# Patient Record
Sex: MALE | Race: WHITE | Employment: UNEMPLOYED | URBAN - NONMETROPOLITAN AREA
[De-identification: names, ages, dates, MRNs, and addresses within clinical notes are randomized per-mention and may not be internally consistent; named-entity substitution may affect disease eponyms.]

---

## 2017-02-16 ENCOUNTER — HOSPITAL ENCOUNTER (OUTPATIENT)
Dept: GENERAL RADIOLOGY | Age: 37
Discharge: HOME | End: 2017-02-16

## 2017-02-16 DIAGNOSIS — J06.9 ACUTE UPPER RESPIRATORY INFECTIONS OF UNSPECIFIED SITE: ICD-10-CM

## 2017-02-16 PROCEDURE — 71020 XR CHEST STANDARD TWO VW: CPT

## 2019-02-06 ENCOUNTER — HOSPITAL ENCOUNTER (INPATIENT)
Age: 39
LOS: 1 days | Discharge: HOME | DRG: 897 | End: 2019-02-07
Attending: EMERGENCY MEDICINE | Admitting: HOSPITALIST

## 2019-02-06 ENCOUNTER — APPOINTMENT (OUTPATIENT)
Dept: CT IMAGING | Age: 39
DRG: 897 | End: 2019-02-06

## 2019-02-06 DIAGNOSIS — F15.10 METHAMPHETAMINE ABUSE (HCC): ICD-10-CM

## 2019-02-06 DIAGNOSIS — F10.931 ALCOHOL WITHDRAWAL SYNDROME, WITH DELIRIUM (HCC): ICD-10-CM

## 2019-02-06 DIAGNOSIS — F29 PSYCHOSIS, UNSPECIFIED PSYCHOSIS TYPE (HCC): Primary | ICD-10-CM

## 2019-02-06 DIAGNOSIS — Z72.0 TOBACCO ABUSE: ICD-10-CM

## 2019-02-06 DIAGNOSIS — M62.82 NON-TRAUMATIC RHABDOMYOLYSIS: ICD-10-CM

## 2019-02-06 PROBLEM — F19.959 DRUG-INDUCED PSYCHOTIC DISORDER WITH COMPLICATION (HCC): Status: ACTIVE | Noted: 2019-02-06

## 2019-02-06 PROBLEM — R74.01 TRANSAMINASEMIA: Status: ACTIVE | Noted: 2019-02-06

## 2019-02-06 PROBLEM — F19.10 POLYSUBSTANCE ABUSE (HCC): Status: ACTIVE | Noted: 2019-02-06

## 2019-02-06 LAB
ACETAMINOPHEN LEVEL: <15 UG/ML
ALBUMIN SERPL-MCNC: 4.7 G/DL (ref 3.5–5.2)
ALP BLD-CCNC: 95 U/L (ref 40–130)
ALT SERPL-CCNC: 53 U/L (ref 5–41)
AMPHETAMINE SCREEN, URINE: POSITIVE
ANION GAP SERPL CALCULATED.3IONS-SCNC: 15 MMOL/L (ref 7–19)
AST SERPL-CCNC: 78 U/L (ref 5–40)
BARBITURATE SCREEN URINE: NEGATIVE
BASOPHILS ABSOLUTE: 0.1 K/UL (ref 0–0.2)
BASOPHILS RELATIVE PERCENT: 0.7 % (ref 0–1)
BENZODIAZEPINE SCREEN, URINE: NEGATIVE
BILIRUB SERPL-MCNC: 0.6 MG/DL (ref 0.2–1.2)
BILIRUBIN URINE: NEGATIVE
BLOOD, URINE: NEGATIVE
BUN BLDV-MCNC: 9 MG/DL (ref 6–20)
CALCIUM SERPL-MCNC: 9.4 MG/DL (ref 8.6–10)
CANNABINOID SCREEN URINE: NEGATIVE
CHLORIDE BLD-SCNC: 100 MMOL/L (ref 98–111)
CLARITY: CLEAR
CO2: 26 MMOL/L (ref 22–29)
COCAINE METABOLITE SCREEN URINE: NEGATIVE
COLOR: YELLOW
CREAT SERPL-MCNC: 0.8 MG/DL (ref 0.5–1.2)
EOSINOPHILS ABSOLUTE: 0.1 K/UL (ref 0–0.6)
EOSINOPHILS RELATIVE PERCENT: 0.7 % (ref 0–5)
ETHANOL: <10 MG/DL (ref 0–0.08)
GFR NON-AFRICAN AMERICAN: >60
GLUCOSE BLD-MCNC: 109 MG/DL (ref 74–109)
GLUCOSE URINE: NEGATIVE MG/DL
HAV IGM SER IA-ACNC: NORMAL
HCT VFR BLD CALC: 41.9 % (ref 42–52)
HEMOGLOBIN: 14.1 G/DL (ref 14–18)
HEPATITIS B CORE IGM ANTIBODY: NORMAL
HEPATITIS B SURFACE ANTIGEN INTERPRETATION: NORMAL
HEPATITIS C ANTIBODY INTERPRETATION: NORMAL
INR BLD: 0.94 (ref 0.88–1.18)
KETONES, URINE: NEGATIVE MG/DL
LEUKOCYTE ESTERASE, URINE: NEGATIVE
LIPASE: 30 U/L (ref 13–60)
LYMPHOCYTES ABSOLUTE: 1.1 K/UL (ref 1.1–4.5)
LYMPHOCYTES RELATIVE PERCENT: 11.2 % (ref 20–40)
Lab: ABNORMAL
MAGNESIUM: 2 MG/DL (ref 1.6–2.6)
MCH RBC QN AUTO: 32.7 PG (ref 27–31)
MCHC RBC AUTO-ENTMCNC: 33.7 G/DL (ref 33–37)
MCV RBC AUTO: 97.2 FL (ref 80–94)
MONOCYTES ABSOLUTE: 1 K/UL (ref 0–0.9)
MONOCYTES RELATIVE PERCENT: 10.1 % (ref 0–10)
NEUTROPHILS ABSOLUTE: 7.4 K/UL (ref 1.5–7.5)
NEUTROPHILS RELATIVE PERCENT: 76.8 % (ref 50–65)
NITRITE, URINE: NEGATIVE
OPIATE SCREEN URINE: NEGATIVE
PDW BLD-RTO: 12.5 % (ref 11.5–14.5)
PH UA: 6.5
PLATELET # BLD: 144 K/UL (ref 130–400)
PMV BLD AUTO: 10.3 FL (ref 9.4–12.4)
POTASSIUM SERPL-SCNC: 3.8 MMOL/L (ref 3.5–5)
PROTEIN UA: NEGATIVE MG/DL
PROTHROMBIN TIME: 12 SEC (ref 12–14.6)
RBC # BLD: 4.31 M/UL (ref 4.7–6.1)
SALICYLATE, SERUM: <3 MG/DL (ref 3–10)
SODIUM BLD-SCNC: 141 MMOL/L (ref 136–145)
SPECIFIC GRAVITY UA: 1.01
TOTAL CK: 357 U/L (ref 39–308)
TOTAL CK: 670 U/L (ref 39–308)
TOTAL PROTEIN: 8.2 G/DL (ref 6.6–8.7)
URINE REFLEX TO CULTURE: NORMAL
UROBILINOGEN, URINE: 0.2 E.U./DL
WBC # BLD: 9.6 K/UL (ref 4.8–10.8)

## 2019-02-06 PROCEDURE — 83690 ASSAY OF LIPASE: CPT

## 2019-02-06 PROCEDURE — 96375 TX/PRO/DX INJ NEW DRUG ADDON: CPT

## 2019-02-06 PROCEDURE — 82550 ASSAY OF CK (CPK): CPT

## 2019-02-06 PROCEDURE — 2500000003 HC RX 250 WO HCPCS: Performed by: FAMILY MEDICINE

## 2019-02-06 PROCEDURE — 96376 TX/PRO/DX INJ SAME DRUG ADON: CPT

## 2019-02-06 PROCEDURE — 80074 ACUTE HEPATITIS PANEL: CPT

## 2019-02-06 PROCEDURE — 6360000002 HC RX W HCPCS: Performed by: FAMILY MEDICINE

## 2019-02-06 PROCEDURE — 2100000000 HC CCU R&B

## 2019-02-06 PROCEDURE — 83735 ASSAY OF MAGNESIUM: CPT

## 2019-02-06 PROCEDURE — 96366 THER/PROPH/DIAG IV INF ADDON: CPT

## 2019-02-06 PROCEDURE — 81003 URINALYSIS AUTO W/O SCOPE: CPT

## 2019-02-06 PROCEDURE — 80307 DRUG TEST PRSMV CHEM ANLYZR: CPT

## 2019-02-06 PROCEDURE — 36415 COLL VENOUS BLD VENIPUNCTURE: CPT

## 2019-02-06 PROCEDURE — 85025 COMPLETE CBC W/AUTO DIFF WBC: CPT

## 2019-02-06 PROCEDURE — 96365 THER/PROPH/DIAG IV INF INIT: CPT

## 2019-02-06 PROCEDURE — G0480 DRUG TEST DEF 1-7 CLASSES: HCPCS

## 2019-02-06 PROCEDURE — 99223 1ST HOSP IP/OBS HIGH 75: CPT | Performed by: FAMILY MEDICINE

## 2019-02-06 PROCEDURE — 6370000000 HC RX 637 (ALT 250 FOR IP): Performed by: FAMILY MEDICINE

## 2019-02-06 PROCEDURE — 2500000003 HC RX 250 WO HCPCS: Performed by: EMERGENCY MEDICINE

## 2019-02-06 PROCEDURE — 93005 ELECTROCARDIOGRAM TRACING: CPT

## 2019-02-06 PROCEDURE — 6370000000 HC RX 637 (ALT 250 FOR IP): Performed by: EMERGENCY MEDICINE

## 2019-02-06 PROCEDURE — 6360000002 HC RX W HCPCS: Performed by: EMERGENCY MEDICINE

## 2019-02-06 PROCEDURE — 2580000003 HC RX 258: Performed by: FAMILY MEDICINE

## 2019-02-06 PROCEDURE — 99291 CRITICAL CARE FIRST HOUR: CPT | Performed by: EMERGENCY MEDICINE

## 2019-02-06 PROCEDURE — 85610 PROTHROMBIN TIME: CPT

## 2019-02-06 PROCEDURE — 99285 EMERGENCY DEPT VISIT HI MDM: CPT

## 2019-02-06 PROCEDURE — 2580000003 HC RX 258: Performed by: EMERGENCY MEDICINE

## 2019-02-06 PROCEDURE — 70450 CT HEAD/BRAIN W/O DYE: CPT

## 2019-02-06 PROCEDURE — 80053 COMPREHEN METABOLIC PANEL: CPT

## 2019-02-06 RX ORDER — ONDANSETRON 2 MG/ML
4 INJECTION INTRAMUSCULAR; INTRAVENOUS EVERY 6 HOURS PRN
Status: DISCONTINUED | OUTPATIENT
Start: 2019-02-06 | End: 2019-02-07 | Stop reason: HOSPADM

## 2019-02-06 RX ORDER — LORAZEPAM 1 MG/1
4 TABLET ORAL
Status: DISCONTINUED | OUTPATIENT
Start: 2019-02-06 | End: 2019-02-07 | Stop reason: HOSPADM

## 2019-02-06 RX ORDER — LORAZEPAM 2 MG/ML
3 INJECTION INTRAMUSCULAR
Status: DISCONTINUED | OUTPATIENT
Start: 2019-02-06 | End: 2019-02-07 | Stop reason: HOSPADM

## 2019-02-06 RX ORDER — OLANZAPINE 10 MG/1
5 TABLET, ORALLY DISINTEGRATING ORAL ONCE
Status: COMPLETED | OUTPATIENT
Start: 2019-02-06 | End: 2019-02-06

## 2019-02-06 RX ORDER — LORAZEPAM 1 MG/1
1 TABLET ORAL
Status: DISCONTINUED | OUTPATIENT
Start: 2019-02-06 | End: 2019-02-07 | Stop reason: HOSPADM

## 2019-02-06 RX ORDER — LORAZEPAM 2 MG/ML
2 INJECTION INTRAMUSCULAR ONCE
Status: COMPLETED | OUTPATIENT
Start: 2019-02-06 | End: 2019-02-06

## 2019-02-06 RX ORDER — ACETAMINOPHEN 325 MG/1
650 TABLET ORAL EVERY 6 HOURS PRN
Status: DISCONTINUED | OUTPATIENT
Start: 2019-02-06 | End: 2019-02-07 | Stop reason: HOSPADM

## 2019-02-06 RX ORDER — LORAZEPAM 1 MG/1
3 TABLET ORAL
Status: DISCONTINUED | OUTPATIENT
Start: 2019-02-06 | End: 2019-02-07 | Stop reason: HOSPADM

## 2019-02-06 RX ORDER — LORAZEPAM 2 MG/ML
2 INJECTION INTRAMUSCULAR
Status: DISCONTINUED | OUTPATIENT
Start: 2019-02-06 | End: 2019-02-07 | Stop reason: HOSPADM

## 2019-02-06 RX ORDER — 0.9 % SODIUM CHLORIDE 0.9 %
1000 INTRAVENOUS SOLUTION INTRAVENOUS ONCE
Status: COMPLETED | OUTPATIENT
Start: 2019-02-06 | End: 2019-02-06

## 2019-02-06 RX ORDER — SODIUM CHLORIDE 0.9 % (FLUSH) 0.9 %
10 SYRINGE (ML) INJECTION EVERY 12 HOURS SCHEDULED
Status: DISCONTINUED | OUTPATIENT
Start: 2019-02-06 | End: 2019-02-07 | Stop reason: HOSPADM

## 2019-02-06 RX ORDER — LORAZEPAM 2 MG/ML
1 INJECTION INTRAMUSCULAR
Status: DISCONTINUED | OUTPATIENT
Start: 2019-02-06 | End: 2019-02-07 | Stop reason: HOSPADM

## 2019-02-06 RX ORDER — SODIUM CHLORIDE 0.9 % (FLUSH) 0.9 %
10 SYRINGE (ML) INJECTION PRN
Status: DISCONTINUED | OUTPATIENT
Start: 2019-02-06 | End: 2019-02-07 | Stop reason: HOSPADM

## 2019-02-06 RX ORDER — SODIUM CHLORIDE 9 MG/ML
INJECTION, SOLUTION INTRAVENOUS CONTINUOUS
Status: DISCONTINUED | OUTPATIENT
Start: 2019-02-06 | End: 2019-02-07 | Stop reason: HOSPADM

## 2019-02-06 RX ORDER — NICOTINE 21 MG/24HR
1 PATCH, TRANSDERMAL 24 HOURS TRANSDERMAL DAILY
Status: DISCONTINUED | OUTPATIENT
Start: 2019-02-06 | End: 2019-02-07 | Stop reason: HOSPADM

## 2019-02-06 RX ORDER — LORAZEPAM 1 MG/1
2 TABLET ORAL
Status: DISCONTINUED | OUTPATIENT
Start: 2019-02-06 | End: 2019-02-07 | Stop reason: HOSPADM

## 2019-02-06 RX ORDER — LORAZEPAM 2 MG/ML
4 INJECTION INTRAMUSCULAR
Status: DISCONTINUED | OUTPATIENT
Start: 2019-02-06 | End: 2019-02-07 | Stop reason: HOSPADM

## 2019-02-06 RX ADMIN — FOLIC ACID: 5 INJECTION, SOLUTION INTRAMUSCULAR; INTRAVENOUS; SUBCUTANEOUS at 14:11

## 2019-02-06 RX ADMIN — Medication 10 ML: at 21:29

## 2019-02-06 RX ADMIN — FAMOTIDINE 20 MG: 10 INJECTION, SOLUTION INTRAVENOUS at 21:29

## 2019-02-06 RX ADMIN — FOLIC ACID: 5 INJECTION, SOLUTION INTRAMUSCULAR; INTRAVENOUS; SUBCUTANEOUS at 10:04

## 2019-02-06 RX ADMIN — LORAZEPAM 2 MG: 2 INJECTION INTRAMUSCULAR; INTRAVENOUS at 08:56

## 2019-02-06 RX ADMIN — LORAZEPAM 2 MG: 2 INJECTION INTRAMUSCULAR; INTRAVENOUS at 18:31

## 2019-02-06 RX ADMIN — OLANZAPINE 5 MG: 10 TABLET, ORALLY DISINTEGRATING ORAL at 08:56

## 2019-02-06 RX ADMIN — LORAZEPAM 2 MG: 2 INJECTION INTRAMUSCULAR; INTRAVENOUS at 14:17

## 2019-02-06 RX ADMIN — LORAZEPAM 2 MG: 2 INJECTION INTRAMUSCULAR; INTRAVENOUS at 08:35

## 2019-02-06 RX ADMIN — SODIUM CHLORIDE 1000 ML: 9 INJECTION, SOLUTION INTRAVENOUS at 08:40

## 2019-02-06 RX ADMIN — SODIUM CHLORIDE: 9 INJECTION, SOLUTION INTRAVENOUS at 12:56

## 2019-02-06 RX ADMIN — LORAZEPAM 2 MG: 2 INJECTION INTRAMUSCULAR; INTRAVENOUS at 10:04

## 2019-02-06 RX ADMIN — FAMOTIDINE 20 MG: 10 INJECTION, SOLUTION INTRAVENOUS at 12:59

## 2019-02-06 RX ADMIN — ENOXAPARIN SODIUM 40 MG: 40 INJECTION SUBCUTANEOUS at 13:00

## 2019-02-06 ASSESSMENT — PAIN SCALES - GENERAL: PAINLEVEL_OUTOF10: 0

## 2019-02-07 VITALS
HEIGHT: 69 IN | WEIGHT: 164.2 LBS | OXYGEN SATURATION: 93 % | RESPIRATION RATE: 15 BRPM | SYSTOLIC BLOOD PRESSURE: 130 MMHG | TEMPERATURE: 99.3 F | DIASTOLIC BLOOD PRESSURE: 90 MMHG | HEART RATE: 93 BPM | BODY MASS INDEX: 24.32 KG/M2

## 2019-02-07 PROBLEM — M62.82 NON-TRAUMATIC RHABDOMYOLYSIS: Status: RESOLVED | Noted: 2019-02-06 | Resolved: 2019-02-07

## 2019-02-07 PROBLEM — F10.931 ALCOHOL WITHDRAWAL SYNDROME, WITH DELIRIUM (HCC): Status: RESOLVED | Noted: 2019-02-06 | Resolved: 2019-02-07

## 2019-02-07 PROBLEM — F19.959 DRUG-INDUCED PSYCHOTIC DISORDER WITH COMPLICATION (HCC): Status: RESOLVED | Noted: 2019-02-06 | Resolved: 2019-02-07

## 2019-02-07 LAB
TOTAL CK: 180 U/L (ref 39–308)
TOTAL CK: 227 U/L (ref 39–308)
TOTAL CK: 270 U/L (ref 39–308)

## 2019-02-07 PROCEDURE — 36415 COLL VENOUS BLD VENIPUNCTURE: CPT

## 2019-02-07 PROCEDURE — 2580000003 HC RX 258: Performed by: FAMILY MEDICINE

## 2019-02-07 PROCEDURE — 6360000002 HC RX W HCPCS: Performed by: FAMILY MEDICINE

## 2019-02-07 PROCEDURE — 99239 HOSP IP/OBS DSCHRG MGMT >30: CPT | Performed by: HOSPITALIST

## 2019-02-07 PROCEDURE — 2500000003 HC RX 250 WO HCPCS: Performed by: FAMILY MEDICINE

## 2019-02-07 PROCEDURE — 99253 IP/OBS CNSLTJ NEW/EST LOW 45: CPT | Performed by: PSYCHIATRY & NEUROLOGY

## 2019-02-07 PROCEDURE — 6370000000 HC RX 637 (ALT 250 FOR IP): Performed by: FAMILY MEDICINE

## 2019-02-07 PROCEDURE — 82550 ASSAY OF CK (CPK): CPT

## 2019-02-07 RX ORDER — NALTREXONE HYDROCHLORIDE 50 MG/1
50 TABLET, FILM COATED ORAL
Status: DISCONTINUED | OUTPATIENT
Start: 2019-02-08 | End: 2019-02-07 | Stop reason: HOSPADM

## 2019-02-07 RX ADMIN — Medication 10 ML: at 09:02

## 2019-02-07 RX ADMIN — FAMOTIDINE 20 MG: 10 INJECTION, SOLUTION INTRAVENOUS at 09:01

## 2019-02-07 RX ADMIN — FOLIC ACID: 5 INJECTION, SOLUTION INTRAMUSCULAR; INTRAVENOUS; SUBCUTANEOUS at 09:01

## 2019-02-07 ASSESSMENT — PAIN SCALES - GENERAL
PAINLEVEL_OUTOF10: 0

## 2019-02-08 LAB
EKG P AXIS: 73 DEGREES
EKG P-R INTERVAL: 150 MS
EKG Q-T INTERVAL: 322 MS
EKG QRS DURATION: 92 MS
EKG QTC CALCULATION (BAZETT): 427 MS
EKG T AXIS: 51 DEGREES

## 2019-04-21 ENCOUNTER — HOSPITAL ENCOUNTER (OUTPATIENT)
Age: 39
Setting detail: OBSERVATION
Discharge: HOME | End: 2019-04-22
Attending: EMERGENCY MEDICINE | Admitting: FAMILY MEDICINE

## 2019-04-21 DIAGNOSIS — F10.920 ACUTE ALCOHOLIC INTOXICATION WITHOUT COMPLICATION (HCC): ICD-10-CM

## 2019-04-21 DIAGNOSIS — R45.851 SUICIDAL IDEATION: Primary | ICD-10-CM

## 2019-04-21 LAB
ACETAMINOPHEN LEVEL: <15 UG/ML
ALBUMIN SERPL-MCNC: 4.6 G/DL (ref 3.5–5.2)
ALP BLD-CCNC: 104 U/L (ref 40–130)
ALT SERPL-CCNC: 156 U/L (ref 5–41)
AMPHETAMINE SCREEN, URINE: NEGATIVE
ANION GAP SERPL CALCULATED.3IONS-SCNC: 17 MMOL/L (ref 7–19)
AST SERPL-CCNC: 167 U/L (ref 5–40)
BARBITURATE SCREEN URINE: NEGATIVE
BASOPHILS ABSOLUTE: 0.1 K/UL (ref 0–0.2)
BASOPHILS RELATIVE PERCENT: 1.3 % (ref 0–1)
BENZODIAZEPINE SCREEN, URINE: NEGATIVE
BILIRUB SERPL-MCNC: 0.3 MG/DL (ref 0.2–1.2)
BUN BLDV-MCNC: 14 MG/DL (ref 6–20)
CALCIUM SERPL-MCNC: 9.3 MG/DL (ref 8.6–10)
CANNABINOID SCREEN URINE: NEGATIVE
CHLORIDE BLD-SCNC: 102 MMOL/L (ref 98–111)
CO2: 24 MMOL/L (ref 22–29)
COCAINE METABOLITE SCREEN URINE: NEGATIVE
CREAT SERPL-MCNC: 0.6 MG/DL (ref 0.5–1.2)
EOSINOPHILS ABSOLUTE: 0 K/UL (ref 0–0.6)
EOSINOPHILS RELATIVE PERCENT: 0.3 % (ref 0–5)
ETHANOL: 320 MG/DL (ref 0–0.08)
GFR NON-AFRICAN AMERICAN: >60
GLUCOSE BLD-MCNC: 127 MG/DL (ref 74–109)
HCT VFR BLD CALC: 46 % (ref 42–52)
HEMOGLOBIN: 15.8 G/DL (ref 14–18)
LYMPHOCYTES ABSOLUTE: 1.5 K/UL (ref 1.1–4.5)
LYMPHOCYTES RELATIVE PERCENT: 24.2 % (ref 20–40)
Lab: NORMAL
MCH RBC QN AUTO: 33.9 PG (ref 27–31)
MCHC RBC AUTO-ENTMCNC: 34.3 G/DL (ref 33–37)
MCV RBC AUTO: 98.7 FL (ref 80–94)
MONOCYTES ABSOLUTE: 0.4 K/UL (ref 0–0.9)
MONOCYTES RELATIVE PERCENT: 6.6 % (ref 0–10)
NEUTROPHILS ABSOLUTE: 4.3 K/UL (ref 1.5–7.5)
NEUTROPHILS RELATIVE PERCENT: 67.3 % (ref 50–65)
OPIATE SCREEN URINE: NEGATIVE
PDW BLD-RTO: 13.2 % (ref 11.5–14.5)
PLATELET # BLD: 163 K/UL (ref 130–400)
PMV BLD AUTO: 9.8 FL (ref 9.4–12.4)
POTASSIUM SERPL-SCNC: 3.7 MMOL/L (ref 3.5–5)
RBC # BLD: 4.66 M/UL (ref 4.7–6.1)
SALICYLATE, SERUM: <3 MG/DL (ref 3–10)
SODIUM BLD-SCNC: 143 MMOL/L (ref 136–145)
TOTAL PROTEIN: 8.1 G/DL (ref 6.6–8.7)
WBC # BLD: 6.3 K/UL (ref 4.8–10.8)

## 2019-04-21 PROCEDURE — 99285 EMERGENCY DEPT VISIT HI MDM: CPT

## 2019-04-21 PROCEDURE — 6360000002 HC RX W HCPCS: Performed by: EMERGENCY MEDICINE

## 2019-04-21 PROCEDURE — 99285 EMERGENCY DEPT VISIT HI MDM: CPT | Performed by: EMERGENCY MEDICINE

## 2019-04-21 PROCEDURE — 85025 COMPLETE CBC W/AUTO DIFF WBC: CPT

## 2019-04-21 PROCEDURE — G0480 DRUG TEST DEF 1-7 CLASSES: HCPCS

## 2019-04-21 PROCEDURE — G0378 HOSPITAL OBSERVATION PER HR: HCPCS

## 2019-04-21 PROCEDURE — 36415 COLL VENOUS BLD VENIPUNCTURE: CPT

## 2019-04-21 PROCEDURE — 80053 COMPREHEN METABOLIC PANEL: CPT

## 2019-04-21 PROCEDURE — 2580000003 HC RX 258: Performed by: EMERGENCY MEDICINE

## 2019-04-21 PROCEDURE — 83735 ASSAY OF MAGNESIUM: CPT

## 2019-04-21 PROCEDURE — 2500000003 HC RX 250 WO HCPCS: Performed by: EMERGENCY MEDICINE

## 2019-04-21 PROCEDURE — 6360000002 HC RX W HCPCS: Performed by: INTERNAL MEDICINE

## 2019-04-21 PROCEDURE — 99219 PR INITIAL OBSERVATION CARE/DAY 50 MINUTES: CPT | Performed by: INTERNAL MEDICINE

## 2019-04-21 PROCEDURE — 96374 THER/PROPH/DIAG INJ IV PUSH: CPT

## 2019-04-21 PROCEDURE — 80307 DRUG TEST PRSMV CHEM ANLYZR: CPT

## 2019-04-21 RX ORDER — THIAMINE MONONITRATE (VIT B1) 100 MG
100 TABLET ORAL DAILY
Status: DISCONTINUED | OUTPATIENT
Start: 2019-04-22 | End: 2019-04-22 | Stop reason: HOSPADM

## 2019-04-21 RX ORDER — LORAZEPAM 1 MG/1
1 TABLET ORAL
Status: DISCONTINUED | OUTPATIENT
Start: 2019-04-21 | End: 2019-04-22 | Stop reason: HOSPADM

## 2019-04-21 RX ORDER — LORAZEPAM 2 MG/ML
3 INJECTION INTRAMUSCULAR
Status: DISCONTINUED | OUTPATIENT
Start: 2019-04-21 | End: 2019-04-22 | Stop reason: HOSPADM

## 2019-04-21 RX ORDER — SODIUM CHLORIDE 9 MG/ML
INJECTION, SOLUTION INTRAVENOUS CONTINUOUS
Status: DISCONTINUED | OUTPATIENT
Start: 2019-04-22 | End: 2019-04-22 | Stop reason: HOSPADM

## 2019-04-21 RX ORDER — ONDANSETRON 2 MG/ML
4 INJECTION INTRAMUSCULAR; INTRAVENOUS EVERY 6 HOURS PRN
Status: DISCONTINUED | OUTPATIENT
Start: 2019-04-21 | End: 2019-04-22 | Stop reason: HOSPADM

## 2019-04-21 RX ORDER — LORAZEPAM 1 MG/1
2 TABLET ORAL
Status: DISCONTINUED | OUTPATIENT
Start: 2019-04-21 | End: 2019-04-22 | Stop reason: HOSPADM

## 2019-04-21 RX ORDER — LORAZEPAM 2 MG/ML
1 INJECTION INTRAMUSCULAR
Status: DISCONTINUED | OUTPATIENT
Start: 2019-04-21 | End: 2019-04-22 | Stop reason: HOSPADM

## 2019-04-21 RX ORDER — LORAZEPAM 2 MG/ML
INJECTION INTRAMUSCULAR
Status: DISPENSED
Start: 2019-04-21 | End: 2019-04-22

## 2019-04-21 RX ORDER — LORAZEPAM 1 MG/1
3 TABLET ORAL
Status: DISCONTINUED | OUTPATIENT
Start: 2019-04-21 | End: 2019-04-22 | Stop reason: HOSPADM

## 2019-04-21 RX ORDER — LORAZEPAM 1 MG/1
4 TABLET ORAL
Status: DISCONTINUED | OUTPATIENT
Start: 2019-04-21 | End: 2019-04-22 | Stop reason: HOSPADM

## 2019-04-21 RX ORDER — FOLIC ACID 1 MG/1
1 TABLET ORAL DAILY
Status: DISCONTINUED | OUTPATIENT
Start: 2019-04-22 | End: 2019-04-22 | Stop reason: HOSPADM

## 2019-04-21 RX ORDER — LORAZEPAM 2 MG/ML
2 INJECTION INTRAMUSCULAR
Status: DISCONTINUED | OUTPATIENT
Start: 2019-04-21 | End: 2019-04-22 | Stop reason: HOSPADM

## 2019-04-21 RX ORDER — LORAZEPAM 2 MG/ML
4 INJECTION INTRAMUSCULAR
Status: DISCONTINUED | OUTPATIENT
Start: 2019-04-21 | End: 2019-04-22 | Stop reason: HOSPADM

## 2019-04-21 RX ORDER — ACETAMINOPHEN 325 MG/1
325 TABLET ORAL EVERY 8 HOURS PRN
Status: DISCONTINUED | OUTPATIENT
Start: 2019-04-21 | End: 2019-04-22 | Stop reason: HOSPADM

## 2019-04-21 RX ADMIN — FOLIC ACID: 5 INJECTION, SOLUTION INTRAMUSCULAR; INTRAVENOUS; SUBCUTANEOUS at 21:50

## 2019-04-21 RX ADMIN — LORAZEPAM 3 MG: 2 INJECTION INTRAMUSCULAR; INTRAVENOUS at 23:57

## 2019-04-21 ASSESSMENT — ENCOUNTER SYMPTOMS
COUGH: 0
VOMITING: 0
DIARRHEA: 0
ABDOMINAL PAIN: 0
NAUSEA: 0
SHORTNESS OF BREATH: 0
RHINORRHEA: 0
SORE THROAT: 0
BACK PAIN: 0

## 2019-04-22 VITALS
SYSTOLIC BLOOD PRESSURE: 135 MMHG | RESPIRATION RATE: 20 BRPM | TEMPERATURE: 99.7 F | BODY MASS INDEX: 21.69 KG/M2 | WEIGHT: 151.5 LBS | DIASTOLIC BLOOD PRESSURE: 83 MMHG | OXYGEN SATURATION: 96 % | HEIGHT: 70 IN | HEART RATE: 79 BPM

## 2019-04-22 PROBLEM — F10.10 ALCOHOL ABUSE: Status: ACTIVE | Noted: 2019-04-22

## 2019-04-22 PROBLEM — F10.20 ALCOHOL ABUSE WITH PHYSIOLOGICAL DEPENDENCE (HCC): Status: ACTIVE | Noted: 2019-04-22

## 2019-04-22 PROBLEM — D53.9 MACROCYTIC ANEMIA: Status: ACTIVE | Noted: 2019-04-22

## 2019-04-22 LAB
ALBUMIN SERPL-MCNC: 3.9 G/DL (ref 3.5–5.2)
ALP BLD-CCNC: 82 U/L (ref 40–130)
ALT SERPL-CCNC: 120 U/L (ref 5–41)
ANION GAP SERPL CALCULATED.3IONS-SCNC: 14 MMOL/L (ref 7–19)
AST SERPL-CCNC: 135 U/L (ref 5–40)
BASOPHILS ABSOLUTE: 0.1 K/UL (ref 0–0.2)
BASOPHILS RELATIVE PERCENT: 1.7 % (ref 0–1)
BILIRUB SERPL-MCNC: 0.4 MG/DL (ref 0.2–1.2)
BUN BLDV-MCNC: 12 MG/DL (ref 6–20)
CALCIUM SERPL-MCNC: 8.7 MG/DL (ref 8.6–10)
CHLORIDE BLD-SCNC: 103 MMOL/L (ref 98–111)
CO2: 28 MMOL/L (ref 22–29)
CREAT SERPL-MCNC: 0.6 MG/DL (ref 0.5–1.2)
EOSINOPHILS ABSOLUTE: 0.1 K/UL (ref 0–0.6)
EOSINOPHILS RELATIVE PERCENT: 2.9 % (ref 0–5)
GFR NON-AFRICAN AMERICAN: >60
GLUCOSE BLD-MCNC: 90 MG/DL (ref 74–109)
HCT VFR BLD CALC: 37.8 % (ref 42–52)
HEMOGLOBIN: 13 G/DL (ref 14–18)
LYMPHOCYTES ABSOLUTE: 1.9 K/UL (ref 1.1–4.5)
LYMPHOCYTES RELATIVE PERCENT: 39.7 % (ref 20–40)
MAGNESIUM: 1.8 MG/DL (ref 1.6–2.6)
MAGNESIUM: 2 MG/DL (ref 1.6–2.6)
MCH RBC QN AUTO: 34 PG (ref 27–31)
MCHC RBC AUTO-ENTMCNC: 34.4 G/DL (ref 33–37)
MCV RBC AUTO: 99 FL (ref 80–94)
MONOCYTES ABSOLUTE: 0.7 K/UL (ref 0–0.9)
MONOCYTES RELATIVE PERCENT: 14.3 % (ref 0–10)
NEUTROPHILS ABSOLUTE: 2 K/UL (ref 1.5–7.5)
NEUTROPHILS RELATIVE PERCENT: 41.2 % (ref 50–65)
PDW BLD-RTO: 13.2 % (ref 11.5–14.5)
PHOSPHORUS: 2.7 MG/DL (ref 2.5–4.5)
PLATELET # BLD: 131 K/UL (ref 130–400)
PMV BLD AUTO: 10.3 FL (ref 9.4–12.4)
POTASSIUM REFLEX MAGNESIUM: 3.5 MMOL/L (ref 3.5–5)
RBC # BLD: 3.82 M/UL (ref 4.7–6.1)
SODIUM BLD-SCNC: 145 MMOL/L (ref 136–145)
TOTAL PROTEIN: 6.5 G/DL (ref 6.6–8.7)
WBC # BLD: 4.8 K/UL (ref 4.8–10.8)

## 2019-04-22 PROCEDURE — 2580000003 HC RX 258: Performed by: INTERNAL MEDICINE

## 2019-04-22 PROCEDURE — 85025 COMPLETE CBC W/AUTO DIFF WBC: CPT

## 2019-04-22 PROCEDURE — 96372 THER/PROPH/DIAG INJ SC/IM: CPT

## 2019-04-22 PROCEDURE — 6370000000 HC RX 637 (ALT 250 FOR IP): Performed by: INTERNAL MEDICINE

## 2019-04-22 PROCEDURE — 6360000002 HC RX W HCPCS: Performed by: INTERNAL MEDICINE

## 2019-04-22 PROCEDURE — 80053 COMPREHEN METABOLIC PANEL: CPT

## 2019-04-22 PROCEDURE — 36415 COLL VENOUS BLD VENIPUNCTURE: CPT

## 2019-04-22 PROCEDURE — 99226 PR SBSQ OBSERVATION CARE/DAY 35 MINUTES: CPT | Performed by: FAMILY MEDICINE

## 2019-04-22 PROCEDURE — 84100 ASSAY OF PHOSPHORUS: CPT

## 2019-04-22 PROCEDURE — G0378 HOSPITAL OBSERVATION PER HR: HCPCS

## 2019-04-22 PROCEDURE — 6370000000 HC RX 637 (ALT 250 FOR IP): Performed by: FAMILY MEDICINE

## 2019-04-22 PROCEDURE — 99243 OFF/OP CNSLTJ NEW/EST LOW 30: CPT | Performed by: PSYCHIATRY & NEUROLOGY

## 2019-04-22 PROCEDURE — 83735 ASSAY OF MAGNESIUM: CPT

## 2019-04-22 RX ORDER — CHLORDIAZEPOXIDE HYDROCHLORIDE 10 MG/1
10 CAPSULE, GELATIN COATED ORAL 3 TIMES DAILY
Qty: 9 CAPSULE | Refills: 0 | Status: SHIPPED | OUTPATIENT
Start: 2019-04-22 | End: 2019-04-25

## 2019-04-22 RX ORDER — NICOTINE 21 MG/24HR
1 PATCH, TRANSDERMAL 24 HOURS TRANSDERMAL DAILY
Status: DISCONTINUED | OUTPATIENT
Start: 2019-04-22 | End: 2019-04-22 | Stop reason: HOSPADM

## 2019-04-22 RX ORDER — NICOTINE 21 MG/24HR
1 PATCH, TRANSDERMAL 24 HOURS TRANSDERMAL DAILY
Qty: 30 PATCH | Refills: 0 | Status: SHIPPED | OUTPATIENT
Start: 2019-04-23 | End: 2020-04-25

## 2019-04-22 RX ORDER — FOLIC ACID 1 MG/1
1 TABLET ORAL DAILY
Qty: 30 TABLET | Refills: 0 | Status: ON HOLD | OUTPATIENT
Start: 2019-04-23 | End: 2020-04-25

## 2019-04-22 RX ORDER — LANOLIN ALCOHOL/MO/W.PET/CERES
100 CREAM (GRAM) TOPICAL DAILY
Qty: 30 TABLET | Refills: 0 | Status: SHIPPED | OUTPATIENT
Start: 2019-04-23 | End: 2020-04-25

## 2019-04-22 RX ADMIN — SODIUM CHLORIDE: 9 INJECTION, SOLUTION INTRAVENOUS at 15:49

## 2019-04-22 RX ADMIN — LORAZEPAM 1 MG: 1 TABLET ORAL at 13:28

## 2019-04-22 RX ADMIN — SODIUM CHLORIDE: 9 INJECTION, SOLUTION INTRAVENOUS at 02:12

## 2019-04-22 RX ADMIN — Medication 100 MG: at 09:47

## 2019-04-22 RX ADMIN — LORAZEPAM 1 MG: 1 TABLET ORAL at 09:47

## 2019-04-22 RX ADMIN — ONDANSETRON 4 MG: 2 INJECTION INTRAMUSCULAR; INTRAVENOUS at 09:47

## 2019-04-22 RX ADMIN — FOLIC ACID 1 MG: 1 TABLET ORAL at 09:47

## 2019-04-22 RX ADMIN — ENOXAPARIN SODIUM 40 MG: 40 INJECTION SUBCUTANEOUS at 09:54

## 2019-04-22 ASSESSMENT — ENCOUNTER SYMPTOMS
HEMOPTYSIS: 0
PHOTOPHOBIA: 0
DOUBLE VISION: 0
BACK PAIN: 0
SPUTUM PRODUCTION: 0
BLURRED VISION: 0
VOMITING: 0
COUGH: 0
ORTHOPNEA: 0
DIARRHEA: 0
HEARTBURN: 0
EYE PAIN: 0
NAUSEA: 0
ABDOMINAL PAIN: 0

## 2019-04-22 NOTE — BH NOTE
SITTER 1:1  C-SSRS COMPLETED    Patient denies SI, HI, AVH. Reports he has never attempted suicide. Drinking - mother, (in room) \"reported he wanted to die, had not eaten in 3 days, wanted to lay there and drink himself to death. Had no intention of eating or doing anything except get off the couch to get another 5th of whiskey. He agreed to come to hospital\"  Drinks daily, 5th of whiskey, denies thoughts of wanting to go to sleep and not wake up (mother frowns)  Reports he is having financial problems, relationship issues. 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 Does not see anyone for depression, which he denies - Mother says \"you know you are depresse\" he replies \"Drinking makes me depressed\". No psychiatric hospitalizations. Shai Mccain: [phone]. Spoke with Dr. Odom July, continue sitter until Dr. Odom July visits patient.

## 2019-04-22 NOTE — PROGRESS NOTES
Pt dc in the company of his mother to his private residence in a private vehicle. Pt received information for [employer] and for [employer] should he decide he would like to utilize these services. Pt verbalized understanding and stated that he was willing to stop drinking. Pt received AVS and his script and was educated. Pt and mother tolerated.  Electronically signed by Ramirez Stuart RN on 4/22/2019 at 5:56 PM
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
## 2019-04-22 NOTE — PROGRESS NOTES
4 Eyes Skin Assessment    Ochoa Spain is being assessed upon: Admission    I agree that I, [address], along with Deann Puentes RN (either 2 RN's or 1 LPN and 1 RN) have performed a thorough Head to Toe Skin Assessment on the patient. ALL assessment sites listed below have been assessed. Areas assessed by both nurses:     [x]   Head, Face, and Ears   [x]   Shoulders, Back, and Chest  [x]   Arms, Elbows, and Hands   [x]   Coccyx, Sacrum, and Ischium  [x]   Legs, Feet, and Heels    Does the Patient have Skin Breakdown?  No    Peter Prevention initiated: Yes  Wound Care Orders initiated: No    WO nurse consulted for Pressure Injury (Stage 3,4, Unstageable, DTI, NWPT, and Complex wounds) and New or Established Ostomies: No        Primary Nurse eSignature: [address], RN on 4/22/2019 at 4:09 AM      Co-Signer eSignature: {Esignature:[id number]}

## 2019-04-22 NOTE — PROGRESS NOTES
Pt was updated as to his care. He was told that psych physician would be here to see him at some point today. Pt and mother tolerated information.  Pt is calm and cooperative and denied having any further questions at this time Electronically signed by Sarah Olivo RN on 4/22/2019 at 3:04 PM

## 2019-04-22 NOTE — CONSULTS
[facility]  Psychiatry Consult    Reason for Consult: Concern suicidal ideations    The primary source(s) of information include(s):  Patient and his mother    The patient is a 34 y.o. male with previous psychiatric history of alcohol use disorder and stimulant use disorder, who has been admitted to medical services secondary to alcohol intoxication, blood alcohol level was 320 at time of admission, and suicidal ideations. Patient has been seen in his room with presence of one-to-one sitter at patient's mother. Patient has been admitted for same reason as at the same time 2 months ago. She stated that after the discharge from the hospital he was doing well for 1-1/2 months was not drinking alcohol or using any illicit drugs. Patient's reported multiple recent life stresses. Stated that he has been laid off from his job in [employer] and currently he does not have any income. Also, his girlfriend broke up with him. Even patient stated that it was \"mutual\" separation, he relapsed in drinking alcohol and was drinking fifths of vodka daily during the 4 days before his admission. Patient stated that she started to feel depressed, had suicidal ideations and decided to come to hospital and ask for help. Patient stated that today he feels anxious, denies any feeling of depression, endorses withdrawal symptoms from alcohol - dizziness, hand tremor, body shakes, headache, increased irritability and anxiety, abdominal discomfort. Also, during the interview patient will decrease level of concentration, had difficulty to process information. He stated that his brother currently in his mother's house, locked himself in the basement and threatened to commit suicide. Patient stated that his anxiety and decrease concentration is related to worry about his brother's life. Patient denies any other affective symptomatology as this time. He denies any suicidal or homicidal ideations, denies any plans. Also patient denies any auditory and visual hallucinations. Patient's mother, who lives next door from the patient, confirmed all information which has been provided by patient. She expresses the same worry as her son about well-being of another son, who is currently in her house and threatens to commit suicide. PSYCHIATRIC HISTORY:  Diagnoses:  alcohol use disorder, stimulants use disorder  Suicide attempts/gestures: Denies   Prior hospitalizations: 2 months ago to medical services with the same presentation as at present time   Medication trials: Denies   Mental health contact: Denies   Head trauma: Denies    Allergies:  Patient has no known allergies. Social History:  Smoking - 1.5 PPD for 20 years  Alcohol - started drinking at age 11years old. Patient stated that he started drinking heavily at age 23years old. He denies any history of seizures, DTs when was coming off of alcohol. Patient denies any history of blackouts. He denies history of previous detox or rehabilitation treatments. Patient also denies history of medication assisted treatments for alcohol use disorder. Illicit drugs - stated that he smokes marijuana once every 3 months. Reported that he smokes marijuana last time  approximately 5 months ago. History of using methamphetamine and cocaine. Mental Status  Appearance: Appropriately groomed and in hospital attire. Made good eye contact. Behavior: Anxious, cooperative, and socially appropriate. Mild psychomotor agitation appreciated. Speech: Normal in tone, volume, and quality. No slurring, dysarthria or   pressured speech noted. Mood: \"fine\"   Affect: Mood congruent. Range is flat. Thought Process: Appears linear and goal oriented. Thought Content: Patient does not have any current active suicidal and   homicidal ideations. No overt delusions or paranoia appreciated. Perceptions: Seems patient does not have any auditory or visual hallucinations at present time.  Patient did not appear to be responding to internal stimuli. No overt psychosis. Executive Functions: Appear mildly impaired. Concentration: Decreased. Reasoning: Appears impaired based on interaction from interview   Orientation: to person, place, date, and situation. Alert. Language: Intact. Fund of information: Intact. Memory: recent and remote appear intact. Impulsivity: Limited. Neurovegitative: Fair appetite, good sleep. Insight: Impaired. Judgment: Fair. Assessment  DSM-IV DIAGNOSIS:  Alcohol use disorder, severe, dependence  Substance-induced mood disorder    Recommendations  1. Currently patient is not suicidal, homicidal or psychotic. He does not meet criteria for psychiatric hospitalization  2. Patient is competent at this time. 3. Patient does not present imminent danger to self or others. 4. Recommended to discontinue one-on-one sitter. 5. Patient can be discharged home when his detox treatment is done and he is medically stable. Thank you for your consult.     General Florencio MD

## 2019-04-22 NOTE — ED PROVIDER NOTES
[facility] [address]  eMERGENCY dEPARTMENT eNCOUnter      Pt Name: Mony Cobian  MRN: [id number]  [place] 1980  Date of evaluation: 4/21/2019  Provider: Chacha Kaufman MD    CHIEF COMPLAINT       Chief Complaint   Patient presents with    Mental Health Problem         HISTORY OF PRESENT ILLNESS   (Location/Symptom, Timing/Onset,Context/Setting, Quality, Duration, Modifying Factors, Severity)  Note limiting factors. Mony Cobian is a 34 y.o. male who presents to the emergency department for mental health evaluation. The patient admits to drinking a little more than a fifth of whiskey today. He states he is depressed and having suicidal thoughtsof specific plans. Denies homicidal ideation delusions hallucinations or paranoia. Denies drug use. HPI    NursingNotes were reviewed. REVIEW OF SYSTEMS    (2-9 systems for level 4, 10 or more for level 5)     Review of Systems   Constitutional: Negative for chills and fever. HENT: Negative for rhinorrhea and sore throat. Respiratory: Negative for cough and shortness of breath. Cardiovascular: Negative for chest pain and leg swelling. Gastrointestinal: Negative for abdominal pain, diarrhea, nausea and vomiting. Genitourinary: Negative for dysuria and frequency. Musculoskeletal: Negative for back pain and neck pain. Neurological: Negative for dizziness and headaches. Psychiatric/Behavioral: Positive for suicidal ideas. Negative for hallucinations. The patient is not nervous/anxious. All other systems reviewed and are negative. PAST MEDICALHISTORY     Past Medical History:   Diagnosis Date    Neuromuscular disorder ([facility] [address]     Psychiatric problem          SURGICAL HISTORY       Past Surgical History:   Procedure Laterality Date    ADENOIDECTOMY      COLONOSCOPY      TYMPANOSTOMY TUBE PLACEMENT           CURRENT MEDICATIONS     There are no discharge medications for this patient.       ALLERGIES     Patient has no known allergies. FAMILY HISTORY       Family History   Problem Relation Age of Onset    High Blood Pressure Mother           SOCIAL HISTORY       Social History     Socioeconomic History    Marital status:      Spouse name: None    Number of children: None    Years of education: None    Highest education level: None   Occupational History    None   Social Needs    Financial resource strain: None    Food insecurity:     Worry: None     Inability: None    Transportation needs:     Medical: None     Non-medical: None   Tobacco Use    Smoking status: Current Every Day Smoker     Packs/day: 2.00     Types: Cigarettes    Smokeless tobacco: Never Used   Substance and Sexual Activity    Alcohol use: Yes     Comment: \"every chance I get\"    Drug use: No    Sexual activity: None   Lifestyle    Physical activity:     Days per week: None     Minutes per session: None    Stress: None   Relationships    Social connections:     Talks on phone: None     Gets together: None     Attends Mandaen service: None     Active member of club or organization: None     Attends meetings of clubs or organizations: None     Relationship status: None    Intimate partner violence:     Fear of current or ex partner: None     Emotionally abused: None     Physically abused: None     Forced sexual activity: None   Other Topics Concern    None   Social History Narrative    None       SCREENINGS    Renuka Coma Scale  Eye Opening: Spontaneous  Best Verbal Response: Oriented  Best Motor Response: Obeys commands  [place] Coma Scale Score: 15        PHYSICAL EXAM    (up to 7 for level 4, 8 or more for level 5)     ED Triage Vitals [04/21/19 2039]   BP Temp Temp Source Pulse Resp SpO2 Height Weight   (!) 143/95 98 °F (36.7 °C) Temporal 106 18 93 % 5' 10\" (1.778 m) 155 lb (70.3 kg)       Physical Exam   Constitutional: He is oriented to person, place, and time. He appears well-developed and well-nourished.    HENT:   Head: 5' 10\" (1.778 m)       MDM  Number of Diagnoses or Management Options     Amount and/or Complexity of Data Reviewed  Clinical lab tests: ordered and reviewed      vital signs stable, no sign of withdrawal, patient calm and cooperative, alcohol intoxication with level above 300, patient is medically clear, I have admitted to the hospitalist service Dr. Virgilio Briseno and once patient is sober he can be evaluated tomorrow by behavioral health      CONSULTS:  IP CONSULT TO PSYCHIATRY    PROCEDURES:  Unless otherwise noted below, none     Procedures    FINAL IMPRESSION      1. Suicidal ideation    2. Acute alcoholic intoxication without complication [facility])          DISPOSITION/PLAN   DISPOSITION Admitted 04/21/2019 09:57:31 PM      PATIENT REFERRED TO:  DOTTIE Branham - [address]  [phone]            DISCHARGE MEDICATIONS:  There are no discharge medications for this patient.          (Please note that portions of this note were completed with a voice recognition program.  Efforts were made to edit thedictations but occasionally words are mis-transcribed.)    Joshua Saavedra MD (electronically signed)  Attending Emergency Physician        Alea Benton MD  04/22/19 0518

## 2019-04-22 NOTE — PROGRESS NOTES
2340 Patient became very agitated, wanting food and wanting to leave. Currently has a NPO diet, explained that we are just waiting for the doctor to see him. Patient cussing and very angry. Security called to assist. Verbal order from Dr. Judge Renee for a 72 hour hold for patient's safety. Paperwork signed and placed in chart. 0000 Verbal order for general diet, food given to patient. Patient then became more agitated saying he didn't want the food. Attempted to deescalate situation. Ativan given based on CIWA scale. Patient seems to be calming down, mother at bedside and 1:1 sitter at bedside. Will continue to monitor.

## 2019-04-22 NOTE — H&P
Hospital Medicine    History & Physical      CC:suicidal    History Obtained From:  patient, electronic medical record    HISTORY OF PRESENT ILLNESS:    The patient is a 34 y.o. male who presents to er intoxicated, and having suicidal thoughts, he c/o been depressed. His alcohol level was above 300  He denies fever , chills and asked to admit for psych evaluation in am.    Past Medical History:    No past medical history on file. Past Surgical History:        Procedure Laterality Date    ADENOIDECTOMY      TYMPANOSTOMY TUBE PLACEMENT         Medications Prior to Admission:    No medications prior to admission. Allergies:  Patient has no known allergies. Social History:   TOBACCO:   reports that he has been smoking cigarettes. He has been smoking about 2.00 packs per day. He has never used smokeless tobacco.  ETOH:   reports that he drinks alcohol. Patient currently lives with family     Family History:   No family history on file. I have personally reviewed above histories  REVIEW OF SYSTEMS:  Review of Systems   Constitutional: Negative for chills, fever and weight loss. HENT: Negative for ear pain, hearing loss and tinnitus. Eyes: Negative for blurred vision, double vision, photophobia and pain. Respiratory: Negative for cough, hemoptysis and sputum production. Cardiovascular: Negative for chest pain, palpitations, orthopnea and claudication. Gastrointestinal: Negative for abdominal pain, diarrhea, heartburn, nausea and vomiting. Genitourinary: Negative for dysuria, frequency and urgency. Musculoskeletal: Negative for back pain, myalgias and neck pain. Skin: Negative for itching and rash. Neurological: Negative for dizziness, tingling, tremors, sensory change and headaches. Endo/Heme/Allergies: Negative for environmental allergies. Does not bruise/bleed easily. Psychiatric/Behavioral: Positive for depression and suicidal ideas.      PHYSICAL EXAM:  /88   Pulse 90 Temp 98.9 °F (37.2 °C) (Temporal)   Resp 16   Ht 5' 10\" (1.778 m)   Wt 155 lb (70.3 kg)   SpO2 96%   BMI 22.24 kg/m²   Physical Exam   Constitutional: He is oriented to person, place, and time. He appears well-developed and well-nourished. Non-toxic appearance. He does not have a sickly appearance. No distress. HENT:   Head: Normocephalic and atraumatic. Eyes: Pupils are equal, round, and reactive to light. Conjunctivae and lids are normal.   Neck: Neck supple. No JVD present. Carotid bruit is present. No thyroid mass and no thyromegaly present. Cardiovascular: Normal rate and regular rhythm. Exam reveals no S3. No murmur heard. No systolic murmur is present. Pulses:       Carotid pulses are 2+ on the right side, and 2+ on the left side. Radial pulses are 2+ on the right side, and 2+ on the left side. Dorsalis pedis pulses are 2+ on the right side, and 2+ on the left side. Pulmonary/Chest: Effort normal. No stridor. He has no decreased breath sounds. He has no wheezes. He has no rhonchi. He has no rales. Abdominal: Soft. Bowel sounds are normal. He exhibits no distension and no ascites. There is no hepatosplenomegaly. There is no tenderness. Musculoskeletal:        Right lower leg: He exhibits no swelling and no edema. Left lower leg: He exhibits no swelling and no edema. Neurological: He is alert and oriented to person, place, and time. He has normal strength. GCS eye subscore is 4. GCS verbal subscore is 5. GCS motor subscore is 6. Skin: Skin is warm and dry. No rash noted. Psychiatric: He is agitated.      DATA:  EKG:  I have reviewed EKG with the following interpretation:  Imaging:    No orders to display              Labs Reviewed   CBC WITH AUTO DIFFERENTIAL - Abnormal; Notable for the following components:       Result Value    RBC 4.66 (*)     MCV 98.7 (*)     MCH 33.9 (*)     Neutrophils % 67.3 (*)     Basophils % 1.3 (*)     All other components within normal limits   COMPREHENSIVE METABOLIC PANEL - Abnormal; Notable for the following components:    Glucose 127 (*)      (*)      (*)     All other components within normal limits   ACETAMINOPHEN LEVEL   ETHANOL   SALICYLATE LEVEL   URINE DRUG SCREEN   CBC WITH AUTO DIFFERENTIAL   COMPREHENSIVE METABOLIC PANEL W/ REFLEX TO MG FOR LOW K          IMPRESSION:  1. Alcohol intoxication  2. Suicidal and depression    PLAN:     1. Admit to floor  2. Suicide precaution  3. Psych consult  4.  Thiamine/folate  5. ciwa protocol      Damir Serrano MD    Internal Medicine Hospitalist

## 2020-04-25 ENCOUNTER — HOSPITAL ENCOUNTER (INPATIENT)
Age: 40
LOS: 1 days | Discharge: HOME | DRG: 897 | End: 2020-04-26
Attending: EMERGENCY MEDICINE | Admitting: HOSPITALIST
Payer: MEDICAID

## 2020-04-25 PROBLEM — F10.920 ALCOHOL INTOXICATION, EPISODIC, UNCOMPLICATED (HCC): Status: ACTIVE | Noted: 2020-04-25

## 2020-04-25 LAB
ACETAMINOPHEN LEVEL: <15 UG/ML
ALBUMIN SERPL-MCNC: 4.4 G/DL (ref 3.5–5.2)
ALP BLD-CCNC: 86 U/L (ref 40–130)
ALT SERPL-CCNC: 105 U/L (ref 5–41)
AMPHETAMINE SCREEN, URINE: NEGATIVE
ANION GAP SERPL CALCULATED.3IONS-SCNC: 19 MMOL/L (ref 7–19)
AST SERPL-CCNC: 118 U/L (ref 5–40)
BARBITURATE SCREEN URINE: NEGATIVE
BASOPHILS ABSOLUTE: 0.1 K/UL (ref 0–0.2)
BASOPHILS RELATIVE PERCENT: 1.8 % (ref 0–1)
BENZODIAZEPINE SCREEN, URINE: NEGATIVE
BILIRUB SERPL-MCNC: 0.3 MG/DL (ref 0.2–1.2)
BILIRUBIN URINE: NEGATIVE
BLOOD, URINE: NEGATIVE
BUN BLDV-MCNC: 9 MG/DL (ref 6–20)
CALCIUM SERPL-MCNC: 8.9 MG/DL (ref 8.6–10)
CANNABINOID SCREEN URINE: NEGATIVE
CHLORIDE BLD-SCNC: 97 MMOL/L (ref 98–111)
CHOLESTEROL, TOTAL: 141 MG/DL (ref 160–199)
CLARITY: CLEAR
CO2: 23 MMOL/L (ref 22–29)
COCAINE METABOLITE SCREEN URINE: NEGATIVE
COLOR: YELLOW
CREAT SERPL-MCNC: 0.7 MG/DL (ref 0.5–1.2)
EOSINOPHILS ABSOLUTE: 0.1 K/UL (ref 0–0.6)
EOSINOPHILS RELATIVE PERCENT: 1.9 % (ref 0–5)
ETHANOL: 307 MG/DL (ref 0–0.08)
GFR NON-AFRICAN AMERICAN: >60
GLUCOSE BLD-MCNC: 159 MG/DL (ref 74–109)
GLUCOSE URINE: NEGATIVE MG/DL
HCT VFR BLD CALC: 47.3 % (ref 42–52)
HDLC SERPL-MCNC: 50 MG/DL (ref 55–121)
HEMOGLOBIN: 16.5 G/DL (ref 14–18)
IMMATURE GRANULOCYTES #: 0 K/UL
KETONES, URINE: NEGATIVE MG/DL
LDL CHOLESTEROL CALCULATED: 66 MG/DL
LEUKOCYTE ESTERASE, URINE: NEGATIVE
LYMPHOCYTES ABSOLUTE: 1.9 K/UL (ref 1.1–4.5)
LYMPHOCYTES RELATIVE PERCENT: 33.6 % (ref 20–40)
Lab: NORMAL
MCH RBC QN AUTO: 33.5 PG (ref 27–31)
MCHC RBC AUTO-ENTMCNC: 34.9 G/DL (ref 33–37)
MCV RBC AUTO: 96.1 FL (ref 80–94)
MONOCYTES ABSOLUTE: 0.7 K/UL (ref 0–0.9)
MONOCYTES RELATIVE PERCENT: 11.9 % (ref 0–10)
NEUTROPHILS ABSOLUTE: 2.9 K/UL (ref 1.5–7.5)
NEUTROPHILS RELATIVE PERCENT: 50.8 % (ref 50–65)
NITRITE, URINE: NEGATIVE
OPIATE SCREEN URINE: NEGATIVE
PDW BLD-RTO: 14.7 % (ref 11.5–14.5)
PH UA: 6.5 (ref 5–8)
PLATELET # BLD: 230 K/UL (ref 130–400)
PMV BLD AUTO: 9.6 FL (ref 9.4–12.4)
POTASSIUM SERPL-SCNC: 3.6 MMOL/L (ref 3.5–5)
PROTEIN UA: NEGATIVE MG/DL
RBC # BLD: 4.92 M/UL (ref 4.7–6.1)
SALICYLATE, SERUM: <3 MG/DL (ref 3–10)
SARS-COV-2, NAAT: NOT DETECTED
SODIUM BLD-SCNC: 139 MMOL/L (ref 136–145)
SPECIFIC GRAVITY UA: 1.01 (ref 1–1.03)
TOTAL PROTEIN: 7.7 G/DL (ref 6.6–8.7)
TRIGL SERPL-MCNC: 126 MG/DL (ref 0–149)
TSH REFLEX FT4: 2 UIU/ML (ref 0.35–5.5)
URINE REFLEX TO CULTURE: NORMAL
UROBILINOGEN, URINE: 0.2 E.U./DL
WBC # BLD: 5.7 K/UL (ref 4.8–10.8)

## 2020-04-25 PROCEDURE — 2580000003 HC RX 258: Performed by: HOSPITALIST

## 2020-04-25 PROCEDURE — 96375 TX/PRO/DX INJ NEW DRUG ADDON: CPT

## 2020-04-25 PROCEDURE — 36415 COLL VENOUS BLD VENIPUNCTURE: CPT

## 2020-04-25 PROCEDURE — 81003 URINALYSIS AUTO W/O SCOPE: CPT

## 2020-04-25 PROCEDURE — G0480 DRUG TEST DEF 1-7 CLASSES: HCPCS

## 2020-04-25 PROCEDURE — U0002 COVID-19 LAB TEST NON-CDC: HCPCS

## 2020-04-25 PROCEDURE — 2500000003 HC RX 250 WO HCPCS: Performed by: PHYSICIAN ASSISTANT

## 2020-04-25 PROCEDURE — 96366 THER/PROPH/DIAG IV INF ADDON: CPT

## 2020-04-25 PROCEDURE — 80053 COMPREHEN METABOLIC PANEL: CPT

## 2020-04-25 PROCEDURE — 96365 THER/PROPH/DIAG IV INF INIT: CPT

## 2020-04-25 PROCEDURE — 80307 DRUG TEST PRSMV CHEM ANLYZR: CPT

## 2020-04-25 PROCEDURE — G0378 HOSPITAL OBSERVATION PER HR: HCPCS

## 2020-04-25 PROCEDURE — 6360000002 HC RX W HCPCS: Performed by: PHYSICIAN ASSISTANT

## 2020-04-25 PROCEDURE — 85025 COMPLETE CBC W/AUTO DIFF WBC: CPT

## 2020-04-25 PROCEDURE — 84443 ASSAY THYROID STIM HORMONE: CPT

## 2020-04-25 PROCEDURE — 99284 EMERGENCY DEPT VISIT MOD MDM: CPT

## 2020-04-25 PROCEDURE — 99999 PR OFFICE/OUTPT VISIT,PROCEDURE ONLY: CPT | Performed by: EMERGENCY MEDICINE

## 2020-04-25 PROCEDURE — 80061 LIPID PANEL: CPT

## 2020-04-25 PROCEDURE — 2580000003 HC RX 258: Performed by: PHYSICIAN ASSISTANT

## 2020-04-25 RX ORDER — ONDANSETRON 2 MG/ML
4 INJECTION INTRAMUSCULAR; INTRAVENOUS EVERY 6 HOURS PRN
Status: DISCONTINUED | OUTPATIENT
Start: 2020-04-25 | End: 2020-04-26 | Stop reason: HOSPADM

## 2020-04-25 RX ORDER — LORAZEPAM 1 MG/1
3 TABLET ORAL
Status: DISCONTINUED | OUTPATIENT
Start: 2020-04-25 | End: 2020-04-26 | Stop reason: HOSPADM

## 2020-04-25 RX ORDER — POTASSIUM CHLORIDE 7.45 MG/ML
10 INJECTION INTRAVENOUS PRN
Status: DISCONTINUED | OUTPATIENT
Start: 2020-04-25 | End: 2020-04-26 | Stop reason: HOSPADM

## 2020-04-25 RX ORDER — LORAZEPAM 2 MG/ML
0.5 INJECTION INTRAMUSCULAR ONCE
Status: COMPLETED | OUTPATIENT
Start: 2020-04-25 | End: 2020-04-25

## 2020-04-25 RX ORDER — MULTIVITAMIN WITH FOLIC ACID 400 MCG
1 TABLET ORAL DAILY
Status: DISCONTINUED | OUTPATIENT
Start: 2020-04-26 | End: 2020-04-26 | Stop reason: HOSPADM

## 2020-04-25 RX ORDER — ONDANSETRON 4 MG/1
4 TABLET, ORALLY DISINTEGRATING ORAL EVERY 8 HOURS PRN
Status: DISCONTINUED | OUTPATIENT
Start: 2020-04-25 | End: 2020-04-26 | Stop reason: HOSPADM

## 2020-04-25 RX ORDER — LORAZEPAM 2 MG/ML
2 INJECTION INTRAMUSCULAR
Status: DISCONTINUED | OUTPATIENT
Start: 2020-04-25 | End: 2020-04-26 | Stop reason: HOSPADM

## 2020-04-25 RX ORDER — SODIUM CHLORIDE 0.9 % (FLUSH) 0.9 %
10 SYRINGE (ML) INJECTION EVERY 12 HOURS SCHEDULED
Status: DISCONTINUED | OUTPATIENT
Start: 2020-04-25 | End: 2020-04-26 | Stop reason: HOSPADM

## 2020-04-25 RX ORDER — LORAZEPAM 2 MG/ML
3 INJECTION INTRAMUSCULAR
Status: DISCONTINUED | OUTPATIENT
Start: 2020-04-25 | End: 2020-04-26 | Stop reason: HOSPADM

## 2020-04-25 RX ORDER — SODIUM CHLORIDE 0.9 % (FLUSH) 0.9 %
10 SYRINGE (ML) INJECTION PRN
Status: DISCONTINUED | OUTPATIENT
Start: 2020-04-25 | End: 2020-04-26 | Stop reason: HOSPADM

## 2020-04-25 RX ORDER — LORAZEPAM 1 MG/1
1 TABLET ORAL
Status: DISCONTINUED | OUTPATIENT
Start: 2020-04-25 | End: 2020-04-26 | Stop reason: HOSPADM

## 2020-04-25 RX ORDER — LORAZEPAM 1 MG/1
4 TABLET ORAL
Status: DISCONTINUED | OUTPATIENT
Start: 2020-04-25 | End: 2020-04-26 | Stop reason: HOSPADM

## 2020-04-25 RX ORDER — THIAMINE MONONITRATE (VIT B1) 100 MG
100 TABLET ORAL DAILY
Status: DISCONTINUED | OUTPATIENT
Start: 2020-04-26 | End: 2020-04-26 | Stop reason: HOSPADM

## 2020-04-25 RX ORDER — FOLIC ACID 1 MG/1
1 TABLET ORAL DAILY
Status: DISCONTINUED | OUTPATIENT
Start: 2020-04-26 | End: 2020-04-26 | Stop reason: HOSPADM

## 2020-04-25 RX ORDER — POLYETHYLENE GLYCOL 3350 17 G/17G
17 POWDER, FOR SOLUTION ORAL DAILY PRN
Status: DISCONTINUED | OUTPATIENT
Start: 2020-04-25 | End: 2020-04-26 | Stop reason: HOSPADM

## 2020-04-25 RX ORDER — MAGNESIUM SULFATE IN WATER 40 MG/ML
2 INJECTION, SOLUTION INTRAVENOUS PRN
Status: DISCONTINUED | OUTPATIENT
Start: 2020-04-25 | End: 2020-04-26 | Stop reason: HOSPADM

## 2020-04-25 RX ORDER — LORAZEPAM 2 MG/ML
4 INJECTION INTRAMUSCULAR
Status: DISCONTINUED | OUTPATIENT
Start: 2020-04-25 | End: 2020-04-26 | Stop reason: HOSPADM

## 2020-04-25 RX ORDER — PAROXETINE HYDROCHLORIDE 40 MG/1
75 TABLET, FILM COATED ORAL EVERY MORNING
COMMUNITY

## 2020-04-25 RX ORDER — LORAZEPAM 2 MG/ML
1 INJECTION INTRAMUSCULAR
Status: DISCONTINUED | OUTPATIENT
Start: 2020-04-25 | End: 2020-04-26 | Stop reason: HOSPADM

## 2020-04-25 RX ORDER — POTASSIUM CHLORIDE 20 MEQ/1
40 TABLET, EXTENDED RELEASE ORAL PRN
Status: DISCONTINUED | OUTPATIENT
Start: 2020-04-25 | End: 2020-04-26 | Stop reason: HOSPADM

## 2020-04-25 RX ORDER — LORAZEPAM 1 MG/1
2 TABLET ORAL
Status: DISCONTINUED | OUTPATIENT
Start: 2020-04-25 | End: 2020-04-26 | Stop reason: HOSPADM

## 2020-04-25 RX ADMIN — SODIUM CHLORIDE, PRESERVATIVE FREE 10 ML: 5 INJECTION INTRAVENOUS at 21:58

## 2020-04-25 RX ADMIN — FOLIC ACID: 5 INJECTION, SOLUTION INTRAMUSCULAR; INTRAVENOUS; SUBCUTANEOUS at 18:57

## 2020-04-25 RX ADMIN — LORAZEPAM 0.5 MG: 2 INJECTION INTRAMUSCULAR; INTRAVENOUS at 20:35

## 2020-04-25 ASSESSMENT — ENCOUNTER SYMPTOMS
BACK PAIN: 0
SHORTNESS OF BREATH: 0
PHOTOPHOBIA: 0
COUGH: 0
COLOR CHANGE: 0
EYE DISCHARGE: 0
EYE ITCHING: 0
APNEA: 0

## 2020-04-25 NOTE — ED PROVIDER NOTES
Smoker     Packs/day: 2.00     Types: Cigarettes    Smokeless tobacco: Never Used   Substance and Sexual Activity    Alcohol use: Yes     Comment: \"every chance I get\"    Drug use: No    Sexual activity: None   Lifestyle    Physical activity     Days per week: None     Minutes per session: None    Stress: None   Relationships    Social connections     Talks on phone: None     Gets together: None     Attends Church service: None     Active member of club or organization: None     Attends meetings of clubs or organizations: None     Relationship status: None    Intimate partner violence     Fear of current or ex partner: None     Emotionally abused: None     Physically abused: None     Forced sexual activity: None   Other Topics Concern    None   Social History Narrative    None       SCREENINGS           PHYSICAL EXAM    (up to 7 forlevel 4, 8 or more for level 5)     ED Triage Vitals [04/25/20 1656]   BP Temp Temp Source Pulse Resp SpO2 Height Weight   139/88 98.2 °F (36.8 °C) Oral 88 20 97 % 5' 10\" (1.778 m) 180 lb (81.6 kg)       Physical Exam  Vitals signs and nursing note reviewed. Constitutional:       General: He is not in acute distress. Appearance: Normal appearance. He is well-developed and normal weight. He is not diaphoretic. HENT:      Head: Normocephalic and atraumatic. Right Ear: Tympanic membrane, ear canal and external ear normal.      Left Ear: Tympanic membrane, ear canal and external ear normal.      Nose: Nose normal.      Mouth/Throat:      Mouth: Mucous membranes are moist.   Eyes:      Pupils: Pupils are equal, round, and reactive to light. Neck:      Musculoskeletal: Normal range of motion and neck supple. Trachea: No tracheal deviation. Cardiovascular:      Rate and Rhythm: Normal rate and regular rhythm. Pulses: Normal pulses. Heart sounds: Normal heart sounds. No murmur.    Pulmonary:      Effort: Pulmonary effort is normal.      Breath sounds:

## 2020-04-26 VITALS
SYSTOLIC BLOOD PRESSURE: 136 MMHG | RESPIRATION RATE: 16 BRPM | OXYGEN SATURATION: 96 % | HEIGHT: 70 IN | HEART RATE: 88 BPM | BODY MASS INDEX: 25.77 KG/M2 | WEIGHT: 180 LBS | DIASTOLIC BLOOD PRESSURE: 81 MMHG | TEMPERATURE: 98.8 F

## 2020-04-26 PROBLEM — F10.220 ALCOHOL DEPENDENCE WITH ACUTE ALCOHOLIC INTOXICATION WITHOUT COMPLICATION (HCC): Status: ACTIVE | Noted: 2020-04-26

## 2020-04-26 LAB
ALBUMIN SERPL-MCNC: 4 G/DL (ref 3.5–5.2)
ALP BLD-CCNC: 81 U/L (ref 40–130)
ALT SERPL-CCNC: 107 U/L (ref 5–41)
ANION GAP SERPL CALCULATED.3IONS-SCNC: 15 MMOL/L (ref 7–19)
AST SERPL-CCNC: 138 U/L (ref 5–40)
BILIRUB SERPL-MCNC: 0.3 MG/DL (ref 0.2–1.2)
BUN BLDV-MCNC: 8 MG/DL (ref 6–20)
CALCIUM SERPL-MCNC: 8.7 MG/DL (ref 8.6–10)
CHLORIDE BLD-SCNC: 104 MMOL/L (ref 98–111)
CO2: 26 MMOL/L (ref 22–29)
CREAT SERPL-MCNC: 0.7 MG/DL (ref 0.5–1.2)
ETHANOL: 143 MG/DL (ref 0–0.08)
GFR NON-AFRICAN AMERICAN: >60
GLUCOSE BLD-MCNC: 128 MG/DL (ref 74–109)
HBA1C MFR BLD: 5.3 % (ref 4–6)
POTASSIUM REFLEX MAGNESIUM: 3.9 MMOL/L (ref 3.5–5)
SODIUM BLD-SCNC: 145 MMOL/L (ref 136–145)
TOTAL PROTEIN: 6.9 G/DL (ref 6.6–8.7)

## 2020-04-26 PROCEDURE — 96372 THER/PROPH/DIAG INJ SC/IM: CPT

## 2020-04-26 PROCEDURE — 36415 COLL VENOUS BLD VENIPUNCTURE: CPT

## 2020-04-26 PROCEDURE — 80053 COMPREHEN METABOLIC PANEL: CPT

## 2020-04-26 PROCEDURE — 6360000002 HC RX W HCPCS: Performed by: HOSPITALIST

## 2020-04-26 PROCEDURE — 6370000000 HC RX 637 (ALT 250 FOR IP): Performed by: HOSPITALIST

## 2020-04-26 PROCEDURE — G0480 DRUG TEST DEF 1-7 CLASSES: HCPCS

## 2020-04-26 PROCEDURE — 83036 HEMOGLOBIN GLYCOSYLATED A1C: CPT

## 2020-04-26 PROCEDURE — 2580000003 HC RX 258: Performed by: HOSPITALIST

## 2020-04-26 PROCEDURE — 1210000000 HC MED SURG R&B

## 2020-04-26 RX ORDER — NICOTINE 21 MG/24HR
1 PATCH, TRANSDERMAL 24 HOURS TRANSDERMAL DAILY
Status: DISCONTINUED | OUTPATIENT
Start: 2020-04-26 | End: 2020-04-26 | Stop reason: HOSPADM

## 2020-04-26 RX ORDER — LORAZEPAM 1 MG/1
2 TABLET ORAL 2 TIMES DAILY
Status: DISCONTINUED | OUTPATIENT
Start: 2020-04-27 | End: 2020-04-26 | Stop reason: HOSPADM

## 2020-04-26 RX ORDER — LORAZEPAM 1 MG/1
2 TABLET ORAL 3 TIMES DAILY
Status: DISCONTINUED | OUTPATIENT
Start: 2020-04-26 | End: 2020-04-26 | Stop reason: HOSPADM

## 2020-04-26 RX ORDER — PAROXETINE 30 MG/1
75 TABLET, FILM COATED ORAL EVERY MORNING
Status: DISCONTINUED | OUTPATIENT
Start: 2020-04-26 | End: 2020-04-26 | Stop reason: HOSPADM

## 2020-04-26 RX ORDER — LORAZEPAM 1 MG/1
2 TABLET ORAL ONCE
Status: DISCONTINUED | OUTPATIENT
Start: 2020-04-28 | End: 2020-04-26 | Stop reason: HOSPADM

## 2020-04-26 RX ADMIN — ENOXAPARIN SODIUM 40 MG: 40 INJECTION SUBCUTANEOUS at 08:54

## 2020-04-26 RX ADMIN — Medication 100 MG: at 08:53

## 2020-04-26 RX ADMIN — FOLIC ACID 1 MG: 1 TABLET ORAL at 08:53

## 2020-04-26 RX ADMIN — LORAZEPAM 2 MG: 1 TABLET ORAL at 08:53

## 2020-04-26 RX ADMIN — PAROXETINE 75 MG: 30 TABLET, FILM COATED ORAL at 10:34

## 2020-04-26 RX ADMIN — SODIUM CHLORIDE, PRESERVATIVE FREE 10 ML: 5 INJECTION INTRAVENOUS at 08:54

## 2020-04-26 RX ADMIN — THERA TABS 1 TABLET: TAB at 08:52

## 2020-04-26 NOTE — PROGRESS NOTES
Charge nurse, Emi Ellsworth, and clinical house, Kamar Conner, also notified of patient leaving AMA.   Electronically signed by Lynsey Garvey RN on 4/26/2020 at 11:30 AM
Patient alert and oriented. Patient spoke to hospitalist and voiced he wished to leave AMA. Hospitalist requested nursing to provide patient with AMA paper. I read AMA paper to patient and patient voiced understanding and signed. Patient belonging returned to patient from security.     Electronically signed by Guerita Olson RN on 4/26/2020 at 11:28 AM
(1.778 m)   Wt 180 lb (81.6 kg)   SpO2 93%   BMI 25.83 kg/m²   24HR INTAKE/OUTPUT:  No intake or output data in the 24 hours ending 04/26/20 0929   General appearance: alert and cooperative with exam  HEENT: atraumatic, eyes with clear conjunctiva and normal lids  Lungs: no increased work of breathing, diminished breath sounds bilaterally  Heart: S1, S2 normal  Abdomen: soft, non-tender; bowel sounds normal; no masses,  no organomegaly  Extremities:atraumatic, no cyanosis no edema no calf tenderness   Neurologic:non focal    Skin: no rashes, nodules. Medications:      LORazepam  2 mg Oral TID    [START ON 4/27/2020] LORazepam  2 mg Oral BID    [START ON 4/28/2020] LORazepam  2 mg Oral Once    PARoxetine  75 mg Oral QAM    nicotine  1 patch Transdermal Daily    sodium chloride flush  10 mL Intravenous 2 times per day    enoxaparin  40 mg Subcutaneous Daily    thiamine  100 mg Oral Daily    folic acid  1 mg Oral Daily    multivitamin  1 tablet Oral Daily     sodium chloride flush, potassium chloride **OR** potassium alternative oral replacement **OR** potassium chloride, magnesium sulfate, ondansetron **OR** ondansetron, polyethylene glycol, LORazepam **OR** LORazepam **OR** LORazepam **OR** LORazepam **OR** LORazepam **OR** LORazepam **OR** LORazepam **OR** LORazepam  DIET GENERAL;         Lab and other Data:     Recent Labs     04/25/20  1704   WBC 5.7   HGB 16.5        Recent Labs     04/25/20  1704 04/26/20  0131    145   K 3.6 3.9   CL 97* 104   CO2 23 26   BUN 9 8   CREATININE 0.7 0.7   GLUCOSE 159* 128*     Recent Labs     04/25/20  1704 04/26/20  0131   * 138*   * 107*   BILITOT 0.3 0.3   ALKPHOS 86 81     Troponin T: No results for input(s): TROPONINI in the last 72 hours. Pro-BNP: No results for input(s): BNP in the last 72 hours. INR: No results for input(s): INR in the last 72 hours.   ABGs: No results found for: PHART, PO2ART, [id number]  UA:  Recent Labs

## 2020-04-26 NOTE — H&P
Patient Information:  Patient: Parth Blake  YOB: 1980  Date of Note Completion: 4/26/2020  (Day of Service is date of admission order, which if after 23:45 is erroneously listed as following day by Epic EMR)  MRN: [id number]   Acct: [de-identified]   Primary Care Physician: DOTTIE Peres CNP  Advance Directive: Full Code  Admit Date: 4/25/2020       Hospital Day: 1        SUBJECTIVE:    Chief Complaint   Patient presents with    Alcohol Intoxication     EP Sign Out:  Alcohol level 26  Mother made him come in  Does not want to harm himself or anyone else    HPI and ROS:  Mr. Parth Blake is a pleasant 37year old  american gentleman from home. He was brought in to the [facility] ED by his Mother. He had an alcohol level >300 upon arrival in the ED so the patient has been admitted by the hospital service. We are detoxifying him as opposed to just caring for him while he radha up as was the initial plan, as he states that he is committed to alcohol cessation. He has never had any seizure or intubation or ICU stay. He states that he drinks every day if he an get it. He drank a pint today. He states that he takes an arthritis pill and a antidepressant only. He endorses also: fatigue. He denies any: fever, chills, night sweats, weakness, malaise, chest pan, chest pressure, confusion, nausea, near syncope, diaphoresis, dyspnea, visual hallucinations, tactile hallucinations, auditory hallucinations, suicidal ideation &/or planning, or homicidal ideation &/or planning.     Past Medical History:   Diagnosis Date    Neuromuscular disorder [facility])     Psychiatric problem      Past Psychiatric History:  Depression    Past Surgical History:   Procedure Laterality Date    ADENOIDECTOMY      COLONOSCOPY      TYMPANOSTOMY TUBE PLACEMENT     (surgcial history as per chart review)    Social History     Tobacco History     Smoking Status  Current Every Day Smoker Smoking Frequency  2 packs/day injection 4 mg  4 mg Intravenous Q6H PRN Joseph Pedro MD        polyethylene glycol [facility]) packet 17 g  17 g Oral Daily PRN Joseph Pedro MD        LORazepam (ATIVAN) tablet 1 mg  1 mg Oral Q1H PRN Joseph Pedro MD        Or    LORazepam (ATIVAN) injection 1 mg  1 mg Intravenous Q1H PRN Joseph Pedro MD        Or    LORazepam (ATIVAN) tablet 2 mg  2 mg Oral Q1H PRN Joseph Pedro MD        Or    LORazepam (ATIVAN) injection 2 mg  2 mg Intravenous Q1H PRN Joseph Pedro MD        Or    LORazepam (ATIVAN) tablet 3 mg  3 mg Oral Q1H PRN Joseph Pedro MD        Or    LORazepam (ATIVAN) injection 3 mg  3 mg Intravenous Q1H PRN Joseph Pedro MD        Or    LORazepam (ATIVAN) tablet 4 mg  4 mg Oral Q1H PRN Joseph Pedro MD        Or    LORazepam (ATIVAN) injection 4 mg  4 mg Intravenous Q1H PRN Joseph Pedro MD         Home Medications:  Prior to Admission medications    Medication Sig Start Date End Date Taking? Authorizing Provider   PARoxetine (PAXIL) 40 MG tablet Take 75 mg by mouth every morning   Yes Historical Provider, MD         OBJECTIVE:    Vitals:    04/25/20 2141   BP: 127/80   Pulse: 73   Resp: 16   Temp: 97.6 °F (36.4 °C)   SpO2: 96%   breathing Room Air    /80   Pulse 73   Temp 97.6 °F (36.4 °C) (Temporal)   Resp 16   Ht 5' 10\" (1.778 m)   Wt 180 lb (81.6 kg)   SpO2 96%   BMI 25.83 kg/m²     No intake or output data in the 24 hours ending 04/26/20 0404    Physical Exam  Vitals signs reviewed. Constitutional:       General: He is not in acute distress. Appearance: Normal appearance. He is normal weight. He is not ill-appearing or toxic-appearing. HENT:      Head: Normocephalic and atraumatic. Nose: No congestion or rhinorrhea. Eyes:      General:         Right eye: No discharge. Left eye: No discharge. Neck:      Musculoskeletal: Neck supple.       Comments: Trachea appears midline  Cardiovascular:      Rate and Rhythm: Normal rate and regular out  Patient wishes to stay for detoxification  CIWA Scale with Ativan  Aticvan 2mg tabs 3 day taper as: TID -> BID -> Once  Thiamine & Folic Acid & Multivitamin  Psych consult for Naltrexone dosing  CM to assist with patient being enrolled in [address] myself for >5 min    Tobacco Abuse: at 1 PPD as per pt (2PPD in EMR recorded)  Nicoderm 21mcg daily patches  Counseled for >3 minutes    Depression:  Paroxetine 40mg PO QDay      Patient Active Problem List   Diagnosis    [facility]    Transaminasemia    Psychosis ([facility]    Methamphetamine abuse ([facility]    Tobacco abuse    Suicidal ideation    Macrocytic anemia    Alcohol abuse with physiological dependence ([facility]    Alcohol intoxication, episodic, uncomplicated (HCC)      LORazepam  2 mg Oral TID    [START ON 4/27/2020] LORazepam  2 mg Oral BID    [START ON 4/28/2020] LORazepam  2 mg Oral Once    sodium chloride flush  10 mL Intravenous 2 times per day    enoxaparin  40 mg Subcutaneous Daily    thiamine  100 mg Oral Daily    folic acid  1 mg Oral Daily    multivitamin  1 tablet Oral Daily       Supoportive and Prophylactic Txx:  DVT Prophylaxis: Lovenox SQ  GI (PUD) Ppx: not indicated  PT consult for evalutation and Txx as indicated: mot indicated  DIET GENERAL;  sodium chloride flush, potassium chloride **OR** potassium alternative oral replacement **OR** potassium chloride, magnesium sulfate, ondansetron **OR** ondansetron, polyethylene glycol, LORazepam **OR** LORazepam **OR** LORazepam **OR** LORazepam **OR** LORazepam **OR** LORazepam **OR** LORazepam **OR** LORazepam      Care time of >45 minutes  Pt seen/examined and admitted to Inpatient status. Inpatient status is used for patients with an expected LOS extending past two midnights due to medical therapy and or critical care needs, otherwise patients are placed to OBServation status. Signed:  Electronically signed by Rebecca Domínguez MD on 4/26/20 at 4:30 AM CDT.

## 2020-05-09 NOTE — DISCHARGE SUMMARY
ROS:  Mr. Christine Garcia is a pleasant 37year old  american gentleman from home. He was brought in to the [facility] ED by his Mother. He had an alcohol level >300 upon arrival in the ED so the patient has been admitted by the hospital service. We are detoxifying him as opposed to just caring for him while he radha up as was the initial plan, as he states that he is committed to alcohol cessation. He has never had any seizure or intubation or ICU stay. He states that he drinks every day if he an get it. He drank a pint today. He states that he takes an arthritis pill and a antidepressant only. He endorses also: fatigue.   He denies any: fever, chills, night sweats, weakness, malaise, chest pan, chest pressure, confusion, nausea, near syncope, diaphoresis, dyspnea, visual hallucinations, tactile hallucinations, auditory hallucinations, suicidal ideation &/or planning, or homicidal ideation &/or planning.     Subjective:not suicidal or homicidal, some tremors, encouraged to stay in but wants to sign AMA, AxOx3   4/26/20  Admitted for ETOH intoxication, no SI or homicidal ideation , his mother wanted him to come in for detoxication, on CIWA, Thiamine & Folic Acid & Multivitamin, Psych consult for Naltrexone dosing,CM to assist with patient being enrolled in AA, started on nicotine patch repeat ETOH down to 143 , UDS -ve, Covid-19 -ve   Did not want to stay left AMA     Physical Exam:    Vitals: /81   Pulse 88   Temp 98.8 °F (37.1 °C) (Temporal)   Resp 16   Ht 5' 10\" (1.778 m)   Wt 180 lb (81.6 kg)   SpO2 96%   BMI 25.83 kg/m²    General appearance: alert and cooperative with exam  HEENT: atraumatic, eyes with clear conjunctiva and normal lids  Lungs: no increased work of breathing, diminished breath sounds bilaterally  Heart: S1, S2 normal  Abdomen: soft, non-tender; bowel sounds normal; no masses,  no organomegaly  Extremities:atraumatic, no cyanosis no edema no calf tenderness

## 2020-09-17 ENCOUNTER — HOSPITAL ENCOUNTER (EMERGENCY)
Facility: HOSPITAL | Age: 40
Discharge: LEFT BEFORE DISCHARGE | End: 2020-09-17
Attending: EMERGENCY MEDICINE | Admitting: EMERGENCY MEDICINE

## 2020-09-17 VITALS
BODY MASS INDEX: 22.9 KG/M2 | OXYGEN SATURATION: 95 % | RESPIRATION RATE: 18 BRPM | WEIGHT: 160 LBS | HEART RATE: 118 BPM | HEIGHT: 70 IN | TEMPERATURE: 98.1 F | SYSTOLIC BLOOD PRESSURE: 154 MMHG | DIASTOLIC BLOOD PRESSURE: 101 MMHG

## 2020-09-17 DIAGNOSIS — F10.920 ALCOHOLIC INTOXICATION WITHOUT COMPLICATION (HCC): Primary | ICD-10-CM

## 2020-09-17 LAB
ALBUMIN SERPL-MCNC: 4.9 G/DL (ref 3.5–5.2)
ALBUMIN/GLOB SERPL: 1.7 G/DL
ALP SERPL-CCNC: 111 U/L (ref 39–117)
ALT SERPL W P-5'-P-CCNC: 129 U/L (ref 1–41)
AMPHET+METHAMPHET UR QL: NEGATIVE
AMPHETAMINES UR QL: NEGATIVE
ANION GAP SERPL CALCULATED.3IONS-SCNC: 17 MMOL/L (ref 5–15)
APTT PPP: 30.1 SECONDS (ref 24.1–35)
AST SERPL-CCNC: 209 U/L (ref 1–40)
BARBITURATES UR QL SCN: NEGATIVE
BASOPHILS # BLD AUTO: 0.07 10*3/MM3 (ref 0–0.2)
BASOPHILS NFR BLD AUTO: 1.5 % (ref 0–1.5)
BENZODIAZ UR QL SCN: NEGATIVE
BILIRUB SERPL-MCNC: 0.4 MG/DL (ref 0–1.2)
BUN SERPL-MCNC: 4 MG/DL (ref 6–20)
BUN/CREAT SERPL: 7.5 (ref 7–25)
BUPRENORPHINE SERPL-MCNC: NEGATIVE NG/ML
CALCIUM SPEC-SCNC: 9.2 MG/DL (ref 8.6–10.5)
CANNABINOIDS SERPL QL: NEGATIVE
CHLORIDE SERPL-SCNC: 105 MMOL/L (ref 98–107)
CO2 SERPL-SCNC: 25 MMOL/L (ref 22–29)
COCAINE UR QL: NEGATIVE
CREAT SERPL-MCNC: 0.53 MG/DL (ref 0.76–1.27)
DEPRECATED RDW RBC AUTO: 50.9 FL (ref 37–54)
EOSINOPHIL # BLD AUTO: 0.08 10*3/MM3 (ref 0–0.4)
EOSINOPHIL NFR BLD AUTO: 1.7 % (ref 0.3–6.2)
ERYTHROCYTE [DISTWIDTH] IN BLOOD BY AUTOMATED COUNT: 13.7 % (ref 12.3–15.4)
ETHANOL UR QL: 0.32 %
GFR SERPL CREATININE-BSD FRML MDRD: >150 ML/MIN/1.73
GLOBULIN UR ELPH-MCNC: 2.9 GM/DL
GLUCOSE SERPL-MCNC: 103 MG/DL (ref 65–99)
HCT VFR BLD AUTO: 43.9 % (ref 37.5–51)
HGB BLD-MCNC: 15.3 G/DL (ref 13–17.7)
IMM GRANULOCYTES # BLD AUTO: 0.01 10*3/MM3 (ref 0–0.05)
IMM GRANULOCYTES NFR BLD AUTO: 0.2 % (ref 0–0.5)
INR PPP: 1.01 (ref 0.91–1.09)
LYMPHOCYTES # BLD AUTO: 1.53 10*3/MM3 (ref 0.7–3.1)
LYMPHOCYTES NFR BLD AUTO: 31.9 % (ref 19.6–45.3)
MCH RBC QN AUTO: 35.1 PG (ref 26.6–33)
MCHC RBC AUTO-ENTMCNC: 34.9 G/DL (ref 31.5–35.7)
MCV RBC AUTO: 100.7 FL (ref 79–97)
METHADONE UR QL SCN: NEGATIVE
MONOCYTES # BLD AUTO: 0.52 10*3/MM3 (ref 0.1–0.9)
MONOCYTES NFR BLD AUTO: 10.9 % (ref 5–12)
NEUTROPHILS NFR BLD AUTO: 2.58 10*3/MM3 (ref 1.7–7)
NEUTROPHILS NFR BLD AUTO: 53.8 % (ref 42.7–76)
NRBC BLD AUTO-RTO: 0 /100 WBC (ref 0–0.2)
OPIATES UR QL: NEGATIVE
OXYCODONE UR QL SCN: NEGATIVE
PCP UR QL SCN: NEGATIVE
PLATELET # BLD AUTO: 159 10*3/MM3 (ref 140–450)
PMV BLD AUTO: 9.9 FL (ref 6–12)
POTASSIUM SERPL-SCNC: 3.7 MMOL/L (ref 3.5–5.2)
PROPOXYPH UR QL: NEGATIVE
PROT SERPL-MCNC: 7.8 G/DL (ref 6–8.5)
PROTHROMBIN TIME: 12.9 SECONDS (ref 11.9–14.6)
RBC # BLD AUTO: 4.36 10*6/MM3 (ref 4.14–5.8)
SODIUM SERPL-SCNC: 147 MMOL/L (ref 136–145)
TRICYCLICS UR QL SCN: NEGATIVE
WBC # BLD AUTO: 4.79 10*3/MM3 (ref 3.4–10.8)

## 2020-09-17 PROCEDURE — 99283 EMERGENCY DEPT VISIT LOW MDM: CPT

## 2020-09-17 PROCEDURE — 85025 COMPLETE CBC W/AUTO DIFF WBC: CPT | Performed by: EMERGENCY MEDICINE

## 2020-09-17 PROCEDURE — 96374 THER/PROPH/DIAG INJ IV PUSH: CPT

## 2020-09-17 PROCEDURE — 85730 THROMBOPLASTIN TIME PARTIAL: CPT | Performed by: EMERGENCY MEDICINE

## 2020-09-17 PROCEDURE — 80053 COMPREHEN METABOLIC PANEL: CPT | Performed by: EMERGENCY MEDICINE

## 2020-09-17 PROCEDURE — 85610 PROTHROMBIN TIME: CPT | Performed by: EMERGENCY MEDICINE

## 2020-09-17 PROCEDURE — 25010000002 ONDANSETRON PER 1 MG: Performed by: EMERGENCY MEDICINE

## 2020-09-17 PROCEDURE — 80307 DRUG TEST PRSMV CHEM ANLYZR: CPT | Performed by: EMERGENCY MEDICINE

## 2020-09-17 RX ORDER — SODIUM CHLORIDE 0.9 % (FLUSH) 0.9 %
10 SYRINGE (ML) INJECTION AS NEEDED
Status: DISCONTINUED | OUTPATIENT
Start: 2020-09-17 | End: 2020-09-17 | Stop reason: HOSPADM

## 2020-09-17 RX ORDER — ONDANSETRON 2 MG/ML
4 INJECTION INTRAMUSCULAR; INTRAVENOUS ONCE
Status: COMPLETED | OUTPATIENT
Start: 2020-09-17 | End: 2020-09-17

## 2020-09-17 RX ADMIN — SODIUM CHLORIDE 1000 ML: 9 INJECTION, SOLUTION INTRAVENOUS at 15:30

## 2020-09-17 RX ADMIN — ONDANSETRON HYDROCHLORIDE 4 MG: 2 SOLUTION INTRAMUSCULAR; INTRAVENOUS at 15:33

## 2020-09-17 NOTE — ED PROVIDER NOTES
Subjective   Patient says he is significantly drunk today.  He tells me just generally feels bad but cannot explain how.  He says he does not drink every day but he did ask for a banana bag.      History provided by:  Patient   used: No    Alcohol Intoxication  Location:  Generalized  Severity:  Severe  Onset quality:  Sudden  Duration:  1 day  Timing:  Constant  Progression:  Unchanged  Chronicity:  Recurrent  Associated symptoms: no abdominal pain, no chest pain, no cough, no diarrhea, no ear pain, no fatigue, no fever, no loss of consciousness, no myalgias, no rash, no rhinorrhea and no shortness of breath        Review of Systems   Constitutional: Negative.  Negative for fatigue and fever.   HENT: Negative.  Negative for ear pain and rhinorrhea.    Respiratory: Negative.  Negative for cough and shortness of breath.    Cardiovascular: Negative.  Negative for chest pain.   Gastrointestinal: Negative.  Negative for abdominal pain and diarrhea.   Genitourinary: Negative.    Musculoskeletal: Negative.  Negative for myalgias.   Skin: Negative.  Negative for rash.   Neurological: Negative.  Negative for loss of consciousness.   Psychiatric/Behavioral: Negative.    All other systems reviewed and are negative.      No past medical history on file.    No Known Allergies    No past surgical history on file.    No family history on file.    Social History     Socioeconomic History   • Marital status:      Spouse name: Not on file   • Number of children: Not on file   • Years of education: Not on file   • Highest education level: Not on file       Prior to Admission medications    Not on File       Medications   sodium chloride 0.9 % flush 10 mL (has no administration in time range)   sodium chloride 0.9 % flush 10 mL (has no administration in time range)   sodium chloride 0.9 % bolus 1,000 mL (0 mL Intravenous Stopped 9/17/20 7746)   ondansetron (ZOFRAN) injection 4 mg (4 mg Intravenous Given  9/17/20 1533)       Vitals:    09/17/20 1447   BP: (!) 154/101   Pulse: 118   Resp: 18   Temp: 98.1 °F (36.7 °C)   SpO2: 95%         Objective   Physical Exam  Vitals signs and nursing note reviewed.   Constitutional:       Appearance: Normal appearance.      Comments: Patient is intoxicated but answers questions correctly but slowly.  He does ambulate with no signs of falling though you can tell he is intoxicated.   HENT:      Head: Normocephalic and atraumatic.   Eyes:      Extraocular Movements: Extraocular movements intact.      Pupils: Pupils are equal, round, and reactive to light.   Cardiovascular:      Rate and Rhythm: Normal rate and regular rhythm.   Pulmonary:      Effort: Pulmonary effort is normal.      Breath sounds: Normal breath sounds.   Abdominal:      General: Abdomen is flat.      Palpations: Abdomen is soft.   Musculoskeletal: Normal range of motion.   Skin:     General: Skin is warm and dry.   Neurological:      General: No focal deficit present.      Mental Status: He is alert and oriented to person, place, and time.   Psychiatric:         Mood and Affect: Mood normal.         Behavior: Behavior normal.         Procedures         Lab Results (last 24 hours)     Procedure Component Value Units Date/Time    CBC & Differential [[id number]]  (Abnormal) Collected: 09/17/20 1530    Specimen: Blood Updated: 09/17/20 1542    Narrative:      The following orders were created for panel order CBC & Differential.  Procedure                               Abnormality         Status                     ---------                               -----------         ------                     CBC Auto Differential[[id number]]        Abnormal            Final result                 Please view results for these tests on the individual orders.    Comprehensive Metabolic Panel [[id number]]  (Abnormal) Collected: 09/17/20 1530    Specimen: Blood Updated: 09/17/20 1600     Glucose 103 mg/dL      BUN 4 mg/dL       Creatinine 0.53 mg/dL      Sodium 147 mmol/L      Potassium 3.7 mmol/L      Chloride 105 mmol/L      CO2 25.0 mmol/L      Calcium 9.2 mg/dL      Total Protein 7.8 g/dL      Albumin 4.90 g/dL      ALT (SGPT) 129 U/L      AST (SGOT) 209 U/L      Alkaline Phosphatase 111 U/L      Total Bilirubin 0.4 mg/dL      eGFR Non African Amer >150 mL/min/1.73      Globulin 2.9 gm/dL      A/G Ratio 1.7 g/dL      BUN/Creatinine Ratio 7.5     Anion Gap 17.0 mmol/L     Narrative:      GFR Normal >60  Chronic Kidney Disease <60  Kidney Failure <15      Protime-INR [[id number]]  (Normal) Collected: 09/17/20 1530    Specimen: Blood Updated: 09/17/20 1549     Protime 12.9 Seconds      INR 1.01    aPTT [[id number]]  (Normal) Collected: 09/17/20 1530    Specimen: Blood Updated: 09/17/20 1549     PTT 30.1 seconds     CBC Auto Differential [[id number]]  (Abnormal) Collected: 09/17/20 1530    Specimen: Blood Updated: 09/17/20 1542     WBC 4.79 10*3/mm3      RBC 4.36 10*6/mm3      Hemoglobin 15.3 g/dL      Hematocrit 43.9 %      .7 fL      MCH 35.1 pg      MCHC 34.9 g/dL      RDW 13.7 %      RDW-SD 50.9 fl      MPV 9.9 fL      Platelets 159 10*3/mm3      Neutrophil % 53.8 %      Lymphocyte % 31.9 %      Monocyte % 10.9 %      Eosinophil % 1.7 %      Basophil % 1.5 %      Immature Grans % 0.2 %      Neutrophils, Absolute 2.58 10*3/mm3      Lymphocytes, Absolute 1.53 10*3/mm3      Monocytes, Absolute 0.52 10*3/mm3      Eosinophils, Absolute 0.08 10*3/mm3      Basophils, Absolute 0.07 10*3/mm3      Immature Grans, Absolute 0.01 10*3/mm3      nRBC 0.0 /100 WBC     Ethanol [[id number]] Collected: 09/17/20 1530    Specimen: Blood Updated: 09/17/20 1555     Ethanol % 0.320 %     Narrative:      Not for legal purposes. Chain of Custody not followed.     Urine Drug Screen - Urine, Clean Catch [[id number]]  (Normal) Collected: 09/17/20 1531    Specimen: Urine, Clean Catch Updated: 09/17/20 1550     THC, Screen, Urine Negative     Phencyclidine (PCP),  Urine Negative     Cocaine Screen, Urine Negative     Methamphetamine, Ur Negative     Opiate Screen Negative     Amphetamine Screen, Urine Negative     Benzodiazepine Screen, Urine Negative     Tricyclic Antidepressants Screen Negative     Methadone Screen, Urine Negative     Barbiturates Screen, Urine Negative     Oxycodone Screen, Urine Negative     Propoxyphene Screen Negative     Buprenorphine, Screen, Urine Negative    Narrative:      Cutoff For Drugs Screened:    Amphetamines               500 ng/ml  Barbiturates               200 ng/ml  Benzodiazepines            150 ng/ml  Cocaine                    150 ng/ml  Methadone                  200 ng/ml  Opiates                    100 ng/ml  Phencyclidine               25 ng/ml  THC                            50 ng/ml  Methamphetamine            500 ng/ml  Tricyclic Antidepressants  300 ng/ml  Oxycodone                  100 ng/ml  Propoxyphene               300 ng/ml  Buprenorphine               10 ng/ml    The normal value for all drugs tested is negative. This report includes unconfirmed screening results, with the cutoff values listed, to be used for medical treatment purposes only.  Unconfirmed results must not be used for non-medical purposes such as employment or legal testing.  Clinical consideration should be applied to any drug of abuse test, particularly when unconfirmed results are used.            No orders to display       ED Course  ED Course as of Sep 17 1609   Thu Sep 17, 2020   1607 I told the patient banana bag would not help so he mildly did say that we will check him out and make sure we gave him some fluids.  Apparently he did not wait for the results and eloped.    [TR]      ED Course User Index  [TR] Amando Stuart Jr., MD          [facility]  Number of Diagnoses or Management Options  Alcoholic intoxication without complication (CMS/HCC):       Final diagnoses:   Alcoholic intoxication without complication (CMS/HCC)          Amando Stuart  MD [place]  09/17/20 6809

## 2020-12-09 ENCOUNTER — HOSPITAL ENCOUNTER (EMERGENCY)
Age: 40
Discharge: HOME | End: 2020-12-10
Attending: PEDIATRICS
Payer: MEDICAID

## 2020-12-09 PROCEDURE — 99284 EMERGENCY DEPT VISIT MOD MDM: CPT

## 2020-12-09 PROCEDURE — 93005 ELECTROCARDIOGRAM TRACING: CPT | Performed by: PEDIATRICS

## 2020-12-09 PROCEDURE — 99999 PR OFFICE/OUTPT VISIT,PROCEDURE ONLY: CPT | Performed by: PEDIATRICS

## 2020-12-10 ENCOUNTER — APPOINTMENT (OUTPATIENT)
Dept: CT IMAGING | Age: 40
End: 2020-12-10
Payer: MEDICAID

## 2020-12-10 VITALS
OXYGEN SATURATION: 98 % | RESPIRATION RATE: 20 BRPM | DIASTOLIC BLOOD PRESSURE: 76 MMHG | TEMPERATURE: 98.8 F | SYSTOLIC BLOOD PRESSURE: 132 MMHG | HEART RATE: 76 BPM

## 2020-12-10 LAB
ALBUMIN SERPL-MCNC: 5.1 G/DL (ref 3.5–5.2)
ALP BLD-CCNC: 123 U/L (ref 40–130)
ALT SERPL-CCNC: 74 U/L (ref 5–41)
AMPHETAMINE SCREEN, URINE: NEGATIVE
ANION GAP SERPL CALCULATED.3IONS-SCNC: 26 MMOL/L (ref 7–19)
AST SERPL-CCNC: 110 U/L (ref 5–40)
BACTERIA: NEGATIVE /HPF
BARBITURATE SCREEN URINE: NEGATIVE
BASOPHILS ABSOLUTE: 0 K/UL (ref 0–0.2)
BASOPHILS RELATIVE PERCENT: 0.6 % (ref 0–1)
BENZODIAZEPINE SCREEN, URINE: NEGATIVE
BILIRUB SERPL-MCNC: 0.6 MG/DL (ref 0.2–1.2)
BILIRUBIN URINE: NEGATIVE
BLOOD, URINE: ABNORMAL
BUN BLDV-MCNC: 18 MG/DL (ref 6–20)
CALCIUM SERPL-MCNC: 9.1 MG/DL (ref 8.6–10)
CANNABINOID SCREEN URINE: NEGATIVE
CHLORIDE BLD-SCNC: 96 MMOL/L (ref 98–111)
CLARITY: CLEAR
CO2: 18 MMOL/L (ref 22–29)
COCAINE METABOLITE SCREEN URINE: NEGATIVE
COLOR: YELLOW
CREAT SERPL-MCNC: 0.6 MG/DL (ref 0.5–1.2)
CRYSTALS, UA: ABNORMAL /HPF
EKG P AXIS: 56 DEGREES
EKG P-R INTERVAL: 120 MS
EKG Q-T INTERVAL: 304 MS
EKG QRS DURATION: 82 MS
EKG QTC CALCULATION (BAZETT): 417 MS
EKG T AXIS: 81 DEGREES
EOSINOPHILS ABSOLUTE: 0 K/UL (ref 0–0.6)
EOSINOPHILS RELATIVE PERCENT: 0 % (ref 0–5)
EPITHELIAL CELLS, UA: 1 /HPF (ref 0–5)
ETHANOL: 298 MG/DL (ref 0–0.08)
GFR AFRICAN AMERICAN: >59
GFR NON-AFRICAN AMERICAN: >60
GLUCOSE BLD-MCNC: 128 MG/DL (ref 74–109)
GLUCOSE URINE: NEGATIVE MG/DL
HCT VFR BLD CALC: 47.9 % (ref 42–52)
HEMOGLOBIN: 16.7 G/DL (ref 14–18)
HYALINE CASTS: 7 /HPF (ref 0–8)
IMMATURE GRANULOCYTES #: 0 K/UL
KETONES, URINE: 80 MG/DL
LEUKOCYTE ESTERASE, URINE: NEGATIVE
LYMPHOCYTES ABSOLUTE: 1.2 K/UL (ref 1.1–4.5)
LYMPHOCYTES RELATIVE PERCENT: 16.4 % (ref 20–40)
Lab: NORMAL
MCH RBC QN AUTO: 35.1 PG (ref 27–31)
MCHC RBC AUTO-ENTMCNC: 34.9 G/DL (ref 33–37)
MCV RBC AUTO: 100.6 FL (ref 80–94)
MONOCYTES ABSOLUTE: 0.4 K/UL (ref 0–0.9)
MONOCYTES RELATIVE PERCENT: 5.3 % (ref 0–10)
NEUTROPHILS ABSOLUTE: 5.4 K/UL (ref 1.5–7.5)
NEUTROPHILS RELATIVE PERCENT: 77.4 % (ref 50–65)
NITRITE, URINE: NEGATIVE
OPIATE SCREEN URINE: NEGATIVE
PDW BLD-RTO: 13.7 % (ref 11.5–14.5)
PH UA: 6 (ref 5–8)
PLATELET # BLD: 155 K/UL (ref 130–400)
PMV BLD AUTO: 10.7 FL (ref 9.4–12.4)
POTASSIUM SERPL-SCNC: 4.3 MMOL/L (ref 3.5–5)
PROTEIN UA: =>1000 MG/DL
RBC # BLD: 4.76 M/UL (ref 4.7–6.1)
RBC UA: 1 /HPF (ref 0–4)
SODIUM BLD-SCNC: 140 MMOL/L (ref 136–145)
SPECIFIC GRAVITY UA: 1.02 (ref 1–1.03)
TOTAL PROTEIN: 8.6 G/DL (ref 6.6–8.7)
UROBILINOGEN, URINE: 0.2 E.U./DL
WBC # BLD: 7 K/UL (ref 4.8–10.8)
WBC UA: 0 /HPF (ref 0–5)

## 2020-12-10 PROCEDURE — 96365 THER/PROPH/DIAG IV INF INIT: CPT

## 2020-12-10 PROCEDURE — 81001 URINALYSIS AUTO W/SCOPE: CPT

## 2020-12-10 PROCEDURE — 36415 COLL VENOUS BLD VENIPUNCTURE: CPT

## 2020-12-10 PROCEDURE — 2500000003 HC RX 250 WO HCPCS: Performed by: PEDIATRICS

## 2020-12-10 PROCEDURE — 80307 DRUG TEST PRSMV CHEM ANLYZR: CPT

## 2020-12-10 PROCEDURE — 6370000000 HC RX 637 (ALT 250 FOR IP): Performed by: PEDIATRICS

## 2020-12-10 PROCEDURE — 85025 COMPLETE CBC W/AUTO DIFF WBC: CPT

## 2020-12-10 PROCEDURE — 6360000002 HC RX W HCPCS: Performed by: PEDIATRICS

## 2020-12-10 PROCEDURE — 80053 COMPREHEN METABOLIC PANEL: CPT

## 2020-12-10 PROCEDURE — 2580000003 HC RX 258: Performed by: PEDIATRICS

## 2020-12-10 PROCEDURE — 70450 CT HEAD/BRAIN W/O DYE: CPT

## 2020-12-10 PROCEDURE — 96366 THER/PROPH/DIAG IV INF ADDON: CPT

## 2020-12-10 PROCEDURE — G0480 DRUG TEST DEF 1-7 CLASSES: HCPCS

## 2020-12-10 RX ORDER — IBUPROFEN 200 MG
400 TABLET ORAL ONCE
Status: COMPLETED | OUTPATIENT
Start: 2020-12-10 | End: 2020-12-10

## 2020-12-10 RX ORDER — HYDROCODONE BITARTRATE AND ACETAMINOPHEN 5; 325 MG/1; MG/1
1 TABLET ORAL EVERY 6 HOURS PRN
Qty: 12 TABLET | Refills: 0 | Status: SHIPPED | OUTPATIENT
Start: 2020-12-10 | End: 2020-12-13

## 2020-12-10 RX ADMIN — FOLIC ACID: 5 INJECTION, SOLUTION INTRAMUSCULAR; INTRAVENOUS; SUBCUTANEOUS at 00:20

## 2020-12-10 RX ADMIN — IBUPROFEN 400 MG: 200 TABLET, FILM COATED ORAL at 04:30

## 2020-12-10 ASSESSMENT — PAIN SCALES - GENERAL: PAINLEVEL_OUTOF10: 2

## 2020-12-10 NOTE — ED NOTES
Pt instructed to lay in bed multiple times, pt keeps getting up out of bed and pulling monitor leads, BP and pulse ox off.       Joyce Clemons RN  12/10/20 3800

## 2020-12-10 NOTE — ED NOTES
Phoned mother, informed her to come around 8am to  to go to the Ophthalmology appointment at [address] [place] [address]  12/10/20 4987

## 2020-12-12 ASSESSMENT — ENCOUNTER SYMPTOMS
BACK PAIN: 0
RHINORRHEA: 0
NAUSEA: 0
VOMITING: 0
COUGH: 0
EYE PAIN: 1
SHORTNESS OF BREATH: 0
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
## 2020-12-24 ENCOUNTER — HOSPITAL ENCOUNTER (OUTPATIENT)
Age: 40
Setting detail: OBSERVATION
Discharge: HOME | End: 2020-12-27
Attending: EMERGENCY MEDICINE | Admitting: INTERNAL MEDICINE
Payer: MEDICAID

## 2020-12-24 ENCOUNTER — APPOINTMENT (OUTPATIENT)
Dept: CT IMAGING | Age: 40
End: 2020-12-24
Payer: MEDICAID

## 2020-12-24 PROBLEM — F10.121 ALCOHOL INTOXICATION DELIRIUM (HCC): Status: ACTIVE | Noted: 2020-12-24

## 2020-12-24 LAB
ACETAMINOPHEN LEVEL: <15 UG/ML
ALBUMIN SERPL-MCNC: 4.5 G/DL (ref 3.5–5.2)
ALP BLD-CCNC: 118 U/L (ref 40–130)
ALT SERPL-CCNC: 112 U/L (ref 5–41)
AMPHETAMINE SCREEN, URINE: NEGATIVE
ANION GAP SERPL CALCULATED.3IONS-SCNC: 18 MMOL/L (ref 7–19)
AST SERPL-CCNC: 119 U/L (ref 5–40)
BARBITURATE SCREEN URINE: NEGATIVE
BASOPHILS ABSOLUTE: 0.1 K/UL (ref 0–0.2)
BASOPHILS RELATIVE PERCENT: 2.4 % (ref 0–1)
BENZODIAZEPINE SCREEN, URINE: NEGATIVE
BILIRUB SERPL-MCNC: <0.2 MG/DL (ref 0.2–1.2)
BILIRUBIN URINE: NEGATIVE
BLOOD, URINE: NEGATIVE
BUN BLDV-MCNC: 8 MG/DL (ref 6–20)
CALCIUM SERPL-MCNC: 9.2 MG/DL (ref 8.6–10)
CANNABINOID SCREEN URINE: POSITIVE
CHLORIDE BLD-SCNC: 103 MMOL/L (ref 98–111)
CLARITY: CLEAR
CO2: 24 MMOL/L (ref 22–29)
COCAINE METABOLITE SCREEN URINE: NEGATIVE
COLOR: YELLOW
CREAT SERPL-MCNC: 0.5 MG/DL (ref 0.5–1.2)
EOSINOPHILS ABSOLUTE: 0.1 K/UL (ref 0–0.6)
EOSINOPHILS RELATIVE PERCENT: 1.8 % (ref 0–5)
ETHANOL: 300 MG/DL (ref 0–0.08)
GFR AFRICAN AMERICAN: >59
GFR NON-AFRICAN AMERICAN: >60
GLUCOSE BLD-MCNC: 91 MG/DL (ref 74–109)
GLUCOSE URINE: NEGATIVE MG/DL
HCT VFR BLD CALC: 44.9 % (ref 42–52)
HEMOGLOBIN: 15.2 G/DL (ref 14–18)
IMMATURE GRANULOCYTES #: 0 K/UL
INR BLD: 0.92 (ref 0.88–1.18)
KETONES, URINE: ABNORMAL MG/DL
LEUKOCYTE ESTERASE, URINE: NEGATIVE
LIPASE: 25 U/L (ref 13–60)
LYMPHOCYTES ABSOLUTE: 2 K/UL (ref 1.1–4.5)
LYMPHOCYTES RELATIVE PERCENT: 37.2 % (ref 20–40)
Lab: ABNORMAL
MCH RBC QN AUTO: 35.3 PG (ref 27–31)
MCHC RBC AUTO-ENTMCNC: 33.9 G/DL (ref 33–37)
MCV RBC AUTO: 104.4 FL (ref 80–94)
MONOCYTES ABSOLUTE: 0.7 K/UL (ref 0–0.9)
MONOCYTES RELATIVE PERCENT: 12.8 % (ref 0–10)
NEUTROPHILS ABSOLUTE: 2.5 K/UL (ref 1.5–7.5)
NEUTROPHILS RELATIVE PERCENT: 45.6 % (ref 50–65)
NITRITE, URINE: NEGATIVE
OPIATE SCREEN URINE: NEGATIVE
PDW BLD-RTO: 14.6 % (ref 11.5–14.5)
PH UA: 5.5 (ref 5–8)
PLATELET # BLD: 384 K/UL (ref 130–400)
PMV BLD AUTO: 9.7 FL (ref 9.4–12.4)
POTASSIUM SERPL-SCNC: 3.9 MMOL/L (ref 3.5–5)
PROTEIN UA: NEGATIVE MG/DL
PROTHROMBIN TIME: 12.2 SEC (ref 12–14.6)
RBC # BLD: 4.3 M/UL (ref 4.7–6.1)
SALICYLATE, SERUM: <3 MG/DL (ref 3–10)
SARS-COV-2, NAAT: NOT DETECTED
SODIUM BLD-SCNC: 145 MMOL/L (ref 136–145)
SPECIFIC GRAVITY UA: 1.01 (ref 1–1.03)
TOTAL PROTEIN: 7.8 G/DL (ref 6.6–8.7)
UROBILINOGEN, URINE: 0.2 E.U./DL
WBC # BLD: 5.5 K/UL (ref 4.8–10.8)

## 2020-12-24 PROCEDURE — 80053 COMPREHEN METABOLIC PANEL: CPT

## 2020-12-24 PROCEDURE — 99283 EMERGENCY DEPT VISIT LOW MDM: CPT

## 2020-12-24 PROCEDURE — 85025 COMPLETE CBC W/AUTO DIFF WBC: CPT

## 2020-12-24 PROCEDURE — G0480 DRUG TEST DEF 1-7 CLASSES: HCPCS

## 2020-12-24 PROCEDURE — 85610 PROTHROMBIN TIME: CPT

## 2020-12-24 PROCEDURE — U0002 COVID-19 LAB TEST NON-CDC: HCPCS

## 2020-12-24 PROCEDURE — 36415 COLL VENOUS BLD VENIPUNCTURE: CPT

## 2020-12-24 PROCEDURE — 6360000002 HC RX W HCPCS: Performed by: STUDENT IN AN ORGANIZED HEALTH CARE EDUCATION/TRAINING PROGRAM

## 2020-12-24 PROCEDURE — G0378 HOSPITAL OBSERVATION PER HR: HCPCS

## 2020-12-24 PROCEDURE — 70450 CT HEAD/BRAIN W/O DYE: CPT

## 2020-12-24 PROCEDURE — 96374 THER/PROPH/DIAG INJ IV PUSH: CPT

## 2020-12-24 PROCEDURE — 80307 DRUG TEST PRSMV CHEM ANLYZR: CPT

## 2020-12-24 PROCEDURE — 81003 URINALYSIS AUTO W/O SCOPE: CPT

## 2020-12-24 PROCEDURE — 2580000003 HC RX 258: Performed by: STUDENT IN AN ORGANIZED HEALTH CARE EDUCATION/TRAINING PROGRAM

## 2020-12-24 PROCEDURE — 83690 ASSAY OF LIPASE: CPT

## 2020-12-24 PROCEDURE — 99999 PR OFFICE/OUTPT VISIT,PROCEDURE ONLY: CPT | Performed by: EMERGENCY MEDICINE

## 2020-12-24 PROCEDURE — 6370000000 HC RX 637 (ALT 250 FOR IP): Performed by: EMERGENCY MEDICINE

## 2020-12-24 RX ORDER — ONDANSETRON 2 MG/ML
4 INJECTION INTRAMUSCULAR; INTRAVENOUS EVERY 6 HOURS PRN
Status: DISCONTINUED | OUTPATIENT
Start: 2020-12-24 | End: 2020-12-27 | Stop reason: HOSPADM

## 2020-12-24 RX ORDER — MULTIVITAMIN WITH IRON
1 TABLET ORAL DAILY
Status: DISCONTINUED | OUTPATIENT
Start: 2020-12-25 | End: 2020-12-27 | Stop reason: HOSPADM

## 2020-12-24 RX ORDER — LANOLIN ALCOHOL/MO/W.PET/CERES
100 CREAM (GRAM) TOPICAL DAILY
Status: DISCONTINUED | OUTPATIENT
Start: 2020-12-25 | End: 2020-12-25 | Stop reason: SDUPTHER

## 2020-12-24 RX ORDER — PROMETHAZINE HYDROCHLORIDE 25 MG/1
12.5 TABLET ORAL EVERY 6 HOURS PRN
Status: DISCONTINUED | OUTPATIENT
Start: 2020-12-24 | End: 2020-12-27 | Stop reason: HOSPADM

## 2020-12-24 RX ORDER — LORAZEPAM 2 MG/ML
2 INJECTION INTRAMUSCULAR
Status: DISCONTINUED | OUTPATIENT
Start: 2020-12-24 | End: 2020-12-27 | Stop reason: HOSPADM

## 2020-12-24 RX ORDER — POTASSIUM CHLORIDE 7.45 MG/ML
10 INJECTION INTRAVENOUS PRN
Status: DISCONTINUED | OUTPATIENT
Start: 2020-12-24 | End: 2020-12-27 | Stop reason: HOSPADM

## 2020-12-24 RX ORDER — ACETAMINOPHEN 325 MG/1
650 TABLET ORAL EVERY 6 HOURS PRN
Status: DISCONTINUED | OUTPATIENT
Start: 2020-12-24 | End: 2020-12-27 | Stop reason: HOSPADM

## 2020-12-24 RX ORDER — LORAZEPAM 2 MG/ML
3 INJECTION INTRAMUSCULAR
Status: DISCONTINUED | OUTPATIENT
Start: 2020-12-24 | End: 2020-12-27 | Stop reason: HOSPADM

## 2020-12-24 RX ORDER — LORAZEPAM 2 MG/ML
1 INJECTION INTRAMUSCULAR
Status: DISCONTINUED | OUTPATIENT
Start: 2020-12-24 | End: 2020-12-27 | Stop reason: HOSPADM

## 2020-12-24 RX ORDER — LORAZEPAM 1 MG/1
2 TABLET ORAL
Status: DISCONTINUED | OUTPATIENT
Start: 2020-12-24 | End: 2020-12-27 | Stop reason: HOSPADM

## 2020-12-24 RX ORDER — POLYETHYLENE GLYCOL 3350 17 G/17G
17 POWDER, FOR SOLUTION ORAL DAILY PRN
Status: DISCONTINUED | OUTPATIENT
Start: 2020-12-24 | End: 2020-12-27 | Stop reason: HOSPADM

## 2020-12-24 RX ORDER — LORAZEPAM 1 MG/1
3 TABLET ORAL
Status: DISCONTINUED | OUTPATIENT
Start: 2020-12-24 | End: 2020-12-27 | Stop reason: HOSPADM

## 2020-12-24 RX ORDER — SODIUM CHLORIDE 0.9 % (FLUSH) 0.9 %
10 SYRINGE (ML) INJECTION EVERY 12 HOURS SCHEDULED
Status: DISCONTINUED | OUTPATIENT
Start: 2020-12-24 | End: 2020-12-27 | Stop reason: HOSPADM

## 2020-12-24 RX ORDER — SODIUM CHLORIDE 9 MG/ML
INJECTION, SOLUTION INTRAVENOUS CONTINUOUS
Status: DISCONTINUED | OUTPATIENT
Start: 2020-12-24 | End: 2020-12-27 | Stop reason: HOSPADM

## 2020-12-24 RX ORDER — LORAZEPAM 2 MG/ML
4 INJECTION INTRAMUSCULAR
Status: DISCONTINUED | OUTPATIENT
Start: 2020-12-24 | End: 2020-12-27 | Stop reason: HOSPADM

## 2020-12-24 RX ORDER — ACETAMINOPHEN 650 MG/1
650 SUPPOSITORY RECTAL EVERY 6 HOURS PRN
Status: DISCONTINUED | OUTPATIENT
Start: 2020-12-24 | End: 2020-12-27 | Stop reason: HOSPADM

## 2020-12-24 RX ORDER — MAGNESIUM SULFATE IN WATER 40 MG/ML
2 INJECTION, SOLUTION INTRAVENOUS PRN
Status: DISCONTINUED | OUTPATIENT
Start: 2020-12-24 | End: 2020-12-27 | Stop reason: HOSPADM

## 2020-12-24 RX ORDER — LORAZEPAM 1 MG/1
4 TABLET ORAL
Status: DISCONTINUED | OUTPATIENT
Start: 2020-12-24 | End: 2020-12-27 | Stop reason: HOSPADM

## 2020-12-24 RX ORDER — LORAZEPAM 1 MG/1
1 TABLET ORAL
Status: DISCONTINUED | OUTPATIENT
Start: 2020-12-24 | End: 2020-12-27 | Stop reason: HOSPADM

## 2020-12-24 RX ORDER — POTASSIUM CHLORIDE 20 MEQ/1
40 TABLET, EXTENDED RELEASE ORAL PRN
Status: DISCONTINUED | OUTPATIENT
Start: 2020-12-24 | End: 2020-12-27 | Stop reason: HOSPADM

## 2020-12-24 RX ORDER — IBUPROFEN 200 MG
600 TABLET ORAL ONCE
Status: COMPLETED | OUTPATIENT
Start: 2020-12-24 | End: 2020-12-24

## 2020-12-24 RX ORDER — SODIUM CHLORIDE 0.9 % (FLUSH) 0.9 %
10 SYRINGE (ML) INJECTION PRN
Status: DISCONTINUED | OUTPATIENT
Start: 2020-12-24 | End: 2020-12-27 | Stop reason: HOSPADM

## 2020-12-24 RX ADMIN — SODIUM CHLORIDE, PRESERVATIVE FREE 10 ML: 5 INJECTION INTRAVENOUS at 23:29

## 2020-12-24 RX ADMIN — LORAZEPAM 3 MG: 2 INJECTION INTRAMUSCULAR; INTRAVENOUS at 23:28

## 2020-12-24 RX ADMIN — IBUPROFEN 600 MG: 200 TABLET, FILM COATED ORAL at 21:21

## 2020-12-24 RX ADMIN — SODIUM CHLORIDE: 9 INJECTION, SOLUTION INTRAVENOUS at 23:28

## 2020-12-24 ASSESSMENT — ENCOUNTER SYMPTOMS
SHORTNESS OF BREATH: 0
COUGH: 0
BACK PAIN: 0
ABDOMINAL PAIN: 0

## 2020-12-24 ASSESSMENT — PAIN SCALES - GENERAL: PAINLEVEL_OUTOF10: 8

## 2020-12-25 LAB
ANION GAP SERPL CALCULATED.3IONS-SCNC: 13 MMOL/L (ref 7–19)
BUN BLDV-MCNC: 8 MG/DL (ref 6–20)
CALCIUM SERPL-MCNC: 9.1 MG/DL (ref 8.6–10)
CHLORIDE BLD-SCNC: 104 MMOL/L (ref 98–111)
CO2: 26 MMOL/L (ref 22–29)
CREAT SERPL-MCNC: 0.5 MG/DL (ref 0.5–1.2)
ETHANOL: 136 MG/DL (ref 0–0.08)
GFR AFRICAN AMERICAN: >59
GFR NON-AFRICAN AMERICAN: >60
GLUCOSE BLD-MCNC: 69 MG/DL (ref 74–109)
MAGNESIUM: 2 MG/DL (ref 1.6–2.6)
POTASSIUM REFLEX MAGNESIUM: 3.5 MMOL/L (ref 3.5–5)
SODIUM BLD-SCNC: 143 MMOL/L (ref 136–145)

## 2020-12-25 PROCEDURE — 36415 COLL VENOUS BLD VENIPUNCTURE: CPT

## 2020-12-25 PROCEDURE — 83735 ASSAY OF MAGNESIUM: CPT

## 2020-12-25 PROCEDURE — 6370000000 HC RX 637 (ALT 250 FOR IP): Performed by: INTERNAL MEDICINE

## 2020-12-25 PROCEDURE — G0480 DRUG TEST DEF 1-7 CLASSES: HCPCS

## 2020-12-25 PROCEDURE — 80048 BASIC METABOLIC PNL TOTAL CA: CPT

## 2020-12-25 PROCEDURE — 2580000003 HC RX 258: Performed by: STUDENT IN AN ORGANIZED HEALTH CARE EDUCATION/TRAINING PROGRAM

## 2020-12-25 PROCEDURE — G0378 HOSPITAL OBSERVATION PER HR: HCPCS

## 2020-12-25 PROCEDURE — 6370000000 HC RX 637 (ALT 250 FOR IP): Performed by: STUDENT IN AN ORGANIZED HEALTH CARE EDUCATION/TRAINING PROGRAM

## 2020-12-25 PROCEDURE — 96375 TX/PRO/DX INJ NEW DRUG ADDON: CPT

## 2020-12-25 PROCEDURE — 2580000003 HC RX 258

## 2020-12-25 PROCEDURE — 6360000002 HC RX W HCPCS: Performed by: STUDENT IN AN ORGANIZED HEALTH CARE EDUCATION/TRAINING PROGRAM

## 2020-12-25 RX ORDER — DEXTROSE MONOHYDRATE 25 G/50ML
INJECTION, SOLUTION INTRAVENOUS
Status: COMPLETED
Start: 2020-12-25 | End: 2020-12-25

## 2020-12-25 RX ORDER — NICOTINE POLACRILEX 4 MG
15 LOZENGE BUCCAL PRN
Status: DISCONTINUED | OUTPATIENT
Start: 2020-12-25 | End: 2020-12-27 | Stop reason: HOSPADM

## 2020-12-25 RX ORDER — FOLIC ACID 1 MG/1
1 TABLET ORAL DAILY
Status: DISCONTINUED | OUTPATIENT
Start: 2020-12-25 | End: 2020-12-27 | Stop reason: HOSPADM

## 2020-12-25 RX ORDER — DEXTROSE MONOHYDRATE 50 MG/ML
100 INJECTION, SOLUTION INTRAVENOUS PRN
Status: DISCONTINUED | OUTPATIENT
Start: 2020-12-25 | End: 2020-12-27 | Stop reason: HOSPADM

## 2020-12-25 RX ORDER — GAUZE BANDAGE 2" X 2"
100 BANDAGE TOPICAL DAILY
Status: DISCONTINUED | OUTPATIENT
Start: 2020-12-25 | End: 2020-12-27 | Stop reason: HOSPADM

## 2020-12-25 RX ORDER — NICOTINE 21 MG/24HR
1 PATCH, TRANSDERMAL 24 HOURS TRANSDERMAL DAILY
Status: DISCONTINUED | OUTPATIENT
Start: 2020-12-25 | End: 2020-12-27 | Stop reason: HOSPADM

## 2020-12-25 RX ORDER — DEXTROSE MONOHYDRATE 25 G/50ML
12.5 INJECTION, SOLUTION INTRAVENOUS ONCE
Status: COMPLETED | OUTPATIENT
Start: 2020-12-25 | End: 2020-12-25

## 2020-12-25 RX ORDER — DEXTROSE MONOHYDRATE 25 G/50ML
12.5 INJECTION, SOLUTION INTRAVENOUS PRN
Status: DISCONTINUED | OUTPATIENT
Start: 2020-12-25 | End: 2020-12-27 | Stop reason: HOSPADM

## 2020-12-25 RX ADMIN — LORAZEPAM 2 MG: 1 TABLET ORAL at 04:47

## 2020-12-25 RX ADMIN — DEXTROSE MONOHYDRATE 12.5 G: 25 INJECTION, SOLUTION INTRAVENOUS at 03:28

## 2020-12-25 RX ADMIN — ONDANSETRON HYDROCHLORIDE 4 MG: 2 SOLUTION INTRAMUSCULAR; INTRAVENOUS at 11:30

## 2020-12-25 RX ADMIN — SODIUM CHLORIDE: 9 INJECTION, SOLUTION INTRAVENOUS at 09:41

## 2020-12-25 RX ADMIN — SODIUM CHLORIDE, PRESERVATIVE FREE 10 ML: 5 INJECTION INTRAVENOUS at 09:38

## 2020-12-25 RX ADMIN — FOLIC ACID 1 MG: 1 TABLET ORAL at 09:38

## 2020-12-25 RX ADMIN — THERA TABS 1 TABLET: TAB at 09:38

## 2020-12-25 RX ADMIN — THIAMINE HCL TAB 100 MG 100 MG: 100 TAB at 09:38

## 2020-12-25 RX ADMIN — LORAZEPAM 1 MG: 1 TABLET ORAL at 09:38

## 2020-12-25 ASSESSMENT — PAIN SCALES - GENERAL
PAINLEVEL_OUTOF10: 0
PAINLEVEL_OUTOF10: 0
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
## 2020-12-25 NOTE — ED NOTES
Bed: 16  Expected date:   Expected time:   Means of arrival:   Comments:  Rachel Morrison RN  12/24/20 5722
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
## 2020-12-25 NOTE — PROGRESS NOTES
Magaly Evans arrived to room # 335   WITH ALCOHOL ABUSE/SUICIDAL IDEATION   Presented with:  Mental Status: Patient is oriented, alert, coherent, logical, thought processes intact and able to concentrate and follow conversation. Vitals:    12/25/20 0233   BP: 129/80   Pulse: 98   Resp: 20   Temp: 98.1 °F (36.7 °C)   SpO2: 98%     Patient safety contract and falls prevention contract reviewed with patient Yes. Oriented Patient to room. Call light within reach. Yes.   Needs, issues or concerns expressed at this time: no.      Electronically signed by Komal Shaw RN on 12/25/2020 at 4:19 AM

## 2020-12-25 NOTE — PLAN OF CARE
Problem: Suicide risk  Description: Suicide risk  Goal: Provide patient with safe environment  Description: Provide patient with safe environment  Outcome: Ongoing     Problem: Falls - Risk of:  Goal: Will remain free from falls  Description: Will remain free from falls  Outcome: Ongoing  Goal: Absence of physical injury  Description: Absence of physical injury  Outcome: Ongoing     Problem: Discharge Planning:  Goal: Discharged to appropriate level of care  Description: Discharged to appropriate level of care  Outcome: Ongoing     Problem: Fluid Volume - Deficit:  Goal: Absence of fluid volume deficit signs and symptoms  Description: Absence of fluid volume deficit signs and symptoms  Outcome: Ongoing     Problem: Nutrition Deficit:  Goal: Ability to achieve adequate nutritional intake will improve  Description: Ability to achieve adequate nutritional intake will improve  Outcome: Ongoing     Problem: Sleep Pattern Disturbance:  Goal: Appears well-rested  Description: Appears well-rested  Outcome: Ongoing     Problem: Violence - Risk of, Self/Other-Directed:  Goal: Knowledge of developmental care interventions  Description: Absence of violence  Outcome: Ongoing     Problem: Anxiety:  Goal: Level of anxiety will decrease  Description: Level of anxiety will decrease  Outcome: Ongoing     Problem: Health Maintenance - Impaired:  Goal: Ability to perform activities of daily living will improve  Description: Ability to perform activities of daily living will improve  Outcome: Ongoing  Goal: Able to sleep without medication for appropriate length of time  Description: Able to sleep without medication for appropriate length of time  Outcome: Ongoing  Goal: Maintenance of adequate nutrition will improve  Description: Maintenance of adequate nutrition will improve  Outcome: Ongoing     Problem: Mood - Altered:  Goal: Mood stable  Description: Mood stable  Outcome: Ongoing     Problem: Self-Esteem - Low: Goal: Demonstrates positive self-esteem  Description: Demonstrates positive self-esteem  Outcome: Ongoing     Problem: Cerebrospinal Fluid Leakage - Risk Of:  Goal: Demonstration of organized thought processes  Description: Demonstration of organized thought processes  Outcome: Ongoing     Problem: SAFETY  Goal: Free from accidental physical injury  Outcome: Ongoing  Goal: Free from intentional harm  Outcome: Ongoing     Problem: DAILY CARE  Goal: Daily care needs are met  Outcome: Ongoing     Problem: PAIN  Goal: Patient's pain/discomfort is manageable  Outcome: Ongoing     Problem: SKIN INTEGRITY  Goal: Skin integrity is maintained or improved  Outcome: Ongoing     Problem: KNOWLEDGE DEFICIT  Goal: Patient/S.O. demonstrates understanding of disease process, treatment plan, medications, and discharge instructions.   Outcome: Ongoing     Problem: DISCHARGE BARRIERS  Goal: Patient's continuum of care needs are met  Outcome: Ongoing     Problem: ABCDS Injury Assessment  Goal: Absence of physical injury  Outcome: Ongoing     Problem: Serum Glucose Level - Abnormal:  Goal: Ability to maintain appropriate glucose levels has stabilized  Description: Ability to maintain appropriate glucose levels has stabilized  Outcome: Ongoing
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
## 2020-12-25 NOTE — BH NOTE
41years old male with previous psychiatric history of alcohol use disorder, stimulant use disorder, who has been admitted to medical services secondary to alcohol intoxication, blood alcohol level 300, and passive suicidal thoughts. According to hospital's guideline, consult for suicidal ideations has to be placed for patient who is not intoxicated. Additionally, [place] suicidal scale score has to be performed when patient is not intoxicated and score has to be documented to the patient's chart. Consult for suicidal ideations can to be placed if [place] suicidal score is 3 and above. Please, follow our hospital's guideline. Consult will be discontinued. Please, reconsult psychiatry if patient meets above-mentioned criteria according to hospital's guideline and document it in the patient's chart. Thank you.

## 2020-12-25 NOTE — PROGRESS NOTES
4 Eyes Skin Assessment    Daljit Oliva is being assessed upon: Admission    I agree that I, Araceli Persaud, along with Montserrat Gates RN (either 2 RN's or 1 LPN and 1 RN) have performed a thorough Head to Toe Skin Assessment on the patient. ALL assessment sites listed below have been assessed. Areas assessed by both nurses:     [x]   Head, Face, and Ears   [x]   Shoulders, Back, and Chest  [x]   Arms, Elbows, and Hands   [x]   Coccyx, Sacrum, and Ischium  [x]   Legs, Feet, and Heels    Does the Patient have Skin Breakdown?  No    Peter Prevention initiated: NA  Wound Care Orders initiated: NA    [facility] nurse consulted for Pressure Injury (Stage 3,4, Unstageable, DTI, NWPT, and Complex wounds) and New or Established Ostomies: NA        Primary Nurse eSignature: Araceli Persaud RN on 12/25/2020 at 4:20 AM      Co-Signer eSignature: {Esignature:[id number]}

## 2020-12-26 PROCEDURE — G0378 HOSPITAL OBSERVATION PER HR: HCPCS

## 2020-12-26 PROCEDURE — 6370000000 HC RX 637 (ALT 250 FOR IP): Performed by: STUDENT IN AN ORGANIZED HEALTH CARE EDUCATION/TRAINING PROGRAM

## 2020-12-26 RX ADMIN — LORAZEPAM 1 MG: 1 TABLET ORAL at 20:36
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
## 2020-12-27 VITALS
SYSTOLIC BLOOD PRESSURE: 142 MMHG | OXYGEN SATURATION: 95 % | DIASTOLIC BLOOD PRESSURE: 88 MMHG | HEART RATE: 109 BPM | TEMPERATURE: 97.7 F | RESPIRATION RATE: 18 BRPM

## 2020-12-27 LAB
ALBUMIN SERPL-MCNC: 4.5 G/DL (ref 3.5–5.2)
ALP BLD-CCNC: 115 U/L (ref 40–130)
ALT SERPL-CCNC: 64 U/L (ref 5–41)
ANION GAP SERPL CALCULATED.3IONS-SCNC: 13 MMOL/L (ref 7–19)
AST SERPL-CCNC: 51 U/L (ref 5–40)
BASOPHILS ABSOLUTE: 0.1 K/UL (ref 0–0.2)
BASOPHILS RELATIVE PERCENT: 1.6 % (ref 0–1)
BILIRUB SERPL-MCNC: 0.6 MG/DL (ref 0.2–1.2)
BILIRUBIN DIRECT: 0.2 MG/DL (ref 0–0.3)
BILIRUBIN, INDIRECT: 0.4 MG/DL (ref 0.1–1)
BUN BLDV-MCNC: 8 MG/DL (ref 6–20)
CALCIUM SERPL-MCNC: 9.5 MG/DL (ref 8.6–10)
CHLORIDE BLD-SCNC: 100 MMOL/L (ref 98–111)
CO2: 25 MMOL/L (ref 22–29)
CREAT SERPL-MCNC: 0.6 MG/DL (ref 0.5–1.2)
EOSINOPHILS ABSOLUTE: 0.2 K/UL (ref 0–0.6)
EOSINOPHILS RELATIVE PERCENT: 3.1 % (ref 0–5)
GFR AFRICAN AMERICAN: >59
GFR NON-AFRICAN AMERICAN: >60
GLUCOSE BLD-MCNC: 78 MG/DL (ref 74–109)
HCT VFR BLD CALC: 42.9 % (ref 42–52)
HEMOGLOBIN: 14.4 G/DL (ref 14–18)
IMMATURE GRANULOCYTES #: 0 K/UL
LYMPHOCYTES ABSOLUTE: 1.6 K/UL (ref 1.1–4.5)
LYMPHOCYTES RELATIVE PERCENT: 29.3 % (ref 20–40)
MCH RBC QN AUTO: 35.2 PG (ref 27–31)
MCHC RBC AUTO-ENTMCNC: 33.6 G/DL (ref 33–37)
MCV RBC AUTO: 104.9 FL (ref 80–94)
MONOCYTES ABSOLUTE: 0.6 K/UL (ref 0–0.9)
MONOCYTES RELATIVE PERCENT: 10.1 % (ref 0–10)
NEUTROPHILS ABSOLUTE: 3 K/UL (ref 1.5–7.5)
NEUTROPHILS RELATIVE PERCENT: 55.7 % (ref 50–65)
PDW BLD-RTO: 13.7 % (ref 11.5–14.5)
PLATELET # BLD: 267 K/UL (ref 130–400)
PMV BLD AUTO: 10.3 FL (ref 9.4–12.4)
POTASSIUM SERPL-SCNC: 4.1 MMOL/L (ref 3.5–5)
RBC # BLD: 4.09 M/UL (ref 4.7–6.1)
SODIUM BLD-SCNC: 138 MMOL/L (ref 136–145)
TOTAL PROTEIN: 7.1 G/DL (ref 6.6–8.7)
WBC # BLD: 5.5 K/UL (ref 4.8–10.8)

## 2020-12-27 PROCEDURE — 36415 COLL VENOUS BLD VENIPUNCTURE: CPT

## 2020-12-27 PROCEDURE — 80076 HEPATIC FUNCTION PANEL: CPT

## 2020-12-27 PROCEDURE — 2580000003 HC RX 258: Performed by: STUDENT IN AN ORGANIZED HEALTH CARE EDUCATION/TRAINING PROGRAM

## 2020-12-27 PROCEDURE — G0378 HOSPITAL OBSERVATION PER HR: HCPCS

## 2020-12-27 PROCEDURE — 6360000002 HC RX W HCPCS: Performed by: INTERNAL MEDICINE

## 2020-12-27 PROCEDURE — 99252 IP/OBS CONSLTJ NEW/EST SF 35: CPT | Performed by: PSYCHIATRY & NEUROLOGY

## 2020-12-27 PROCEDURE — 80048 BASIC METABOLIC PNL TOTAL CA: CPT

## 2020-12-27 PROCEDURE — 6370000000 HC RX 637 (ALT 250 FOR IP): Performed by: STUDENT IN AN ORGANIZED HEALTH CARE EDUCATION/TRAINING PROGRAM

## 2020-12-27 PROCEDURE — 85025 COMPLETE CBC W/AUTO DIFF WBC: CPT

## 2020-12-27 PROCEDURE — 96372 THER/PROPH/DIAG INJ SC/IM: CPT

## 2020-12-27 PROCEDURE — 6370000000 HC RX 637 (ALT 250 FOR IP): Performed by: INTERNAL MEDICINE

## 2020-12-27 RX ORDER — FOLIC ACID 1 MG/1
1 TABLET ORAL DAILY
Qty: 30 TABLET | Refills: 0 | Status: SHIPPED | OUTPATIENT
Start: 2020-12-28

## 2020-12-27 RX ORDER — MULTIVITAMIN WITH IRON
1 TABLET ORAL DAILY
Qty: 30 TABLET | Refills: 0 | Status: SHIPPED | OUTPATIENT
Start: 2020-12-28 | End: 2021-03-29

## 2020-12-27 RX ORDER — GAUZE BANDAGE 2" X 2"
100 BANDAGE TOPICAL DAILY
Qty: 30 TABLET | Refills: 0 | Status: SHIPPED | OUTPATIENT
Start: 2020-12-28 | End: 2021-03-29

## 2020-12-27 RX ORDER — NICOTINE 21 MG/24HR
1 PATCH, TRANSDERMAL 24 HOURS TRANSDERMAL DAILY
Qty: 30 PATCH | Refills: 0 | Status: SHIPPED | OUTPATIENT
Start: 2020-12-28 | End: 2021-03-29

## 2020-12-27 RX ADMIN — ENOXAPARIN SODIUM 40 MG: 40 INJECTION SUBCUTANEOUS at 08:19

## 2020-12-27 RX ADMIN — FOLIC ACID 1 MG: 1 TABLET ORAL at 08:19

## 2020-12-27 RX ADMIN — THIAMINE HCL TAB 100 MG 100 MG: 100 TAB at 08:19

## 2020-12-27 RX ADMIN — PAROXETINE 40 MG: 30 TABLET, FILM COATED ORAL at 08:19

## 2020-12-27 RX ADMIN — SODIUM CHLORIDE, PRESERVATIVE FREE 10 ML: 5 INJECTION INTRAVENOUS at 08:20

## 2020-12-27 RX ADMIN — LORAZEPAM 1 MG: 1 TABLET ORAL at 02:43

## 2020-12-27 RX ADMIN — THERA TABS 1 TABLET: TAB at 08:19

## 2020-12-27 ASSESSMENT — PAIN SCALES - GENERAL: PAINLEVEL_OUTOF10: 0
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
## 2020-12-27 NOTE — CONSULTS
[facility]  Psychiatry Consult    Reason for Consult: Suicidal ideation    25-year-old white male with history of depression and alcohol abuse, admitted with alcohol intoxication and suicidal ideation.  on admission. UDS positive for cannabis. Patient is known to our consult service. Patient is observed resting in bed. He is calm and cooperative. Smiling. Very polite. States he is doing well. Denies physical symptoms. Denies suicidal ideation. \"I was so drunk. I do not remember what I was saying. I would never hurt myself. \"  States he has been drinking heavily since his wife  him 6 years ago. Recently unemployed due to [employer]. Denies depression and anxiety. States he has been sleeping well at home and at times takes OTC sleeping aids. States he talked to his teenage son yesterday who asked him to stop drinking. Patient has received information of rehabs. \"I can no longer continue like this. I felt so bad talking to my son. I have to quit. \"  Patient is looking forward to going home today. \"I will get into my pajamas and will watch movies. \"  States his mother can come pick him up. Denies having access to guns. Psychiatric History:    Diagnoses: Depression  Suicide attempts/gestures: Denies   Prior hospitalizations: Denies   Medication trials: [facility]   Mental [facility] contact: None  Head trauma: Denies    Substance Use History:  History of alcohol abuse. Has been drinking half a gallon daily. No h/o rehab treatment. Smokes marijuana. History of using methamphetamine and cocaine. Smokes cigarettes, 1.5 PPD. Allergies:  Patient has no known allergies. Medical History:  Past Medical History:   Diagnosis Date    Neuromuscular disorder [facility])     Psychiatric problem        Family History:  Family History   Problem Relation Age of Onset    High Blood Pressure Mother        Social History:  , lives with mother. Has a teenage son. Unemployed. Review of Systems: 14 point review of systems negative except as described above    Vitals:    12/27/20 0807   BP: (!) 142/88   Pulse: 109   Resp: 18   Temp: 97.7 °F (36.5 °C)   SpO2: 95%       Mental Status  Appearance: Disheveled and in hospital attire. Made good eye contact. Gait not assessed. No abnormal movements or tremor. Behavior: Calm, cooperative, and socially appropriate. No psychomotor retardation/agitation appreciated. Speech: Normal in tone, volume, and quality. No slurring, dysarthria or pressured speech noted. Mood: \"Good. Ready to go! \"   Affect: Mood congruent. Thought Process: Appears linear, logical and goal oriented. Causality appears intact. Thought Content: Denies active suicidal and homicidal ideation. No overt delusions or paranoia appreciated. Perceptions: Denies auditory or visual hallucinations at present time. Not responding to internal stimuli. Concentration: Intact. Orientation: to person, place, date, and situation. Language: Intact. Fund of information: Intact. Memory: Recent and remote appear intact. Impulsivity: Limited. Neurovegitative: Fair appetite and sleep. Insight: Fair. Judgment: Fair. Assessment  DSM-5 DIAGNOSIS:  Impression   Mood disorder unspecified   Alcohol use disorder, severe  Cannabis use disorder  Tobacco use disorder   History of stimulant use    No evidence of acute suicidality, homicidality or psychosis observed today. Okay to discharge patient from psychiatry standpoint. He will benefit from rehab treatment - resources provided. Thank you for consulting our service. Please call us with questions.       Josué Zamudio MD

## 2020-12-27 NOTE — PLAN OF CARE
Problem: Suicide risk  Goal: Provide patient with safe environment  Description: Provide patient with safe environment  Outcome: Ongoing     Problem: Falls - Risk of:  Goal: Will remain free from falls  Description: Will remain free from falls  Outcome: Ongoing  Goal: Absence of physical injury  Description: Absence of physical injury  Outcome: Ongoing     Problem: Discharge Planning:  Goal: Discharged to appropriate level of care  Description: Discharged to appropriate level of care  Outcome: Ongoing     Problem: Fluid Volume - Deficit:  Goal: Absence of fluid volume deficit signs and symptoms  Description: Absence of fluid volume deficit signs and symptoms  Outcome: Ongoing     Problem: Nutrition Deficit:  Goal: Ability to achieve adequate nutritional intake will improve  Description: Ability to achieve adequate nutritional intake will improve  Outcome: Ongoing     Problem: Sleep Pattern Disturbance:  Goal: Appears well-rested  Description: Appears well-rested  Outcome: Ongoing     Problem: Violence - Risk of, Self/Other-Directed:  Goal: Knowledge of developmental care interventions  Description: Absence of violence  Outcome: Ongoing     Problem: Anxiety:  Goal: Level of anxiety will decrease  Description: Level of anxiety will decrease  Outcome: Ongoing     Problem: Health Maintenance - Impaired:  Goal: Ability to perform activities of daily living will improve  Description: Ability to perform activities of daily living will improve  Outcome: Ongoing  Goal: Able to sleep without medication for appropriate length of time  Description: Able to sleep without medication for appropriate length of time  Outcome: Ongoing  Goal: Maintenance of adequate nutrition will improve  Description: Maintenance of adequate nutrition will improve  Outcome: Ongoing     Problem: Mood - Altered:  Goal: Mood stable  Description: Mood stable  Outcome: Ongoing     Problem: Self-Esteem - Low:  Goal: Demonstrates positive self-esteem Description: Demonstrates positive self-esteem  Outcome: Ongoing     Problem: Cerebrospinal Fluid Leakage - Risk Of:  Goal: Demonstration of organized thought processes  Description: Demonstration of organized thought processes  Outcome: Ongoing     Problem: SAFETY  Goal: Free from accidental physical injury  Outcome: Ongoing  Goal: Free from intentional harm  Outcome: Ongoing     Problem: DAILY CARE  Goal: Daily care needs are met  Outcome: Ongoing     Problem: PAIN  Goal: Patient's pain/discomfort is manageable  Outcome: Ongoing     Problem: SKIN INTEGRITY  Goal: Skin integrity is maintained or improved  Outcome: Ongoing     Problem: KNOWLEDGE DEFICIT  Goal: Patient/S.O. demonstrates understanding of disease process, treatment plan, medications, and discharge instructions. Outcome: Ongoing     Problem: DISCHARGE BARRIERS  Goal: Patient's continuum of care needs are met  Outcome: Ongoing     Problem: ABCDS Injury Assessment  Goal: Absence of physical injury  Outcome: Ongoing     Problem: Serum Glucose Level - Abnormal:  Goal: Ability to maintain appropriate glucose levels has stabilized  Description: Ability to maintain appropriate glucose levels has stabilized  Outcome: Ongoing     Problem:  Activity:  Goal: Amount of time patient spends in regular exercise will increase  Description: Amount of time patient spends in regular exercise will increase  Outcome: Ongoing  Goal: Sleep-wake cycle will improve  Description: Sleep-wake cycle will improve  Outcome: Ongoing     Problem: Nutritional:  Goal: Consumption of the prescribed amount of daily calories will improve  Description: Consumption of the prescribed amount of daily calories will improve  Outcome: Ongoing  Goal: Ability to make healthy dietary choices will improve  Description: Ability to make healthy dietary choices will improve  Outcome: Ongoing  Goal: Progress toward achieving an optimal weight will improve Description: Progress toward achieving an optimal weight will improve  Outcome: Ongoing

## 2021-01-31 ENCOUNTER — APPOINTMENT (OUTPATIENT)
Dept: CT IMAGING | Age: 41
DRG: 897 | End: 2021-01-31
Payer: MEDICAID

## 2021-01-31 ENCOUNTER — APPOINTMENT (OUTPATIENT)
Dept: GENERAL RADIOLOGY | Age: 41
DRG: 897 | End: 2021-01-31
Payer: MEDICAID

## 2021-01-31 ENCOUNTER — HOSPITAL ENCOUNTER (INPATIENT)
Age: 41
LOS: 2 days | Discharge: HOME | DRG: 897 | End: 2021-02-02
Attending: PEDIATRICS | Admitting: INTERNAL MEDICINE
Payer: MEDICAID

## 2021-01-31 DIAGNOSIS — F10.921 ACUTE ALCOHOLIC INTOXICATION WITH DELIRIUM (HCC): Primary | ICD-10-CM

## 2021-01-31 DIAGNOSIS — S01.81XA FACIAL LACERATION, INITIAL ENCOUNTER: ICD-10-CM
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
## 2021-01-31 LAB
ALBUMIN SERPL-MCNC: 4.2 G/DL (ref 3.5–5.2)
ALBUMIN SERPL-MCNC: 4.7 G/DL (ref 3.5–5.2)
ALP BLD-CCNC: 89 U/L (ref 40–130)
ALP BLD-CCNC: 98 U/L (ref 40–130)
ALT SERPL-CCNC: 59 U/L (ref 5–41)
ALT SERPL-CCNC: 70 U/L (ref 5–41)
AMPHETAMINE SCREEN, URINE: NEGATIVE
ANION GAP SERPL CALCULATED.3IONS-SCNC: 14 MMOL/L (ref 7–19)
ANION GAP SERPL CALCULATED.3IONS-SCNC: 18 MMOL/L (ref 7–19)
AST SERPL-CCNC: 83 U/L (ref 5–40)
AST SERPL-CCNC: 95 U/L (ref 5–40)
BACTERIA: NEGATIVE /HPF
BARBITURATE SCREEN URINE: NEGATIVE
BASE EXCESS ARTERIAL: 3.1 MMOL/L (ref -2–2)
BASOPHILS ABSOLUTE: 0.1 K/UL (ref 0–0.2)
BASOPHILS RELATIVE PERCENT: 0.8 % (ref 0–1)
BENZODIAZEPINE SCREEN, URINE: NEGATIVE
BILIRUB SERPL-MCNC: 0.4 MG/DL (ref 0.2–1.2)
BILIRUB SERPL-MCNC: 0.7 MG/DL (ref 0.2–1.2)
BILIRUBIN URINE: NEGATIVE
BLOOD, URINE: ABNORMAL
BUN BLDV-MCNC: 10 MG/DL (ref 6–20)
BUN BLDV-MCNC: 12 MG/DL (ref 6–20)
CALCIUM SERPL-MCNC: 8.5 MG/DL (ref 8.6–10)
CALCIUM SERPL-MCNC: 8.9 MG/DL (ref 8.6–10)
CANNABINOID SCREEN URINE: NEGATIVE
CARBOXYHEMOGLOBIN ARTERIAL: 1.7 % (ref 0–5)
CHLORIDE BLD-SCNC: 101 MMOL/L (ref 98–111)
CHLORIDE BLD-SCNC: 95 MMOL/L (ref 98–111)
CLARITY: CLEAR
CO2: 24 MMOL/L (ref 22–29)
CO2: 25 MMOL/L (ref 22–29)
COCAINE METABOLITE SCREEN URINE: NEGATIVE
COLOR: YELLOW
CREAT SERPL-MCNC: 0.6 MG/DL (ref 0.5–1.2)
CREAT SERPL-MCNC: 0.6 MG/DL (ref 0.5–1.2)
CRYSTALS, UA: ABNORMAL /HPF
EOSINOPHILS ABSOLUTE: 0.1 K/UL (ref 0–0.6)
EOSINOPHILS RELATIVE PERCENT: 2 % (ref 0–5)
EPITHELIAL CELLS, UA: 2 /HPF (ref 0–5)
ETHANOL: 112 MG/DL (ref 0–0.08)
ETHANOL: 403 MG/DL (ref 0–0.08)
GFR AFRICAN AMERICAN: >59
GFR AFRICAN AMERICAN: >59
GFR NON-AFRICAN AMERICAN: >60
GFR NON-AFRICAN AMERICAN: >60
GLUCOSE BLD-MCNC: 101 MG/DL (ref 74–109)
GLUCOSE BLD-MCNC: 96 MG/DL (ref 74–109)
GLUCOSE URINE: NEGATIVE MG/DL
HCO3 ARTERIAL: 27 MMOL/L (ref 22–26)
HCT VFR BLD CALC: 44 % (ref 42–52)
HEMOGLOBIN, ART, EXTENDED: 15.7 G/DL (ref 14–18)
HEMOGLOBIN: 15.1 G/DL (ref 14–18)
HYALINE CASTS: 1 /HPF (ref 0–8)
IMMATURE GRANULOCYTES #: 0 K/UL
KETONES, URINE: ABNORMAL MG/DL
LEUKOCYTE ESTERASE, URINE: NEGATIVE
LYMPHOCYTES ABSOLUTE: 1.7 K/UL (ref 1.1–4.5)
LYMPHOCYTES RELATIVE PERCENT: 26 % (ref 20–40)
Lab: NORMAL
MAGNESIUM: 1.7 MG/DL (ref 1.6–2.6)
MAGNESIUM: 1.9 MG/DL (ref 1.6–2.6)
MCH RBC QN AUTO: 34.2 PG (ref 27–31)
MCHC RBC AUTO-ENTMCNC: 34.3 G/DL (ref 33–37)
MCV RBC AUTO: 99.5 FL (ref 80–94)
METHEMOGLOBIN ARTERIAL: 1.4 %
MONOCYTES ABSOLUTE: 0.6 K/UL (ref 0–0.9)
MONOCYTES RELATIVE PERCENT: 9.6 % (ref 0–10)
NEUTROPHILS ABSOLUTE: 3.9 K/UL (ref 1.5–7.5)
NEUTROPHILS RELATIVE PERCENT: 61.3 % (ref 50–65)
NITRITE, URINE: NEGATIVE
O2 CONTENT ARTERIAL: 20.7 ML/DL
O2 SAT, ARTERIAL: 93.9 %
O2 THERAPY: ABNORMAL
OPIATE SCREEN URINE: NEGATIVE
PCO2 ARTERIAL: 38 MMHG (ref 35–45)
PDW BLD-RTO: 12.6 % (ref 11.5–14.5)
PH ARTERIAL: 7.46 (ref 7.35–7.45)
PH UA: 6 (ref 5–8)
PHOSPHORUS: 3 MG/DL (ref 2.5–4.5)
PLATELET # BLD: 137 K/UL (ref 130–400)
PMV BLD AUTO: 10.2 FL (ref 9.4–12.4)
PO2 ARTERIAL: 78 MMHG (ref 80–100)
POTASSIUM REFLEX MAGNESIUM: 3.4 MMOL/L (ref 3.5–5)
POTASSIUM REFLEX MAGNESIUM: 3.8 MMOL/L (ref 3.5–5)
POTASSIUM, WHOLE BLOOD: 3.2
PROTEIN UA: 30 MG/DL
RBC # BLD: 4.42 M/UL (ref 4.7–6.1)
RBC UA: 0 /HPF (ref 0–4)
SARS-COV-2, NAAT: NOT DETECTED
SODIUM BLD-SCNC: 137 MMOL/L (ref 136–145)
SODIUM BLD-SCNC: 140 MMOL/L (ref 136–145)
SPECIFIC GRAVITY UA: 1.01 (ref 1–1.03)
TOTAL PROTEIN: 6.9 G/DL (ref 6.6–8.7)
TOTAL PROTEIN: 7.8 G/DL (ref 6.6–8.7)
TROPONIN: <0.01 NG/ML (ref 0–0.03)
UROBILINOGEN, URINE: 0.2 E.U./DL
WBC # BLD: 6.4 K/UL (ref 4.8–10.8)
WBC UA: 1 /HPF (ref 0–5)

## 2021-01-31 PROCEDURE — 2580000003 HC RX 258: Performed by: HOSPITALIST

## 2021-01-31 PROCEDURE — 83735 ASSAY OF MAGNESIUM: CPT

## 2021-01-31 PROCEDURE — 70450 CT HEAD/BRAIN W/O DYE: CPT

## 2021-01-31 PROCEDURE — 72125 CT NECK SPINE W/O DYE: CPT

## 2021-01-31 PROCEDURE — 82077 ASSAY SPEC XCP UR&BREATH IA: CPT

## 2021-01-31 PROCEDURE — 80053 COMPREHEN METABOLIC PANEL: CPT

## 2021-01-31 PROCEDURE — 36415 COLL VENOUS BLD VENIPUNCTURE: CPT

## 2021-01-31 PROCEDURE — 0HQ1XZZ REPAIR FACE SKIN, EXTERNAL APPROACH: ICD-10-PCS | Performed by: PEDIATRICS

## 2021-01-31 PROCEDURE — 0HQGXZZ REPAIR LEFT HAND SKIN, EXTERNAL APPROACH: ICD-10-PCS | Performed by: PEDIATRICS

## 2021-01-31 PROCEDURE — 71045 X-RAY EXAM CHEST 1 VIEW: CPT

## 2021-01-31 PROCEDURE — 36600 WITHDRAWAL OF ARTERIAL BLOOD: CPT

## 2021-01-31 PROCEDURE — 90715 TDAP VACCINE 7 YRS/> IM: CPT | Performed by: PEDIATRICS

## 2021-01-31 PROCEDURE — 2500000003 HC RX 250 WO HCPCS: Performed by: PEDIATRICS

## 2021-01-31 PROCEDURE — 99285 EMERGENCY DEPT VISIT HI MDM: CPT

## 2021-01-31 PROCEDURE — 6360000002 HC RX W HCPCS: Performed by: HOSPITALIST

## 2021-01-31 PROCEDURE — 96372 THER/PROPH/DIAG INJ SC/IM: CPT

## 2021-01-31 PROCEDURE — 2580000003 HC RX 258: Performed by: INTERNAL MEDICINE

## 2021-01-31 PROCEDURE — 81001 URINALYSIS AUTO W/SCOPE: CPT

## 2021-01-31 PROCEDURE — 85025 COMPLETE CBC W/AUTO DIFF WBC: CPT

## 2021-01-31 PROCEDURE — 6370000000 HC RX 637 (ALT 250 FOR IP): Performed by: HOSPITALIST

## 2021-01-31 PROCEDURE — 0HQ0XZZ REPAIR SCALP SKIN, EXTERNAL APPROACH: ICD-10-PCS | Performed by: PEDIATRICS

## 2021-01-31 PROCEDURE — 96365 THER/PROPH/DIAG IV INF INIT: CPT

## 2021-01-31 PROCEDURE — 80307 DRUG TEST PRSMV CHEM ANLYZR: CPT

## 2021-01-31 PROCEDURE — 6360000002 HC RX W HCPCS: Performed by: PEDIATRICS

## 2021-01-31 PROCEDURE — 2580000003 HC RX 258: Performed by: PEDIATRICS

## 2021-01-31 PROCEDURE — U0002 COVID-19 LAB TEST NON-CDC: HCPCS

## 2021-01-31 PROCEDURE — 1210000000 HC MED SURG R&B

## 2021-01-31 PROCEDURE — 70486 CT MAXILLOFACIAL W/O DYE: CPT

## 2021-01-31 PROCEDURE — 90471 IMMUNIZATION ADMIN: CPT | Performed by: PEDIATRICS

## 2021-01-31 PROCEDURE — 84484 ASSAY OF TROPONIN QUANT: CPT

## 2021-01-31 PROCEDURE — 84100 ASSAY OF PHOSPHORUS: CPT

## 2021-01-31 PROCEDURE — 82803 BLOOD GASES ANY COMBINATION: CPT

## 2021-01-31 PROCEDURE — 84132 ASSAY OF SERUM POTASSIUM: CPT

## 2021-01-31 RX ORDER — LORAZEPAM 2 MG/ML
4 INJECTION INTRAMUSCULAR
Status: DISCONTINUED | OUTPATIENT
Start: 2021-01-31 | End: 2021-02-02 | Stop reason: HOSPADM

## 2021-01-31 RX ORDER — POLYETHYLENE GLYCOL 3350 17 G/17G
17 POWDER, FOR SOLUTION ORAL DAILY PRN
Status: DISCONTINUED | OUTPATIENT
Start: 2021-01-31 | End: 2021-02-02 | Stop reason: HOSPADM

## 2021-01-31 RX ORDER — ONDANSETRON 4 MG/1
4 TABLET, ORALLY DISINTEGRATING ORAL EVERY 8 HOURS PRN
Status: DISCONTINUED | OUTPATIENT
Start: 2021-01-31 | End: 2021-02-02 | Stop reason: HOSPADM

## 2021-01-31 RX ORDER — LORAZEPAM 1 MG/1
4 TABLET ORAL
Status: DISCONTINUED | OUTPATIENT
Start: 2021-01-31 | End: 2021-02-02 | Stop reason: HOSPADM

## 2021-01-31 RX ORDER — PAROXETINE 30 MG/1
60 TABLET, FILM COATED ORAL EVERY MORNING
Status: DISCONTINUED | OUTPATIENT
Start: 2021-01-31 | End: 2021-02-02 | Stop reason: HOSPADM

## 2021-01-31 RX ORDER — LORAZEPAM 1 MG/1
1 TABLET ORAL
Status: DISCONTINUED | OUTPATIENT
Start: 2021-01-31 | End: 2021-02-02 | Stop reason: HOSPADM

## 2021-01-31 RX ORDER — LIDOCAINE HYDROCHLORIDE 10 MG/ML
INJECTION, SOLUTION EPIDURAL; INFILTRATION; INTRACAUDAL; PERINEURAL
Status: DISPENSED
Start: 2021-01-31 | End: 2021-01-31

## 2021-01-31 RX ORDER — SODIUM CHLORIDE 9 MG/ML
INJECTION, SOLUTION INTRAVENOUS CONTINUOUS
Status: DISCONTINUED | OUTPATIENT
Start: 2021-01-31 | End: 2021-02-02 | Stop reason: HOSPADM

## 2021-01-31 RX ORDER — LORAZEPAM 1 MG/1
3 TABLET ORAL
Status: DISCONTINUED | OUTPATIENT
Start: 2021-01-31 | End: 2021-02-02 | Stop reason: HOSPADM

## 2021-01-31 RX ORDER — ZIPRASIDONE MESYLATE 20 MG/ML
20 INJECTION, POWDER, LYOPHILIZED, FOR SOLUTION INTRAMUSCULAR ONCE
Status: COMPLETED | OUTPATIENT
Start: 2021-01-31 | End: 2021-01-31

## 2021-01-31 RX ORDER — ONDANSETRON 2 MG/ML
4 INJECTION INTRAMUSCULAR; INTRAVENOUS EVERY 6 HOURS PRN
Status: DISCONTINUED | OUTPATIENT
Start: 2021-01-31 | End: 2021-02-02 | Stop reason: HOSPADM

## 2021-01-31 RX ORDER — LORAZEPAM 2 MG/ML
2 INJECTION INTRAMUSCULAR ONCE
Status: COMPLETED | OUTPATIENT
Start: 2021-01-31 | End: 2021-01-31

## 2021-01-31 RX ORDER — NICOTINE 21 MG/24HR
1 PATCH, TRANSDERMAL 24 HOURS TRANSDERMAL DAILY
Status: DISCONTINUED | OUTPATIENT
Start: 2021-01-31 | End: 2021-02-02 | Stop reason: HOSPADM

## 2021-01-31 RX ORDER — LORAZEPAM 2 MG/ML
1 INJECTION INTRAMUSCULAR
Status: DISCONTINUED | OUTPATIENT
Start: 2021-01-31 | End: 2021-02-02 | Stop reason: HOSPADM

## 2021-01-31 RX ORDER — LORAZEPAM 1 MG/1
2 TABLET ORAL
Status: DISCONTINUED | OUTPATIENT
Start: 2021-01-31 | End: 2021-02-02 | Stop reason: HOSPADM

## 2021-01-31 RX ORDER — SODIUM CHLORIDE 0.9 % (FLUSH) 0.9 %
10 SYRINGE (ML) INJECTION EVERY 12 HOURS SCHEDULED
Status: DISCONTINUED | OUTPATIENT
Start: 2021-01-31 | End: 2021-02-02 | Stop reason: HOSPADM

## 2021-01-31 RX ORDER — LORAZEPAM 2 MG/ML
3 INJECTION INTRAMUSCULAR
Status: DISCONTINUED | OUTPATIENT
Start: 2021-01-31 | End: 2021-02-02 | Stop reason: HOSPADM

## 2021-01-31 RX ORDER — MULTIVITAMIN WITH IRON
1 TABLET ORAL DAILY
Status: DISCONTINUED | OUTPATIENT
Start: 2021-01-31 | End: 2021-02-02 | Stop reason: HOSPADM

## 2021-01-31 RX ORDER — LIDOCAINE HYDROCHLORIDE AND EPINEPHRINE 10; 10 MG/ML; UG/ML
20 INJECTION, SOLUTION INFILTRATION; PERINEURAL ONCE
Status: DISCONTINUED | OUTPATIENT
Start: 2021-01-31 | End: 2021-02-02 | Stop reason: HOSPADM

## 2021-01-31 RX ORDER — LORAZEPAM 2 MG/ML
2 INJECTION INTRAMUSCULAR
Status: DISCONTINUED | OUTPATIENT
Start: 2021-01-31 | End: 2021-02-02 | Stop reason: HOSPADM

## 2021-01-31 RX ORDER — SODIUM CHLORIDE 0.9 % (FLUSH) 0.9 %
10 SYRINGE (ML) INJECTION PRN
Status: DISCONTINUED | OUTPATIENT
Start: 2021-01-31 | End: 2021-02-02 | Stop reason: HOSPADM

## 2021-01-31 RX ORDER — FOLIC ACID 1 MG/1
1 TABLET ORAL DAILY
Status: DISCONTINUED | OUTPATIENT
Start: 2021-01-31 | End: 2021-02-02 | Stop reason: HOSPADM

## 2021-01-31 RX ORDER — GAUZE BANDAGE 2" X 2"
100 BANDAGE TOPICAL DAILY
Status: DISCONTINUED | OUTPATIENT
Start: 2021-01-31 | End: 2021-02-02 | Stop reason: HOSPADM

## 2021-01-31 RX ORDER — PAROXETINE 30 MG/1
75 TABLET, FILM COATED ORAL EVERY MORNING
Status: DISCONTINUED | OUTPATIENT
Start: 2021-01-31 | End: 2021-01-31

## 2021-01-31 RX ORDER — HALOPERIDOL 5 MG/ML
5 INJECTION INTRAMUSCULAR ONCE
Status: COMPLETED | OUTPATIENT
Start: 2021-01-31 | End: 2021-01-31

## 2021-01-31 RX ADMIN — SODIUM CHLORIDE, PRESERVATIVE FREE 10 ML: 5 INJECTION INTRAVENOUS at 19:37

## 2021-01-31 RX ADMIN — LORAZEPAM 2 MG: 2 INJECTION INTRAMUSCULAR; INTRAVENOUS at 16:32

## 2021-01-31 RX ADMIN — PIPERACILLIN AND TAZOBACTAM 3375 MG: 3; .375 INJECTION, POWDER, LYOPHILIZED, FOR SOLUTION INTRAVENOUS at 03:08

## 2021-01-31 RX ADMIN — TETANUS TOXOID, REDUCED DIPHTHERIA TOXOID AND ACELLULAR PERTUSSIS VACCINE, ADSORBED 0.5 ML: 5; 2.5; 8; 8; 2.5 SUSPENSION INTRAMUSCULAR at 04:25

## 2021-01-31 RX ADMIN — LORAZEPAM 2 MG: 2 INJECTION INTRAMUSCULAR; INTRAVENOUS at 23:39

## 2021-01-31 RX ADMIN — LORAZEPAM 2 MG: 1 TABLET ORAL at 13:45

## 2021-01-31 RX ADMIN — LORAZEPAM 2 MG: 2 INJECTION INTRAMUSCULAR; INTRAVENOUS at 04:32

## 2021-01-31 RX ADMIN — FOLIC ACID: 5 INJECTION, SOLUTION INTRAMUSCULAR; INTRAVENOUS; SUBCUTANEOUS at 03:54

## 2021-01-31 RX ADMIN — SODIUM CHLORIDE: 9 INJECTION, SOLUTION INTRAVENOUS at 11:10

## 2021-01-31 RX ADMIN — HALOPERIDOL LACTATE 5 MG: 5 INJECTION, SOLUTION INTRAMUSCULAR at 04:31

## 2021-01-31 RX ADMIN — LORAZEPAM 3 MG: 2 INJECTION INTRAMUSCULAR; INTRAVENOUS at 19:37

## 2021-01-31 RX ADMIN — ZIPRASIDONE MESYLATE 20 MG: 20 INJECTION, POWDER, LYOPHILIZED, FOR SOLUTION INTRAMUSCULAR at 01:54

## 2021-01-31 RX ADMIN — LORAZEPAM 2 MG: 1 TABLET ORAL at 11:10
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
## 2021-01-31 NOTE — PROGRESS NOTES
Blood Gas, Arterial  Status:  Final result   Ref Range & Units 01/31/21 0232   pH, Arterial 7.350 - 7.450 7.460High     pCO2, Arterial 35.0 - 45.0 mmHg 38.0    pO2, Arterial 80.0 - 100.0 mmHg 78. 0Low     HCO3, Arterial 22.0 - 26.0 mmol/L 27.0High     Base Excess, Arterial -2.0 - 2.0 mmol/L 3.1High     Hemoglobin, Art, Extended 14.0 - 18.0 g/dL 15.7    O2 Sat, Arterial >92 % 93.9    Carboxyhgb, Arterial 0.0 - 5.0 % 1.7    Comment:      0.0-1.5   (Smokers 1.5-5.0)    Methemoglobin, Arterial <1.5 % 1.4    O2 Content, Arterial Not Established mL/dL 20.7    O2 Therapy  Unknown    Resulting Agency  [address] [facility]        Specimen Collected: 01/31/21 0232 Last Resulted: 01/31/21 0232 View Other Order Details        Room [employer]

## 2021-01-31 NOTE — ED NOTES
PT arrive for c/o animal bite and alcohol/ possible drug use. PT states he has had a lot to drink, denies drug use, but is not following commands and is exhibiting drug-induced behavior.       Rosangela Murillo, [facility]  01/31/21 2006

## 2021-01-31 NOTE — CARE COORDINATION
SW attempted to speak with pt re: alcohol rehab. Pt was asleep at this time. SW left paper with sitter. SW informed sitter to let me know if pt has any questions.

## 2021-01-31 NOTE — PROGRESS NOTES
4 Eyes Skin Assessment    Jose Antonio Guerrero is being assessed upon: Admission    I agree that Luly Cortes, along with Mary Ann Boswell RN (either 2 RN's or 1 LPN and 1 RN) have performed a thorough Head to Toe Skin Assessment on the patient. ALL assessment sites listed below have been assessed.       Areas assessed by both nurses:     [x]   Head, Face, and Ears   [x]   Shoulders, Back, and Chest  [x]   Arms, Elbows, and Hands   [x]   Coccyx, Sacrum, and Ischium  [x]   Legs, Feet, and Heels    Does the Patient have Skin Breakdown? multiple lacerations to forehead and scalp, scattered bruising and abrasions    Peter Prevention initiated: NA  Wound Care Orders initiated: NA    WOC nurse consulted for Pressure Injury (Stage 3,4, Unstageable, DTI, NWPT, and Complex wounds) and New or Established Ostomies: NA        Primary Nurse eSignature: Anisa Beltran RN on 1/31/2021 at 7:21 AM      Co-Signer eSignature: Electronically signed by Mary Ann Boswell RN on 1/31/21 at 7:39 AM CST
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
## 2021-01-31 NOTE — ED NOTES
Pt admits to consuming a large amount of alcohol. Pt denies any drug use but is exhibiting behaviors consistent w drug use. Pt is uncooperative w staff, pulled his bp cuff off and attempting to climb out of bed. Security and PCA at bedside.       Hetal Carreon RN  01/31/21 4381
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
## 2021-01-31 NOTE — PROGRESS NOTES
Jennifer Vance arrived to room # 330. Presented with: alcohol intoxication  Mental Status: Patient is sleeping heavily, responds to voice, heavily intoxicated. Vitals:    01/31/21 0710   BP: 128/74   Pulse:    Resp: 20   Temp:    SpO2:      Patient safety contract and falls prevention contract reviewed with patient unable to explain at this time. Oriented Patient to room. Call light within reach. Yes.   Needs, issues or concerns expressed at this time: no.      Electronically signed by Karuna Olvera RN on 1/31/2021 at 7:20 AM

## 2021-01-31 NOTE — ED NOTES
Bed: 07  Expected date:   Expected time:   Means of arrival:   Comments:  530 Iron Sim RN  01/31/21 5304

## 2021-02-01 LAB
ALBUMIN SERPL-MCNC: 4.1 G/DL (ref 3.5–5.2)
ALP BLD-CCNC: 87 U/L (ref 40–130)
ALT SERPL-CCNC: 50 U/L (ref 5–41)
ANION GAP SERPL CALCULATED.3IONS-SCNC: 16 MMOL/L (ref 7–19)
AST SERPL-CCNC: 72 U/L (ref 5–40)
BILIRUB SERPL-MCNC: 1 MG/DL (ref 0.2–1.2)
BUN BLDV-MCNC: 15 MG/DL (ref 6–20)
CALCIUM SERPL-MCNC: 8.4 MG/DL (ref 8.6–10)
CHLORIDE BLD-SCNC: 96 MMOL/L (ref 98–111)
CO2: 25 MMOL/L (ref 22–29)
CREAT SERPL-MCNC: 0.6 MG/DL (ref 0.5–1.2)
ETHANOL: <10 MG/DL (ref 0–0.08)
GFR AFRICAN AMERICAN: >59
GFR NON-AFRICAN AMERICAN: >60
GLUCOSE BLD-MCNC: 67 MG/DL (ref 74–109)
MAGNESIUM: 1.8 MG/DL (ref 1.6–2.6)
POTASSIUM REFLEX MAGNESIUM: 3.2 MMOL/L (ref 3.5–5)
SODIUM BLD-SCNC: 137 MMOL/L (ref 136–145)
TOTAL PROTEIN: 6.2 G/DL (ref 6.6–8.7)

## 2021-02-01 PROCEDURE — 6360000002 HC RX W HCPCS: Performed by: HOSPITALIST

## 2021-02-01 PROCEDURE — 82077 ASSAY SPEC XCP UR&BREATH IA: CPT

## 2021-02-01 PROCEDURE — 2500000003 HC RX 250 WO HCPCS: Performed by: INTERNAL MEDICINE

## 2021-02-01 PROCEDURE — 2000000000 HC ICU R&B

## 2021-02-01 PROCEDURE — 83735 ASSAY OF MAGNESIUM: CPT

## 2021-02-01 PROCEDURE — 6370000000 HC RX 637 (ALT 250 FOR IP): Performed by: INTERNAL MEDICINE

## 2021-02-01 PROCEDURE — 2580000003 HC RX 258: Performed by: HOSPITALIST

## 2021-02-01 PROCEDURE — 80053 COMPREHEN METABOLIC PANEL: CPT

## 2021-02-01 PROCEDURE — 6370000000 HC RX 637 (ALT 250 FOR IP): Performed by: HOSPITALIST

## 2021-02-01 PROCEDURE — 2580000003 HC RX 258: Performed by: INTERNAL MEDICINE

## 2021-02-01 PROCEDURE — 6360000002 HC RX W HCPCS: Performed by: INTERNAL MEDICINE

## 2021-02-01 PROCEDURE — 51798 US URINE CAPACITY MEASURE: CPT

## 2021-02-01 PROCEDURE — 36415 COLL VENOUS BLD VENIPUNCTURE: CPT

## 2021-02-01 RX ORDER — DEXMEDETOMIDINE HYDROCHLORIDE 4 UG/ML
0.2 INJECTION, SOLUTION INTRAVENOUS CONTINUOUS
Status: DISCONTINUED | OUTPATIENT
Start: 2021-02-01 | End: 2021-02-02 | Stop reason: HOSPADM

## 2021-02-01 RX ORDER — POTASSIUM CHLORIDE 20 MEQ/1
40 TABLET, EXTENDED RELEASE ORAL PRN
Status: DISCONTINUED | OUTPATIENT
Start: 2021-02-01 | End: 2021-02-02 | Stop reason: HOSPADM

## 2021-02-01 RX ORDER — MAGNESIUM SULFATE 1 G/100ML
1000 INJECTION INTRAVENOUS PRN
Status: DISCONTINUED | OUTPATIENT
Start: 2021-02-01 | End: 2021-02-02 | Stop reason: HOSPADM

## 2021-02-01 RX ORDER — POTASSIUM CHLORIDE 7.45 MG/ML
10 INJECTION INTRAVENOUS
Status: COMPLETED | OUTPATIENT
Start: 2021-02-01 | End: 2021-02-02

## 2021-02-01 RX ORDER — POTASSIUM CHLORIDE 7.45 MG/ML
10 INJECTION INTRAVENOUS PRN
Status: DISCONTINUED | OUTPATIENT
Start: 2021-02-01 | End: 2021-02-02 | Stop reason: HOSPADM

## 2021-02-01 RX ORDER — POTASSIUM CHLORIDE 20 MEQ/1
40 TABLET, EXTENDED RELEASE ORAL 2 TIMES DAILY
Status: ACTIVE | OUTPATIENT
Start: 2021-02-01 | End: 2021-02-02

## 2021-02-01 RX ORDER — ACETAMINOPHEN 325 MG/1
650 TABLET ORAL EVERY 4 HOURS PRN
Status: DISCONTINUED | OUTPATIENT
Start: 2021-02-01 | End: 2021-02-02 | Stop reason: HOSPADM

## 2021-02-01 RX ADMIN — SODIUM CHLORIDE: 9 INJECTION, SOLUTION INTRAVENOUS at 17:56

## 2021-02-01 RX ADMIN — LORAZEPAM 4 MG: 2 INJECTION INTRAMUSCULAR; INTRAVENOUS at 10:40

## 2021-02-01 RX ADMIN — POTASSIUM CHLORIDE 10 MEQ: 7.46 INJECTION, SOLUTION INTRAVENOUS at 20:56

## 2021-02-01 RX ADMIN — DEXMEDETOMIDINE HYDROCHLORIDE 0.2 MCG/KG/HR: 4 INJECTION, SOLUTION INTRAVENOUS at 12:30

## 2021-02-01 RX ADMIN — ONDANSETRON HYDROCHLORIDE 4 MG: 2 SOLUTION INTRAMUSCULAR; INTRAVENOUS at 10:40

## 2021-02-01 RX ADMIN — POTASSIUM CHLORIDE 10 MEQ: 7.46 INJECTION, SOLUTION INTRAVENOUS at 21:52

## 2021-02-01 RX ADMIN — ENOXAPARIN SODIUM 40 MG: 40 INJECTION SUBCUTANEOUS at 08:07

## 2021-02-01 RX ADMIN — THIAMINE HCL TAB 100 MG 100 MG: 100 TAB at 08:08

## 2021-02-01 RX ADMIN — LORAZEPAM 4 MG: 2 INJECTION INTRAMUSCULAR; INTRAVENOUS at 09:07

## 2021-02-01 RX ADMIN — LORAZEPAM 3 MG: 2 INJECTION INTRAMUSCULAR; INTRAVENOUS at 02:01

## 2021-02-01 RX ADMIN — ACETAMINOPHEN 650 MG: 325 TABLET ORAL at 12:36

## 2021-02-01 RX ADMIN — LORAZEPAM 3 MG: 2 INJECTION INTRAMUSCULAR; INTRAVENOUS at 08:07

## 2021-02-01 RX ADMIN — LORAZEPAM 4 MG: 2 INJECTION INTRAMUSCULAR; INTRAVENOUS at 06:55

## 2021-02-01 RX ADMIN — DEXMEDETOMIDINE HYDROCHLORIDE 0.3 MCG/KG/HR: 4 INJECTION, SOLUTION INTRAVENOUS at 14:45

## 2021-02-01 RX ADMIN — SODIUM CHLORIDE: 9 INJECTION, SOLUTION INTRAVENOUS at 08:06

## 2021-02-01 RX ADMIN — POTASSIUM CHLORIDE 10 MEQ: 7.46 INJECTION, SOLUTION INTRAVENOUS at 22:58

## 2021-02-01 RX ADMIN — FOLIC ACID: 5 INJECTION, SOLUTION INTRAMUSCULAR; INTRAVENOUS; SUBCUTANEOUS at 19:43

## 2021-02-01 RX ADMIN — LORAZEPAM 3 MG: 2 INJECTION INTRAMUSCULAR; INTRAVENOUS at 04:44

## 2021-02-01 RX ADMIN — SODIUM CHLORIDE, PRESERVATIVE FREE 10 ML: 5 INJECTION INTRAVENOUS at 08:09

## 2021-02-01 RX ADMIN — LORAZEPAM 2 MG: 2 INJECTION INTRAMUSCULAR; INTRAVENOUS at 23:17

## 2021-02-01 RX ADMIN — POTASSIUM CHLORIDE 10 MEQ: 7.46 INJECTION, SOLUTION INTRAVENOUS at 23:56

## 2021-02-01 RX ADMIN — POTASSIUM CHLORIDE 10 MEQ: 7.46 INJECTION, SOLUTION INTRAVENOUS at 19:43

## 2021-02-01 RX ADMIN — SODIUM CHLORIDE, PRESERVATIVE FREE 10 ML: 5 INJECTION INTRAVENOUS at 19:43

## 2021-02-01 RX ADMIN — PAROXETINE 60 MG: 30 TABLET, FILM COATED ORAL at 08:08

## 2021-02-01 RX ADMIN — DEXMEDETOMIDINE HYDROCHLORIDE 0.4 MCG/KG/HR: 4 INJECTION, SOLUTION INTRAVENOUS at 19:41

## 2021-02-01 RX ADMIN — THERA TABS 1 TABLET: TAB at 08:07

## 2021-02-01 RX ADMIN — FOLIC ACID 1 MG: 1 TABLET ORAL at 08:07

## 2021-02-01 RX ADMIN — LORAZEPAM 4 MG: 2 INJECTION INTRAMUSCULAR; INTRAVENOUS at 12:33

## 2021-02-01 RX ADMIN — LORAZEPAM 4 MG: 2 INJECTION INTRAMUSCULAR; INTRAVENOUS at 17:45

## 2021-02-01 ASSESSMENT — PAIN SCALES - GENERAL
PAINLEVEL_OUTOF10: 0
PAINLEVEL_OUTOF10: 5

## 2021-02-01 NOTE — PROGRESS NOTES
[facility]        Hospitalist Progress Note  2/1/2021 8:59 AM  Subjective:   Admit Date: 1/31/2021  PCP: DOTTIE Regan CNP    Chief Complaint: Alcohol intoxication    Subjective: Patient seen and examined at bedside. Acutely withdrawing. Denies chest pain or shortness of breath. Watching TV. Otherwise comfortable with no complaints. Cumulative Hospital History: 53-year-old male with a past medical history of depression, alcohol and tobacco abuse presenting to the hospital by EMS acutely intoxicated with multiple lacerations on his face and left hand. Patient is a poor historian as he is acutely intoxicated with a alcohol level greater than 400 on admission. Patient states he has 4 dogs and they were fighting and he tried to break them up and he got bit. Patient received sutures and staples as appropriate on facial lacerations and was admitted for alcohol intoxication. ROS: 14 point review of systems is negative except as specifically addressed above.     DIET GENERAL; Safety Tray; Safety Tray (Disposables No Utensils)    Intake/Output Summary (Last 24 hours) at 2/1/2021 0859  Last data filed at 2/1/2021 1679  Gross per 24 hour   Intake    Output 375 ml   Net -375 ml     Medications:   sodium chloride 100 mL/hr at 02/01/21 4637     Current Facility-Administered Medications   Medication Dose Route Frequency Provider Last Rate Last Admin    acetaminophen (TYLENOL) tablet 650 mg  650 mg Oral Q4H PRN Pranav Fraser MD        potassium chloride (KLOR-CON M) extended release tablet 40 mEq  40 mEq Oral PRN Pranav Fraser MD        Or    potassium bicarb-citric acid (EFFER-K) effervescent tablet 40 mEq  40 mEq Oral PRN Pranav Fraser MD        Or    potassium chloride 10 mEq/100 mL IVPB (Peripheral Line)  10 mEq Intravenous PRN Pranav Fraser MD        magnesium sulfate 1000 mg in dextrose 5% 100 mL IVPB  1,000 mg Intravenous PRN Pranav Fraser MD  potassium chloride (KLOR-CON M) extended release tablet 40 mEq  40 mEq Oral BID Max López MD        thiamine mononitrate tablet 100 mg  100 mg Oral Daily Traci Counter, MD   100 mg at 02/01/21 1470    nicotine (NICODERM CQ) 21 MG/24HR 1 patch  1 patch Transdermal Daily Traci Counter, MD   1 patch at 02/01/21 0166    multivitamin 1 tablet  1 tablet Oral Daily Traci Counter, MD   1 tablet at 10/96/42 5221    folic acid (FOLVITE) tablet 1 mg  1 mg Oral Daily Traci Counter, MD   1 mg at 02/01/21 0233    PARoxetine (PAXIL) tablet 60 mg  60 mg Oral QAM Traci Counter, MD   60 mg at 02/01/21 0576    sodium chloride flush 0.9 % injection 10 mL  10 mL Intravenous 2 times per day Traci Counter, MD   10 mL at 02/01/21 0809    sodium chloride flush 0.9 % injection 10 mL  10 mL Intravenous PRN Traci Counter, MD        enoxaparin (LOVENOX) injection 40 mg  40 mg Subcutaneous Daily Traci Counter, MD   40 mg at 02/01/21 8631    ondansetron (ZOFRAN-ODT) disintegrating tablet 4 mg  4 mg Oral Q8H PRN Traci Counter, MD        Or    ondansetron [facility]) injection 4 mg  4 mg Intravenous Q6H PRN Traci Counter, MD        LORazepam (ATIVAN) tablet 1 mg  1 mg Oral Q1H PRN Traci Arevalo MD        Or    LORazepam (ATIVAN) injection 1 mg  1 mg Intravenous Q1H PRN Traci Counter, MD        Or    LORazepam (ATIVAN) tablet 2 mg  2 mg Oral Q1H PRN Traci Counter, MD   2 mg at 01/31/21 1345    Or    LORazepam (ATIVAN) injection 2 mg  2 mg Intravenous Q1H PRN Traci Counter, MD   2 mg at 01/31/21 2339    Or    LORazepam (ATIVAN) tablet 3 mg  3 mg Oral Q1H PRN Traci Counter, MD        Or    LORazepam (ATIVAN) injection 3 mg  3 mg Intravenous Q1H PRN Traci Counter, MD   3 mg at 02/01/21 0688    Or    LORazepam (ATIVAN) tablet 4 mg  4 mg Oral Q1H PRN Traci Counter, MD        Or    LORazepam (ATIVAN) injection 4 mg  4 mg Intravenous Q1H PRN Traci Arevalo MD   4 mg at 02/01/21 8154  polyethylene glycol (GLYCOLAX) packet 17 g  17 g Oral Daily PRN Mary Tucker MD        lidocaine-EPINEPHrine 1 percent-1:[id number] injection 20 mL  20 mL Intradermal Once Nadia Davis MD        0.9 % sodium chloride infusion   Intravenous Continuous Cris Cole  mL/hr at 02/01/21 0806 New Bag at 02/01/21 0806        Labs:     Recent Labs     01/31/21 0030   WBC 6.4   RBC 4.42*   HGB 15.1   HCT 44.0   MCV 99.5*   MCH 34.2*   MCHC 34.3        Recent Labs     01/31/21  0030 01/31/21  0232 01/31/21  1205 02/01/21  0132     --  140 137   K 3.8 3.2 3.4* 3.2*   ANIONGAP 18  --  14 16   CL 95*  --  101 96*   CO2 24  --  25 25   BUN 10  --  12 15   CREATININE 0.6  --  0.6 0.6   GLUCOSE 101  --  96 67*   CALCIUM 8.9  --  8.5* 8.4*     Recent Labs     01/31/21  0030 01/31/21  1205 02/01/21  0132   MG 1.9 1.7 1.8   PHOS 3.0  --   --      Recent Labs     01/31/21  0030 01/31/21  1205 02/01/21  0132   AST 95* 83* 72*   ALT 70* 59* 50*   BILITOT 0.4 0.7 1.0   ALKPHOS 98 89 87     ABGs:No results for input(s): PH, PO2, PCO2, HCO3, BE, O2SAT in the last 72 hours. Troponin T:   Recent Labs     01/31/21 0030   TROPONINI <0.01     INR: No results for input(s): INR in the last 72 hours. Lactic Acid: No results for input(s): LACTA in the last 72 hours. Objective:   Vitals: BP (!) 146/89   Pulse 110   Temp 99 °F (37.2 °C) (Temporal)   Resp 24   Wt 160 lb (72.6 kg)   SpO2 96%   BMI 22.96 kg/m²   24HR INTAKE/OUTPUT:      Intake/Output Summary (Last 24 hours) at 2/1/2021 0859  Last data filed at 2/1/2021 4509  Gross per 24 hour   Intake    Output 375 ml   Net -375 ml     General appearance: Alert  HEENT: Facial/scalp lacerations with staples/sutures in place  Lungs: no increased work of breathing, BLAE  Heart: Tachycardia, regular rhythm  Abdomen: BS+, soft  Extremities: no edema  Neurologic: Alert, tremulous, gross motor function intact  Skin: warm    Assessment and Plan:    Active Problems: Tobacco abuse    Alcohol dependence with acute alcoholic intoxication without complication ([facility] [address]  Resolved Problems:    * No resolved hospital problems. *    Facial lacerations: Stable/sutured in ER. As needed pain management. EtOH abuse/withdrawal: CIWA protocol. MVI/folate/thiamine. Fall/seizure precautions. Supportive management.     Advance Directive: Full Code    DVT prophylaxis: Lovenox    Discharge planning: TBD      Signed:  Daniel Essex, MD 2/1/2021 8:59 AM  Rounding Hospitalist

## 2021-02-01 NOTE — PROGRESS NOTES
Pt has continued to have an elevated CIWA scale this shift. Pt has received ativan per protocol. Dr. Jasson Segovia has been made aware of the pt's condition.

## 2021-02-01 NOTE — CARE COORDINATION
On assessment at 1600, patient is on Precedex drip at 0.3 mcg/kg/min. Vital signs are stable and improved since starting the Precedex drip. An external Lopez is in place without void at current time. The patient had recently voided 400 mL on bedside commode on 3rd floor before transferring to ICU. Normal saline drip at 100 mL/hour. Patient has 18g IV in right antecubital infusing drips without difficulty. Patient's abrasions and lesions on the face, forehead and hands with staples/stitches are without change.

## 2021-02-01 NOTE — CARE COORDINATION
Patient states \"I'm about to freak out. \"  I asked \"freak out how? \". He said, \"freak out, like a seizure. \"  He is restless and trembling. Primary nurse just administered Zofran for nausea and Ativan for trembling.

## 2021-02-01 NOTE — PROGRESS NOTES
Speech Language Pathology  Nursing requested hold on patient. Patient is being transferred to ICU for higher level of care.      Electronically signed by ANDREAS Garcia on 2/1/2021 at 12:32 PM

## 2021-02-01 NOTE — PROGRESS NOTES
Received patient from 3rd flood via bed. Patient confused, stating \"Please let me call my mom\" \"I want to leave\" \"Please let me go home\" Patient having severe tremors. CIWA scale obtained. 4 mg of Ativan given per protocol. Precedex drip started. Patient -140s, sinus tach,  Lungs clear- O2 sat 94% RA, lacerations scattered from \"dog fight\" per patient mother. Sitter at bedside. Patient mother notified of patient transfer to ICU. Will continue to monitor.

## 2021-02-02 ENCOUNTER — HOSPITAL ENCOUNTER (EMERGENCY)
Age: 41
Discharge: HOME | End: 2021-02-03
Attending: EMERGENCY MEDICINE
Payer: MEDICAID

## 2021-02-02 ENCOUNTER — APPOINTMENT (OUTPATIENT)
Dept: CT IMAGING | Age: 41
End: 2021-02-02
Payer: MEDICAID

## 2021-02-02 VITALS
HEART RATE: 91 BPM | BODY MASS INDEX: 25.04 KG/M2 | DIASTOLIC BLOOD PRESSURE: 88 MMHG | OXYGEN SATURATION: 94 % | RESPIRATION RATE: 17 BRPM | TEMPERATURE: 98.6 F | WEIGHT: 174.5 LBS | SYSTOLIC BLOOD PRESSURE: 117 MMHG

## 2021-02-02 DIAGNOSIS — S09.90XA INJURY OF HEAD, INITIAL ENCOUNTER: ICD-10-CM

## 2021-02-02 DIAGNOSIS — S61.259S: ICD-10-CM

## 2021-02-02 DIAGNOSIS — S61.452S: ICD-10-CM

## 2021-02-02 DIAGNOSIS — F10.929 ACUTE ALCOHOLIC INTOXICATION WITH COMPLICATION (HCC): Primary | ICD-10-CM
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
## 2021-02-02 PROBLEM — F10.220 ALCOHOL DEPENDENCE WITH ACUTE ALCOHOLIC INTOXICATION WITHOUT COMPLICATION (HCC): Status: RESOLVED | Noted: 2020-04-26 | Resolved: 2021-02-02

## 2021-02-02 LAB
ACETAMINOPHEN LEVEL: <15 UG/ML
ALBUMIN SERPL-MCNC: 3.8 G/DL (ref 3.5–5.2)
ALBUMIN SERPL-MCNC: 4.4 G/DL (ref 3.5–5.2)
ALP BLD-CCNC: 87 U/L (ref 40–130)
ALP BLD-CCNC: 97 U/L (ref 40–130)
ALT SERPL-CCNC: 44 U/L (ref 5–41)
ALT SERPL-CCNC: 54 U/L (ref 5–41)
AMMONIA: 25 UMOL/L (ref 16–60)
AMPHETAMINE SCREEN, URINE: NEGATIVE
ANION GAP SERPL CALCULATED.3IONS-SCNC: 17 MMOL/L (ref 7–19)
ANION GAP SERPL CALCULATED.3IONS-SCNC: 9 MMOL/L (ref 7–19)
AST SERPL-CCNC: 57 U/L (ref 5–40)
AST SERPL-CCNC: 79 U/L (ref 5–40)
BARBITURATE SCREEN URINE: NEGATIVE
BASOPHILS ABSOLUTE: 0 K/UL (ref 0–0.2)
BASOPHILS ABSOLUTE: 0 K/UL (ref 0–0.2)
BASOPHILS RELATIVE PERCENT: 0.4 % (ref 0–1)
BASOPHILS RELATIVE PERCENT: 0.5 % (ref 0–1)
BENZODIAZEPINE SCREEN, URINE: NEGATIVE
BILIRUB SERPL-MCNC: 0.5 MG/DL (ref 0.2–1.2)
BILIRUB SERPL-MCNC: 1 MG/DL (ref 0.2–1.2)
BILIRUBIN URINE: NEGATIVE
BLOOD, URINE: NEGATIVE
BUN BLDV-MCNC: 6 MG/DL (ref 6–20)
BUN BLDV-MCNC: 8 MG/DL (ref 6–20)
CALCIUM SERPL-MCNC: 8.4 MG/DL (ref 8.6–10)
CALCIUM SERPL-MCNC: 9.5 MG/DL (ref 8.6–10)
CANNABINOID SCREEN URINE: POSITIVE
CHLORIDE BLD-SCNC: 103 MMOL/L (ref 98–111)
CHLORIDE BLD-SCNC: 104 MMOL/L (ref 98–111)
CLARITY: CLEAR
CO2: 23 MMOL/L (ref 22–29)
CO2: 24 MMOL/L (ref 22–29)
COCAINE METABOLITE SCREEN URINE: NEGATIVE
COLOR: YELLOW
CREAT SERPL-MCNC: 0.4 MG/DL (ref 0.5–1.2)
CREAT SERPL-MCNC: 0.5 MG/DL (ref 0.5–1.2)
EOSINOPHILS ABSOLUTE: 0 K/UL (ref 0–0.6)
EOSINOPHILS ABSOLUTE: 0.3 K/UL (ref 0–0.6)
EOSINOPHILS RELATIVE PERCENT: 0.2 % (ref 0–5)
EOSINOPHILS RELATIVE PERCENT: 4.5 % (ref 0–5)
ETHANOL: 209 MG/DL (ref 0–0.08)
GFR AFRICAN AMERICAN: >59
GFR AFRICAN AMERICAN: >59
GFR NON-AFRICAN AMERICAN: >60
GFR NON-AFRICAN AMERICAN: >60
GLUCOSE BLD-MCNC: 86 MG/DL (ref 74–109)
GLUCOSE BLD-MCNC: 92 MG/DL (ref 74–109)
GLUCOSE URINE: NEGATIVE MG/DL
HCT VFR BLD CALC: 39.1 % (ref 42–52)
HCT VFR BLD CALC: 42.4 % (ref 42–52)
HEMOGLOBIN: 13.7 G/DL (ref 14–18)
HEMOGLOBIN: 14.8 G/DL (ref 14–18)
IMMATURE GRANULOCYTES #: 0 K/UL
IMMATURE GRANULOCYTES #: 0 K/UL
KETONES, URINE: 40 MG/DL
LEUKOCYTE ESTERASE, URINE: NEGATIVE
LYMPHOCYTES ABSOLUTE: 1 K/UL (ref 1.1–4.5)
LYMPHOCYTES ABSOLUTE: 1.6 K/UL (ref 1.1–4.5)
LYMPHOCYTES RELATIVE PERCENT: 12.5 % (ref 20–40)
LYMPHOCYTES RELATIVE PERCENT: 27.3 % (ref 20–40)
Lab: ABNORMAL
MAGNESIUM: 1.9 MG/DL (ref 1.6–2.6)
MCH RBC QN AUTO: 34.2 PG (ref 27–31)
MCH RBC QN AUTO: 34.3 PG (ref 27–31)
MCHC RBC AUTO-ENTMCNC: 34.9 G/DL (ref 33–37)
MCHC RBC AUTO-ENTMCNC: 35 G/DL (ref 33–37)
MCV RBC AUTO: 97.5 FL (ref 80–94)
MCV RBC AUTO: 98.1 FL (ref 80–94)
MONOCYTES ABSOLUTE: 0.5 K/UL (ref 0–0.9)
MONOCYTES ABSOLUTE: 0.7 K/UL (ref 0–0.9)
MONOCYTES RELATIVE PERCENT: 8.4 % (ref 0–10)
MONOCYTES RELATIVE PERCENT: 8.9 % (ref 0–10)
NEUTROPHILS ABSOLUTE: 3.4 K/UL (ref 1.5–7.5)
NEUTROPHILS ABSOLUTE: 6.3 K/UL (ref 1.5–7.5)
NEUTROPHILS RELATIVE PERCENT: 58.6 % (ref 50–65)
NEUTROPHILS RELATIVE PERCENT: 78.3 % (ref 50–65)
NITRITE, URINE: NEGATIVE
OPIATE SCREEN URINE: NEGATIVE
PDW BLD-RTO: 12.2 % (ref 11.5–14.5)
PDW BLD-RTO: 12.5 % (ref 11.5–14.5)
PH UA: 6.5 (ref 5–8)
PLATELET # BLD: 80 K/UL (ref 130–400)
PLATELET # BLD: 94 K/UL (ref 130–400)
PMV BLD AUTO: 10.7 FL (ref 9.4–12.4)
PMV BLD AUTO: 11.3 FL (ref 9.4–12.4)
POTASSIUM SERPL-SCNC: 3.4 MMOL/L (ref 3.5–5)
POTASSIUM SERPL-SCNC: 3.9 MMOL/L (ref 3.5–5)
PROTEIN UA: NEGATIVE MG/DL
RBC # BLD: 4.01 M/UL (ref 4.7–6.1)
RBC # BLD: 4.32 M/UL (ref 4.7–6.1)
SALICYLATE, SERUM: <3 MG/DL (ref 3–10)
SODIUM BLD-SCNC: 136 MMOL/L (ref 136–145)
SODIUM BLD-SCNC: 144 MMOL/L (ref 136–145)
SPECIFIC GRAVITY UA: 1.01 (ref 1–1.03)
TOTAL PROTEIN: 5.8 G/DL (ref 6.6–8.7)
TOTAL PROTEIN: 7.8 G/DL (ref 6.6–8.7)
UROBILINOGEN, URINE: 1 E.U./DL
WBC # BLD: 5.8 K/UL (ref 4.8–10.8)
WBC # BLD: 8.1 K/UL (ref 4.8–10.8)

## 2021-02-02 PROCEDURE — 99285 EMERGENCY DEPT VISIT HI MDM: CPT

## 2021-02-02 PROCEDURE — 80053 COMPREHEN METABOLIC PANEL: CPT

## 2021-02-02 PROCEDURE — 6360000002 HC RX W HCPCS: Performed by: HOSPITALIST

## 2021-02-02 PROCEDURE — 72125 CT NECK SPINE W/O DYE: CPT

## 2021-02-02 PROCEDURE — 80143 DRUG ASSAY ACETAMINOPHEN: CPT

## 2021-02-02 PROCEDURE — 2500000003 HC RX 250 WO HCPCS: Performed by: INTERNAL MEDICINE

## 2021-02-02 PROCEDURE — 83735 ASSAY OF MAGNESIUM: CPT

## 2021-02-02 PROCEDURE — 36415 COLL VENOUS BLD VENIPUNCTURE: CPT

## 2021-02-02 PROCEDURE — 6360000002 HC RX W HCPCS: Performed by: INTERNAL MEDICINE

## 2021-02-02 PROCEDURE — 85025 COMPLETE CBC W/AUTO DIFF WBC: CPT

## 2021-02-02 PROCEDURE — 2500000003 HC RX 250 WO HCPCS: Performed by: EMERGENCY MEDICINE

## 2021-02-02 PROCEDURE — 6360000002 HC RX W HCPCS: Performed by: EMERGENCY MEDICINE

## 2021-02-02 PROCEDURE — 81003 URINALYSIS AUTO W/O SCOPE: CPT

## 2021-02-02 PROCEDURE — 70450 CT HEAD/BRAIN W/O DYE: CPT

## 2021-02-02 PROCEDURE — 80307 DRUG TEST PRSMV CHEM ANLYZR: CPT

## 2021-02-02 PROCEDURE — 82077 ASSAY SPEC XCP UR&BREATH IA: CPT

## 2021-02-02 PROCEDURE — 80179 DRUG ASSAY SALICYLATE: CPT

## 2021-02-02 PROCEDURE — 82140 ASSAY OF AMMONIA: CPT

## 2021-02-02 PROCEDURE — 6370000000 HC RX 637 (ALT 250 FOR IP): Performed by: HOSPITALIST

## 2021-02-02 PROCEDURE — 2580000003 HC RX 258: Performed by: EMERGENCY MEDICINE

## 2021-02-02 PROCEDURE — 96372 THER/PROPH/DIAG INJ SC/IM: CPT

## 2021-02-02 RX ADMIN — THIAMINE HCL TAB 100 MG 100 MG: 100 TAB at 07:42

## 2021-02-02 RX ADMIN — PAROXETINE 60 MG: 30 TABLET, FILM COATED ORAL at 07:42

## 2021-02-02 RX ADMIN — ENOXAPARIN SODIUM 40 MG: 40 INJECTION SUBCUTANEOUS at 07:30

## 2021-02-02 RX ADMIN — DEXMEDETOMIDINE HYDROCHLORIDE 0.5 MCG/KG/HR: 4 INJECTION, SOLUTION INTRAVENOUS at 07:32

## 2021-02-02 RX ADMIN — FOLIC ACID 1 MG: 1 TABLET ORAL at 07:42

## 2021-02-02 RX ADMIN — POTASSIUM CHLORIDE 10 MEQ: 7.46 INJECTION, SOLUTION INTRAVENOUS at 02:00

## 2021-02-02 RX ADMIN — LORAZEPAM 3 MG: 2 INJECTION INTRAMUSCULAR; INTRAVENOUS at 07:43

## 2021-02-02 RX ADMIN — FOLIC ACID: 5 INJECTION, SOLUTION INTRAMUSCULAR; INTRAVENOUS; SUBCUTANEOUS at 21:34

## 2021-02-02 RX ADMIN — LORAZEPAM 2 MG: 2 INJECTION INTRAMUSCULAR; INTRAVENOUS at 03:42

## 2021-02-02 RX ADMIN — POTASSIUM CHLORIDE 10 MEQ: 7.46 INJECTION, SOLUTION INTRAVENOUS at 01:00

## 2021-02-02 RX ADMIN — THERA TABS 1 TABLET: TAB at 07:42

## 2021-02-02 RX ADMIN — POTASSIUM CHLORIDE 10 MEQ: 7.46 INJECTION, SOLUTION INTRAVENOUS at 03:12

## 2021-02-02 ASSESSMENT — PAIN SCALES - GENERAL
PAINLEVEL_OUTOF10: 0

## 2021-02-02 NOTE — PLAN OF CARE
Problem: Falls - Risk of:  Goal: Will remain free from falls  Description: Will remain free from falls  Outcome: Ongoing  Goal: Absence of physical injury  Description: Absence of physical injury  Outcome: Ongoing     Problem: Pain:  Goal: Pain level will decrease  Description: Pain level will decrease  Outcome: Ongoing  Goal: Control of acute pain  Description: Control of acute pain  Outcome: Ongoing  Goal: Control of chronic pain  Description: Control of chronic pain  Outcome: Ongoing     Problem: Skin Integrity:  Goal: Will show no infection signs and symptoms  Description: Will show no infection signs and symptoms  Outcome: Ongoing  Goal: Absence of new skin breakdown  Description: Absence of new skin breakdown  Outcome: Ongoing     Problem: Restraint Use - Nonviolent/Non-Self-Destructive Behavior:  Goal: Absence of restraint indications  Description: Absence of restraint indications  Outcome: Ongoing  Goal: Absence of restraint-related injury  Description: Absence of restraint-related injury  Outcome: Ongoing

## 2021-02-02 NOTE — PROGRESS NOTES
Discharge papers given to patient and mother. Mother at bedside. Mother will take him home.      Electronically signed by Danitza Brennan RN on 2/2/2021 at 10:22 AM

## 2021-02-02 NOTE — DISCHARGE SUMMARY
Discharge Summary    Jesus Alberto Carter  :  1980  MRN:  [id number]    Admit date:  2021  Discharge date: 2021     Admitting Physician:  Yair Mace DO    Advance Directive: Full Code    Consults: none    Primary Care Physician:  Nickolas Nugent, APRN - CNP    Discharge Diagnoses: Active Problems:    * No active hospital problems. *  Resolved Problems:    Tobacco abuse    Alcohol dependence with acute alcoholic intoxication without complication ([address]      Significant Diagnostic Studies:   Ct Head Wo Contrast    Result Date: 2021  CT head 2021 2:16 AM HISTORY: Altered mental status, intoxication TECHNIQUE: Axial images of the head were obtained without IV contrast. Coronal and sagittal reformatted images are reconstructed and reviewed. COMPARISON: 2020. DLP: 568 mGy cm Automated exposure control was utilized to minimize patient radiation dose. FINDINGS: The ventricles are normal in size and configuration. There is no intracranial hemorrhage or mass effect. No acute signs of ischemia. No extra-axial hematoma or subarachnoid hemorrhage. No air-fluid levels within the visible paranasal sinuses. Minimal mucosal thickening. Mastoid air cells are well-aerated. No depressed skull fracture. 1. No intracranial abnormality. Comments: A preliminary report is issued to the ER by the [facility] radiology service. I agree with this impression.  Signed by Dr Shayne Escobar on 2021 7:43 AM    Ct Facial Bones Wo Contrast    Result Date: 2021 CT face 1/31/2021 2:21 AM HISTORY: Altered mental status, intoxication; laceration forehead TECHNIQUE: Axial images of the face were obtained without IV contrast. Coronal and sagittal reformatted images are reconstructed and reviewed. COMPARISON: None. DLP: 456 mGy cm Automated exposure control was utilized to minimize patient radiation dose. FINDINGS: There is superficial laceration of the left forehead with no radiopaque foreign bodies. No underlying skull fracture. Mild depression of the right orbital floor favoring old injury. No air-fluid levels in the paranasal sinuses. Mild chronic mucosal thickening of the maxillary and ethmoid sinuses. Orbital globes appear intact. No retrobulbar pathology. No acute appearing facial fracture identified. Mild left-sided deviation of nasal septum appearing chronic. 1. No acute appearing facial fracture. Chronic right orbital floor injury suspected. Mild chronic mucosal thickening of the paranasal sinuses. Comments: A preliminary report is issued to the ER by the [facility]ology service. I agree with this impression.  Signed by Dr Adebayo Shahid on 1/31/2021 7:45 AM    Ct Cervical Spine Wo Contrast    Result Date: 1/31/2021 CT cervical spine 1/31/2021 2:21 AM HISTORY: Laceration of the forehead, intoxication TECHNIQUE: Axial images of the cervical spine were obtained without IV contrast. Coronal and sagittal reformatted images are reconstructed and reviewed. COMPARISON: None. DLP: 393 mGy cm Automated exposure control was utilized to minimize patient radiation dose. FINDINGS: The alignment of the cervical spine is appropriate. No cervical vertebral fracture identified. Moderate degenerative disc change at C5/6 and C6/7 with uncinate process hypertrophy at the C5/6 and C6/7 level. Mild facet osteoarthropathy. Degenerative changes result in mild cervical spinal canal stenosis at C5/6 and C6/7 with mild to moderate bilateral C5/6 and mild left C6/7 neural foramen narrowing. Paravertebral soft tissues are unremarkable. Emphysematous changes seen within the visible lung apices. 1. No acute cervical vertebral fracture or malalignment. Degenerative changes greatest at C5/6 and C6/7 as described above. Comments: A preliminary report is issued to the ER by the [facility]ology service. I agree with this impression. Signed by Dr Kathy Baldwin on 1/31/2021 7:48 AM    Xr Chest Portable    Result Date: 1/31/2021  XR CHEST PORTABLE 1/31/2021 2:25 AM HISTORY: Altered mental status COMPARISON: 2/16/2017 CXR: A single frontal view of the chest is performed. Findings: Diminished level of inspiration. Right basilar densities favorable for atelectasis. Left lung clear. Normal cardiac mediastinal contours. No pleural effusion or pneumothorax. Mild degenerative change of the thoracic spine. 1. Diminished level inspiration with probable right basilar atelectasis, otherwise no acute process. . Signed by Dr Kathy Baldwin on 1/31/2021 8:34 AM      CBC:   Recent Labs     01/31/21  0030 02/02/21  0521   WBC 6.4 5.8   HGB 15.1 13.7*    80*     BMP:    Recent Labs     01/31/21  1205 02/01/21  0132 02/02/21  0521    137 136   K 3.4* 3.2* 3.9  96* 103   CO2 25 25 24   BUN 12 15 8   CREATININE 0.6 0.6 0.4*   GLUCOSE 96 67* 92     INR: No results for input(s): INR in the last 72 hours. Lipids: No results for input(s): CHOL, HDL in the last 72 hours. Invalid input(s): LDLCALCU  ABGs:  Recent Labs     01/31/21  0232   PHART 7.460*   VHU8RPC 38.0   PO2ART 78.0*   INC4HVO 27.0*   BEART 3.1*   HGBAE 15.7   P6KOEWRF 93.9   CARBOXHGBART 1.7   02THERAPY Unknown     HgBA1c:  No results for input(s): LABA1C in the last 72 hours. Procedures: None  Hospital Course: Mr. Nneka Espinal was admitted 1/31 after getting bitten by dogs that were fighting. He tried to break them up and inadvertently was bit. He was admitted for observation and then went into alcohol withdrawal.  He is a very heavy drinker of vodka. He was moved to the ICU for observation. He was admitted because of the animal bites. He was not admitted because of his alcohol problem. I feel he can be discharged from the hospital from a medical standpoint. The alcohol issue can be dealt with as an outpatient. Physical Exam:  Vital Signs: BP (!) 140/89   Pulse 70   Temp 97.8 °F (36.6 °C)   Resp 16   Wt 174 lb 8 oz (79.2 kg)   SpO2 95%   BMI 25.04 kg/m²   General appearance:. Alert and Cooperative   HEENT: Normocephalic. Chest: clear to auscultation bilaterally without wheezes or rhonchi. Cardiac: Normal heart tones with regular rate and rhythm, S1, S2 normal. No murmurs, gallops, or rubs auscultated. Abdomen:soft, non-tender; normal bowel sounds, no masses, no organomegaly. Extremities: No clubbing or cyanosis. No peripheral edema. Peripheral pulses palpable. Neurologic: Grossly intact.     Discharge Medications:       [facility] Grandchild   Home Medication Instructions RHO:[id number]    Printed on:02/02/21 1774   Medication Information                      folic acid (FOLVITE) 1 MG tablet  Take 1 tablet by mouth daily             Multiple Vitamin (MULTIVITAMIN) TABS tablet Take 1 tablet by mouth daily             nicotine (NICODERM CQ) 21 MG/24HR  Place 1 patch onto the skin daily             PARoxetine (PAXIL) 40 MG tablet  Take 75 mg by mouth every morning             thiamine mononitrate 100 MG tablet  Take 1 tablet by mouth daily                 Discharge Instructions: Follow up with DOTTIE Grover CNP in 3-5 days. Take medications as directed. Resume activity as tolerated. Diet: DIET GENERAL; Safety Tray; Safety Tray (Disposables No Utensils)     Disposition: Patient is medically stable and will be discharged home. Time spent on discharge greater than 30 minutes.     Signed:  Sage Diaz DO

## 2021-02-03 VITALS
WEIGHT: 180 LBS | SYSTOLIC BLOOD PRESSURE: 95 MMHG | HEIGHT: 70 IN | HEART RATE: 98 BPM | TEMPERATURE: 98.8 F | OXYGEN SATURATION: 95 % | DIASTOLIC BLOOD PRESSURE: 65 MMHG | BODY MASS INDEX: 25.77 KG/M2 | RESPIRATION RATE: 20 BRPM

## 2021-02-03 PROCEDURE — 6360000002 HC RX W HCPCS: Performed by: EMERGENCY MEDICINE

## 2021-02-03 RX ORDER — ZIPRASIDONE MESYLATE 20 MG/ML
10 INJECTION, POWDER, LYOPHILIZED, FOR SOLUTION INTRAMUSCULAR ONCE
Status: COMPLETED | OUTPATIENT
Start: 2021-02-03 | End: 2021-02-03

## 2021-02-03 RX ADMIN — ZIPRASIDONE MESYLATE 10 MG: 20 INJECTION, POWDER, LYOPHILIZED, FOR SOLUTION INTRAMUSCULAR at 00:12

## 2021-02-03 NOTE — ED PROVIDER NOTES
[facility] EMERGENCY DEPT  eMERGENCYdEPARTMENT eNCOUnter      Pt Name: Marlee Burt  MRN: [id number]  [place] 1980  Date of evaluation: 2/2/2021  Eugenio Rincon MD    Emergency Department care of this patient was assumed at [phone] from Dr. HARGR[facility]. We have discussed the case and the plan of care. I have seen and evaluated patient and reviewed ED course. Patient was discharged from the hospital earlier today. He has now returned and is fairly intoxicated. I reviewed the citation from [place] [address] regarding agitation and evidence of alcohol intoxication noted at the scene prior to arrival here in the ED.     CHIEF COMPLAINT       Chief Complaint   Patient presents with    Alcohol Intoxication    Fall    Psychiatric Evaluation     HAS A CITATION         PHYSICAL EXAM    (up to 7 for level 4, 8 or more for level 5)     ED Triage Vitals   BP Temp Temp src Pulse Resp SpO2 Height Weight   02/02/21 2049 02/02/21 2046 -- 02/02/21 2046 02/02/21 2046 02/02/21 2046 02/02/21 2051 02/02/21 2051   (!) 150/100 98.8 °F (37.1 °C)  116 17 96 % 5' 10\" (1.778 m) 180 lb (81.6 kg)       Physical Exam    DIAGNOSTIC RESULTS         LABS:  Labs Reviewed   CBC WITH AUTO DIFFERENTIAL - Abnormal; Notable for the following components:       Result Value    RBC 4.32 (*)     MCV 98.1 (*)     MCH 34.3 (*)     Platelets 94 (*)     Neutrophils % 78.3 (*)     Lymphocytes % 12.5 (*)     Lymphocytes Absolute 1.0 (*)     All other components within normal limits   COMPREHENSIVE METABOLIC PANEL - Abnormal; Notable for the following components:    Potassium 3.4 (*)     ALT 54 (*)     AST 79 (*)     All other components within normal limits   URINE DRUG SCREEN - Abnormal; Notable for the following components:    Cannabinoid Scrn, Ur Positive (*)     All other components within normal limits   URINE RT REFLEX TO CULTURE - Abnormal; Notable for the following components:    Ketones, Urine 40 (*)     All other components within normal limits   ETHANOL   ACETAMINOPHEN LEVEL   SALICYLATE LEVEL   AMMONIA       All other labs were within normal range or not returned as of this dictation. EMERGENCY DEPARTMENT COURSE and DIFFERENTIAL DIAGNOSIS/MDM:   Vitals:    Vitals:    02/02/21 2049 02/02/21 2051 02/03/21 0141 02/03/21 0302   BP: (!) 150/100  103/62 95/65   Pulse:   104 98   Resp:   20    Temp:       SpO2:   98% 95%   Weight:  180 lb (81.6 kg)     Height:  5' 10\" (1.778 m)         MDM    Reassessment    Patient has been observed here in the ED overnight as he was mildly intoxicated however did not have anyone to come and get him in the lives in [facility]. Patient was requesting something to help him sleep. I have given him 10 mg of IM Geodon at this time. Patient observed in the ED overnight. This morning at about 0600 he is alert, speaking and able to ambulate in the ED without difficulty. He states that he is not having any thoughts about wanting to harm himself or harming others. He feels safe about discharging home. Patient has been able to get in touch with his mother who will be picking him up here from the ED. PROCEDURES:  Unless otherwise noted below, none     Procedures    FINAL IMPRESSION      1. Acute alcoholic intoxication with complication ([address]    2. Injury of head, initial encounter    3.  Dog bite of multiple sites of hand and fingers, left, sequela          DISPOSITION/PLAN   DISPOSITION Decision To Discharge    PATIENT REFERRED TO:  Asia Small, APRN - [facility]  [address] [address]  [phone]            DISCHARGE MEDICATIONS:  New Prescriptions    No medications on file          (Please note that portions ofthis note were completed with a voice recognition program.  Efforts were made to edit the dictations but occasionally words are mis-transcribed.)    Radha Mueller MD(electronically signed)  Attending Emergency Physician         Radha Mueller MD  02/03/21 1218

## 2021-02-03 NOTE — ED NOTES
Bed: 02-A  Expected date: 2/2/21  Expected time:   Means of arrival: [facility]  Comments:  Consepcion Epley, RN  02/02/21 2045

## 2021-02-03 NOTE — ED PROVIDER NOTES
provided by the patient, a parent and medical records. NursingNotes were reviewed. REVIEW OF SYSTEMS    (2-9 systems for level 4, 10 or more for level 5)     Review of Systems   Unable to perform ROS: Other (Patient is intoxicated and his review of systems is somewhat questionable.)   Skin: Positive for wound. Psychiatric/Behavioral:        He is denying suicidal or homicidal ideations. A complete review of systems was performed and is negative except as noted above in the HPI. PAST MEDICAL HISTORY     Past Medical History:   Diagnosis Date    Neuromuscular disorder ([facility] [address]     Psychiatric problem          SURGICAL HISTORY       Past Surgical History:   Procedure Laterality Date    ADENOIDECTOMY      COLONOSCOPY      TYMPANOSTOMY TUBE PLACEMENT           CURRENT MEDICATIONS       Previous Medications    FOLIC ACID (FOLVITE) 1 MG TABLET    Take 1 tablet by mouth daily    MULTIPLE VITAMIN (MULTIVITAMIN) TABS TABLET    Take 1 tablet by mouth daily    NICOTINE (NICODERM CQ) 21 MG/24HR    Place 1 patch onto the skin daily    PAROXETINE (PAXIL) 40 MG TABLET    Take 75 mg by mouth every morning    THIAMINE MONONITRATE 100 MG TABLET    Take 1 tablet by mouth daily       ALLERGIES     Patient has no known allergies.     FAMILY HISTORY       Family History   Problem Relation Age of Onset    High Blood Pressure Mother           SOCIAL HISTORY       Social History     Socioeconomic History    Marital status:      Spouse name: None    Number of children: None    Years of education: None    Highest education level: None   Occupational History    None   Social Needs    Financial resource strain: None    Food insecurity     Worry: None     Inability: None    Transportation needs     Medical: None     Non-medical: None   Tobacco Use    Smoking status: Current Every Day Smoker     Packs/day: 2.00     Types: Cigarettes    Smokeless tobacco: Never Used   Substance and Sexual Activity    Alcohol use: Yes     Comment: \"every chance I get\"    Drug use: No    Sexual activity: None   Lifestyle    Physical activity     Days per week: None     Minutes per session: None    Stress: None   Relationships    Social connections     Talks on phone: None     Gets together: None     Attends Mosque service: None     Active member of club or organization: None     Attends meetings of clubs or organizations: None     Relationship status: None    Intimate partner violence     Fear of current or ex partner: None     Emotionally abused: None     Physically abused: None     Forced sexual activity: None   Other Topics Concern    None   Social History Narrative    None       SCREENINGS    Renuka Coma Scale  Eye Opening: Spontaneous  Best Verbal Response: Oriented  Best Motor Response: Obeys commands  Jemison Coma Scale Score: 15        PHYSICAL EXAM    (up to 7 for level 4, 8 or more for level 5)     ED Triage Vitals   BP Temp Temp src Pulse Resp SpO2 Height Weight   02/02/21 2049 02/02/21 2046 -- 02/02/21 2046 02/02/21 2046 02/02/21 2046 02/02/21 2051 02/02/21 2051   (!) 150/100 98.8 °F (37.1 °C)  116 17 96 % 5' 10\" (1.778 m) 180 lb (81.6 kg)       Physical Exam  Vitals signs and nursing note reviewed. Constitutional:       General: He is awake. Appearance: He is well-developed. HENT:      Head: Normocephalic. Right Ear: External ear normal.      Left Ear: External ear normal.      Nose: Nose normal.      Mouth/Throat:      Pharynx: Oropharynx is clear. Eyes:      General: No scleral icterus. Conjunctiva/sclera: Conjunctivae normal.      Pupils: Pupils are equal, round, and reactive to light. Neck:      Musculoskeletal: Normal range of motion and neck supple. Cardiovascular:      Rate and Rhythm: Normal rate and regular rhythm. Pulses: Normal pulses. Heart sounds: Normal heart sounds.    Pulmonary:      Effort: Pulmonary effort is normal.      Breath sounds: Normal breath components:    Cannabinoid Scrn, Ur Positive (*)     All other components within normal limits   URINE RT REFLEX TO CULTURE - Abnormal; Notable for the following components:    Ketones, Urine 40 (*)     All other components within normal limits   ETHANOL   ACETAMINOPHEN LEVEL   SALICYLATE LEVEL   AMMONIA       All other labs were within normal range or not returned as of this dictation. EMERGENCY DEPARTMENT COURSE and DIFFERENTIALDIAGNOSIS/MDM:   Vitals:    Vitals:    02/02/21 2046 02/02/21 2049 02/02/21 2051   BP:  (!) 150/100    Pulse: 116     Resp: 17     Temp: 98.8 °F (37.1 °C)     SpO2: 96%     Weight:   180 lb (81.6 kg)   Height:   5' 10\" (1.778 m)       MDM  Number of Diagnoses or Management Options  Diagnosis management comments: My shift is ended and I have discussed the case with the nighttime attending Dr. Delgado Miguel. We discussed that we do not have a judges order for committal or psych admission. Law enforcement has a citation which they feel the patient is in danger to himself or others in an alcohol problem. And request psychiatric evaluation. Patient is denying psychiatric need and just wants to go home. It is felt best that we evaluate monitor allow the patient to sober up and reevaluate and can have psychiatry evaluation at that time if needed. CONSULTS:  None    PROCEDURES:  Unless otherwise notedbelow, none     Procedures    FINAL IMPRESSION     1. Acute alcoholic intoxication with complication ([facility] [address]    2. Injury of head, initial encounter    3.  Dog bite of multiple sites of hand and fingers, left, sequela          DISPOSITION/PLAN   DISPOSITION        PATIENT REFERRED TO:  @FUP@    DISCHARGE MEDICATIONS:  New Prescriptions    No medications on file          (Please note that portions of this note were completed with a voice recognition program.  Efforts were made to edit the dictations butoccasionally words are mis-transcribed.)    Chris Velásquez MD (electronically

## 2021-02-03 NOTE — ED NOTES
Phoned [employer] dispatch regarding pt's threatening behavior to staff and pt being under the influence     Caitlyn Russell RN  02/02/21 1940

## 2021-02-03 NOTE — ED NOTES
Spoke with patients mother regarding todays events. Relayed information to provider at this time.   New orders received      Joseline Rosario RN  02/02/21 [id number]

## 2021-02-03 NOTE — ED NOTES
Pt states that he needs to urinate. Pt given urinal. Pt urinates in the floor.       Leonides Muñoz RN  02/03/21 [id number]

## 2021-02-03 NOTE — ED NOTES
Pt attempting to pull out IV. Pt attempting to get out of the bed. Encouraged pt to get back into the bed due to him being intoxicated. Pt not following instructions of staff. Pt refuses to wear monitor leads. Pt pulling at IV.      Tong Johnston RN  02/02/21 [address], RN  02/02/21 9971

## 2021-02-03 NOTE — ED NOTES
Contacted pt's mother Jas Mean [phone]. Per pts mother she will be coming to get him but it will be a little bit before she can get here.       Ron Miranda RN  02/03/21 0446

## 2021-02-28 ENCOUNTER — APPOINTMENT (OUTPATIENT)
Dept: GENERAL RADIOLOGY | Age: 41
DRG: 896 | End: 2021-02-28
Payer: MEDICAID

## 2021-02-28 ENCOUNTER — HOSPITAL ENCOUNTER (INPATIENT)
Age: 41
LOS: 3 days | Discharge: HOME | DRG: 896 | End: 2021-03-03
Attending: EMERGENCY MEDICINE | Admitting: HOSPITALIST
Payer: MEDICAID

## 2021-02-28 ENCOUNTER — APPOINTMENT (OUTPATIENT)
Dept: CT IMAGING | Age: 41
DRG: 896 | End: 2021-02-28
Payer: MEDICAID

## 2021-02-28 DIAGNOSIS — F10.931 ALCOHOL WITHDRAWAL SYNDROME, WITH DELIRIUM (HCC): ICD-10-CM

## 2021-02-28 DIAGNOSIS — S51.851A DOG BITE OF RIGHT FOREARM, INITIAL ENCOUNTER: ICD-10-CM
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
## 2021-02-28 DIAGNOSIS — R56.9 OBSERVED SEIZURE-LIKE ACTIVITY (HCC): ICD-10-CM

## 2021-02-28 DIAGNOSIS — S41.152A DOG BITE OF LEFT UPPER ARM, INITIAL ENCOUNTER: ICD-10-CM

## 2021-02-28 DIAGNOSIS — F10.10 CHRONIC ALCOHOL ABUSE: ICD-10-CM

## 2021-02-28 DIAGNOSIS — R45.1 AGITATION REQUIRING SEDATION PROTOCOL: ICD-10-CM

## 2021-02-28 DIAGNOSIS — F10.921 ACUTE ALCOHOLIC INTOXICATION WITH DELIRIUM (HCC): Primary | ICD-10-CM
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
## 2021-02-28 PROBLEM — F10.231 ALCOHOL DEPENDENCE WITH WITHDRAWAL DELIRIUM (HCC): Status: ACTIVE | Noted: 2021-02-28

## 2021-02-28 LAB
ALBUMIN SERPL-MCNC: 4.9 G/DL (ref 3.5–5.2)
ALP BLD-CCNC: 120 U/L (ref 40–130)
ALT SERPL-CCNC: 56 U/L (ref 5–41)
AMORPHOUS: ABNORMAL /HPF
AMPHETAMINE SCREEN, URINE: NEGATIVE
ANION GAP SERPL CALCULATED.3IONS-SCNC: 19 MMOL/L (ref 7–19)
AST SERPL-CCNC: 90 U/L (ref 5–40)
BARBITURATE SCREEN URINE: NEGATIVE
BASE EXCESS ARTERIAL: 1 MMOL/L (ref -2–2)
BASOPHILS ABSOLUTE: 0 K/UL (ref 0–0.2)
BASOPHILS RELATIVE PERCENT: 0.4 % (ref 0–1)
BENZODIAZEPINE SCREEN, URINE: NEGATIVE
BILIRUB SERPL-MCNC: 0.6 MG/DL (ref 0.2–1.2)
BILIRUBIN URINE: NEGATIVE
BLOOD, URINE: ABNORMAL
BUN BLDV-MCNC: 6 MG/DL (ref 6–20)
CALCIUM SERPL-MCNC: 8.7 MG/DL (ref 8.6–10)
CANNABINOID SCREEN URINE: NEGATIVE
CARBOXYHEMOGLOBIN ARTERIAL: 2.1 % (ref 0–5)
CHLORIDE BLD-SCNC: 90 MMOL/L (ref 98–111)
CLARITY: CLEAR
CO2: 24 MMOL/L (ref 22–29)
COCAINE METABOLITE SCREEN URINE: NEGATIVE
COLOR: YELLOW
CREAT SERPL-MCNC: 0.6 MG/DL (ref 0.5–1.2)
EOSINOPHILS ABSOLUTE: 0 K/UL (ref 0–0.6)
EOSINOPHILS RELATIVE PERCENT: 0 % (ref 0–5)
EPITHELIAL CELLS, UA: ABNORMAL /HPF
ETHANOL: 256 MG/DL (ref 0–0.08)
GFR AFRICAN AMERICAN: >59
GFR NON-AFRICAN AMERICAN: >60
GLUCOSE BLD-MCNC: 107 MG/DL (ref 74–109)
GLUCOSE URINE: NEGATIVE MG/DL
HCO3 ARTERIAL: 25.2 MMOL/L (ref 22–26)
HCT VFR BLD CALC: 40.1 % (ref 42–52)
HEMOGLOBIN, ART, EXTENDED: 13.8 G/DL (ref 14–18)
HEMOGLOBIN: 14.1 G/DL (ref 14–18)
IMMATURE GRANULOCYTES #: 0 K/UL
KETONES, URINE: NEGATIVE MG/DL
LEUKOCYTE ESTERASE, URINE: NEGATIVE
LYMPHOCYTES ABSOLUTE: 1.2 K/UL (ref 1.1–4.5)
LYMPHOCYTES RELATIVE PERCENT: 11.7 % (ref 20–40)
Lab: NORMAL
MAGNESIUM: 2 MG/DL (ref 1.6–2.6)
MCH RBC QN AUTO: 34.5 PG (ref 27–31)
MCHC RBC AUTO-ENTMCNC: 35.2 G/DL (ref 33–37)
MCV RBC AUTO: 98 FL (ref 80–94)
METHEMOGLOBIN ARTERIAL: 1.4 %
MONOCYTES ABSOLUTE: 0.9 K/UL (ref 0–0.9)
MONOCYTES RELATIVE PERCENT: 8.6 % (ref 0–10)
NEUTROPHILS ABSOLUTE: 7.9 K/UL (ref 1.5–7.5)
NEUTROPHILS RELATIVE PERCENT: 78.9 % (ref 50–65)
NITRITE, URINE: NEGATIVE
O2 CONTENT ARTERIAL: 18.8 ML/DL
O2 SAT, ARTERIAL: 95.9 %
O2 THERAPY: ABNORMAL
OPIATE SCREEN URINE: NEGATIVE
PCO2 ARTERIAL: 38 MMHG (ref 35–45)
PDW BLD-RTO: 13.7 % (ref 11.5–14.5)
PH ARTERIAL: 7.43 (ref 7.35–7.45)
PH UA: 6 (ref 5–8)
PHOSPHORUS: 2.3 MG/DL (ref 2.5–4.5)
PLATELET # BLD: 130 K/UL (ref 130–400)
PMV BLD AUTO: 10.1 FL (ref 9.4–12.4)
PO2 ARTERIAL: 139 MMHG (ref 80–100)
POTASSIUM REFLEX MAGNESIUM: 3.8 MMOL/L (ref 3.5–5)
POTASSIUM, WHOLE BLOOD: 3.1
PROTEIN UA: 30 MG/DL
RBC # BLD: 4.09 M/UL (ref 4.7–6.1)
RBC UA: ABNORMAL /HPF (ref 0–2)
SARS-COV-2, NAAT: NOT DETECTED
SODIUM BLD-SCNC: 133 MMOL/L (ref 136–145)
SPECIFIC GRAVITY UA: 1.01 (ref 1–1.03)
TOTAL CK: 1961 U/L (ref 39–308)
TOTAL PROTEIN: 8 G/DL (ref 6.6–8.7)
UROBILINOGEN, URINE: 1 E.U./DL
WBC # BLD: 10.1 K/UL (ref 4.8–10.8)

## 2021-02-28 PROCEDURE — 70450 CT HEAD/BRAIN W/O DYE: CPT

## 2021-02-28 PROCEDURE — 5A1945Z RESPIRATORY VENTILATION, 24-96 CONSECUTIVE HOURS: ICD-10-PCS | Performed by: HOSPITALIST

## 2021-02-28 PROCEDURE — 36415 COLL VENOUS BLD VENIPUNCTURE: CPT

## 2021-02-28 PROCEDURE — 2580000003 HC RX 258: Performed by: PHYSICIAN ASSISTANT

## 2021-02-28 PROCEDURE — 96365 THER/PROPH/DIAG IV INF INIT: CPT

## 2021-02-28 PROCEDURE — 80307 DRUG TEST PRSMV CHEM ANLYZR: CPT

## 2021-02-28 PROCEDURE — 82803 BLOOD GASES ANY COMBINATION: CPT

## 2021-02-28 PROCEDURE — 0BH17EZ INSERTION OF ENDOTRACHEAL AIRWAY INTO TRACHEA, VIA NATURAL OR ARTIFICIAL OPENING: ICD-10-PCS | Performed by: EMERGENCY MEDICINE

## 2021-02-28 PROCEDURE — 2500000003 HC RX 250 WO HCPCS: Performed by: HOSPITALIST

## 2021-02-28 PROCEDURE — 2000000000 HC ICU R&B

## 2021-02-28 PROCEDURE — 6360000002 HC RX W HCPCS: Performed by: EMERGENCY MEDICINE

## 2021-02-28 PROCEDURE — 82550 ASSAY OF CK (CPK): CPT

## 2021-02-28 PROCEDURE — 84132 ASSAY OF SERUM POTASSIUM: CPT

## 2021-02-28 PROCEDURE — 85025 COMPLETE CBC W/AUTO DIFF WBC: CPT

## 2021-02-28 PROCEDURE — 83735 ASSAY OF MAGNESIUM: CPT

## 2021-02-28 PROCEDURE — 81001 URINALYSIS AUTO W/SCOPE: CPT

## 2021-02-28 PROCEDURE — 73090 X-RAY EXAM OF FOREARM: CPT

## 2021-02-28 PROCEDURE — 73060 X-RAY EXAM OF HUMERUS: CPT

## 2021-02-28 PROCEDURE — 90714 TD VACC NO PRESV 7 YRS+ IM: CPT | Performed by: EMERGENCY MEDICINE

## 2021-02-28 PROCEDURE — 36600 WITHDRAWAL OF ARTERIAL BLOOD: CPT

## 2021-02-28 PROCEDURE — 99284 EMERGENCY DEPT VISIT MOD MDM: CPT

## 2021-02-28 PROCEDURE — 71045 X-RAY EXAM CHEST 1 VIEW: CPT

## 2021-02-28 PROCEDURE — 87635 SARS-COV-2 COVID-19 AMP PRB: CPT

## 2021-02-28 PROCEDURE — 82077 ASSAY SPEC XCP UR&BREATH IA: CPT

## 2021-02-28 PROCEDURE — 2700000000 HC OXYGEN THERAPY PER DAY

## 2021-02-28 PROCEDURE — 96375 TX/PRO/DX INJ NEW DRUG ADDON: CPT

## 2021-02-28 PROCEDURE — 2500000003 HC RX 250 WO HCPCS: Performed by: EMERGENCY MEDICINE

## 2021-02-28 PROCEDURE — 80053 COMPREHEN METABOLIC PANEL: CPT

## 2021-02-28 PROCEDURE — 6360000002 HC RX W HCPCS

## 2021-02-28 PROCEDURE — 96372 THER/PROPH/DIAG INJ SC/IM: CPT

## 2021-02-28 PROCEDURE — 84100 ASSAY OF PHOSPHORUS: CPT

## 2021-02-28 PROCEDURE — 90471 IMMUNIZATION ADMIN: CPT | Performed by: EMERGENCY MEDICINE

## 2021-02-28 PROCEDURE — 2580000003 HC RX 258: Performed by: EMERGENCY MEDICINE

## 2021-02-28 PROCEDURE — 31500 INSERT EMERGENCY AIRWAY: CPT

## 2021-02-28 PROCEDURE — 96367 TX/PROPH/DG ADDL SEQ IV INF: CPT

## 2021-02-28 RX ORDER — GAUZE BANDAGE 2" X 2"
100 BANDAGE TOPICAL DAILY
Status: DISCONTINUED | OUTPATIENT
Start: 2021-02-28 | End: 2021-03-03 | Stop reason: HOSPADM

## 2021-02-28 RX ORDER — PAROXETINE 30 MG/1
75 TABLET, FILM COATED ORAL EVERY MORNING
Status: DISCONTINUED | OUTPATIENT
Start: 2021-03-01 | End: 2021-03-03 | Stop reason: HOSPADM

## 2021-02-28 RX ORDER — LORAZEPAM 2 MG/ML
2 INJECTION INTRAMUSCULAR ONCE
Status: COMPLETED | OUTPATIENT
Start: 2021-02-28 | End: 2021-02-28

## 2021-02-28 RX ORDER — LORAZEPAM 2 MG/ML
2 INJECTION INTRAMUSCULAR
Status: DISCONTINUED | OUTPATIENT
Start: 2021-02-28 | End: 2021-03-03

## 2021-02-28 RX ORDER — LORAZEPAM 1 MG/1
3 TABLET ORAL
Status: DISCONTINUED | OUTPATIENT
Start: 2021-02-28 | End: 2021-03-03

## 2021-02-28 RX ORDER — DIPHENHYDRAMINE HYDROCHLORIDE 50 MG/ML
50 INJECTION INTRAMUSCULAR; INTRAVENOUS ONCE
Status: COMPLETED | OUTPATIENT
Start: 2021-02-28 | End: 2021-02-28

## 2021-02-28 RX ORDER — MULTIVITAMIN WITH IRON
1 TABLET ORAL DAILY
Status: DISCONTINUED | OUTPATIENT
Start: 2021-02-28 | End: 2021-03-03 | Stop reason: HOSPADM

## 2021-02-28 RX ORDER — NICOTINE 21 MG/24HR
1 PATCH, TRANSDERMAL 24 HOURS TRANSDERMAL DAILY
Status: DISCONTINUED | OUTPATIENT
Start: 2021-02-28 | End: 2021-03-03 | Stop reason: HOSPADM

## 2021-02-28 RX ORDER — LORAZEPAM 2 MG/ML
INJECTION INTRAMUSCULAR
Status: COMPLETED
Start: 2021-02-28 | End: 2021-02-28

## 2021-02-28 RX ORDER — VECURONIUM BROMIDE 1 MG/ML
10 INJECTION, POWDER, LYOPHILIZED, FOR SOLUTION INTRAVENOUS ONCE
Status: COMPLETED | OUTPATIENT
Start: 2021-02-28 | End: 2021-02-28

## 2021-02-28 RX ORDER — SODIUM CHLORIDE 0.9 % (FLUSH) 0.9 %
10 SYRINGE (ML) INJECTION EVERY 12 HOURS SCHEDULED
Status: DISCONTINUED | OUTPATIENT
Start: 2021-02-28 | End: 2021-03-03 | Stop reason: HOSPADM

## 2021-02-28 RX ORDER — MIDAZOLAM HYDROCHLORIDE 5 MG/ML
5 INJECTION, SOLUTION INTRAMUSCULAR; INTRAVENOUS ONCE
Status: COMPLETED | OUTPATIENT
Start: 2021-02-28 | End: 2021-02-28

## 2021-02-28 RX ORDER — ONDANSETRON 4 MG/1
4 TABLET, ORALLY DISINTEGRATING ORAL EVERY 8 HOURS PRN
Status: DISCONTINUED | OUTPATIENT
Start: 2021-02-28 | End: 2021-03-03 | Stop reason: HOSPADM

## 2021-02-28 RX ORDER — POLYETHYLENE GLYCOL 3350 17 G/17G
17 POWDER, FOR SOLUTION ORAL DAILY PRN
Status: DISCONTINUED | OUTPATIENT
Start: 2021-02-28 | End: 2021-03-03 | Stop reason: HOSPADM

## 2021-02-28 RX ORDER — HALOPERIDOL 5 MG/ML
10 INJECTION INTRAMUSCULAR ONCE
Status: COMPLETED | OUTPATIENT
Start: 2021-02-28 | End: 2021-02-28

## 2021-02-28 RX ORDER — TETANUS AND DIPHTHERIA TOXOIDS ADSORBED 2; 2 [LF]/.5ML; [LF]/.5ML
0.5 INJECTION INTRAMUSCULAR ONCE
Status: COMPLETED | OUTPATIENT
Start: 2021-02-28 | End: 2021-02-28

## 2021-02-28 RX ORDER — SODIUM CHLORIDE 0.9 % (FLUSH) 0.9 %
10 SYRINGE (ML) INJECTION PRN
Status: DISCONTINUED | OUTPATIENT
Start: 2021-02-28 | End: 2021-03-03 | Stop reason: HOSPADM

## 2021-02-28 RX ORDER — FOLIC ACID 1 MG/1
1 TABLET ORAL DAILY
Status: DISCONTINUED | OUTPATIENT
Start: 2021-02-28 | End: 2021-03-03 | Stop reason: HOSPADM

## 2021-02-28 RX ORDER — LORAZEPAM 2 MG/ML
4 INJECTION INTRAMUSCULAR
Status: DISCONTINUED | OUTPATIENT
Start: 2021-02-28 | End: 2021-03-03

## 2021-02-28 RX ORDER — PROPOFOL 10 MG/ML
5-50 INJECTION, EMULSION INTRAVENOUS ONCE
Status: COMPLETED | OUTPATIENT
Start: 2021-02-28 | End: 2021-03-01

## 2021-02-28 RX ORDER — DEXMEDETOMIDINE HYDROCHLORIDE 4 UG/ML
.2-1.4 INJECTION, SOLUTION INTRAVENOUS CONTINUOUS
Status: DISCONTINUED | OUTPATIENT
Start: 2021-02-28 | End: 2021-03-01

## 2021-02-28 RX ORDER — ETOMIDATE 2 MG/ML
20 INJECTION INTRAVENOUS ONCE
Status: COMPLETED | OUTPATIENT
Start: 2021-02-28 | End: 2021-02-28

## 2021-02-28 RX ORDER — LORAZEPAM 1 MG/1
1 TABLET ORAL
Status: DISCONTINUED | OUTPATIENT
Start: 2021-02-28 | End: 2021-03-03 | Stop reason: HOSPADM

## 2021-02-28 RX ORDER — LORAZEPAM 1 MG/1
4 TABLET ORAL
Status: DISCONTINUED | OUTPATIENT
Start: 2021-02-28 | End: 2021-03-03

## 2021-02-28 RX ORDER — SODIUM CHLORIDE, SODIUM LACTATE, POTASSIUM CHLORIDE, CALCIUM CHLORIDE 600; 310; 30; 20 MG/100ML; MG/100ML; MG/100ML; MG/100ML
INJECTION, SOLUTION INTRAVENOUS CONTINUOUS
Status: DISCONTINUED | OUTPATIENT
Start: 2021-02-28 | End: 2021-03-03

## 2021-02-28 RX ORDER — ONDANSETRON 2 MG/ML
4 INJECTION INTRAMUSCULAR; INTRAVENOUS EVERY 6 HOURS PRN
Status: DISCONTINUED | OUTPATIENT
Start: 2021-02-28 | End: 2021-03-03 | Stop reason: HOSPADM

## 2021-02-28 RX ORDER — LORAZEPAM 1 MG/1
2 TABLET ORAL
Status: DISCONTINUED | OUTPATIENT
Start: 2021-02-28 | End: 2021-03-03

## 2021-02-28 RX ORDER — LORAZEPAM 2 MG/ML
1 INJECTION INTRAMUSCULAR
Status: DISCONTINUED | OUTPATIENT
Start: 2021-02-28 | End: 2021-03-03 | Stop reason: HOSPADM

## 2021-02-28 RX ORDER — SODIUM CHLORIDE, SODIUM LACTATE, POTASSIUM CHLORIDE, CALCIUM CHLORIDE 600; 310; 30; 20 MG/100ML; MG/100ML; MG/100ML; MG/100ML
1000 INJECTION, SOLUTION INTRAVENOUS ONCE
Status: COMPLETED | OUTPATIENT
Start: 2021-02-28 | End: 2021-02-28

## 2021-02-28 RX ORDER — LORAZEPAM 2 MG/ML
3 INJECTION INTRAMUSCULAR
Status: DISCONTINUED | OUTPATIENT
Start: 2021-02-28 | End: 2021-03-03

## 2021-02-28 RX ADMIN — VECURONIUM BROMIDE 10 MG: 1 INJECTION, POWDER, LYOPHILIZED, FOR SOLUTION INTRAVENOUS at 21:20

## 2021-02-28 RX ADMIN — LORAZEPAM 2 MG: 2 INJECTION INTRAMUSCULAR; INTRAVENOUS at 20:59

## 2021-02-28 RX ADMIN — LORAZEPAM 2 MG: 2 INJECTION INTRAMUSCULAR; INTRAVENOUS at 20:08

## 2021-02-28 RX ADMIN — SODIUM CHLORIDE 1500 MG: 900 INJECTION INTRAVENOUS at 19:16

## 2021-02-28 RX ADMIN — LORAZEPAM 2 MG: 2 INJECTION INTRAMUSCULAR; INTRAVENOUS at 22:12

## 2021-02-28 RX ADMIN — SODIUM CHLORIDE, POTASSIUM CHLORIDE, SODIUM LACTATE AND CALCIUM CHLORIDE 1000 ML: 600; 310; 30; 20 INJECTION, SOLUTION INTRAVENOUS at 19:59

## 2021-02-28 RX ADMIN — ETOMIDATE 20 MG: 20 INJECTION, SOLUTION INTRAVENOUS at 21:20

## 2021-02-28 RX ADMIN — DEXMEDETOMIDINE HYDROCHLORIDE 0.2 MCG/KG/HR: 4 INJECTION, SOLUTION INTRAVENOUS at 21:32

## 2021-02-28 RX ADMIN — SODIUM CHLORIDE, POTASSIUM CHLORIDE, SODIUM LACTATE AND CALCIUM CHLORIDE: 600; 310; 30; 20 INJECTION, SOLUTION INTRAVENOUS at 21:43

## 2021-02-28 RX ADMIN — LORAZEPAM 2 MG: 2 INJECTION INTRAMUSCULAR; INTRAVENOUS at 21:23

## 2021-02-28 RX ADMIN — DIPHENHYDRAMINE HYDROCHLORIDE 50 MG: 50 INJECTION, SOLUTION INTRAMUSCULAR; INTRAVENOUS at 17:53

## 2021-02-28 RX ADMIN — HALOPERIDOL LACTATE 10 MG: 5 INJECTION, SOLUTION INTRAMUSCULAR at 17:53

## 2021-02-28 RX ADMIN — Medication 25 MCG/HR: at 21:41

## 2021-02-28 RX ADMIN — TETANUS AND DIPHTHERIA TOXOIDS ADSORBED 0.5 ML: 2; 2 INJECTION INTRAMUSCULAR at 23:00

## 2021-02-28 RX ADMIN — FOLIC ACID: 5 INJECTION, SOLUTION INTRAMUSCULAR; INTRAVENOUS; SUBCUTANEOUS at 19:59

## 2021-02-28 RX ADMIN — PROPOFOL 20 MCG/KG/MIN: 10 INJECTION, EMULSION INTRAVENOUS at 22:12

## 2021-02-28 RX ADMIN — MIDAZOLAM HYDROCHLORIDE 5 MG: 5 INJECTION, SOLUTION INTRAMUSCULAR; INTRAVENOUS at 22:18

## 2021-02-28 RX ADMIN — LORAZEPAM 2 MG: 2 INJECTION INTRAMUSCULAR at 20:59

## 2021-02-28 ASSESSMENT — PULMONARY FUNCTION TESTS: PIF_VALUE: 29

## 2021-02-28 ASSESSMENT — PAIN SCALES - GENERAL
PAINLEVEL_OUTOF10: 0
PAINLEVEL_OUTOF10: 0

## 2021-02-28 NOTE — Clinical Note
Patient Class: Inpatient [101]   REQUIRED: Diagnosis: Alcohol dependence with withdrawal delirium [facility]) [[id number]]   Estimated Length of Stay: Estimated stay of more than 2 midnights   Future Attending Provider: Frances Cruz [[id number]]   Admitting Provider: Frances Cruz [[id number]]   Preferred Department: also has dog bite, any tele bed

## 2021-03-01 LAB
ANION GAP SERPL CALCULATED.3IONS-SCNC: 17 MMOL/L (ref 7–19)
BASOPHILS ABSOLUTE: 0 K/UL (ref 0–0.2)
BASOPHILS RELATIVE PERCENT: 0.3 % (ref 0–1)
BUN BLDV-MCNC: 7 MG/DL (ref 6–20)
CALCIUM SERPL-MCNC: 8.1 MG/DL (ref 8.6–10)
CHLORIDE BLD-SCNC: 100 MMOL/L (ref 98–111)
CO2: 24 MMOL/L (ref 22–29)
CREAT SERPL-MCNC: 0.5 MG/DL (ref 0.5–1.2)
EOSINOPHILS ABSOLUTE: 0 K/UL (ref 0–0.6)
EOSINOPHILS RELATIVE PERCENT: 0.6 % (ref 0–5)
GFR AFRICAN AMERICAN: >59
GFR NON-AFRICAN AMERICAN: >60
GLUCOSE BLD-MCNC: 92 MG/DL (ref 74–109)
HCT VFR BLD CALC: 34.5 % (ref 42–52)
HEMOGLOBIN: 11.7 G/DL (ref 14–18)
IMMATURE GRANULOCYTES #: 0 K/UL
LACTIC ACID: 1 MMOL/L (ref 0.5–1.9)
LACTIC ACID: 1 MMOL/L (ref 0.5–1.9)
LACTIC ACID: 4.8 MMOL/L (ref 0.5–1.9)
LYMPHOCYTES ABSOLUTE: 2.2 K/UL (ref 1.1–4.5)
LYMPHOCYTES RELATIVE PERCENT: 33.2 % (ref 20–40)
MAGNESIUM: 2 MG/DL (ref 1.6–2.6)
MCH RBC QN AUTO: 34.4 PG (ref 27–31)
MCHC RBC AUTO-ENTMCNC: 33.9 G/DL (ref 33–37)
MCV RBC AUTO: 101.5 FL (ref 80–94)
MONOCYTES ABSOLUTE: 0.4 K/UL (ref 0–0.9)
MONOCYTES RELATIVE PERCENT: 5.7 % (ref 0–10)
NEUTROPHILS ABSOLUTE: 3.9 K/UL (ref 1.5–7.5)
NEUTROPHILS RELATIVE PERCENT: 60 % (ref 50–65)
PDW BLD-RTO: 14 % (ref 11.5–14.5)
PLATELET # BLD: 101 K/UL (ref 130–400)
PMV BLD AUTO: 11.1 FL (ref 9.4–12.4)
POTASSIUM REFLEX MAGNESIUM: 3.2 MMOL/L (ref 3.5–5)
RBC # BLD: 3.4 M/UL (ref 4.7–6.1)
SODIUM BLD-SCNC: 141 MMOL/L (ref 136–145)
TOTAL CK: 2543 U/L (ref 39–308)
WBC # BLD: 6.5 K/UL (ref 4.8–10.8)

## 2021-03-01 PROCEDURE — 94002 VENT MGMT INPAT INIT DAY: CPT

## 2021-03-01 PROCEDURE — 6360000002 HC RX W HCPCS: Performed by: HOSPITALIST

## 2021-03-01 PROCEDURE — 87040 BLOOD CULTURE FOR BACTERIA: CPT

## 2021-03-01 PROCEDURE — 2580000003 HC RX 258: Performed by: PHYSICIAN ASSISTANT

## 2021-03-01 PROCEDURE — 82550 ASSAY OF CK (CPK): CPT

## 2021-03-01 PROCEDURE — 80048 BASIC METABOLIC PNL TOTAL CA: CPT

## 2021-03-01 PROCEDURE — 6370000000 HC RX 637 (ALT 250 FOR IP): Performed by: HOSPITALIST

## 2021-03-01 PROCEDURE — 6360000002 HC RX W HCPCS: Performed by: INTERNAL MEDICINE

## 2021-03-01 PROCEDURE — 2500000003 HC RX 250 WO HCPCS: Performed by: HOSPITALIST

## 2021-03-01 PROCEDURE — 36415 COLL VENOUS BLD VENIPUNCTURE: CPT

## 2021-03-01 PROCEDURE — 83605 ASSAY OF LACTIC ACID: CPT

## 2021-03-01 PROCEDURE — 2000000000 HC ICU R&B

## 2021-03-01 PROCEDURE — 83735 ASSAY OF MAGNESIUM: CPT

## 2021-03-01 PROCEDURE — 6360000002 HC RX W HCPCS

## 2021-03-01 PROCEDURE — 85025 COMPLETE CBC W/AUTO DIFF WBC: CPT

## 2021-03-01 PROCEDURE — 2580000003 HC RX 258: Performed by: HOSPITALIST

## 2021-03-01 PROCEDURE — 2700000000 HC OXYGEN THERAPY PER DAY

## 2021-03-01 RX ORDER — MAGNESIUM SULFATE 1 G/100ML
1000 INJECTION INTRAVENOUS PRN
Status: DISCONTINUED | OUTPATIENT
Start: 2021-03-01 | End: 2021-03-03 | Stop reason: HOSPADM

## 2021-03-01 RX ORDER — PROPOFOL 10 MG/ML
INJECTION, EMULSION INTRAVENOUS
Status: COMPLETED
Start: 2021-03-01 | End: 2021-03-01

## 2021-03-01 RX ORDER — PROPOFOL 10 MG/ML
5-50 INJECTION, EMULSION INTRAVENOUS
Status: DISCONTINUED | OUTPATIENT
Start: 2021-03-01 | End: 2021-03-03

## 2021-03-01 RX ORDER — POTASSIUM CHLORIDE 7.45 MG/ML
10 INJECTION INTRAVENOUS PRN
Status: DISCONTINUED | OUTPATIENT
Start: 2021-03-01 | End: 2021-03-03 | Stop reason: HOSPADM

## 2021-03-01 RX ORDER — CHLORHEXIDINE GLUCONATE 0.12 MG/ML
15 RINSE ORAL 2 TIMES DAILY
Status: DISCONTINUED | OUTPATIENT
Start: 2021-03-01 | End: 2021-03-03

## 2021-03-01 RX ORDER — LORAZEPAM 2 MG/ML
5 INJECTION INTRAMUSCULAR ONCE
Status: COMPLETED | OUTPATIENT
Start: 2021-03-01 | End: 2021-03-01

## 2021-03-01 RX ORDER — ACETAMINOPHEN 325 MG/1
650 TABLET ORAL EVERY 4 HOURS PRN
Status: DISCONTINUED | OUTPATIENT
Start: 2021-03-01 | End: 2021-03-03 | Stop reason: HOSPADM

## 2021-03-01 RX ORDER — POTASSIUM CHLORIDE 20 MEQ/1
40 TABLET, EXTENDED RELEASE ORAL PRN
Status: DISCONTINUED | OUTPATIENT
Start: 2021-03-01 | End: 2021-03-03 | Stop reason: HOSPADM

## 2021-03-01 RX ADMIN — SODIUM CHLORIDE 1500 MG: 900 INJECTION INTRAVENOUS at 06:01

## 2021-03-01 RX ADMIN — DEXMEDETOMIDINE HYDROCHLORIDE 0.5 MCG/KG/HR: 4 INJECTION, SOLUTION INTRAVENOUS at 11:28

## 2021-03-01 RX ADMIN — SODIUM CHLORIDE 1500 MG: 900 INJECTION INTRAVENOUS at 00:29

## 2021-03-01 RX ADMIN — POTASSIUM BICARBONATE 40 MEQ: 782 TABLET, EFFERVESCENT ORAL at 05:57

## 2021-03-01 RX ADMIN — DEXMEDETOMIDINE HYDROCHLORIDE 0.5 MCG/KG/HR: 4 INJECTION, SOLUTION INTRAVENOUS at 06:37

## 2021-03-01 RX ADMIN — THERA TABS 1 TABLET: TAB at 07:59

## 2021-03-01 RX ADMIN — LORAZEPAM 2 MG: 2 INJECTION INTRAMUSCULAR; INTRAVENOUS at 05:04

## 2021-03-01 RX ADMIN — PROPOFOL 10 MCG/KG/MIN: 10 INJECTION, EMULSION INTRAVENOUS at 13:55

## 2021-03-01 RX ADMIN — SODIUM CHLORIDE, PRESERVATIVE FREE 10 ML: 5 INJECTION INTRAVENOUS at 20:10

## 2021-03-01 RX ADMIN — LORAZEPAM 2 MG: 2 INJECTION INTRAMUSCULAR; INTRAVENOUS at 20:09

## 2021-03-01 RX ADMIN — PROPOFOL 45 MCG/KG/MIN: 10 INJECTION, EMULSION INTRAVENOUS at 22:18

## 2021-03-01 RX ADMIN — LORAZEPAM 5 MG: 2 INJECTION INTRAMUSCULAR; INTRAVENOUS at 13:46

## 2021-03-01 RX ADMIN — LORAZEPAM 4 MG: 2 INJECTION INTRAMUSCULAR; INTRAVENOUS at 14:04

## 2021-03-01 RX ADMIN — SODIUM CHLORIDE 1500 MG: 900 INJECTION INTRAVENOUS at 18:12

## 2021-03-01 RX ADMIN — SODIUM CHLORIDE, POTASSIUM CHLORIDE, SODIUM LACTATE AND CALCIUM CHLORIDE: 600; 310; 30; 20 INJECTION, SOLUTION INTRAVENOUS at 20:39

## 2021-03-01 RX ADMIN — LORAZEPAM 2 MG: 2 INJECTION INTRAMUSCULAR; INTRAVENOUS at 03:41

## 2021-03-01 RX ADMIN — ACETAMINOPHEN 650 MG: 325 TABLET ORAL at 21:45

## 2021-03-01 RX ADMIN — FAMOTIDINE 20 MG: 10 INJECTION, SOLUTION INTRAVENOUS at 07:58

## 2021-03-01 RX ADMIN — CHLORHEXIDINE GLUCONATE 15 ML: 1.2 RINSE ORAL at 08:00

## 2021-03-01 RX ADMIN — ENOXAPARIN SODIUM 40 MG: 100 INJECTION SUBCUTANEOUS at 07:58

## 2021-03-01 RX ADMIN — SODIUM CHLORIDE, PRESERVATIVE FREE 10 ML: 5 INJECTION INTRAVENOUS at 07:56

## 2021-03-01 RX ADMIN — CHLORHEXIDINE GLUCONATE 15 ML: 1.2 RINSE ORAL at 20:10

## 2021-03-01 RX ADMIN — FOLIC ACID 1 MG: 1 TABLET ORAL at 07:59

## 2021-03-01 RX ADMIN — SODIUM CHLORIDE, PRESERVATIVE FREE 10 ML: 5 INJECTION INTRAVENOUS at 00:26

## 2021-03-01 RX ADMIN — PROPOFOL 45 MCG/KG/MIN: 10 INJECTION, EMULSION INTRAVENOUS at 17:10

## 2021-03-01 RX ADMIN — SODIUM CHLORIDE 1500 MG: 900 INJECTION INTRAVENOUS at 12:26

## 2021-03-01 RX ADMIN — PAROXETINE 75 MG: 30 TABLET, FILM COATED ORAL at 07:59

## 2021-03-01 RX ADMIN — SODIUM CHLORIDE, POTASSIUM CHLORIDE, SODIUM LACTATE AND CALCIUM CHLORIDE: 600; 310; 30; 20 INJECTION, SOLUTION INTRAVENOUS at 10:00

## 2021-03-01 RX ADMIN — Medication 100 MG: at 07:58

## 2021-03-01 RX ADMIN — FAMOTIDINE 20 MG: 10 INJECTION, SOLUTION INTRAVENOUS at 20:09

## 2021-03-01 ASSESSMENT — PULMONARY FUNCTION TESTS
PIF_VALUE: 23
PIF_VALUE: 25
PIF_VALUE: 22
PIF_VALUE: 24
PIF_VALUE: 25
PIF_VALUE: 23
PIF_VALUE: 25
PIF_VALUE: 24
PIF_VALUE: 25
PIF_VALUE: 26
PIF_VALUE: 29
PIF_VALUE: 24
PIF_VALUE: 35

## 2021-03-01 ASSESSMENT — PAIN SCALES - GENERAL
PAINLEVEL_OUTOF10: 0

## 2021-03-01 NOTE — PLAN OF CARE
Problem: Non-Violent Restraints  Goal: Removal from restraints as soon as assessed to be safe  Outcome: Ongoing  Goal: No harm/injury to patient while restraints in use  Outcome: Ongoing  Goal: Patient's dignity will be maintained  Outcome: Ongoing     Problem: Falls - Risk of:  Goal: Will remain free from falls  Description: Will remain free from falls  Outcome: Ongoing  Goal: Absence of physical injury  Description: Absence of physical injury  Outcome: Ongoing

## 2021-03-01 NOTE — PLAN OF CARE
Nutrition Problem #1: Inadequate oral intake  Intervention: Food and/or Nutrient Delivery: Continue NPO, Start Tube Feeding  Nutritional Goals: Nutritional needs will be met with EN with no s/s intolerance

## 2021-03-01 NOTE — CONSULTS
Comprehensive Nutrition Assessment    Type and Reason for Visit:  Initial, Consult    Nutrition Recommendations/Plan: Initiate EN: Vital High Protein with a goal rate of 75 mL/hr. Flush tube with 15 mL/H2O every hr. EN at goal with H2O flushes will provide 1800 kcals, 157 g/PRO, 200 g/CHO, & 1865 mL/fluid daily. Nutrition Assessment:  Pt is mechanically ventilated and NPO. Consult received for TF ordering and management per vent bundle. Will initiate EN. Malnutrition Assessment:  Malnutrition Status: At risk for malnutrition (Comment)    Context:  Acute Illness     Findings of the 6 clinical characteristics of malnutrition:  Energy Intake:  Mild decrease in energy intake (Comment)  Weight Loss:  No significant weight loss     Body Fat Loss:  No significant body fat loss     Muscle Mass Loss:  No significant muscle mass loss    Fluid Accumulation:  No significant fluid accumulation     Strength:  Not Performed    Estimated Daily Nutrient Needs:  Energy (kcal):  6930-7392 kcals/day; Weight Used for Energy Requirements:  Current(20-25)     Protein (g):   g/PRO/day; Weight Used for Protein Requirements:  Ideal(1.2-2.0)        Fluid (ml/day):  4191-7336 mL/day; Method Used for Fluid Requirements:  1 ml/kcal        Current Nutrition Therapies:    Diet NPO Effective Now    Anthropometric Measures:  · Height: 5' 10\" (177.8 cm)  · Current Body Weight: 176 lb (79.8 kg)    · Ideal Body Weight: 166 lbs  · BMI: 25.3  · BMI Categories: Overweight (BMI 25.0-29. 9)       Nutrition Diagnosis:   · Inadequate oral intake related to impaired respiratory function as evidenced by intubation, NPO or clear liquid status due to medical condition    Nutrition Interventions:   Food and/or Nutrient Delivery:  Continue NPO, Start Tube Feeding  Coordination of Nutrition Care:  Continue to monitor while inpatient    Goals:  Nutritional needs will be met with EN with no s/s intolerance Nutrition Monitoring and Evaluation:   Food/Nutrient Intake Outcomes:  Enteral Nutrition Intake/Tolerance  Physical Signs/Symptoms Outcomes:  Biochemical Data, Nutrition Focused Physical Findings, Weight     Electronically signed by Mar Potts, MS, RD, LD on 3/1/21 at 9:56 AM CST    Contact: [phone]

## 2021-03-01 NOTE — PROGRESS NOTES
 ampicillin-sulbactam (UNASYN) 1500 mg IVPB minibag  1,500 mg Intravenous Q6H Franci Howard MD   Stopped at 03/01/21 1405    thiamine mononitrate tablet 100 mg  100 mg Oral Daily Franci Howard MD   100 mg at 03/01/21 6781    multivitamin 1 tablet  1 tablet Oral Daily Franci Howard MD   1 tablet at 81/65/11 5149    folic acid (FOLVITE) tablet 1 mg  1 mg Oral Daily Franci Howard MD   1 mg at 03/01/21 0759    PARoxetine (PAXIL) tablet 75 mg  75 mg Oral QAM Franci Howard MD   75 mg at 03/01/21 0759    nicotine (NICODERM CQ) 21 MG/24HR 1 patch  1 patch Transdermal Daily Franci Howard MD   1 patch at 03/01/21 0756    sodium chloride flush 0.9 % injection 10 mL  10 mL Intravenous 2 times per day Franci Howard MD   10 mL at 03/01/21 0756    sodium chloride flush 0.9 % injection 10 mL  10 mL Intravenous PRN Franci Howard MD        enoxaparin (LOVENOX) injection 40 mg  40 mg Subcutaneous Daily Franci Howard MD   40 mg at 03/01/21 0758    ondansetron (ZOFRAN-ODT) disintegrating tablet 4 mg  4 mg Oral Q8H PRN Franci Howard MD        Or    ondansetron [facility]) injection 4 mg  4 mg Intravenous Q6H PRN Franci Howard MD        polyethylene glycol [facility]) packet 17 g  17 g Oral Daily PRN Franci Howard MD        LORazepam (ATIVAN) tablet 1 mg  1 mg Oral Q1H PRN Franci Howard MD        Or    LORazepam (ATIVAN) injection 1 mg  1 mg Intravenous Q1H PRN Franci Howard MD        Or    LORazepam (ATIVAN) tablet 2 mg  2 mg Oral Q1H PRN Franci Howard MD        Or    LORazepam (ATIVAN) injection 2 mg  2 mg Intravenous Q1H PRN Franci Howard MD   2 mg at 03/01/21 0655    Or    LORazepam (ATIVAN) tablet 3 mg  3 mg Oral Q1H PRN Franci Howard MD        Or    LORazepam (ATIVAN) injection 3 mg  3 mg Intravenous Q1H PRN Franci Howard MD        Or    LORazepam (ATIVAN) tablet 4 mg  4 mg Oral Q1H PRN Franci Howard MD        Or  LORazepam (ATIVAN) injection 4 mg  4 mg Intravenous Q1H PRN Tasha Molina MD   4 mg at 03/01/21 1404    lactated ringers infusion   Intravenous Continuous Aravind Martinez PA-C 125 mL/hr at 03/01/21 1000 New Bag at 03/01/21 1000       Past Medical History:  Past Medical History:   Diagnosis Date    Dog bite     Neuromuscular disorder ([facility] [address]     Psychiatric problem        Past Surgical History:  Past Surgical History:   Procedure Laterality Date    ADENOIDECTOMY      COLONOSCOPY      TYMPANOSTOMY TUBE PLACEMENT         Family History  Family History   Problem Relation Age of Onset    High Blood Pressure Mother        Social History  Social History     Socioeconomic History    Marital status:      Spouse name: Not on file    Number of children: Not on file    Years of education: Not on file    Highest education level: Not on file   Occupational History    Not on file   Social Needs    Financial resource strain: Not on file    Food insecurity     Worry: Not on file     Inability: Not on file    Transportation needs     Medical: Not on file     Non-medical: Not on file   Tobacco Use    Smoking status: Current Every Day Smoker     Packs/day: 2.00     Types: Cigarettes    Smokeless tobacco: Never Used   Substance and Sexual Activity    Alcohol use: Yes     Comment: \"every chance I get\"    Drug use: No    Sexual activity: Not on file   Lifestyle    Physical activity     Days per week: Not on file     Minutes per session: Not on file    Stress: Not on file   Relationships    Social connections     Talks on phone: Not on file     Gets together: Not on file     Attends Restorationism service: Not on file     Active member of club or organization: Not on file     Attends meetings of clubs or organizations: Not on file     Relationship status: Not on file    Intimate partner violence     Fear of current or ex partner: Not on file     Emotionally abused: Not on file Physically abused: Not on file     Forced sexual activity: Not on file   Other Topics Concern    Not on file   Social History Narrative    Not on file         Review of Systems:    Review of Systems   Unable to perform ROS: Intubated           Objective:  Blood pressure 113/66, pulse 101, temperature 99 °F (37.2 °C), temperature source Temporal, resp. rate 18, height 5' 10\" (1.778 m), weight 176 lb 8 oz (80.1 kg), SpO2 96 %. Intake/Output Summary (Last 24 hours) at 3/1/2021 1433  Last data filed at 3/1/2021 1400  Gross per 24 hour   Intake 5048.21 ml   Output 1773 ml   Net 3275.21 ml       Physical Exam  Vitals signs and nursing note reviewed. Constitutional:       Appearance: He is ill-appearing and diaphoretic. HENT:      Head: Normocephalic and atraumatic. Right Ear: External ear normal.      Left Ear: External ear normal.      Nose: Nose normal.      Mouth/Throat:      Mouth: Mucous membranes are moist.   Eyes:      Conjunctiva/sclera: Conjunctivae normal.   Neck:      Musculoskeletal: Neck supple. No neck rigidity. Cardiovascular:      Rate and Rhythm: Normal rate and regular rhythm. Heart sounds: Normal heart sounds. Pulmonary:      Effort: Pulmonary effort is normal.      Breath sounds: Normal breath sounds. Abdominal:      General: Abdomen is flat. Palpations: Abdomen is soft. Musculoskeletal:         General: No swelling. Skin:     General: Skin is warm.          Labs:  BMP:   Recent Labs     02/28/21 1910 02/28/21 2215 03/01/21  0234   *  --  141   K 3.8 3.1 3.2*   CL 90*  --  100   CO2 24  --  24   PHOS 2.3*  --   --    BUN 6  --  7   CREATININE 0.6  --  0.5   CALCIUM 8.7  --  8.1*     CBC:   Recent Labs     02/28/21 1910 03/01/21  0234   WBC 10.1 6.5   HGB 14.1 11.7*   HCT 40.1* 34.5*   MCV 98.0* 101.5*    101*     LIVER PROFILE:   Recent Labs     02/28/21 1910   AST 90*   ALT 56*   BILITOT 0.6   ALKPHOS 120 PT/INR: No results for input(s): PROTIME, INR in the last 72 hours. APTT: No results for input(s): APTT in the last 72 hours. BNP:  No results for input(s): BNP in the last 72 hours. Ionized Calcium:No results for input(s): IONCA in the last 72 hours. Magnesium:  Recent Labs     02/28/21 1910 03/01/21  0234   MG 2.0 2.0     Phosphorus:  Recent Labs     02/28/21 1910   PHOS 2.3*     HgbA1C: No results for input(s): LABA1C in the last 72 hours. Hepatic:   Recent Labs     02/28/21 1910   ALKPHOS 120   ALT 56*   AST 90*   PROT 8.0   BILITOT 0.6   LABALBU 4.9     Lactic Acid:   Recent Labs     03/01/21  0730   LACTA 1.0     Troponin:   Recent Labs     02/28/21 1910 03/01/21  0336   CKTOTAL 1,961* 2,543*     ABGs: No results for input(s): PH, PCO2, PO2, HCO3, O2SAT in the last 72 hours. CRP:  No results for input(s): CRP in the last 72 hours. Sed Rate:  No results for input(s): SEDRATE in the last 72 hours. Cultures:   No results for input(s): CULTURE in the last 72 hours. No results for input(s): BC, Maritza Baltic in the last 72 hours. No results for input(s): CXSURG in the last 72 hours. Radiology reports as per the Radiologist  Radiology: Xr Humerus Left (min 2 Views)    Result Date: 2/28/2021  EXAMINATION: XR HUMERUS LEFT (MIN 2 VIEWS) 2/28/2021 10:06 PM HISTORY: Dog bite AP and lateral views the left humerus is obtained. The glenohumeral joints well maintained. Humeral diaphysis humeral condyles are intact. Negative left humerus. There is a radiopaque density in the mid soft tissues of undetermined etiology. IMPRESSION:  in the distal third of the soft tissues a radiopaque density is present of undetermined etiology.  Signed by Dr Popeye Hart on 2/28/2021 10:08 PM    Xr Radius Ulna Right (2 Views)    Result Date: 2/28/2021 EXAMINATION: XR RADIUS ULNA RIGHT (2 VIEWS) 2/28/2021 10:12 PM HISTORY: Dog bite AP and lateral views the right forearm are obtained. The radius and ulna are unremarkable. The soft tissues are unremarkable. Impression negative AP and lateral right forearm Signed by Dr Lesli Camejo on 2/28/2021 10:13 PM    Ct Head Wo Contrast    Result Date: 3/1/2021  Exam: CT HEAD WO CONTRAST - 2/28/2021 8:13 PM Indication: Altered mental status Comparison: 2/2/2020 DLP: 837 mGy cm. In order to have a CT radiation dose as low as reasonably achievable, Automated Exposure Control was utilized for adjustment of the mA and/or KV according to patient size. Findings: No evidence of intracranial hemorrhage. No loss of gray-white differentiation to suggest acute infarct. No midline shift or mass effect. Lateral ventricles are nondilated. Basilar cisterns are patent. No acute orbital finding. Partially imaged enteric and endotracheal tubes. Mastoid air cells are clear. Paranasal sinuses are predominantly clear. No acute osseous finding. Impression: No acute intracranial findings. Findings in agreement with the emergent findings from the initial [facility] preliminary report. Signed by Dr Aleyda Lopez on 3/1/2021 7:05 AM    Xr Chest Portable    Result Date: 2/28/2021  EXAMINATION: XR CHEST PORTABLE 2/28/2021 10:04 PM HISTORY: XR CHEST PORTABLE 2/28/2021 5:01 PM HISTORY: Intubation COMPARISON: January 31, 2021. FINDINGS: The lungs are clear. Cardiac silhouette is normal. Endotracheal tube is satisfactorily positioned. . The osseous structures and surrounding soft tissues demonstrate no acute abnormality. 1. No radiographic evidence of acute cardiopulmonary process. Signed by Dr Lesli Camejo on 2/28/2021 10:06 PM       Assessment     Alcohol dependence with withdrawal.  DC Precedex. Change to propofol. Continue Ativan. Seizure secondary to #1. Continue medical management. Dog bite  Continue antibiotics. Please document 41 minutes of critical care time for patient assessment, chart review, discussion with staff, .       Dario Wright, DO

## 2021-03-01 NOTE — CARE COORDINATION
Pt currently intubated; SW following for ETOH rehab needs. Electronically signed by TATIANA Pride on 3/1/2021 at 4:22 PM      02/28/21 2300  Inpatient consult to [facility]    Complete  Discontinue   Provider: (Not yet assigned)   Question: Reason for Consult? Answer:  For consideration of rehab

## 2021-03-01 NOTE — PROGRESS NOTES
Pt found having generalized tremors and difficulty breathing with vent support. Ativan given per CIWA scale. Dr. Suze Estrada at bedside, additional 5 mg of Ativan ordered. Sedation orders changed, see MAR.

## 2021-03-01 NOTE — PROGRESS NOTES
Results for Kristin Guajardo (MRN [id number]) as of 2/28/2021 22:16   Ref.  Range 2/28/2021 22:15   Hemoglobin, Art, Extended Latest Ref Range: 14.0 - 18.0 g/dL 13.8 (L)   pH, Arterial Latest Ref Range: 7.350 - 7.450  7.430   pCO2, Arterial Latest Ref Range: 35.0 - 45.0 mmHg 38.0   pO2, Arterial Latest Ref Range: 80.0 - 100.0 mmHg 139.0 (H)   HCO3, Arterial Latest Ref Range: 22.0 - 26.0 mmol/L 25.2   Base Excess, Arterial Latest Ref Range: -2.0 - 2.0 mmol/L 1.0   O2 Sat, Arterial Latest Ref Range: >92 % 95.9   O2 Content, Arterial Latest Ref Range: Not Established mL/dL 18.8   Methemoglobin, Arterial Latest Ref Range: <1.5 % 1.4   Carboxyhgb, Arterial Latest Ref Range: 0.0 - 5.0 % 2.1       PT ON PRVC 16, 600, 40%+5  RR AT+

## 2021-03-01 NOTE — H&P
[address] - History & Physical      PCP: DOTTIE Shahid CNP    Date of Admission: 2/28/2021    Date of Service: 2/28/2021    Chief Complaint:  Animal bite     History Of Present Illness: The patient is a 41 y.o. male who presented to [address] [place] [address] ED with PMH alcohol abuse, suicidal ideation, depression complaining of dog bites to his right forearm, left upper arm. EMS was called by patient's mother. Patient is reported to have been combative with EMS en route. He has several previous admission for alcohol intoxication and was last discharged from this facility on 02/02/2021 after also receiving dog bites at that time. At this time Mr. Jackie Sheffield received Haldol 10 mg IM, Ativan 2 mg IV. He is sedated but tremulous. CK elevated to 1,961. Past Medical History:        Diagnosis Date    Dog bite     Neuromuscular disorder [facility])     Psychiatric problem      Past Surgical History:        Procedure Laterality Date    ADENOIDECTOMY      COLONOSCOPY      TYMPANOSTOMY TUBE PLACEMENT       Home Medications:  Prior to Admission medications    Medication Sig Start Date End Date Taking? Authorizing Provider   folic acid (FOLVITE) 1 MG tablet Take 1 tablet by mouth daily 12/28/20   Nirali Puentes MD   nicotine (NICODERM CQ) 21 MG/24HR Place 1 patch onto the skin daily 12/28/20 1/27/21  Nirali Puentes MD   Multiple Vitamin (MULTIVITAMIN) TABS tablet Take 1 tablet by mouth daily 12/28/20 1/27/21  Nirali Puentes MD   thiamine mononitrate 100 MG tablet Take 1 tablet by mouth daily 12/28/20 1/27/21  Nirali Puentes MD   PARoxetine (PAXIL) 40 MG tablet Take 75 mg by mouth every morning    Historical Provider, MD     Allergies:    Patient has no known allergies.     Social History:    The patient currently lives at home  Tobacco: Unknown  Alcohol:  Unknown  Illicit Drugs: Unknown     Family History:      Problem Relation Age of Onset    High Blood Pressure Mother      Review of Systems: Unable to obtain due to sedation    Physical Examination:  /89   Pulse 126   Temp 97.8 °F (36.6 °C)   Resp 18   SpO2 96%   General appearance: 42 yo male, disheveled, sedated, tremulous with blood covering right forearm  Head: Normocephalic, without obvious abnormality, atraumatic  Eyes: conjunctivae/corneas clear.  PERRL  Ears: normal external ears and nose, throat without exudate  Neck: no adenopathy, no carotid bruit, no JVD, supple, symmetrical, trachea midline   Lungs: clear to auscultation bilaterally,no rales or wheezes   Heart: tachycardic, regular rhythm, S1, S2 normal, no murmur  Abdomen:soft, non-tender; non-distended, normal bowel sounds no masses, no organomegaly  Extremities:No lower extremity edema,  No erythema, no tenderness to palpation  Skin: puncture wounds and edema to right forearm, left upper arm, scattered abrasions BLE's  Lymphatic: No palpable lymph node enlargment  Neurologic: Sedated, tremulous, does not follow commands or answer questions   Psychiatric: Unable to fully assess     Diagnostic Data:  CBC:  Recent Labs     02/28/21 1910   WBC 10.1   HGB 14.1   HCT 40.1*        BMP:  Recent Labs     02/28/21 1910   *   K 3.8   CL 90*   CO2 24   BUN 6   CREATININE 0.6   CALCIUM 8.7   PHOS 2.3*     Recent Labs     02/28/21 1910   AST 90*   ALT 56*   BILITOT 0.6   ALKPHOS 120     Cardiac Enzymes:   Recent Labs     02/28/21 1910   CKTOTAL 1,961*     Urinalysis:  Lab Results   Component Value Date    NITRU Negative 02/28/2021    WBCUA 1 01/31/2021    BACTERIA NEGATIVE 01/31/2021    RBCUA 0-1 02/28/2021    BLOODU SMALL 02/28/2021    SPECGRAV 1.006 02/28/2021    GLUCOSEU Negative 02/28/2021     Assessment/Plan:  Principal Problem:    Alcohol dependence with withdrawal delirium ([facility] [address] with concern for delirium tremens-Precedex gtt, monitor in ICU, supplement folic acid, thiamine, multivitamin    Active Problems:    Dog bite-continue Unasyn, local wound care       Elevated CK-concerning for rhabdomyolysis, continue IVF resuscitation       Polysubstance abuse (HCC)-noted       Elevated LFT's-chronic, monitor, suspect improvement with IVF resuscitation    Further orders per clinical course/attending      Signed:  Arie Castleman, PA-C

## 2021-03-01 NOTE — ED PROVIDER NOTES
[facility] EMERGENCY DEPT  EMERGENCY DEPARTMENT ENCOUNTER      Pt Name: Vielka Jameson  MRN: [id number]  [place] 1980  Date of evaluation: 2/28/2021  Provider: Judah Louis MD    CHIEF COMPLAINT       Chief Complaint   Patient presents with    Animal Bite         HISTORY OF PRESENT ILLNESS   (Location/Symptom, Timing/Onset,Context/Setting, Quality, Duration, Modifying Factors, Severity)  Note limiting factors. Vielka Jameson is a 41 y.o. male who presents to the emergency department for evaluation after sustaining a dog bite to the right forearm today. Patient also sustained a dog bite to the left upper arm last night. Has been drinking alcohol heavily throughout the day today. Patient's mother called EMS after he was bitten on the right forearm today. Patient has been combative and uncooperative with EMS in route. Patient with multiple previous visits here with similar alcohol intoxication presentation. HPI    NursingNotes were reviewed. REVIEW OF SYSTEMS    (2-9 systems for level 4, 10 or more for level 5)     Review of Systems   Unable to perform ROS: Other       A complete review of systems was performed and is negative except as noted above in the HPI.        PAST MEDICAL HISTORY     Past Medical History:   Diagnosis Date    Dog bite     Neuromuscular disorder ([facility] [address]     Psychiatric problem          SURGICAL HISTORY       Past Surgical History:   Procedure Laterality Date    ADENOIDECTOMY      COLONOSCOPY      TYMPANOSTOMY TUBE PLACEMENT           CURRENT MEDICATIONS       Previous Medications    FOLIC ACID (FOLVITE) 1 MG TABLET    Take 1 tablet by mouth daily    MULTIPLE VITAMIN (MULTIVITAMIN) TABS TABLET    Take 1 tablet by mouth daily    NICOTINE (NICODERM CQ) 21 MG/24HR    Place 1 patch onto the skin daily    PAROXETINE (PAXIL) 40 MG TABLET    Take 75 mg by mouth every morning    THIAMINE MONONITRATE 100 MG TABLET    Take 1 tablet by mouth daily       ALLERGIES Patient has no known allergies. FAMILY HISTORY       Family History   Problem Relation Age of Onset    High Blood Pressure Mother           SOCIAL HISTORY       Social History     Socioeconomic History    Marital status:      Spouse name: None    Number of children: None    Years of education: None    Highest education level: None   Occupational History    None   Social Needs    Financial resource strain: None    Food insecurity     Worry: None     Inability: None    Transportation needs     Medical: None     Non-medical: None   Tobacco Use    Smoking status: Current Every Day Smoker     Packs/day: 2.00     Types: Cigarettes    Smokeless tobacco: Never Used   Substance and Sexual Activity    Alcohol use: Yes     Comment: \"every chance I get\"    Drug use: No    Sexual activity: None   Lifestyle    Physical activity     Days per week: None     Minutes per session: None    Stress: None   Relationships    Social connections     Talks on phone: None     Gets together: None     Attends Yarsani service: None     Active member of club or organization: None     Attends meetings of clubs or organizations: None     Relationship status: None    Intimate partner violence     Fear of current or ex partner: None     Emotionally abused: None     Physically abused: None     Forced sexual activity: None   Other Topics Concern    None   Social History Narrative    None       SCREENINGS             PHYSICAL EXAM    (up to 7 for level 4, 8 or more for level 5)     ED Triage Vitals [02/28/21 1750]   BP Temp Temp src Pulse Resp SpO2 Height Weight   (!) 157/83 97.8 °F (36.6 °C) -- 124 18 98 % -- --       Physical Exam  Vitals signs and nursing note reviewed. Constitutional:       General: He is not in acute distress. Appearance: He is well-developed. He is not toxic-appearing or diaphoretic. HENT:      Head: Normocephalic and atraumatic. Eyes:      General: No scleral icterus. Right eye: No discharge. Left eye: No discharge. Pupils: Pupils are equal, round, and reactive to light. Neck:      Musculoskeletal: Normal range of motion. Cardiovascular:      Rate and Rhythm: Regular rhythm. Tachycardia present. Pulmonary:      Effort: Pulmonary effort is normal. No respiratory distress. Breath sounds: No stridor. Abdominal:      General: There is no distension. Musculoskeletal: Normal range of motion. General: No deformity. Comments: Puncture wound and swelling to the right forearm as well as the left upper arm. No obvious deformity otherwise. No signs of infection or other acute complication   Skin:     General: Skin is warm and dry. Neurological:      Mental Status: He is alert and oriented to person, place, and time. GCS: GCS eye subscore is 4. GCS verbal subscore is 5. GCS motor subscore is 6. Cranial Nerves: No cranial nerve deficit. Motor: No abnormal muscle tone. Psychiatric:         Behavior: Behavior is uncooperative. Thought Content:  Thought content normal.         Judgment: Judgment normal.         DIAGNOSTIC RESULTS     EKG: All EKG's are interpreted by the Emergency Department Physician who either signs or Co-signs this chart in the absence of a cardiologist.    RADIOLOGY:   Non-plain film images such as CT, Ultrasound and MRI are read by the radiologist. Michaelagurpreet Priyanka images are visualized and preliminarily interpreted by the emergency physician with the below findings:        Interpretation per the Radiologist below, if available at the time of this note:    XR RADIUS ULNA RIGHT (2 VIEWS)    (Results Pending)   XR HUMERUS LEFT (MIN 2 VIEWS)    (Results Pending)   CT Head WO Contrast    (Results Pending)   XR CHEST PORTABLE    (Results Pending)         ED BEDSIDE ULTRASOUND:   Performed by ED Physician - none    LABS:  Labs Reviewed   CK - Abnormal; Notable for the following components: Result Value    Total CK 1,961 (*)     All other components within normal limits   CBC WITH AUTO DIFFERENTIAL - Abnormal; Notable for the following components:    RBC 4.09 (*)     Hematocrit 40.1 (*)     MCV 98.0 (*)     MCH 34.5 (*)     Neutrophils % 78.9 (*)     Lymphocytes % 11.7 (*)     Neutrophils Absolute 7.9 (*)     All other components within normal limits   COMPREHENSIVE METABOLIC PANEL W/ REFLEX TO MG FOR LOW K - Abnormal; Notable for the following components:    Sodium 133 (*)     Chloride 90 (*)     ALT 56 (*)     AST 90 (*)     All other components within normal limits   URINE RT REFLEX TO CULTURE - Abnormal; Notable for the following components:    Blood, Urine SMALL (*)     Protein, UA 30 (*)     All other components within normal limits   MICROSCOPIC URINALYSIS - Abnormal; Notable for the following components:    Amorphous, UA Rare (*)     All other components within normal limits   PHOSPHORUS - Abnormal; Notable for the following components:    Phosphorus 2.3 (*)     All other components within normal limits   COVID-19, RAPID   ETHANOL   URINE DRUG SCREEN   MAGNESIUM   CBC WITH AUTO DIFFERENTIAL   BASIC METABOLIC PANEL W/ REFLEX TO MG FOR LOW K   LACTIC ACID, PLASMA   CK       All other labs were within normal range or not returned as of this dictation.     Medications   Tetanus-Diphth-Acell Pertussis (BOOSTRIX) injection 0.5 mL (0.5 mLs Intramuscular Not Given 2/28/21 2003)   ampicillin-sulbactam (UNASYN) 1500 mg IVPB minibag (has no administration in time range)   thiamine mononitrate tablet 100 mg (has no administration in time range)   multivitamin 1 tablet (has no administration in time range)   folic acid (FOLVITE) tablet 1 mg (has no administration in time range)   PARoxetine (PAXIL) tablet 75 mg (has no administration in time range)   nicotine (NICODERM CQ) 21 MG/24HR 1 patch (has no administration in time range)   lactated ringers infusion ( Intravenous New Bag 2/28/21 4664) dexmedetomidine (PRECEDEX) 400 mcg in sodium chloride 0.9 % 100 mL infusion (0.2 mcg/kg/hr × 81.6 kg Intravenous New Bag 2/28/21 2132)   fentaNYL 5 mcg/ml in 0.9%  ml infusion (25 mcg/hr Intravenous New Bag 2/28/21 2141)   diptheria-tetanus toxoids (DECAVAC) 2-2 LF/0.5ML injection 0.5 mL (has no administration in time range)   midazolam PF (VERSED) injection 5 mg (has no administration in time range)   propofol injection (has no administration in time range)   haloperidol lactate (HALDOL) injection 10 mg (10 mg Intramuscular Given 2/28/21 1753)   diphenhydrAMINE (BENADRYL) injection 50 mg (50 mg Intramuscular Given 2/28/21 1753)   ampicillin-sulbactam (UNASYN) 1500 mg IVPB minibag (0 mg Intravenous Stopped 2/28/21 1946)   lactated ringers infusion 1,000 mL (1,000 mLs Intravenous New Bag 2/28/21 1959)   sodium chloride 0.9 % 7,027 mL with folic acid 1 mg, adult multi-vitamin with vitamin k 10 mL, thiamine 100 mg ( Intravenous New Bag 2/28/21 1959)   LORazepam (ATIVAN) injection 2 mg (2 mg Intravenous Given 2/28/21 2008)   LORazepam (ATIVAN) injection 2 mg (2 mg Intravenous Given 2/28/21 2059)   LORazepam (ATIVAN) injection 2 mg (2 mg Intravenous Given 2/28/21 2059)   LORazepam (ATIVAN) injection 2 mg (2 mg Intravenous Given 2/28/21 2123)   etomidate (AMIDATE) injection 20 mg (20 mg Intravenous Given 2/28/21 2120)   vecuronium (NORCURON) injection 10 mg (10 mg Intravenous Given 2/28/21 2120)       EMERGENCY DEPARTMENT COURSE and DIFFERENTIALDIAGNOSIS/MDM:   Vitals:    Vitals:    02/28/21 1750 02/28/21 1945 02/28/21 2057 02/28/21 2155   BP: (!) 157/83 134/89  (!) 181/89   Pulse: 124 126  118   Resp: 18 18  24   Temp: 97.8 °F (36.6 °C)   97.8 °F (36.6 °C)   TempSrc:    Oral   SpO2: 98% 96%  100%   Weight:   180 lb (81.6 kg)        [facility]    ED Course as of Feb 28 2203   Sun Feb 28, 2021 1807 Patient with puncture wounds and swelling to the right forearm and the left upper arm without signs of other complication from the dog bites at this time. Patient combative and confused on arrival here. Required sedation with Haldol and Benadryl    [COURTNEY]   2004 Patient is tachycardic, tremulous, and hypertensive. Though ethanol level is 250, clinical appearance is more suggestive of alcohol withdrawal.  Drug screen shows no amphetamine or other stimulant intoxication to explain patient's vital signs and exam findings. [COURTNEY]   2040 Remains tachycardic and hypertensive despite Ativan administration here. Additional dose of Ativan ordered. [COURTNEY]   2047 Patient remains rigid and tremulous on repeat exam at this time as well as confusion. Giving dose of 4 mg of Ativan and will monitor response. May require intubation with additional sedation given severity of presentation. [COURTNEY]   2128 I was called to bedside by nursing staff after patient had acute change in mental status. On arrival, patient was sonorous respirations. Seemed postictal though there was no witnessed seizure by the sitter who was at bedside. Given decompensation, decision made to proceed with intubation and sedation. Intubation was difficult as patient had a very anterior airway. Initial attempt at direct laryngoscopy was aborted as patient had a very quick desaturation. Initial attempt with video laryngoscopy revealed good view of the cords though had difficulty making the hyperacute angle with the tube. On second attempt with video laryngoscopy was successful. Patient sent for head CT given acute change in mental status. [COURTNEY]   2132 Given patient's rigidity and tremors, though initially suspected to be secondary to alcohol withdrawal, patient was given tetanus toxoid in addition to the Tdap.     [COURTNEY] 2202 Patient still with generalized shaking and rigidity heart rate at this time was only 115. Unclear whether this represents continued seizure activity. Propofol infusion ordered. [COURTNEY]      ED Course User Index  [COURTNEY] Carlos Espinoza MD       CONSULTS:  IP CONSULT TO SOCIAL WORK    PROCEDURES:  Unless otherwise notedbelow, none     Intubation    Date/Time: 2/28/2021 9:30 PM  Performed by: Carlos Espinoza MD  Authorized by: Bg Marie MD     Consent:     Consent obtained:  Emergent situation  Pre-procedure details:     Patient status:  Altered mental status    Pretreatment meds: etomidate. Paralytics:  Vecuronium  Procedure details:     Preoxygenation:  Bag valve mask    Intubation method:  Oral    Oral intubation technique:  Direct    Laryngoscope blade: Mac 4    Tube size (mm):  8.0    Tube type:  Cuffed    Number of attempts:  3 or more    Ventilation between attempts: yes      Tube visualized through cords: yes    Placement assessment:     ETT to lip:  24    Tube secured with:  ETT kessler    Placement verification: chest rise, condensation, CXR verification, direct visualization, equal breath sounds and ETCO2 detector      CXR findings:  ETT in proper place  Post-procedure details:     Patient tolerance of procedure: Tolerated well, no immediate complications      CRITICAL CARE TIME   Total Critical Care time was 80 minutes, excluding separately reportable procedures. There was a high probability of clinically significant/life threatening deterioration in the patient's condition which required my urgent intervention. FINAL IMPRESSION     1. Acute alcoholic intoxication with delirium ([address]    2. Agitation requiring sedation protocol    3. Dog bite of right forearm, initial encounter    4. Dog bite of left upper arm, initial encounter    5. Chronic alcohol abuse    6. Alcohol withdrawal syndrome, with delirium ([address]    7.  Observed seizure-like activity (HCC)          DISPOSITION/PLAN DISPOSITION Admitted 02/28/2021 08:37:36 PM      PATIENT REFERRED TO:  No follow-up provider specified.     DISCHARGE MEDICATIONS:  New Prescriptions    No medications on file          (Please note that portions of this note were completed with a voice recognition program.  Efforts were made to edit the dictations butoccasionally words are mis-transcribed.)    Perla Mercado MD (electronically signed)  AttendingEmergency Physician    te     Perla Bartholomew MD  02/28/21 2042       Perla Bartholomew MD  02/28/21 2133       Perla Bartholomew MD  02/28/21 2208

## 2021-03-02 LAB
ANION GAP SERPL CALCULATED.3IONS-SCNC: 12 MMOL/L (ref 7–19)
ANION GAP SERPL CALCULATED.3IONS-SCNC: 8 MMOL/L (ref 7–19)
BASOPHILS ABSOLUTE: 0 K/UL (ref 0–0.2)
BASOPHILS RELATIVE PERCENT: 0.4 % (ref 0–1)
BUN BLDV-MCNC: 14 MG/DL (ref 6–20)
BUN BLDV-MCNC: 15 MG/DL (ref 6–20)
CALCIUM SERPL-MCNC: 8.2 MG/DL (ref 8.6–10)
CALCIUM SERPL-MCNC: 8.3 MG/DL (ref 8.6–10)
CHLORIDE BLD-SCNC: 102 MMOL/L (ref 98–111)
CHLORIDE BLD-SCNC: 103 MMOL/L (ref 98–111)
CO2: 25 MMOL/L (ref 22–29)
CO2: 26 MMOL/L (ref 22–29)
CREAT SERPL-MCNC: 0.5 MG/DL (ref 0.5–1.2)
CREAT SERPL-MCNC: 0.6 MG/DL (ref 0.5–1.2)
EOSINOPHILS ABSOLUTE: 0.1 K/UL (ref 0–0.6)
EOSINOPHILS RELATIVE PERCENT: 1.2 % (ref 0–5)
GFR AFRICAN AMERICAN: >59
GFR AFRICAN AMERICAN: >59
GFR NON-AFRICAN AMERICAN: >60
GFR NON-AFRICAN AMERICAN: >60
GLUCOSE BLD-MCNC: 102 MG/DL (ref 74–109)
GLUCOSE BLD-MCNC: 87 MG/DL (ref 74–109)
HCT VFR BLD CALC: 32.8 % (ref 42–52)
HEMOGLOBIN: 11.4 G/DL (ref 14–18)
IMMATURE GRANULOCYTES #: 0 K/UL
LYMPHOCYTES ABSOLUTE: 1.4 K/UL (ref 1.1–4.5)
LYMPHOCYTES RELATIVE PERCENT: 18.8 % (ref 20–40)
MAGNESIUM: 2.2 MG/DL (ref 1.6–2.6)
MCH RBC QN AUTO: 35.5 PG (ref 27–31)
MCHC RBC AUTO-ENTMCNC: 34.8 G/DL (ref 33–37)
MCV RBC AUTO: 102.2 FL (ref 80–94)
MONOCYTES ABSOLUTE: 0.5 K/UL (ref 0–0.9)
MONOCYTES RELATIVE PERCENT: 7 % (ref 0–10)
NEUTROPHILS ABSOLUTE: 5.4 K/UL (ref 1.5–7.5)
NEUTROPHILS RELATIVE PERCENT: 72.3 % (ref 50–65)
PDW BLD-RTO: 14 % (ref 11.5–14.5)
PLATELET # BLD: 77 K/UL (ref 130–400)
PMV BLD AUTO: 12 FL (ref 9.4–12.4)
POTASSIUM REFLEX MAGNESIUM: 2.9 MMOL/L (ref 3.5–5)
POTASSIUM REFLEX MAGNESIUM: 3.9 MMOL/L (ref 3.5–5)
RBC # BLD: 3.21 M/UL (ref 4.7–6.1)
SODIUM BLD-SCNC: 137 MMOL/L (ref 136–145)
SODIUM BLD-SCNC: 139 MMOL/L (ref 136–145)
WBC # BLD: 7.5 K/UL (ref 4.8–10.8)

## 2021-03-02 PROCEDURE — 6370000000 HC RX 637 (ALT 250 FOR IP): Performed by: HOSPITALIST

## 2021-03-02 PROCEDURE — 94003 VENT MGMT INPAT SUBQ DAY: CPT

## 2021-03-02 PROCEDURE — 87186 SC STD MICRODIL/AGAR DIL: CPT

## 2021-03-02 PROCEDURE — 2580000003 HC RX 258: Performed by: PHYSICIAN ASSISTANT

## 2021-03-02 PROCEDURE — 36415 COLL VENOUS BLD VENIPUNCTURE: CPT

## 2021-03-02 PROCEDURE — 6360000002 HC RX W HCPCS: Performed by: HOSPITALIST

## 2021-03-02 PROCEDURE — 2700000000 HC OXYGEN THERAPY PER DAY

## 2021-03-02 PROCEDURE — 85025 COMPLETE CBC W/AUTO DIFF WBC: CPT

## 2021-03-02 PROCEDURE — 2500000003 HC RX 250 WO HCPCS: Performed by: HOSPITALIST

## 2021-03-02 PROCEDURE — 87070 CULTURE OTHR SPECIMN AEROBIC: CPT

## 2021-03-02 PROCEDURE — 2580000003 HC RX 258: Performed by: HOSPITALIST

## 2021-03-02 PROCEDURE — 80048 BASIC METABOLIC PNL TOTAL CA: CPT

## 2021-03-02 PROCEDURE — 2000000000 HC ICU R&B

## 2021-03-02 PROCEDURE — 6360000002 HC RX W HCPCS: Performed by: INTERNAL MEDICINE

## 2021-03-02 PROCEDURE — 83735 ASSAY OF MAGNESIUM: CPT

## 2021-03-02 PROCEDURE — 87205 SMEAR GRAM STAIN: CPT

## 2021-03-02 PROCEDURE — 2500000003 HC RX 250 WO HCPCS: Performed by: INTERNAL MEDICINE

## 2021-03-02 RX ORDER — ZIPRASIDONE MESYLATE 20 MG/ML
20 INJECTION, POWDER, LYOPHILIZED, FOR SOLUTION INTRAMUSCULAR ONCE
Status: COMPLETED | OUTPATIENT
Start: 2021-03-02 | End: 2021-03-02

## 2021-03-02 RX ORDER — DEXMEDETOMIDINE HYDROCHLORIDE 4 UG/ML
.2-1.4 INJECTION, SOLUTION INTRAVENOUS CONTINUOUS
Status: DISCONTINUED | OUTPATIENT
Start: 2021-03-02 | End: 2021-03-03

## 2021-03-02 RX ORDER — ENALAPRILAT 2.5 MG/2ML
1.25 INJECTION INTRAVENOUS EVERY 6 HOURS SCHEDULED
Status: DISCONTINUED | OUTPATIENT
Start: 2021-03-02 | End: 2021-03-03 | Stop reason: HOSPADM

## 2021-03-02 RX ORDER — KETOROLAC TROMETHAMINE 30 MG/ML
15 INJECTION, SOLUTION INTRAMUSCULAR; INTRAVENOUS ONCE
Status: COMPLETED | OUTPATIENT
Start: 2021-03-02 | End: 2021-03-02

## 2021-03-02 RX ADMIN — POTASSIUM CHLORIDE 10 MEQ: 7.46 INJECTION, SOLUTION INTRAVENOUS at 11:16

## 2021-03-02 RX ADMIN — POTASSIUM CHLORIDE 10 MEQ: 7.46 INJECTION, SOLUTION INTRAVENOUS at 09:39

## 2021-03-02 RX ADMIN — DEXMEDETOMIDINE HYDROCHLORIDE 0.5 MCG/KG/HR: 4 INJECTION, SOLUTION INTRAVENOUS at 09:56

## 2021-03-02 RX ADMIN — LORAZEPAM 3 MG: 2 INJECTION INTRAMUSCULAR; INTRAVENOUS at 04:36

## 2021-03-02 RX ADMIN — PAROXETINE 75 MG: 30 TABLET, FILM COATED ORAL at 08:01

## 2021-03-02 RX ADMIN — CHLORHEXIDINE GLUCONATE 15 ML: 1.2 RINSE ORAL at 19:49

## 2021-03-02 RX ADMIN — SODIUM CHLORIDE, POTASSIUM CHLORIDE, SODIUM LACTATE AND CALCIUM CHLORIDE: 600; 310; 30; 20 INJECTION, SOLUTION INTRAVENOUS at 13:28

## 2021-03-02 RX ADMIN — PROPOFOL 50 MCG/KG/MIN: 10 INJECTION, EMULSION INTRAVENOUS at 06:48

## 2021-03-02 RX ADMIN — FOLIC ACID 1 MG: 1 TABLET ORAL at 08:01

## 2021-03-02 RX ADMIN — LORAZEPAM 4 MG: 2 INJECTION INTRAMUSCULAR; INTRAVENOUS at 02:25

## 2021-03-02 RX ADMIN — LORAZEPAM 3 MG: 2 INJECTION INTRAMUSCULAR; INTRAVENOUS at 11:58

## 2021-03-02 RX ADMIN — ZIPRASIDONE MESYLATE 20 MG: 20 INJECTION, POWDER, LYOPHILIZED, FOR SOLUTION INTRAMUSCULAR at 12:53

## 2021-03-02 RX ADMIN — LORAZEPAM 3 MG: 2 INJECTION INTRAMUSCULAR; INTRAVENOUS at 06:48

## 2021-03-02 RX ADMIN — FAMOTIDINE 20 MG: 10 INJECTION, SOLUTION INTRAVENOUS at 19:49

## 2021-03-02 RX ADMIN — DEXMEDETOMIDINE HYDROCHLORIDE 0.5 MCG/KG/HR: 4 INJECTION, SOLUTION INTRAVENOUS at 12:00

## 2021-03-02 RX ADMIN — ENALAPRILAT 1.25 MG: 1.25 INJECTION INTRAVENOUS at 16:29

## 2021-03-02 RX ADMIN — SODIUM CHLORIDE, PRESERVATIVE FREE 10 ML: 5 INJECTION INTRAVENOUS at 08:02

## 2021-03-02 RX ADMIN — SODIUM CHLORIDE 1500 MG: 900 INJECTION INTRAVENOUS at 05:18

## 2021-03-02 RX ADMIN — DEXMEDETOMIDINE HYDROCHLORIDE 0.2 MCG/KG/HR: 4 INJECTION, SOLUTION INTRAVENOUS at 02:50

## 2021-03-02 RX ADMIN — CHLORHEXIDINE GLUCONATE 15 ML: 1.2 RINSE ORAL at 08:01

## 2021-03-02 RX ADMIN — SODIUM CHLORIDE 1500 MG: 900 INJECTION INTRAVENOUS at 18:00

## 2021-03-02 RX ADMIN — LORAZEPAM 2 MG: 2 INJECTION INTRAMUSCULAR; INTRAVENOUS at 00:45

## 2021-03-02 RX ADMIN — LORAZEPAM 3 MG: 2 INJECTION INTRAMUSCULAR; INTRAVENOUS at 09:51

## 2021-03-02 RX ADMIN — PROPOFOL 45 MCG/KG/MIN: 10 INJECTION, EMULSION INTRAVENOUS at 02:54

## 2021-03-02 RX ADMIN — SODIUM CHLORIDE 1500 MG: 900 INJECTION INTRAVENOUS at 13:26

## 2021-03-02 RX ADMIN — KETOROLAC TROMETHAMINE 15 MG: 30 INJECTION, SOLUTION INTRAMUSCULAR at 02:56

## 2021-03-02 RX ADMIN — SODIUM CHLORIDE 1500 MG: 900 INJECTION INTRAVENOUS at 00:19

## 2021-03-02 RX ADMIN — POTASSIUM CHLORIDE 10 MEQ: 7.46 INJECTION, SOLUTION INTRAVENOUS at 06:49

## 2021-03-02 RX ADMIN — FAMOTIDINE 20 MG: 10 INJECTION, SOLUTION INTRAVENOUS at 08:01

## 2021-03-02 RX ADMIN — Medication 100 MG: at 08:01

## 2021-03-02 RX ADMIN — POTASSIUM CHLORIDE 10 MEQ: 7.46 INJECTION, SOLUTION INTRAVENOUS at 05:28

## 2021-03-02 RX ADMIN — SODIUM CHLORIDE, POTASSIUM CHLORIDE, SODIUM LACTATE AND CALCIUM CHLORIDE: 600; 310; 30; 20 INJECTION, SOLUTION INTRAVENOUS at 05:15

## 2021-03-02 RX ADMIN — THERA TABS 1 TABLET: TAB at 08:01

## 2021-03-02 RX ADMIN — POTASSIUM CHLORIDE 10 MEQ: 7.46 INJECTION, SOLUTION INTRAVENOUS at 12:50

## 2021-03-02 RX ADMIN — POTASSIUM CHLORIDE 10 MEQ: 7.46 INJECTION, SOLUTION INTRAVENOUS at 10:14

## 2021-03-02 RX ADMIN — SODIUM CHLORIDE, PRESERVATIVE FREE 10 ML: 5 INJECTION INTRAVENOUS at 19:50

## 2021-03-02 RX ADMIN — DEXMEDETOMIDINE HYDROCHLORIDE 0.4 MCG/KG/HR: 4 INJECTION, SOLUTION INTRAVENOUS at 18:01

## 2021-03-02 RX ADMIN — PROPOFOL 50 MCG/KG/MIN: 10 INJECTION, EMULSION INTRAVENOUS at 09:54

## 2021-03-02 ASSESSMENT — PULMONARY FUNCTION TESTS
PIF_VALUE: 27
PIF_VALUE: 27
PIF_VALUE: 22
PIF_VALUE: 28
PIF_VALUE: 22
PIF_VALUE: 22

## 2021-03-02 ASSESSMENT — PAIN SCALES - GENERAL
PAINLEVEL_OUTOF10: 0
PAINLEVEL_OUTOF10: 0
PAINLEVEL_OUTOF10: 7
PAINLEVEL_OUTOF10: 0

## 2021-03-02 NOTE — PROGRESS NOTES
Patient very alert and trying to lift up off the bed and away from restraints even with ativan and propofol orders. Restarted order for precedex. Patient CIWA scale has varied in range from 5-20. Unable to get most accurate CIWA as patient does not respond to questioning.  Electronically signed by Shane Olivier RN on 3/2/2021 at 2:40 AM

## 2021-03-02 NOTE — PLAN OF CARE
Problem: Non-Violent Restraints  Goal: Removal from restraints as soon as assessed to be safe  Outcome: Ongoing  Goal: No harm/injury to patient while restraints in use  Outcome: Ongoing  Goal: Patient's dignity will be maintained  Outcome: Ongoing     Problem: Falls - Risk of:  Goal: Will remain free from falls  Description: Will remain free from falls  Outcome: Ongoing  Goal: Absence of physical injury  Description: Absence of physical injury  Outcome: Ongoing     Problem: Skin Integrity:  Goal: Will show no infection signs and symptoms  Description: Will show no infection signs and symptoms  Outcome: Ongoing  Goal: Absence of new skin breakdown  Description: Absence of new skin breakdown  Outcome: Ongoing     Problem: Nutrition  Goal: Optimal nutrition therapy  Outcome: Ongoing

## 2021-03-02 NOTE — PROGRESS NOTES
Nutrition Assessment     Type and Reason for Visit: Reassess    Nutrition Recommendations/Plan: Monitor for diet advance    Nutrition Assessment:  Pt extubated and OGT removed. D/C's TF orders and changed to NPO. Will monitor for diet advance. Malnutrition Assessment:  Malnutrition Status: At risk for malnutrition (Comment)    Current Nutrition Therapies:    Diet NPO Effective Now    Anthropometric Measures:  · Height: 5' 10\" (177.8 cm)  · Current Body Wt: 181 lb (82.1 kg)   · BMI: 26    Nutrition Interventions:   Food and/or Nutrient Delivery:  Continue NPO  Coordination of Nutrition Care:  Continue to monitor while inpatient    Goals:  New goal: pt will tolerate advance to po diet with wt stable.        Nutrition Monitoring and Evaluation:   Food/Nutrient Intake Outcomes:  Diet Advancement/Tolerance  Physical Signs/Symptoms Outcomes:  Biochemical Data, Nutrition Focused Physical Findings, Weight, Skin, Hemodynamic Status     Electronically signed by Terry Damico, MS, RD, LD on 3/2/21 at 2:48 PM CST    Contact: [phone]

## 2021-03-02 NOTE — PLAN OF CARE
Nutrition Problem #1: Inadequate oral intake  Intervention: Food and/or Nutrient Delivery: Continue NPO  Nutritional Goals: New goal: pt will tolerate advance to po diet with wt stable.

## 2021-03-02 NOTE — PROGRESS NOTES
Hospitalist Progress Note    Patient:  Neil Ruelas  YOB: 1980  Date of Service: 3/2/2021  MRN: [id number]   Acct: [de-identified]   Primary Care Physician: DOTTIE Becerril CNP  Advance Directive: Full Code  Admit Date: 2/28/2021       Hospital Day: 2  Referring Provider: Rafael Schwarz DO    Patient Seen, Chart, Consults, Notes, Labs, Radiology studies reviewed. Subjective:  Niel Ruelas is a 41 y.o. male  whom we are following for seizures, alcohol withdrawal, hypoxemic respiratory failure. We elected to extubate him this afternoon. He initially did well. Then he became markedly agitated. We had to treat him with Precedex and IM Geodon. He is now comfortable. Allergies:  Patient has no known allergies.     Medicines:  Current Facility-Administered Medications   Medication Dose Route Frequency Provider Last Rate Last Admin    dexmedetomidine (PRECEDEX) 400 mcg in sodium chloride 0.9 % 100 mL infusion  0.2-1.4 mcg/kg/hr Intravenous Continuous Mary Tucker MD 10 mL/hr at 03/02/21 1200 0.5 mcg/kg/hr at 03/02/21 1200    enalaprilat (VASOTEC) injection 1.25 mg  1.25 mg Intravenous 4 times per day Rafael Schwarz DO        potassium chloride (KLOR-CON M) extended release tablet 40 mEq  40 mEq Oral PRN Mary Tucker MD        Or    potassium bicarb-citric acid (EFFER-K) effervescent tablet 40 mEq  40 mEq Oral PRN Mary Tucker MD   40 mEq at 03/01/21 0557    Or    potassium chloride 10 mEq/100 mL IVPB (Peripheral Line)  10 mEq Intravenous PRN Mary Tucker  mL/hr at 03/02/21 1250 10 mEq at 03/02/21 1250    magnesium sulfate 1000 mg in dextrose 5% 100 mL IVPB  1,000 mg Intravenous PRN Mary Tucker MD        chlorhexidine (PERIDEX) 0.12 % solution 15 mL  15 mL Mouth/Throat BID Mary Tucker MD   15 mL at 03/02/21 0801    famotidine (PEPCID) injection 20 mg  20 mg Intravenous BID Mary Tucker MD   20 mg at 03/02/21 5781  propofol injection  5-50 mcg/kg/min Intravenous Titrated Yanna Rosario DO   Stopped at 03/02/21 1139    acetaminophen (TYLENOL) tablet 650 mg  650 mg Oral Q4H PRN Ruben Ward MD   650 mg at 03/01/21 2145    Tetanus-Diphth-Acell Pertussis (BOOSTRIX) injection 0.5 mL  0.5 mL Intramuscular Once Jose Blanco MD        ampicillin-sulbactam (UNASYN) 1500 mg IVPB minibag  1,500 mg Intravenous Q6H Ruben Ward MD   Stopped at 03/02/21 1423    thiamine mononitrate tablet 100 mg  100 mg Oral Daily Ruben Ward MD   100 mg at 03/02/21 0801    multivitamin 1 tablet  1 tablet Oral Daily Ruben Ward MD   1 tablet at 70/72/03 5884    folic acid (FOLVITE) tablet 1 mg  1 mg Oral Daily Ruben Ward MD   1 mg at 03/02/21 0801    PARoxetine (PAXIL) tablet 75 mg  75 mg Oral QAM Ruben Ward MD   75 mg at 03/02/21 0801    nicotine (NICODERM CQ) 21 MG/24HR 1 patch  1 patch Transdermal Daily Ruben Ward MD   1 patch at 03/02/21 0802    sodium chloride flush 0.9 % injection 10 mL  10 mL Intravenous 2 times per day Ruben Ward MD   10 mL at 03/02/21 0802    sodium chloride flush 0.9 % injection 10 mL  10 mL Intravenous PRN Ruben Ward MD        enoxaparin (LOVENOX) injection 40 mg  40 mg Subcutaneous Daily Ruben Ward MD   Stopped at 03/02/21 0941    ondansetron (ZOFRAN-ODT) disintegrating tablet 4 mg  4 mg Oral Q8H PRN Ruben Ward MD        Or    [facility]ron [facility]) injection 4 mg  4 mg Intravenous Q6H PRN Ruben Ward MD        polyethylene glycol [facility]) packet 17 g  17 g Oral Daily PRN Ruben Ward MD        LORazepam (ATIVAN) tablet 1 mg  1 mg Oral Q1H PRN Ruben Ward MD        Or    LORazepam (ATIVAN) injection 1 mg  1 mg Intravenous Q1H PRN Ruben Ward MD        Or    LORazepam (ATIVAN) tablet 2 mg  2 mg Oral Q1H PRN Ruben Ward MD        Or    LORazepam (ATIVAN) injection 2 mg  2 mg Intravenous Q1H PRN Ruben Ward MD   2 mg at 03/02/21 0045    Or  LORazepam (ATIVAN) tablet 3 mg  3 mg Oral Q1H PRN Traci Counter, MD        Or    LORazepam (ATIVAN) injection 3 mg  3 mg Intravenous Q1H PRN Traci Counter, MD   3 mg at 03/02/21 1158    Or    LORazepam (ATIVAN) tablet 4 mg  4 mg Oral Q1H PRN Traci MD Mickey        Or    LORazepam (ATIVAN) injection 4 mg  4 mg Intravenous Q1H PRN Traci Arevalo MD   4 mg at 03/02/21 0225    lactated ringers infusion   Intravenous Continuous Lafayette Ewings, PA-C 125 mL/hr at 03/02/21 1328 New Bag at 03/02/21 1328       Past Medical History:  Past Medical History:   Diagnosis Date    Dog bite     Neuromuscular disorder ([facility] [address]     Psychiatric problem        Past Surgical History:  Past Surgical History:   Procedure Laterality Date    ADENOIDECTOMY      COLONOSCOPY      TYMPANOSTOMY TUBE PLACEMENT         Family History  Family History   Problem Relation Age of Onset    High Blood Pressure Mother        Social History  Social History     Socioeconomic History    Marital status:      Spouse name: Not on file    Number of children: Not on file    Years of education: Not on file    Highest education level: Not on file   Occupational History    Not on file   Social Needs    Financial resource strain: Not on file    Food insecurity     Worry: Not on file     Inability: Not on file    Transportation needs     Medical: Not on file     Non-medical: Not on file   Tobacco Use    Smoking status: Current Every Day Smoker     Packs/day: 2.00     Types: Cigarettes    Smokeless tobacco: Never Used   Substance and Sexual Activity    Alcohol use: Yes     Comment: \"every chance I get\"    Drug use: No    Sexual activity: Not on file   Lifestyle    Physical activity     Days per week: Not on file     Minutes per session: Not on file    Stress: Not on file   Relationships    Social connections     Talks on phone: Not on file     Gets together: Not on file     Attends Alevism service: Not on file Active member of club or organization: Not on file     Attends meetings of clubs or organizations: Not on file     Relationship status: Not on file    Intimate partner violence     Fear of current or ex partner: Not on file     Emotionally abused: Not on file     Physically abused: Not on file     Forced sexual activity: Not on file   Other Topics Concern    Not on file   Social History Narrative    Not on file         Review of Systems:    Review of Systems   Unable to perform ROS: Mental status change           Objective:  Blood pressure (!) 162/93, pulse 73, temperature 98.6 °F (37 °C), temperature source Temporal, resp. rate 13, height 5' 10\" (1.778 m), weight 181 lb 14.4 oz (82.5 kg), SpO2 96 %. Intake/Output Summary (Last 24 hours) at 3/2/2021 1613  Last data filed at 3/2/2021 1400  Gross per 24 hour   Intake 3982.34 ml   Output 1600 ml   Net 2382.34 ml       Physical Exam  Vitals signs and nursing note reviewed. Constitutional:       Appearance: He is ill-appearing. HENT:      Head: Normocephalic and atraumatic. Right Ear: External ear normal.      Left Ear: External ear normal.      Nose: Nose normal.      Mouth/Throat:      Mouth: Mucous membranes are moist.   Eyes:      Conjunctiva/sclera: Conjunctivae normal.   Neck:      Musculoskeletal: Neck supple. No neck rigidity. Cardiovascular:      Rate and Rhythm: Normal rate and regular rhythm. Heart sounds: Normal heart sounds. Pulmonary:      Effort: Pulmonary effort is normal.      Breath sounds: Normal breath sounds. Abdominal:      General: Abdomen is flat. Palpations: Abdomen is soft. Musculoskeletal:         General: No swelling. Skin:     General: Skin is warm and dry.          Labs:  BMP:   Recent Labs     02/28/21  1910 02/28/21  1910 03/01/21  0234 03/02/21  0411 03/02/21  1543   *  --  141 139 137   K 3.8   < > 3.2* 2.9* 3.9   CL 90*  --  100 102 103   CO2 24  --  24 25 26   PHOS 2.3*  --   --   --   -- BUN 6  --  7 15 14   CREATININE 0.6  --  0.5 0.6 0.5   CALCIUM 8.7  --  8.1* 8.3* 8.2*    < > = values in this interval not displayed. CBC:   Recent Labs     02/28/21 1910 03/01/21 0234 03/02/21 0411   WBC 10.1 6.5 7.5   HGB 14.1 11.7* 11.4*   HCT 40.1* 34.5* 32.8*   MCV 98.0* 101.5* 102.2*    101* 77*     LIVER PROFILE:   Recent Labs     02/28/21 1910   AST 90*   ALT 56*   BILITOT 0.6   ALKPHOS 120     PT/INR: No results for input(s): PROTIME, INR in the last 72 hours. APTT: No results for input(s): APTT in the last 72 hours. BNP:  No results for input(s): BNP in the last 72 hours. Ionized Calcium:No results for input(s): IONCA in the last 72 hours. Magnesium:  Recent Labs     02/28/21 1910 03/01/21 0234 03/02/21 0411   MG 2.0 2.0 2.2     Phosphorus:  Recent Labs     02/28/21 1910   PHOS 2.3*     HgbA1C: No results for input(s): LABA1C in the last 72 hours. Hepatic:   Recent Labs     02/28/21 1910   ALKPHOS 120   ALT 56*   AST 90*   PROT 8.0   BILITOT 0.6   LABALBU 4.9     Lactic Acid:   Recent Labs     03/01/21  2200   LACTA 1.0     Troponin:   Recent Labs     02/28/21 1910 03/01/21  0336   CKTOTAL 1,961* 2,543*     ABGs: No results for input(s): PH, PCO2, PO2, HCO3, O2SAT in the last 72 hours. CRP:  No results for input(s): CRP in the last 72 hours. Sed Rate:  No results for input(s): SEDRATE in the last 72 hours. Cultures:   No results for input(s): CULTURE in the last 72 hours. No results for input(s): BC, Lori Aline in the last 72 hours. No results for input(s): CXSURG in the last 72 hours. Radiology reports as per the Radiologist  Radiology: Xr Humerus Left (min 2 Views)    Result Date: 2/28/2021  EXAMINATION: XR HUMERUS LEFT (MIN 2 VIEWS) 2/28/2021 10:06 PM HISTORY: Dog bite AP and lateral views the left humerus is obtained. The glenohumeral joints well maintained. Humeral diaphysis humeral condyles are intact. Negative left humerus. There is a radiopaque density in the mid soft tissues of undetermined etiology. IMPRESSION:  in the distal third of the soft tissues a radiopaque density is present of undetermined etiology. Signed by Dr Yulisa De Dios on 2/28/2021 10:08 PM    Xr Radius Ulna Right (2 Views)    Result Date: 2/28/2021  EXAMINATION: XR RADIUS ULNA RIGHT (2 VIEWS) 2/28/2021 10:12 PM HISTORY: Dog bite AP and lateral views the right forearm are obtained. The radius and ulna are unremarkable. The soft tissues are unremarkable. Impression negative AP and lateral right forearm Signed by Dr Yulisa De Dios on 2/28/2021 10:13 PM    Ct Head Wo Contrast    Result Date: 3/1/2021  Exam: CT HEAD WO CONTRAST - 2/28/2021 8:13 PM Indication: Altered mental status Comparison: 2/2/2020 DLP: 837 mGy cm. In order to have a CT radiation dose as low as reasonably achievable, Automated Exposure Control was utilized for adjustment of the mA and/or KV according to patient size. Findings: No evidence of intracranial hemorrhage. No loss of gray-white differentiation to suggest acute infarct. No midline shift or mass effect. Lateral ventricles are nondilated. Basilar cisterns are patent. No acute orbital finding. Partially imaged enteric and endotracheal tubes. Mastoid air cells are clear. Paranasal sinuses are predominantly clear. No acute osseous finding. Impression: No acute intracranial findings. Findings in agreement with the emergent findings from the initial [facility] preliminary report. Signed by Dr Gina Jenkins on 3/1/2021 7:05 AM    Xr Chest Portable    Result Date: 2/28/2021  EXAMINATION: XR CHEST PORTABLE 2/28/2021 10:04 PM HISTORY: XR CHEST PORTABLE 2/28/2021 5:01 PM HISTORY: Intubation COMPARISON: January 31, 2021. FINDINGS: The lungs are clear. Cardiac silhouette is normal. Endotracheal tube is satisfactorily positioned. . The osseous structures and surrounding soft tissues demonstrate no acute abnormality. 1. No radiographic evidence of acute cardiopulmonary process. Signed by Dr Popeye Hrat on 2/28/2021 10:06 PM       Assessment     Alcohol dependence with withdrawal.  Precedex as needed. Ativan as needed.     Seizure secondary to #1. Continue medical management.     Dog bite  Continue antibiotics.     Please document 44 minutes of critical care time for patient assessment, chart review, discussion with staff, .       Tutu Chinchilla DO

## 2021-03-02 NOTE — PROGRESS NOTES
Just spoke with patient's brother. He states that patient's mother found 4 half gallon jugs of vodka that he had drank within 1-2 days prior to admission. States that he was so drunk he didn't know what he was doing and stepped on his dogs broken foot and dogs are very protective over his mother. (hence dog attack) Brother is going to try and get permission from his Director at [employer] to be able to come visit him today.  Electronically signed by Armaan Jesus RN on 3/2/2021 at 6:23 AM

## 2021-03-03 VITALS
DIASTOLIC BLOOD PRESSURE: 83 MMHG | OXYGEN SATURATION: 95 % | WEIGHT: 181.9 LBS | RESPIRATION RATE: 18 BRPM | HEIGHT: 70 IN | BODY MASS INDEX: 26.04 KG/M2 | HEART RATE: 77 BPM | SYSTOLIC BLOOD PRESSURE: 146 MMHG | TEMPERATURE: 98.8 F

## 2021-03-03 PROBLEM — R74.01 TRANSAMINASEMIA: Status: RESOLVED | Noted: 2019-02-06 | Resolved: 2021-03-03

## 2021-03-03 PROBLEM — F10.231 ALCOHOL DEPENDENCE WITH WITHDRAWAL DELIRIUM (HCC): Status: RESOLVED | Noted: 2021-02-28 | Resolved: 2021-03-03

## 2021-03-03 PROBLEM — F19.10 POLYSUBSTANCE ABUSE (HCC): Status: RESOLVED | Noted: 2019-02-06 | Resolved: 2021-03-03
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
## 2021-03-03 LAB
ANION GAP SERPL CALCULATED.3IONS-SCNC: 13 MMOL/L (ref 7–19)
BASOPHILS ABSOLUTE: 0 K/UL (ref 0–0.2)
BASOPHILS RELATIVE PERCENT: 0.2 % (ref 0–1)
BUN BLDV-MCNC: 13 MG/DL (ref 6–20)
CALCIUM SERPL-MCNC: 8.3 MG/DL (ref 8.6–10)
CHLORIDE BLD-SCNC: 99 MMOL/L (ref 98–111)
CO2: 24 MMOL/L (ref 22–29)
CREAT SERPL-MCNC: 0.4 MG/DL (ref 0.5–1.2)
EOSINOPHILS ABSOLUTE: 0.4 K/UL (ref 0–0.6)
EOSINOPHILS RELATIVE PERCENT: 4.2 % (ref 0–5)
GFR AFRICAN AMERICAN: >59
GFR NON-AFRICAN AMERICAN: >60
GLUCOSE BLD-MCNC: 75 MG/DL (ref 74–109)
HCT VFR BLD CALC: 37.5 % (ref 42–52)
HEMOGLOBIN: 12.9 G/DL (ref 14–18)
IMMATURE GRANULOCYTES #: 0 K/UL
LYMPHOCYTES ABSOLUTE: 1.3 K/UL (ref 1.1–4.5)
LYMPHOCYTES RELATIVE PERCENT: 13.7 % (ref 20–40)
MAGNESIUM: 1.9 MG/DL (ref 1.6–2.6)
MCH RBC QN AUTO: 34.7 PG (ref 27–31)
MCHC RBC AUTO-ENTMCNC: 34.4 G/DL (ref 33–37)
MCV RBC AUTO: 100.8 FL (ref 80–94)
MONOCYTES ABSOLUTE: 0.5 K/UL (ref 0–0.9)
MONOCYTES RELATIVE PERCENT: 5.6 % (ref 0–10)
NEUTROPHILS ABSOLUTE: 7 K/UL (ref 1.5–7.5)
NEUTROPHILS RELATIVE PERCENT: 76 % (ref 50–65)
PDW BLD-RTO: 12.9 % (ref 11.5–14.5)
PLATELET # BLD: 77 K/UL (ref 130–400)
PMV BLD AUTO: 11.6 FL (ref 9.4–12.4)
POTASSIUM REFLEX MAGNESIUM: 3.5 MMOL/L (ref 3.5–5)
RBC # BLD: 3.72 M/UL (ref 4.7–6.1)
SODIUM BLD-SCNC: 136 MMOL/L (ref 136–145)
WBC # BLD: 9.2 K/UL (ref 4.8–10.8)

## 2021-03-03 PROCEDURE — 36415 COLL VENOUS BLD VENIPUNCTURE: CPT

## 2021-03-03 PROCEDURE — 6370000000 HC RX 637 (ALT 250 FOR IP): Performed by: HOSPITALIST

## 2021-03-03 PROCEDURE — 2500000003 HC RX 250 WO HCPCS: Performed by: INTERNAL MEDICINE

## 2021-03-03 PROCEDURE — 80048 BASIC METABOLIC PNL TOTAL CA: CPT

## 2021-03-03 PROCEDURE — 83735 ASSAY OF MAGNESIUM: CPT

## 2021-03-03 PROCEDURE — 85025 COMPLETE CBC W/AUTO DIFF WBC: CPT

## 2021-03-03 PROCEDURE — 2580000003 HC RX 258: Performed by: HOSPITALIST

## 2021-03-03 PROCEDURE — 6360000002 HC RX W HCPCS: Performed by: HOSPITALIST

## 2021-03-03 PROCEDURE — 2500000003 HC RX 250 WO HCPCS: Performed by: HOSPITALIST

## 2021-03-03 RX ORDER — AMOXICILLIN AND CLAVULANATE POTASSIUM 562.5; 437.5; 62.5 MG/1; MG/1; MG/1
1 TABLET, FILM COATED, EXTENDED RELEASE ORAL 2 TIMES DAILY
Qty: 20 TABLET | Refills: 0 | Status: SHIPPED | OUTPATIENT
Start: 2021-03-03 | End: 2021-03-13

## 2021-03-03 RX ADMIN — LORAZEPAM 2 MG: 2 INJECTION INTRAMUSCULAR; INTRAVENOUS at 09:09

## 2021-03-03 RX ADMIN — SODIUM CHLORIDE 1500 MG: 900 INJECTION INTRAVENOUS at 12:37

## 2021-03-03 RX ADMIN — ENOXAPARIN SODIUM 40 MG: 100 INJECTION SUBCUTANEOUS at 07:38

## 2021-03-03 RX ADMIN — FAMOTIDINE 20 MG: 10 INJECTION, SOLUTION INTRAVENOUS at 07:38

## 2021-03-03 RX ADMIN — SODIUM CHLORIDE 1500 MG: 900 INJECTION INTRAVENOUS at 05:15

## 2021-03-03 RX ADMIN — ENALAPRILAT 1.25 MG: 1.25 INJECTION INTRAVENOUS at 05:15

## 2021-03-03 RX ADMIN — DEXMEDETOMIDINE HYDROCHLORIDE 1.4 MCG/KG/HR: 4 INJECTION, SOLUTION INTRAVENOUS at 14:48

## 2021-03-03 RX ADMIN — SODIUM CHLORIDE 1500 MG: 900 INJECTION INTRAVENOUS at 00:13

## 2021-03-03 RX ADMIN — SODIUM CHLORIDE, PRESERVATIVE FREE 10 ML: 5 INJECTION INTRAVENOUS at 07:38

## 2021-03-03 RX ADMIN — LORAZEPAM 2 MG: 2 INJECTION INTRAMUSCULAR; INTRAVENOUS at 12:36

## 2021-03-03 RX ADMIN — ENALAPRILAT 1.25 MG: 1.25 INJECTION INTRAVENOUS at 00:13

## 2021-03-03 RX ADMIN — LORAZEPAM 3 MG: 2 INJECTION INTRAMUSCULAR; INTRAVENOUS at 03:56

## 2021-03-03 RX ADMIN — ENALAPRILAT 1.25 MG: 1.25 INJECTION INTRAVENOUS at 18:28

## 2021-03-03 RX ADMIN — SODIUM CHLORIDE 1500 MG: 900 INJECTION INTRAVENOUS at 18:28

## 2021-03-03 RX ADMIN — LORAZEPAM 4 MG: 2 INJECTION INTRAMUSCULAR; INTRAVENOUS at 10:57

## 2021-03-03 RX ADMIN — DEXMEDETOMIDINE HYDROCHLORIDE 0.7 MCG/KG/HR: 4 INJECTION, SOLUTION INTRAVENOUS at 04:02

## 2021-03-03 RX ADMIN — ENALAPRILAT 1.25 MG: 1.25 INJECTION INTRAVENOUS at 12:36

## 2021-03-03 RX ADMIN — DEXMEDETOMIDINE HYDROCHLORIDE 1 MCG/KG/HR: 4 INJECTION, SOLUTION INTRAVENOUS at 10:10

## 2021-03-03 ASSESSMENT — ENCOUNTER SYMPTOMS
DIARRHEA: 0
BACK PAIN: 0
NAUSEA: 0
COLOR CHANGE: 0
VOMITING: 0
CONSTIPATION: 0
SHORTNESS OF BREATH: 0
VOICE CHANGE: 0

## 2021-03-03 NOTE — PROGRESS NOTES
I called his mom to come and get him because he wanted to go home. As I continued to speak with the patient's mother she became verbally abusive on the phone. She stated that she was Hilda barrett my ass if she had to bring her son back to the hospital like she did last time that we let her son go. I immediately placed her on hold and went and got Dr. Nini Tripp. Dr. Nini Tripp talked extensively with the patient's mother. Will continue to monitor.

## 2021-03-03 NOTE — PLAN OF CARE
Nutrition Problem #1: Inadequate oral intake  Intervention: Food and/or Nutrient Delivery: Continue Current Diet  Nutritional Goals: New goal: pt will tolerate advance to po diet with wt stable.

## 2021-03-03 NOTE — CARE COORDINATION
SW attempted to visit re: ETOH rehab info and issue with dogs at home; however, Pt is on precedex drip, etc., not able to talk or hold a conversation at current; and nurse stated he definitely wouldn't remember it; following    Electronically signed by Viola Stuart on 3/3/2021 at 12:40 PM

## 2021-03-03 NOTE — PROGRESS NOTES
Comprehensive Nutrition Assessment    Type and Reason for Visit:  Reassess    Nutrition Assessment:  Pt has progressed to a General diet. Will monitor intakes/tolerance. Will provide nutrition intervention as needed. Malnutrition Assessment:  Malnutrition Status: At risk for malnutrition (Comment)      Current Nutrition Therapies:    DIET GENERAL; Anthropometric Measures:  · Height: 5' 10\" (177.8 cm)  · Current Body Weight: 181 lb (82.1 kg)    · Ideal Body Weight: 166 lbs  · BMI: 26  · BMI Categories: Overweight (BMI 25.0-29. 9)       Nutrition Diagnosis:   · Inadequate oral intake related to impaired respiratory function as evidenced by intubation, NPO or clear liquid status due to medical condition    Nutrition Interventions:   Food and/or Nutrient Delivery:  Continue Current Diet  Coordination of Nutrition Care:  Continue to monitor while inpatient    Nutrition Monitoring and Evaluation:   Food/Nutrient Intake Outcomes:  Diet Advancement/Tolerance  Physical Signs/Symptoms Outcomes:  Biochemical Data, Nutrition Focused Physical Findings, Weight, Skin, Hemodynamic Status     Electronically signed by Ancelmo Parrish MS, RD, LD on 3/3/21 at 2:52 PM CST    Contact: [phone]

## 2021-03-03 NOTE — PROGRESS NOTES
Pt became agitated, sat straight up in bed, and started trying to pull his restraints loose. Pt saying \"please, please, please\" and trembling. Administered 3 mg Ativan IVP per CIWA scale protocol and increased Precedex to 0.7 mcg/kg/hr. Pt now resting comfortably.  Electronically signed by Char Ramirez RN on 3/3/2021 at 4:10 AM

## 2021-03-04 NOTE — PROGRESS NOTES
Pt was discharged. Myself and another nurse talked multiple times to the patient. The mother apologized for her behavior. Discharge paper signed.

## 2021-03-04 NOTE — PROGRESS NOTES
Hospitalist Progress Note    Patient:  Misbah Christina  YOB: 1980  Date of Service: 3/3/2021  MRN: [id number]   Acct: [de-identified]   Primary Care Physician: DOTTIE Arriola CNP  Advance Directive: Full Code  Admit Date: 2/28/2021       Hospital Day: 3  Referring Provider: Ish Higgins DO    Patient Seen, Chart, Consults, Notes, Labs, Radiology studies reviewed. Subjective:  Misbah Christina is a 41 y.o. male  whom we are following for seizures, alcohol withdrawal, hypoxemic respiratory failure. He actually did fairly well today with some low-dose Precedex. This evening he became somewhat agitated but was able to be redirected. He was insistent on going home. This is hospital day 4 and he is out of the window for DTs. We contacted his mother about possibly coming to come get him and she became quite upset and was rude to his nurse. I spoke with her on the telephone. She accused us of not offering him an option for rehab. This is clearly not the case as this has been clearly documented every time that he has been at this facility,  has reached out to him to give him information regarding rehab. I suspect that his mother is an enabler. She would like to have a psychiatric evaluation and then have him sent to rehab. We will ask psych to evaluate tomorrow morning. Allergies:  Patient has no known allergies.     Medicines:  Current Facility-Administered Medications   Medication Dose Route Frequency Provider Last Rate Last Admin    dexmedetomidine (PRECEDEX) 400 mcg in sodium chloride 0.9 % 100 mL infusion  0.2-1.4 mcg/kg/hr Intravenous Continuous Calvin Barajas MD   Stopped at 03/03/21 1700    enalaprilat (VASOTEC) injection 1.25 mg  1.25 mg Intravenous 4 times per day Ish Higgins DO   1.25 mg at 03/03/21 1236    potassium chloride (KLOR-CON M) extended release tablet 40 mEq  40 mEq Oral PRN Calvin Barajas MD        Or  potassium bicarb-citric acid (EFFER-K) effervescent tablet 40 mEq  40 mEq Oral PRN Lynnette Nelson MD   40 mEq at 03/01/21 0557    Or    potassium chloride 10 mEq/100 mL IVPB (Peripheral Line)  10 mEq Intravenous PRN Lynnette Nelson  mL/hr at 03/02/21 1250 10 mEq at 03/02/21 1250    magnesium sulfate 1000 mg in dextrose 5% 100 mL IVPB  1,000 mg Intravenous PRN Lynnette Nelson MD        famotidine (PEPCID) injection 20 mg  20 mg Intravenous BID Lynnette Nelson MD   20 mg at 03/03/21 1812    acetaminophen (TYLENOL) tablet 650 mg  650 mg Oral Q4H PRN Lynnette Nelson MD   650 mg at 03/01/21 2145    Tetanus-Diphth-Acell Pertussis (BOOSTRIX) injection 0.5 mL  0.5 mL Intramuscular Once Lenin Estevez MD        ampicillin-sulbactam (UNASYN) 1500 mg IVPB minibag  1,500 mg Intravenous Q6H Lynnette Nelson MD   Stopped at 03/03/21 1307    thiamine mononitrate tablet 100 mg  100 mg Oral Daily Lynnette Nelson MD   100 mg at 03/02/21 0801    multivitamin 1 tablet  1 tablet Oral Daily Lynnette Nelson MD   1 tablet at 24/43/98 9146    folic acid (FOLVITE) tablet 1 mg  1 mg Oral Daily Lynnette Nelson MD   1 mg at 03/02/21 0801    PARoxetine (PAXIL) tablet 75 mg  75 mg Oral QAM Lynnette Nelson MD   75 mg at 03/02/21 0801    nicotine (NICODERM CQ) 21 MG/24HR 1 patch  1 patch Transdermal Daily Lynnette Nelson MD   1 patch at 03/03/21 0738    sodium chloride flush 0.9 % injection 10 mL  10 mL Intravenous 2 times per day Lynnette Nelson MD   10 mL at 03/03/21 0738    sodium chloride flush 0.9 % injection 10 mL  10 mL Intravenous PRN Lynnette Nelson MD        enoxaparin (LOVENOX) injection 40 mg  40 mg Subcutaneous Daily Lynnette Nelson MD   40 mg at 03/03/21 0738    ondansetron (ZOFRAN-ODT) disintegrating tablet 4 mg  4 mg Oral Q8H PRN Lynnette Nelson MD        Or    [facility]dansetron [facility]) injection 4 mg  4 mg Intravenous Q6H PRN Lynnette Nelson MD  polyethylene glycol (GLYCOLAX) packet 17 g  17 g Oral Daily PRN Vinay Fischer MD        LORazepam (ATIVAN) tablet 1 mg  1 mg Oral Q1H PRN Vinay Fischer MD        Or    LORazepam (ATIVAN) injection 1 mg  1 mg Intravenous Q1H PRN Vinay Fischer MD        Or    LORazepam (ATIVAN) tablet 2 mg  2 mg Oral Q1H PRN Vinay Fischer MD        Or    LORazepam (ATIVAN) injection 2 mg  2 mg Intravenous Q1H PRN Vinay Fischer MD   2 mg at 03/03/21 1236    Or    LORazepam (ATIVAN) tablet 3 mg  3 mg Oral Q1H PRN Vinay Fischer MD        Or    LORazepam (ATIVAN) injection 3 mg  3 mg Intravenous Q1H PRN Vinay Fischer MD   3 mg at 03/02/21 1158    Or    LORazepam (ATIVAN) tablet 4 mg  4 mg Oral Q1H PRN Vinay Fischer MD        Or    LORazepam (ATIVAN) injection 4 mg  4 mg Intravenous Q1H PRN Vinay Fischer MD   4 mg at 03/03/21 1057       Past Medical History:  Past Medical History:   Diagnosis Date    Dog bite     Neuromuscular disorder ([facility] [address]     Psychiatric problem        Past Surgical History:  Past Surgical History:   Procedure Laterality Date    ADENOIDECTOMY      COLONOSCOPY      TYMPANOSTOMY TUBE PLACEMENT         Family History  Family History   Problem Relation Age of Onset    High Blood Pressure Mother        Social History  Social History     Socioeconomic History    Marital status:      Spouse name: Not on file    Number of children: Not on file    Years of education: Not on file    Highest education level: Not on file   Occupational History    Not on file   Social Needs    Financial resource strain: Not on file    Food insecurity     Worry: Not on file     Inability: Not on file    Transportation needs     Medical: Not on file     Non-medical: Not on file   Tobacco Use    Smoking status: Current Every Day Smoker     Packs/day: 2.00     Types: Cigarettes    Smokeless tobacco: Never Used   Substance and Sexual Activity    Alcohol use: Yes     Comment: \"every chance I get\"  Drug use: No    Sexual activity: Not on file   Lifestyle    Physical activity     Days per week: Not on file     Minutes per session: Not on file    Stress: Not on file   Relationships    Social connections     Talks on phone: Not on file     Gets together: Not on file     Attends [employer] service: Not on file     Active member of club or organization: Not on file     Attends meetings of clubs or organizations: Not on file     Relationship status: Not on file    Intimate partner violence     Fear of current or ex partner: Not on file     Emotionally abused: Not on file     Physically abused: Not on file     Forced sexual activity: Not on file   Other Topics Concern    Not on file   Social History Narrative    Not on file         Review of Systems:    Review of Systems   Constitutional: Negative for activity change and fever. HENT: Negative for congestion and voice change. Eyes: Negative for visual disturbance. Respiratory: Negative for shortness of breath. Cardiovascular: Negative for chest pain and leg swelling. Gastrointestinal: Negative for constipation, diarrhea, nausea and vomiting. Endocrine: Negative for polyuria. Genitourinary: Negative for difficulty urinating and dysuria. Musculoskeletal: Negative for back pain and neck pain. Skin: Negative for color change. Allergic/Immunologic: Negative for immunocompromised state. Neurological: Negative for dizziness and light-headedness. Psychiatric/Behavioral: The patient is not nervous/anxious. Objective:  Blood pressure 135/86, pulse 85, temperature 98.8 °F (37.1 °C), temperature source Temporal, resp. rate 15, height 5' 10\" (1.778 m), weight 181 lb 14.4 oz (82.5 kg), SpO2 96 %. Intake/Output Summary (Last 24 hours) at 3/3/2021 1821  Last data filed at 3/3/2021 1606  Gross per 24 hour   Intake 860.67 ml   Output 2955 ml   Net -2094.33 ml       Physical Exam  Vitals signs and nursing note reviewed. Constitutional:       Appearance: He is ill-appearing. HENT:      Head: Normocephalic and atraumatic. Right Ear: External ear normal.      Left Ear: External ear normal.      Nose: Nose normal.      Mouth/Throat:      Mouth: Mucous membranes are moist.   Eyes:      Conjunctiva/sclera: Conjunctivae normal.      Pupils: Pupils are equal, round, and reactive to light. Neck:      Musculoskeletal: Neck supple. No neck rigidity. Cardiovascular:      Rate and Rhythm: Normal rate and regular rhythm. Heart sounds: Normal heart sounds. Pulmonary:      Effort: Pulmonary effort is normal.      Breath sounds: Normal breath sounds. Abdominal:      General: Abdomen is flat. Palpations: Abdomen is soft. Musculoskeletal:         General: No swelling. Skin:     General: Skin is warm and dry. Neurological:      Mental Status: He is oriented to person, place, and time. Labs:  BMP:   Recent Labs     02/28/21 1910 02/28/21 1910 03/02/21  0411 03/02/21  1543 03/03/21  0145   *   < > 139 137 136   K 3.8   < > 2.9* 3.9 3.5   CL 90*   < > 102 103 99   CO2 24   < > 25 26 24   PHOS 2.3*  --   --   --   --    BUN 6   < > 15 14 13   CREATININE 0.6   < > 0.6 0.5 0.4*   CALCIUM 8.7   < > 8.3* 8.2* 8.3*    < > = values in this interval not displayed. CBC:   Recent Labs     03/01/21  0234 03/02/21  0411 03/03/21  0145   WBC 6.5 7.5 9.2   HGB 11.7* 11.4* 12.9*   HCT 34.5* 32.8* 37.5*   .5* 102.2* 100.8*   * 77* 77*     LIVER PROFILE:   Recent Labs     02/28/21 1910   AST 90*   ALT 56*   BILITOT 0.6   ALKPHOS 120     PT/INR: No results for input(s): PROTIME, INR in the last 72 hours. APTT: No results for input(s): APTT in the last 72 hours. BNP:  No results for input(s): BNP in the last 72 hours. Ionized Calcium:No results for input(s): IONCA in the last 72 hours.   Magnesium:  Recent Labs     03/01/21  0234 03/02/21  0411 03/03/21  0145   MG 2.0 2.2 1.9     Phosphorus:  Recent Labs 02/28/21 1910   PHOS 2.3*     HgbA1C: No results for input(s): LABA1C in the last 72 hours. Hepatic:   Recent Labs     02/28/21 1910   ALKPHOS 120   ALT 56*   AST 90*   PROT 8.0   BILITOT 0.6   LABALBU 4.9     Lactic Acid:   Recent Labs     03/01/21 2200   LACTA 1.0     Troponin:   Recent Labs     02/28/21 1910 03/01/21  0336   CKTOTAL 1,961* 2,543*     ABGs: No results for input(s): PH, PCO2, PO2, HCO3, O2SAT in the last 72 hours. CRP:  No results for input(s): CRP in the last 72 hours. Sed Rate:  No results for input(s): SEDRATE in the last 72 hours. Cultures:   No results for input(s): CULTURE in the last 72 hours. Recent Labs     03/01/21 2200 03/01/21 2201   BC No Growth to date. Any change in status will be called. --    BLOODCULT2  --  No Growth to date. Any change in status will be called. No results for input(s): CXSURG in the last 72 hours. Radiology reports as per the Radiologist  Radiology: Xr Humerus Left (min 2 Views)    Result Date: 2/28/2021  EXAMINATION: XR HUMERUS LEFT (MIN 2 VIEWS) 2/28/2021 10:06 PM HISTORY: Dog bite AP and lateral views the left humerus is obtained. The glenohumeral joints well maintained. Humeral diaphysis humeral condyles are intact. Negative left humerus. There is a radiopaque density in the mid soft tissues of undetermined etiology. IMPRESSION:  in the distal third of the soft tissues a radiopaque density is present of undetermined etiology. Signed by Dr Jaswinder Richards on 2/28/2021 10:08 PM    Xr Radius Ulna Right (2 Views)    Result Date: 2/28/2021  EXAMINATION: XR RADIUS ULNA RIGHT (2 VIEWS) 2/28/2021 10:12 PM HISTORY: Dog bite AP and lateral views the right forearm are obtained. The radius and ulna are unremarkable. The soft tissues are unremarkable.  Impression negative AP and lateral right forearm Signed by Dr Jaswinder Richards on 2/28/2021 10:13 PM    Ct Head Wo Contrast    Result Date: 3/1/2021 Exam: CT HEAD WO CONTRAST - 2/28/2021 8:13 PM Indication: Altered mental status Comparison: 2/2/2020 DLP: 837 mGy cm. In order to have a CT radiation dose as low as reasonably achievable, Automated Exposure Control was utilized for adjustment of the mA and/or KV according to patient size. Findings: No evidence of intracranial hemorrhage. No loss of gray-white differentiation to suggest acute infarct. No midline shift or mass effect. Lateral ventricles are nondilated. Basilar cisterns are patent. No acute orbital finding. Partially imaged enteric and endotracheal tubes. Mastoid air cells are clear. Paranasal sinuses are predominantly clear. No acute osseous finding. Impression: No acute intracranial findings. Findings in agreement with the emergent findings from the initial [facility] preliminary report. Signed by Dr Albaro Webb on 3/1/2021 7:05 AM    Xr Chest Portable    Result Date: 2/28/2021  EXAMINATION: XR CHEST PORTABLE 2/28/2021 10:04 PM HISTORY: XR CHEST PORTABLE 2/28/2021 5:01 PM HISTORY: Intubation COMPARISON: January 31, 2021. FINDINGS: The lungs are clear. Cardiac silhouette is normal. Endotracheal tube is satisfactorily positioned. . The osseous structures and surrounding soft tissues demonstrate no acute abnormality. 1. No radiographic evidence of acute cardiopulmonary process. Signed by Dr Jasvir Tilley on 2/28/2021 10:06 PM       Assessment     Alcohol dependence with withdrawal.  Precedex as needed. We are now out of the window for withdrawal.     Seizure secondary to #1. Continue medical management.     Dog bite  Continue antibiotics.     Please document 41 minutes of critical care time for patient assessment, chart review, discussion with staff, .       Jaymie Eddy DO

## 2021-03-05 LAB
CULTURE, RESPIRATORY: ABNORMAL
GRAM STAIN RESULT: ABNORMAL
ORGANISM: ABNORMAL
ORGANISM: ABNORMAL

## 2021-03-07 LAB
BLOOD CULTURE, ROUTINE: NORMAL
CULTURE, BLOOD 2: NORMAL

## 2021-03-19 NOTE — DISCHARGE SUMMARY
SpO2 95%   BMI 26.10 kg/m²   General appearance:. Alert and Cooperative   HEENT: Normocephalic. Chest: clear to auscultation bilaterally without wheezes or rhonchi. Cardiac: Normal heart tones with regular rate and rhythm, S1, S2 normal. No murmurs, gallops, or rubs auscultated. Abdomen:soft, non-tender; normal bowel sounds, no masses, no organomegaly. Extremities: No clubbing or cyanosis. No peripheral edema. Peripheral pulses palpable. Neurologic: Grossly intact. Discharge Medications:       Gina Hernandez   [place] Medication Instructions NBR:[id number]    Printed on:03/19/21 7679   Medication Information                      folic acid (FOLVITE) 1 MG tablet  Take 1 tablet by mouth daily             Multiple Vitamin (MULTIVITAMIN) TABS tablet  Take 1 tablet by mouth daily             nicotine (NICODERM CQ) 21 MG/24HR  Place 1 patch onto the skin daily             PARoxetine (PAXIL) 40 MG tablet  Take 75 mg by mouth every morning             thiamine mononitrate 100 MG tablet  Take 1 tablet by mouth daily                 Discharge Instructions: Follow up with DOTTIE Grover CNP in 3-5 days. Take medications as directed. Resume activity as tolerated. Diet: No diet orders on file     Disposition: Patient is medically stable and will be discharged to home. Time spent on discharge greater than 30 minutes.     Signed:  Ale Valderrama DO

## 2021-03-29 ENCOUNTER — HOSPITAL ENCOUNTER (INPATIENT)
Age: 41
LOS: 2 days | Discharge: PSYCHIATRIC OR SUBSTANCE USE FACILITY | DRG: 897 | End: 2021-04-01
Attending: EMERGENCY MEDICINE | Admitting: HOSPITALIST
Payer: MEDICAID

## 2021-03-29 DIAGNOSIS — F10.921 ACUTE ALCOHOLIC INTOXICATION WITH DELIRIUM (HCC): Primary | ICD-10-CM

## 2021-03-29 DIAGNOSIS — F10.10 CHRONIC ALCOHOL ABUSE: ICD-10-CM

## 2021-03-29 PROBLEM — F10.129 ALCOHOL ABUSE WITH INTOXICATION (HCC): Status: ACTIVE | Noted: 2021-03-29

## 2021-03-29 LAB
ALBUMIN SERPL-MCNC: 4.8 G/DL (ref 3.5–5.2)
ALP BLD-CCNC: 139 U/L (ref 40–130)
ALT SERPL-CCNC: 78 U/L (ref 5–41)
AMPHETAMINE SCREEN, URINE: NEGATIVE
ANION GAP SERPL CALCULATED.3IONS-SCNC: 18 MMOL/L (ref 7–19)
AST SERPL-CCNC: 131 U/L (ref 5–40)
BACTERIA: ABNORMAL /HPF
BARBITURATE SCREEN URINE: NEGATIVE
BASOPHILS ABSOLUTE: 0 K/UL (ref 0–0.2)
BASOPHILS RELATIVE PERCENT: 0.9 % (ref 0–1)
BENZODIAZEPINE SCREEN, URINE: NEGATIVE
BILIRUB SERPL-MCNC: 0.7 MG/DL (ref 0.2–1.2)
BILIRUBIN URINE: NEGATIVE
BLOOD, URINE: ABNORMAL
BUN BLDV-MCNC: 8 MG/DL (ref 6–20)
CALCIUM SERPL-MCNC: 8.5 MG/DL (ref 8.6–10)
CANNABINOID SCREEN URINE: NEGATIVE
CHLORIDE BLD-SCNC: 88 MMOL/L (ref 98–111)
CLARITY: CLEAR
CO2: 26 MMOL/L (ref 22–29)
COCAINE METABOLITE SCREEN URINE: NEGATIVE
COLOR: YELLOW
CREAT SERPL-MCNC: 0.6 MG/DL (ref 0.5–1.2)
CRYSTALS, UA: ABNORMAL /HPF
EOSINOPHILS ABSOLUTE: 0 K/UL (ref 0–0.6)
EOSINOPHILS RELATIVE PERCENT: 0 % (ref 0–5)
ETHANOL: 346 MG/DL (ref 0–0.08)
GFR AFRICAN AMERICAN: >59
GFR NON-AFRICAN AMERICAN: >60
GLUCOSE BLD-MCNC: 89 MG/DL (ref 74–109)
GLUCOSE URINE: NEGATIVE MG/DL
HCT VFR BLD CALC: 41.5 % (ref 42–52)
HEMOGLOBIN: 14.7 G/DL (ref 14–18)
IMMATURE GRANULOCYTES #: 0 K/UL
KETONES, URINE: 15 MG/DL
LEUKOCYTE ESTERASE, URINE: NEGATIVE
LYMPHOCYTES ABSOLUTE: 1.3 K/UL (ref 1.1–4.5)
LYMPHOCYTES RELATIVE PERCENT: 29.6 % (ref 20–40)
Lab: NORMAL
MCH RBC QN AUTO: 34.8 PG (ref 27–31)
MCHC RBC AUTO-ENTMCNC: 35.4 G/DL (ref 33–37)
MCV RBC AUTO: 98.1 FL (ref 80–94)
MONOCYTES ABSOLUTE: 0.5 K/UL (ref 0–0.9)
MONOCYTES RELATIVE PERCENT: 11 % (ref 0–10)
NEUTROPHILS ABSOLUTE: 2.5 K/UL (ref 1.5–7.5)
NEUTROPHILS RELATIVE PERCENT: 58.3 % (ref 50–65)
NITRITE, URINE: NEGATIVE
OPIATE SCREEN URINE: NEGATIVE
PDW BLD-RTO: 13.8 % (ref 11.5–14.5)
PH UA: 5.5 (ref 5–8)
PLATELET # BLD: 119 K/UL (ref 130–400)
PMV BLD AUTO: 10.5 FL (ref 9.4–12.4)
POTASSIUM SERPL-SCNC: 3.5 MMOL/L (ref 3.5–5)
PROTEIN UA: 100 MG/DL
RBC # BLD: 4.23 M/UL (ref 4.7–6.1)
SARS-COV-2, NAAT: NOT DETECTED
SODIUM BLD-SCNC: 132 MMOL/L (ref 136–145)
SPECIFIC GRAVITY UA: 1 (ref 1–1.03)
TOTAL PROTEIN: 8.1 G/DL (ref 6.6–8.7)
UROBILINOGEN, URINE: 0.2 E.U./DL
WBC # BLD: 4.3 K/UL (ref 4.8–10.8)
WBC UA: ABNORMAL /HPF (ref 0–5)

## 2021-03-29 PROCEDURE — 80053 COMPREHEN METABOLIC PANEL: CPT

## 2021-03-29 PROCEDURE — 2580000003 HC RX 258: Performed by: NURSE PRACTITIONER

## 2021-03-29 PROCEDURE — 85025 COMPLETE CBC W/AUTO DIFF WBC: CPT

## 2021-03-29 PROCEDURE — 96372 THER/PROPH/DIAG INJ SC/IM: CPT

## 2021-03-29 PROCEDURE — 82077 ASSAY SPEC XCP UR&BREATH IA: CPT

## 2021-03-29 PROCEDURE — G0378 HOSPITAL OBSERVATION PER HR: HCPCS

## 2021-03-29 PROCEDURE — 6370000000 HC RX 637 (ALT 250 FOR IP): Performed by: EMERGENCY MEDICINE

## 2021-03-29 PROCEDURE — 81003 URINALYSIS AUTO W/O SCOPE: CPT

## 2021-03-29 PROCEDURE — 93005 ELECTROCARDIOGRAM TRACING: CPT | Performed by: EMERGENCY MEDICINE

## 2021-03-29 PROCEDURE — 99285 EMERGENCY DEPT VISIT HI MDM: CPT

## 2021-03-29 PROCEDURE — 80307 DRUG TEST PRSMV CHEM ANLYZR: CPT

## 2021-03-29 PROCEDURE — 6360000002 HC RX W HCPCS: Performed by: NURSE PRACTITIONER

## 2021-03-29 PROCEDURE — 36415 COLL VENOUS BLD VENIPUNCTURE: CPT

## 2021-03-29 PROCEDURE — 6370000000 HC RX 637 (ALT 250 FOR IP): Performed by: NURSE PRACTITIONER

## 2021-03-29 PROCEDURE — 87635 SARS-COV-2 COVID-19 AMP PRB: CPT

## 2021-03-29 PROCEDURE — 2500000003 HC RX 250 WO HCPCS: Performed by: NURSE PRACTITIONER

## 2021-03-29 RX ORDER — LORAZEPAM 2 MG/ML
1 INJECTION INTRAMUSCULAR
Status: DISCONTINUED | OUTPATIENT
Start: 2021-03-29 | End: 2021-04-01 | Stop reason: HOSPADM

## 2021-03-29 RX ORDER — M-VIT,TX,IRON,MINS/CALC/FOLIC 27MG-0.4MG
1 TABLET ORAL DAILY
Status: DISCONTINUED | OUTPATIENT
Start: 2021-03-29 | End: 2021-04-01 | Stop reason: HOSPADM

## 2021-03-29 RX ORDER — ACETAMINOPHEN 650 MG/1
650 SUPPOSITORY RECTAL EVERY 6 HOURS PRN
Status: DISCONTINUED | OUTPATIENT
Start: 2021-03-29 | End: 2021-04-01 | Stop reason: HOSPADM

## 2021-03-29 RX ORDER — GAUZE BANDAGE 2" X 2"
100 BANDAGE TOPICAL DAILY
Status: DISCONTINUED | OUTPATIENT
Start: 2021-03-30 | End: 2021-04-01 | Stop reason: HOSPADM

## 2021-03-29 RX ORDER — SODIUM CHLORIDE 0.9 % (FLUSH) 0.9 %
10 SYRINGE (ML) INJECTION EVERY 12 HOURS SCHEDULED
Status: DISCONTINUED | OUTPATIENT
Start: 2021-03-29 | End: 2021-04-01 | Stop reason: HOSPADM

## 2021-03-29 RX ORDER — NICOTINE 21 MG/24HR
1 PATCH, TRANSDERMAL 24 HOURS TRANSDERMAL DAILY
Status: DISCONTINUED | OUTPATIENT
Start: 2021-03-29 | End: 2021-04-01 | Stop reason: HOSPADM

## 2021-03-29 RX ORDER — DIAZEPAM 5 MG/1
5 TABLET ORAL ONCE
Status: COMPLETED | OUTPATIENT
Start: 2021-03-29 | End: 2021-03-29

## 2021-03-29 RX ORDER — ONDANSETRON 2 MG/ML
4 INJECTION INTRAMUSCULAR; INTRAVENOUS EVERY 6 HOURS PRN
Status: DISCONTINUED | OUTPATIENT
Start: 2021-03-29 | End: 2021-04-01 | Stop reason: HOSPADM

## 2021-03-29 RX ORDER — LORAZEPAM 1 MG/1
4 TABLET ORAL
Status: DISCONTINUED | OUTPATIENT
Start: 2021-03-29 | End: 2021-03-30

## 2021-03-29 RX ORDER — SODIUM CHLORIDE 9 MG/ML
25 INJECTION, SOLUTION INTRAVENOUS PRN
Status: DISCONTINUED | OUTPATIENT
Start: 2021-03-29 | End: 2021-04-01 | Stop reason: HOSPADM

## 2021-03-29 RX ORDER — LORAZEPAM 2 MG/ML
2 INJECTION INTRAMUSCULAR
Status: DISCONTINUED | OUTPATIENT
Start: 2021-03-29 | End: 2021-04-01 | Stop reason: HOSPADM

## 2021-03-29 RX ORDER — LORAZEPAM 1 MG/1
3 TABLET ORAL
Status: DISCONTINUED | OUTPATIENT
Start: 2021-03-29 | End: 2021-04-01 | Stop reason: HOSPADM

## 2021-03-29 RX ORDER — FOLIC ACID 1 MG/1
1 TABLET ORAL DAILY
Status: DISCONTINUED | OUTPATIENT
Start: 2021-03-30 | End: 2021-04-01 | Stop reason: HOSPADM

## 2021-03-29 RX ORDER — MAGNESIUM SULFATE IN WATER 40 MG/ML
2000 INJECTION, SOLUTION INTRAVENOUS PRN
Status: DISCONTINUED | OUTPATIENT
Start: 2021-03-29 | End: 2021-04-01 | Stop reason: HOSPADM

## 2021-03-29 RX ORDER — POTASSIUM CHLORIDE 7.45 MG/ML
10 INJECTION INTRAVENOUS PRN
Status: DISCONTINUED | OUTPATIENT
Start: 2021-03-29 | End: 2021-04-01 | Stop reason: HOSPADM

## 2021-03-29 RX ORDER — SODIUM CHLORIDE 0.9 % (FLUSH) 0.9 %
10 SYRINGE (ML) INJECTION PRN
Status: DISCONTINUED | OUTPATIENT
Start: 2021-03-29 | End: 2021-04-01 | Stop reason: HOSPADM

## 2021-03-29 RX ORDER — LORAZEPAM 1 MG/1
1 TABLET ORAL
Status: DISCONTINUED | OUTPATIENT
Start: 2021-03-29 | End: 2021-04-01 | Stop reason: HOSPADM

## 2021-03-29 RX ORDER — POTASSIUM CHLORIDE 20 MEQ/1
40 TABLET, EXTENDED RELEASE ORAL PRN
Status: DISCONTINUED | OUTPATIENT
Start: 2021-03-29 | End: 2021-04-01 | Stop reason: HOSPADM

## 2021-03-29 RX ORDER — ACETAMINOPHEN 325 MG/1
650 TABLET ORAL EVERY 6 HOURS PRN
Status: DISCONTINUED | OUTPATIENT
Start: 2021-03-29 | End: 2021-04-01 | Stop reason: HOSPADM

## 2021-03-29 RX ORDER — PROMETHAZINE HYDROCHLORIDE 25 MG/1
12.5 TABLET ORAL EVERY 6 HOURS PRN
Status: DISCONTINUED | OUTPATIENT
Start: 2021-03-29 | End: 2021-04-01 | Stop reason: HOSPADM

## 2021-03-29 RX ORDER — POLYETHYLENE GLYCOL 3350 17 G/17G
17 POWDER, FOR SOLUTION ORAL DAILY PRN
Status: DISCONTINUED | OUTPATIENT
Start: 2021-03-29 | End: 2021-04-01 | Stop reason: HOSPADM

## 2021-03-29 RX ORDER — LORAZEPAM 2 MG/ML
3 INJECTION INTRAMUSCULAR
Status: DISCONTINUED | OUTPATIENT
Start: 2021-03-29 | End: 2021-04-01 | Stop reason: HOSPADM

## 2021-03-29 RX ORDER — LORAZEPAM 2 MG/ML
4 INJECTION INTRAMUSCULAR
Status: DISCONTINUED | OUTPATIENT
Start: 2021-03-29 | End: 2021-03-30

## 2021-03-29 RX ORDER — LORAZEPAM 1 MG/1
2 TABLET ORAL
Status: DISCONTINUED | OUTPATIENT
Start: 2021-03-29 | End: 2021-04-01 | Stop reason: HOSPADM

## 2021-03-29 RX ORDER — RISPERIDONE 1 MG/1
2 TABLET, FILM COATED ORAL ONCE
Status: COMPLETED | OUTPATIENT
Start: 2021-03-29 | End: 2021-03-29

## 2021-03-29 RX ADMIN — DIAZEPAM 5 MG: 5 TABLET ORAL at 20:20

## 2021-03-29 RX ADMIN — ENOXAPARIN SODIUM 40 MG: 40 INJECTION SUBCUTANEOUS at 22:30

## 2021-03-29 RX ADMIN — SODIUM CHLORIDE, PRESERVATIVE FREE 10 ML: 5 INJECTION INTRAVENOUS at 22:30

## 2021-03-29 RX ADMIN — FOLIC ACID: 5 INJECTION, SOLUTION INTRAMUSCULAR; INTRAVENOUS; SUBCUTANEOUS at 22:30

## 2021-03-29 RX ADMIN — Medication 1 TABLET: at 22:30

## 2021-03-29 RX ADMIN — RISPERIDONE 2 MG: 1 TABLET ORAL at 20:32

## 2021-03-29 ASSESSMENT — PAIN SCALES - GENERAL
PAINLEVEL_OUTOF10: 0
PAINLEVEL_OUTOF10: 0

## 2021-03-29 ASSESSMENT — ENCOUNTER SYMPTOMS
VOMITING: 1
NAUSEA: 1

## 2021-03-29 NOTE — PROGRESS NOTES
72 hour hold paperwork received by me from [facility]. Call [phone] when pt is released from hospital, he is to be transported by deputy to [facility].  Electronically signed by Humberto Cid RN on 3/29/2021 at 6:48 PM

## 2021-03-30 PROBLEM — F10.930 ALCOHOL WITHDRAWAL SYNDROME, UNCOMPLICATED (HCC): Status: ACTIVE | Noted: 2021-03-30

## 2021-03-30 LAB
ANION GAP SERPL CALCULATED.3IONS-SCNC: 19 MMOL/L (ref 7–19)
BASOPHILS ABSOLUTE: 0 K/UL (ref 0–0.2)
BASOPHILS RELATIVE PERCENT: 0.4 % (ref 0–1)
BUN BLDV-MCNC: 10 MG/DL (ref 6–20)
CALCIUM SERPL-MCNC: 8.8 MG/DL (ref 8.6–10)
CHLORIDE BLD-SCNC: 94 MMOL/L (ref 98–111)
CO2: 24 MMOL/L (ref 22–29)
CREAT SERPL-MCNC: 0.6 MG/DL (ref 0.5–1.2)
EKG P AXIS: 58 DEGREES
EKG P-R INTERVAL: 170 MS
EKG Q-T INTERVAL: 362 MS
EKG QRS DURATION: 100 MS
EKG QTC CALCULATION (BAZETT): 446 MS
EKG T AXIS: 62 DEGREES
EOSINOPHILS ABSOLUTE: 0 K/UL (ref 0–0.6)
EOSINOPHILS RELATIVE PERCENT: 0.2 % (ref 0–5)
ETHANOL: 102 MG/DL (ref 0–0.08)
GFR AFRICAN AMERICAN: >59
GFR NON-AFRICAN AMERICAN: >60
GLUCOSE BLD-MCNC: 144 MG/DL (ref 74–109)
HCT VFR BLD CALC: 38 % (ref 42–52)
HEMOGLOBIN: 13.3 G/DL (ref 14–18)
IMMATURE GRANULOCYTES #: 0 K/UL
LYMPHOCYTES ABSOLUTE: 1 K/UL (ref 1.1–4.5)
LYMPHOCYTES RELATIVE PERCENT: 21.1 % (ref 20–40)
MAGNESIUM: 2 MG/DL (ref 1.6–2.6)
MCH RBC QN AUTO: 34.8 PG (ref 27–31)
MCHC RBC AUTO-ENTMCNC: 35 G/DL (ref 33–37)
MCV RBC AUTO: 99.5 FL (ref 80–94)
MONOCYTES ABSOLUTE: 0.4 K/UL (ref 0–0.9)
MONOCYTES RELATIVE PERCENT: 7.8 % (ref 0–10)
NEUTROPHILS ABSOLUTE: 3.3 K/UL (ref 1.5–7.5)
NEUTROPHILS RELATIVE PERCENT: 70.3 % (ref 50–65)
PDW BLD-RTO: 13.7 % (ref 11.5–14.5)
PLATELET # BLD: 98 K/UL (ref 130–400)
PMV BLD AUTO: 10.9 FL (ref 9.4–12.4)
POTASSIUM REFLEX MAGNESIUM: 3.4 MMOL/L (ref 3.5–5)
RBC # BLD: 3.82 M/UL (ref 4.7–6.1)
SODIUM BLD-SCNC: 137 MMOL/L (ref 136–145)
WBC # BLD: 4.8 K/UL (ref 4.8–10.8)

## 2021-03-30 PROCEDURE — 93010 ELECTROCARDIOGRAM REPORT: CPT | Performed by: INTERNAL MEDICINE

## 2021-03-30 PROCEDURE — 6360000002 HC RX W HCPCS: Performed by: NURSE PRACTITIONER

## 2021-03-30 PROCEDURE — 6370000000 HC RX 637 (ALT 250 FOR IP): Performed by: NURSE PRACTITIONER

## 2021-03-30 PROCEDURE — 2580000003 HC RX 258: Performed by: NURSE PRACTITIONER

## 2021-03-30 PROCEDURE — 6370000000 HC RX 637 (ALT 250 FOR IP): Performed by: HOSPITALIST

## 2021-03-30 PROCEDURE — 82077 ASSAY SPEC XCP UR&BREATH IA: CPT

## 2021-03-30 PROCEDURE — 83735 ASSAY OF MAGNESIUM: CPT

## 2021-03-30 PROCEDURE — 1210000000 HC MED SURG R&B

## 2021-03-30 PROCEDURE — 36415 COLL VENOUS BLD VENIPUNCTURE: CPT

## 2021-03-30 PROCEDURE — 96374 THER/PROPH/DIAG INJ IV PUSH: CPT

## 2021-03-30 PROCEDURE — 80048 BASIC METABOLIC PNL TOTAL CA: CPT

## 2021-03-30 PROCEDURE — 85025 COMPLETE CBC W/AUTO DIFF WBC: CPT

## 2021-03-30 RX ORDER — CHLORDIAZEPOXIDE HYDROCHLORIDE 25 MG/1
25 CAPSULE, GELATIN COATED ORAL 3 TIMES DAILY
Status: DISCONTINUED | OUTPATIENT
Start: 2021-03-30 | End: 2021-03-31

## 2021-03-30 RX ADMIN — ACETAMINOPHEN 650 MG: 325 TABLET ORAL at 20:01

## 2021-03-30 RX ADMIN — CHLORDIAZEPOXIDE HYDROCHLORIDE 25 MG: 25 CAPSULE ORAL at 14:31

## 2021-03-30 RX ADMIN — SODIUM CHLORIDE, PRESERVATIVE FREE 10 ML: 5 INJECTION INTRAVENOUS at 20:02

## 2021-03-30 RX ADMIN — CHLORDIAZEPOXIDE HYDROCHLORIDE 25 MG: 25 CAPSULE ORAL at 20:01

## 2021-03-30 RX ADMIN — LORAZEPAM 1 MG: 1 TABLET ORAL at 20:08

## 2021-03-30 RX ADMIN — ENOXAPARIN SODIUM 40 MG: 40 INJECTION SUBCUTANEOUS at 20:01

## 2021-03-30 RX ADMIN — LORAZEPAM 2 MG: 1 TABLET ORAL at 10:11

## 2021-03-30 RX ADMIN — LORAZEPAM 1 MG: 1 TABLET ORAL at 13:07

## 2021-03-30 RX ADMIN — ACETAMINOPHEN 650 MG: 325 TABLET ORAL at 02:22

## 2021-03-30 RX ADMIN — LORAZEPAM 1 MG: 2 INJECTION INTRAMUSCULAR; INTRAVENOUS at 02:30

## 2021-03-30 RX ADMIN — Medication 1 TABLET: at 08:23

## 2021-03-30 RX ADMIN — FOLIC ACID 1 MG: 1 TABLET ORAL at 08:24

## 2021-03-30 RX ADMIN — SODIUM CHLORIDE, PRESERVATIVE FREE 10 ML: 5 INJECTION INTRAVENOUS at 08:44

## 2021-03-30 RX ADMIN — CHLORDIAZEPOXIDE HYDROCHLORIDE 25 MG: 25 CAPSULE ORAL at 08:24

## 2021-03-30 RX ADMIN — LORAZEPAM 2 MG: 1 TABLET ORAL at 08:23

## 2021-03-30 RX ADMIN — THIAMINE HCL TAB 100 MG 100 MG: 100 TAB at 08:24

## 2021-03-30 RX ADMIN — PROMETHAZINE HYDROCHLORIDE 12.5 MG: 25 TABLET ORAL at 08:39

## 2021-03-30 ASSESSMENT — PAIN DESCRIPTION - PAIN TYPE: TYPE: OTHER (COMMENT)

## 2021-03-30 ASSESSMENT — PAIN DESCRIPTION - DESCRIPTORS: DESCRIPTORS: HEADACHE

## 2021-03-30 ASSESSMENT — PAIN DESCRIPTION - ORIENTATION: ORIENTATION: OTHER (COMMENT)

## 2021-03-30 ASSESSMENT — PAIN DESCRIPTION - FREQUENCY: FREQUENCY: CONTINUOUS

## 2021-03-30 ASSESSMENT — PAIN - FUNCTIONAL ASSESSMENT: PAIN_FUNCTIONAL_ASSESSMENT: ACTIVITIES ARE NOT PREVENTED

## 2021-03-30 NOTE — PROGRESS NOTES
[facility] Hospitalists      Progress Note    Patient:  She Michel  YOB: 1980  Date of Service: 3/30/2021  MRN: [id number]   Acct: [de-identified]   Primary Care Physician: DOTTIE Mcclelland CNP  Advance Directive: Full Code  Admit Date: 3/29/2021       Hospital Day: 0    Portions of this note have been copied forward, however, updated to reflect the most current clinical status of this patient. CHIEF COMPLAINT alcohol intoxication     SUBJECTIVE:  Mr. Delano uYn appeared sleepy but easily arousal. He did not participate much in exam, noticed startling at times while assessing patient. Denies thoughts of harming himself or others at this time. CUMULATIVE HOSPITAL COURSE:   The patient is a 41 y.o. male with past medical history of alcohol abuse, and Suicidal ideation who presented to [address] [place] [address] ED for evaluation after he had an outpatient 72-hr hold placed on him. Patient was being taken to [facility] (outpatient psychiatric facility) but was found to be intoxicated with alcohol and could not be accepted at the facility and was sent to [address] [place] [address] ED for evaluation. Patient is a poor historian. He reported drinking \"lot of Vodka\". Upon review of chart, patient was recently admitted here with alcohol intoxication complications last month. Denied thoughts of harming himself or others at this time. Workup in ED revealed alcohol level of 346. Valium and Risperdal was given in ED to keep him relaxed, without improvement. Patient was admitted to hospital medicine with Alcohol intoxication. Alcohol level 102 today, recheck in am. Librium 25mg tid added.      Review of Systems   Unable to perform ROS: Psychiatric disorder (pt still appears to be withdrawing, answers some questions )        Objective:   VITALS:  BP (!) 149/90   Pulse 113   Temp 98.8 °F (37.1 °C) (Temporal)   Resp 20   Ht 5' 9\" (1.753 m)   Wt 168 lb 5 oz (76.3 kg)   SpO2 97%   BMI 24.86 kg/m²   24HR INTAKE/OUTPUT:      Intake/Output Summary (Last 24 hours) at 3/30/2021 1501  Last data filed at 3/30/2021 1319  Gross per 24 hour   Intake 1735 ml   Output --   Net 1735 ml       Physical Exam  Constitutional:       General: He is not in acute distress. Appearance: Normal appearance. He is not ill-appearing. Comments: Sleepy but easily arousal. Noticed startling at times while assessing patient. HENT:      Head: Normocephalic and atraumatic. Right Ear: External ear normal.      Left Ear: External ear normal.      Nose: Nose normal.      Mouth/Throat:      Mouth: Mucous membranes are moist.   Eyes:      Extraocular Movements: Extraocular movements intact. Conjunctiva/sclera: Conjunctivae normal.      Pupils: Pupils are equal, round, and reactive to light. Neck:      Musculoskeletal: Normal range of motion and neck supple. No muscular tenderness. Cardiovascular:      Rate and Rhythm: Regular rhythm. Tachycardia present. Pulses: Normal pulses. Heart sounds: Normal heart sounds. Pulmonary:      Effort: Pulmonary effort is normal. No respiratory distress. Breath sounds: Normal breath sounds. No wheezing, rhonchi or rales. Abdominal:      General: Bowel sounds are normal. There is no distension. Palpations: Abdomen is soft. Tenderness: There is no abdominal tenderness. Musculoskeletal: Normal range of motion. General: No swelling, tenderness or deformity. Right lower leg: No edema. Left lower leg: No edema. Skin:     General: Skin is warm and dry. Findings: No bruising or lesion. Neurological:      Mental Status: He is alert and oriented to person, place, and time. Comments: Able to answer all questions correctly. Psychiatric:         Mood and Affect: Mood normal.         Behavior: Behavior normal.         Thought Content: Thought content normal.      Comments: Denies Suicide ideation at this time.              Medications:      sodium chloride        chlordiazePOXIDE  25 mg Oral TID    sodium chloride flush  10 mL Intravenous 2 times per day    enoxaparin  40 mg Subcutaneous Q24H    thiamine  100 mg Oral Daily    multivitamin  1 tablet Oral Daily    folic acid  1 mg Oral Daily    nicotine  1 patch Transdermal Daily     sodium chloride flush, sodium chloride, promethazine **OR** ondansetron, polyethylene glycol, acetaminophen **OR** acetaminophen, potassium chloride **OR** potassium alternative oral replacement **OR** potassium chloride, magnesium sulfate, LORazepam **OR** LORazepam **OR** LORazepam **OR** LORazepam **OR** LORazepam **OR** LORazepam **OR** [DISCONTINUED] LORazepam **OR** [DISCONTINUED] LORazepam  DIET GENERAL;     Lab and other Data:     Recent Labs     03/29/21 1913 03/30/21  0346   WBC 4.3* 4.8   HGB 14.7 13.3*   * 98*     Recent Labs     03/29/21 1913 03/30/21  0346   * 137   K 3.5 3.4*   CL 88* 94*   CO2 26 24   BUN 8 10   CREATININE 0.6 0.6   GLUCOSE 89 144*     Recent Labs     03/29/21 1913   *   ALT 78*   BILITOT 0.7   ALKPHOS 139*     UA:  Recent Labs     03/29/21 1913   COLORU YELLOW   PHUR 5.5   WBCUA 0-1   BACTERIA 1+*   CLARITYU Clear   SPECGRAV 1.005   LEUKOCYTESUR Negative   UROBILINOGEN 0.2   BILIRUBINUR Negative   BLOODU SMALL*   GLUCOSEU Negative       Micro:    Rapid COVID- negative     Assessment/Plan   Principal Problem:    Alcohol dependence with withdrawal delirium (HCC)  Active Problems:    Alcohol abuse with intoxication ([facility] [address]    Alcohol withdrawal syndrome, uncomplicated ([facility] [address]  Resolved Problems:    * No resolved hospital problems.  *    Principal Problem:    Alcohol dependence with withdrawal delirium (HCC)/ Alcohol abuse with intoxication (HCC)/ Alcohol withdrawal syndrome, uncomplicated ([facility] [address]-              - Continue to monitor for withdrawal               - Alcohol level 102, recheck in am               - Continue daily Folic acid, thiamine and multivitamin               - Continue [facility] protocol and PRN Ativan    - Librium tid added. - Continue to monitor on telemetry               - Counseled               - Sitter at bedside- discontinued at this time, pt staying in bed currently. - Social work following to facilitate with placement to [facility], Will need to contact  GELY Tobias at discharge for transportation to [facility].                - Denies SI/HI          DVT Prophylaxis: on Lovenox     Further Orders per Clinical course/attending.      Electronically signed by DOTTIE Hirsch CNP on 3/30/2021 at 3:01 PM

## 2021-03-30 NOTE — PROGRESS NOTES
12 hour chart check review completed. Electronically signed by Leisa Whittaker LPN on 3/75/7376 at 56:31 AM

## 2021-03-30 NOTE — PROGRESS NOTES
4 Eyes Skin Assessment    [facility] is being assessed upon: Admission    I agree that I, Flaco Pineda, along with Ashanti RN (either 2 RN's or 1 LPN and 1 RN) have performed a thorough Head to Toe Skin Assessment on the patient. ALL assessment sites listed below have been assessed. Areas assessed by both nurses:     [x]   Head, Face, and Ears   [x]   Shoulders, Back, and Chest  [x]   Arms, Elbows, and Hands   [x]   Coccyx, Sacrum, and Ischium  [x]   Legs, Feet, and Heels    Does the Patient have Skin Breakdown?  No    Peter Prevention initiated: No  Wound Care Orders initiated: No    [facility] nurse consulted for Pressure Injury (Stage 3,4, Unstageable, DTI, NWPT, and Complex wounds) and New or Established Ostomies: No        Primary Nurse eSignature: Flaco Pineda LPN on 8/33/3520 at 91:15 AM      Co-Signer eSignature: Electronically signed by Rae Lemus RN on 3/30/21 at 12:41 AM CDT

## 2021-03-30 NOTE — PLAN OF CARE
Problem: Falls - Risk of:  Goal: Will remain free from falls  Description: Will remain free from falls  3/29/2021 2151 by Livier Thapa LPN  Outcome: Ongoing  3/29/2021 2151 by Livier Thapa LPN  Outcome: Ongoing  Goal: Absence of physical injury  Description: Absence of physical injury  3/29/2021 2151 by Livier Thapa LPN  Outcome: Ongoing  3/29/2021 2151 by Livier Thapa LPN  Outcome: Ongoing     Problem: Skin Integrity:  Goal: Will show no infection signs and symptoms  Description: Will show no infection signs and symptoms  3/29/2021 2151 by Livier Thapa LPN  Outcome: Ongoing  3/29/2021 2151 by Livier Thapa LPN  Outcome: Ongoing  Goal: Absence of new skin breakdown  Description: Absence of new skin breakdown  3/29/2021 2151 by Livier Thapa LPN  Outcome: Ongoing  3/29/2021 2151 by Livier Thapa LPN  Outcome: Ongoing     Problem: Discharge Planning:  Goal: Discharged to appropriate level of care  Description: Discharged to appropriate level of care  3/29/2021 2151 by Livier Thapa LPN  Outcome: Ongoing  3/29/2021 2151 by Livier Thapa LPN  Outcome: Ongoing     Problem: Fluid Volume - Deficit:  Goal: Absence of fluid volume deficit signs and symptoms  Description: Absence of fluid volume deficit signs and symptoms  3/29/2021 2151 by Livier Thapa LPN  Outcome: Ongoing  3/29/2021 2151 by Livier Thapa LPN  Outcome: Ongoing     Problem: Nutrition Deficit:  Goal: Ability to achieve adequate nutritional intake will improve  Description: Ability to achieve adequate nutritional intake will improve  3/29/2021 2151 by Livier Thapa LPN  Outcome: Ongoing  3/29/2021 2151 by Livier Thapa LPN  Outcome: Ongoing     Problem: Sleep Pattern Disturbance:  Goal: Appears well-rested  Description: Appears well-rested  3/29/2021 2151 by Livier Thapa LPN  Outcome: Ongoing  3/29/2021 2151 by Livier Thapa LPN  Outcome: Ongoing     Problem: Violence - Risk of, Self/Other-Directed:  Goal: Knowledge of developmental care interventions  Description: Absence of violence  3/29/2021 2151 by Rebecca Mary LPN  Outcome: Ongoing  3/29/2021 2151 by Rebecca Mary LPN  Outcome: Ongoing     Problem: Suicide risk  Goal: Provide patient with safe environment  Description: Provide patient with safe environment  3/29/2021 2151 by Rebecca Mary LPN  Outcome: Ongoing  3/29/2021 2151 by Rebecca Mary LPN  Outcome: Ongoing     Problem: Fluid Volume:  Goal: Ability to achieve a balanced intake and output will improve  Description: Ability to achieve a balanced intake and output will improve  3/29/2021 2151 by Rebecca Mary LPN  Outcome: Ongoing  3/29/2021 2151 by Rebecca Mary LPN  Outcome: Ongoing     Problem: Physical Regulation:  Goal: Ability to maintain clinical measurements within normal limits will improve  Description: Ability to maintain clinical measurements within normal limits will improve  3/29/2021 2151 by Rebecca Mary LPN  Outcome: Ongoing  3/29/2021 2151 by Rebecca Mary LPN  Outcome: Ongoing  Goal: Will show no signs and symptoms of electrolyte imbalance  Description: Will show no signs and symptoms of electrolyte imbalance  3/29/2021 2151 by Rebecca Mary LPN  Outcome: Ongoing  3/29/2021 2151 by Rebecca Mary LPN  Outcome: Ongoing

## 2021-03-30 NOTE — PROGRESS NOTES
Tony Medeiros arrived to room # [id number]. Presented with: alcohol dependence with withdrawal delirium. Mental Status: Patient is oriented, alert, coherent, logical, thought processes intact and able to concentrate and follow conversation. Vitals:    03/29/21 2116   BP: 129/68   Pulse: 119   Resp: 18   Temp: 98.9 °F (37.2 °C)   SpO2: 95%     Patient safety contract and falls prevention contract reviewed with patient Yes. Oriented Patient to room. Call light within reach. Yes.   Needs, issues or concerns expressed at this time: no.      Electronically signed by Anastasia Lozano LPN on 6/76/2902 at 0:76 PM

## 2021-03-30 NOTE — H&P
General: Skin is warm. Findings: No bruising or lesion. Neurological:      Mental Status: He is alert. Comments: A&O X 2, Oriented to person and place, not able to tell the year    Psychiatric:      Comments: Denies Suicidal ideation at this time    Inattentive and anxious           Diagnostic Data:  CBC:  Recent Labs     03/29/21 1913   WBC 4.3*   HGB 14.7   HCT 41.5*   *     BMP:  Recent Labs     03/29/21 1913   *   K 3.5   CL 88*   CO2 26   BUN 8   CREATININE 0.6   CALCIUM 8.5*     Recent Labs     03/29/21 1913   *   ALT 78*   BILITOT 0.7   ALKPHOS 139*       ABGs:  Lab Results   Component Value Date    PHART 7.430 02/28/2021    PO2ART 139.0 02/28/2021    TSE7WDF 38.0 02/28/2021     Urinalysis:  Lab Results   Component Value Date    NITRU Negative 03/29/2021    WBCUA 0-1 03/29/2021    BACTERIA 1+ 03/29/2021    RBCUA 0-1 02/28/2021    BLOODU SMALL 03/29/2021    SPECGRAV 1.005 03/29/2021    GLUCOSEU Negative 03/29/2021       Assessment/Plan:  Principal Problem:    Alcohol dependence with withdrawal delirium ([facility] [address]  Active Problems:    Alcohol abuse with intoxication ([facility] [address]  Resolved Problems:    * No resolved hospital problems. *     Principal Problem:    Alcohol dependence with withdrawal delirium (HCC)/ Alcohol abuse with intoxication ([facility] [address]-    - Admit to Medsurg    - Monitor for withdrawal    - Alcohol level 346, recheck in am    - Banana bag today then daily Folic acid, thiamine and multivitamin    - CIWA protocol and PRN Ativan    - Monitor on telemetry    - Counseled    - Sitter at bedside- pt unable to stay in bed, agitated keeps getting out of bed    - Social work consulted to facilitate with placement to [facility]    - Denies SI/HI       Further Orders per Clinical course/attending.      Signed:  Electronically signed by DOTTIE Clement CNP on 3/29/21 at 9:05 PM CDT

## 2021-03-30 NOTE — ED PROVIDER NOTES
[facility] EMERGENCY DEPT  EMERGENCY DEPARTMENT ENCOUNTER      Pt Name: Jonna Garcia  MRN: [id number]  [place] 1980  Date of evaluation: 3/29/2021  Provider: Rhonda Morrison MD    CHIEF COMPLAINT       Chief Complaint   Patient presents with    Alcohol Intoxication         HISTORY OF PRESENT ILLNESS   (Location/Symptom, Timing/Onset,Context/Setting, Quality, Duration, Modifying Factors, Severity)  Note limiting factors. Jonna Garcia is a 41 y.o. male who presents to the emergency department for evaluation after he had an outpatient 72-hour hold placed on him. Patient was taken to an outpatient psychiatric facility today but was found to be intoxicated with alcohol and could not be accepted there and then was sent here to the emergency department. Patient reports drinking a lot of alcohol today but denies any specific complaints at this time. Patient has been admitted here several times in the past for all complications. HPI    NursingNotes were reviewed. REVIEW OF SYSTEMS    (2-9 systems for level 4, 10 or more for level 5)     Review of Systems   Unable to perform ROS: Psychiatric disorder       A complete review of systems was performed and is negative except as noted above in the HPI.        PAST MEDICAL HISTORY     Past Medical History:   Diagnosis Date    Dog bite     Neuromuscular disorder ([facility] [address]     Psychiatric problem          SURGICAL HISTORY       Past Surgical History:   Procedure Laterality Date    ADENOIDECTOMY      COLONOSCOPY      TYMPANOSTOMY TUBE PLACEMENT           CURRENT MEDICATIONS       Previous Medications    FOLIC ACID (FOLVITE) 1 MG TABLET    Take 1 tablet by mouth daily    MULTIPLE VITAMIN (MULTIVITAMIN) TABS TABLET    Take 1 tablet by mouth daily    NICOTINE (NICODERM CQ) 21 MG/24HR    Place 1 patch onto the skin daily    PAROXETINE (PAXIL) 40 MG TABLET    Take 75 mg by mouth every morning    THIAMINE MONONITRATE 100 MG TABLET    Take 1 tablet by mouth daily ALLERGIES     Patient has no known allergies.     FAMILY HISTORY       Family History   Problem Relation Age of Onset    High Blood Pressure Mother           SOCIAL HISTORY       Social History     Socioeconomic History    Marital status:      Spouse name: None    Number of children: None    Years of education: None    Highest education level: None   Occupational History    None   Social Needs    Financial resource strain: None    Food insecurity     Worry: None     Inability: None    Transportation needs     Medical: None     Non-medical: None   Tobacco Use    Smoking status: Current Every Day Smoker     Packs/day: 2.00     Types: Cigarettes    Smokeless tobacco: Never Used   Substance and Sexual Activity    Alcohol use: Yes     Comment: \"every chance I get\"    Drug use: No    Sexual activity: None   Lifestyle    Physical activity     Days per week: None     Minutes per session: None    Stress: None   Relationships    Social connections     Talks on phone: None     Gets together: None     Attends Roman Catholic service: None     Active member of club or organization: None     Attends meetings of clubs or organizations: None     Relationship status: None    Intimate partner violence     Fear of current or ex partner: None     Emotionally abused: None     Physically abused: None     Forced sexual activity: None   Other Topics Concern    None   Social History Narrative    None       SCREENINGS    [place] Coma Scale  Eye Opening: Spontaneous  Best Verbal Response: Confused  Best Motor Response: Obeys commands  [place] Coma Scale Score: 14        PHYSICAL EXAM    (up to 7 for level 4, 8 or more for level 5)     ED Triage Vitals   BP Temp Temp Source Pulse Resp SpO2 Height Weight   03/29/21 1857 03/29/21 2018 03/29/21 2018 03/29/21 1857 03/29/21 1857 03/29/21 1857 03/29/21 1857 --   (!) 169/102 98.9 °F (37.2 °C) Oral 112 14 94 % 5' 9\" (1.753 m)        Physical Exam  Vitals signs and nursing DIFFERENTIAL - Abnormal; Notable for the following components:       Result Value    WBC 4.3 (*)     RBC 4.23 (*)     Hematocrit 41.5 (*)     MCV 98.1 (*)     MCH 34.8 (*)     Platelets 010 (*)     Monocytes % 11.0 (*)     All other components within normal limits   COMPREHENSIVE METABOLIC PANEL - Abnormal; Notable for the following components:    Sodium 132 (*)     Chloride 88 (*)     Calcium 8.5 (*)     Alkaline Phosphatase 139 (*)     ALT 78 (*)      (*)     All other components within normal limits   URINE RT REFLEX TO CULTURE - Abnormal; Notable for the following components:    Ketones, Urine 15 (*)     Blood, Urine SMALL (*)     Protein,  (*)     All other components within normal limits   MICROSCOPIC URINALYSIS - Abnormal; Notable for the following components:    Bacteria, UA 1+ (*)     Crystals, UA NEG (*)     All other components within normal limits   COVID-19, RAPID   ETHANOL   URINE DRUG SCREEN   BASIC METABOLIC PANEL W/ REFLEX TO MG FOR LOW K   CBC WITH AUTO DIFFERENTIAL       All other labs were within normal range or not returned as of this dictation.     Medications   sodium chloride flush 0.9 % injection 10 mL (has no administration in time range)   sodium chloride flush 0.9 % injection 10 mL (has no administration in time range)   0.9 % sodium chloride infusion (has no administration in time range)   enoxaparin (LOVENOX) injection 40 mg (has no administration in time range)   promethazine (PHENERGAN) tablet 12.5 mg (has no administration in time range)     Or   ondansetron (ZOFRAN) injection 4 mg (has no administration in time range)   polyethylene glycol (GLYCOLAX) packet 17 g (has no administration in time range)   acetaminophen (TYLENOL) tablet 650 mg (has no administration in time range)     Or   acetaminophen (TYLENOL) suppository 650 mg (has no administration in time range)   thiamine mononitrate tablet 100 mg (has no administration in time range)   therapeutic multivitamin-minerals 1 tablet (has no administration in time range)   folic acid (FOLVITE) tablet 1 mg (has no administration in time range)   sodium chloride 0.9 % 9,997 mL with folic acid 1 mg, adult multi-vitamin with vitamin k 10 mL, thiamine 100 mg (has no administration in time range)   potassium chloride (KLOR-CON M) extended release tablet 40 mEq (has no administration in time range)     Or   potassium bicarb-citric acid (EFFER-K) effervescent tablet 40 mEq (has no administration in time range)     Or   potassium chloride 10 mEq/100 mL IVPB (Peripheral Line) (has no administration in time range)   magnesium sulfate 2000 mg in 50 mL IVPB premix (has no administration in time range)   nicotine (NICODERM CQ) 21 MG/24HR 1 patch (has no administration in time range)   LORazepam (ATIVAN) tablet 1 mg (has no administration in time range)     Or   LORazepam (ATIVAN) injection 1 mg (has no administration in time range)     Or   LORazepam (ATIVAN) tablet 2 mg (has no administration in time range)     Or   LORazepam (ATIVAN) injection 2 mg (has no administration in time range)     Or   LORazepam (ATIVAN) tablet 3 mg (has no administration in time range)     Or   LORazepam (ATIVAN) injection 3 mg (has no administration in time range)     Or   LORazepam (ATIVAN) tablet 4 mg (has no administration in time range)     Or   LORazepam (ATIVAN) injection 4 mg (has no administration in time range)   diazePAM (VALIUM) tablet 5 mg (5 mg Oral Given 3/29/21 2020)   risperiDONE (RISPERDAL) tablet 2 mg (2 mg Oral Given 3/29/21 2032)       EMERGENCY DEPARTMENT COURSE and DIFFERENTIALDIAGNOSIS/MDM:   Vitals:    Vitals:    03/29/21 1857 03/29/21 2018   BP: (!) 169/102    Pulse: 112    Resp: 14    Temp:  98.9 °F (37.2 °C)   TempSrc:  Oral   SpO2: 94%    Height: 5' 9\" (1.753 m)        MDM  Patient is known to have severe alcohol withdrawal symptoms even with elevated ethanol levels.   On evaluation here, does not appear to have any signs of alcohol withdrawal.  Patient was given Valium as well as Risperdal here to keep him relaxed     Based on the evaluation and work-up here patient is felt to require further monitoring, work-up, or treatment that is available in the emergency department. Case was discussed with hospitalist who agrees for observation or admission for further management. Treatment and stabilization as necessary were provided in the emergency department prior to transfer of care to the medicine service. CONSULTS:  IP CONSULT TO SOCIAL WORK    PROCEDURES:  Unless otherwise notedbelow, none     Procedures      FINAL IMPRESSION     1. Acute alcoholic intoxication with delirium ([facility] [address]    2. Chronic alcohol abuse          DISPOSITION/PLAN   DISPOSITION Admitted 03/29/2021 08:49:20 PM      PATIENT REFERRED TO:  No follow-up provider specified.     DISCHARGE MEDICATIONS:  New Prescriptions    No medications on file          (Please note that portions of this note were completed with a voice recognition program.  Efforts were made to edit the dictations butoccasionally words are mis-transcribed.)    Thomas Osler, MD (electronically signed)  AttendingEmergency Physician          Thomas Osler., MD  03/29/21 2100

## 2021-03-30 NOTE — ED TRIAGE NOTES
Brought in via EMS for alcohol intoxication. Per pt he drank [place] lot of vodka\" today. Per EMS he had possible seizure activity this morning. EMS accompanied by [facility] with 72 hour emergency hold paperwork.  Electronically signed by Antonio Ramirez RN on 3/29/2021 at 7:05 PM

## 2021-03-30 NOTE — CARE COORDINATION
[facility]. Pt was to be assessed on a mental health petition at their facility, however was intoxicated when picked up by deputies. The facility was unable to assess pt under the influence, deputy was instructed to bring pt here for medical clearance. Once pt is medically stable, we are to call GELY Tobias at [phone], they will transport pt back to [facility]. An Examination and Transport Order for 72 Hour Hospitalization ([place]RS 202A.051; 202A. 028 has been provided by the [employer], stating that pt be \"delivered to: [facility], [facility], without unnecessary delay by the [employer] or other . .... \".     Electronically signed by Juancho García on 3/30/2021 at 11:11 AM

## 2021-03-30 NOTE — PLAN OF CARE
improve  3/30/2021 0308 by Juan Pablo Goldstein LPN  Outcome: Ongoing  3/29/2021 2151 by Juan Pablo Goldstein LPN  Outcome: Ongoing  3/29/2021 2151 by Juan Pablo Goldstein LPN  Outcome: Ongoing     Problem: Sleep Pattern Disturbance:  Goal: Appears well-rested  Description: Appears well-rested  3/30/2021 0308 by Juan Pablo Goldstein LPN  Outcome: Ongoing  3/29/2021 2151 by Juan Pablo Goldstein LPN  Outcome: Ongoing  3/29/2021 2151 by Juan Pablo Goldstein LPN  Outcome: Ongoing     Problem: Violence - Risk of, Self/Other-Directed:  Goal: Knowledge of developmental care interventions  Description: Absence of violence  3/30/2021 0308 by Juan Pablo Goldstein LPN  Outcome: Ongoing  3/29/2021 2151 by Juan Pablo Goldstein LPN  Outcome: Ongoing  3/29/2021 2151 by Juan Pablo Goldstein LPN  Outcome: Ongoing     Problem: Suicide risk  Goal: Provide patient with safe environment  Description: Provide patient with safe environment  3/30/2021 0308 by Juan Pablo Goldstein LPN  Outcome: Ongoing  3/29/2021 2151 by Juan Pablo Goldstein LPN  Outcome: Ongoing  3/29/2021 2151 by Juan Pablo Goldstein LPN  Outcome: Ongoing     Problem: Fluid Volume:  Goal: Ability to achieve a balanced intake and output will improve  Description: Ability to achieve a balanced intake and output will improve  3/30/2021 0308 by Juan Pablo Goldstein LPN  Outcome: Ongoing  3/29/2021 2151 by Juan Pablo Goldstein LPN  Outcome: Ongoing  3/29/2021 2151 by Juan Pablo Goldstein LPN  Outcome: Ongoing     Problem: Physical Regulation:  Goal: Ability to maintain clinical measurements within normal limits will improve  Description: Ability to maintain clinical measurements within normal limits will improve  3/30/2021 0308 by Juan Pablo Goldstein LPN  Outcome: Ongoing  3/29/2021 2151 by Juan Pablo Goldstein LPN  Outcome: Ongoing  3/29/2021 2151 by Juan Pablo Goldstein LPN  Outcome: Ongoing  Goal: Will show no signs and symptoms of electrolyte imbalance  Description: Will show no signs and symptoms of electrolyte imbalance  3/30/2021 0308 by Leisa Whittaker LPN  Outcome: Ongoing  3/29/2021 2151 by Leisa Whittaker LPN  Outcome: Ongoing  3/29/2021 2151 by Leisa Whittaker LPN  Outcome: Ongoing

## 2021-03-31 LAB
ANION GAP SERPL CALCULATED.3IONS-SCNC: 14 MMOL/L (ref 7–19)
BASOPHILS ABSOLUTE: 0 K/UL (ref 0–0.2)
BASOPHILS RELATIVE PERCENT: 0.8 % (ref 0–1)
BUN BLDV-MCNC: 12 MG/DL (ref 6–20)
CALCIUM SERPL-MCNC: 9.1 MG/DL (ref 8.6–10)
CHLORIDE BLD-SCNC: 98 MMOL/L (ref 98–111)
CO2: 28 MMOL/L (ref 22–29)
CREAT SERPL-MCNC: 0.5 MG/DL (ref 0.5–1.2)
EOSINOPHILS ABSOLUTE: 0.1 K/UL (ref 0–0.6)
EOSINOPHILS RELATIVE PERCENT: 2.4 % (ref 0–5)
ETHANOL: <10 MG/DL (ref 0–0.08)
GFR AFRICAN AMERICAN: >59
GFR NON-AFRICAN AMERICAN: >60
GLUCOSE BLD-MCNC: 106 MG/DL (ref 74–109)
HCT VFR BLD CALC: 42.4 % (ref 42–52)
HEMOGLOBIN: 14.4 G/DL (ref 14–18)
IMMATURE GRANULOCYTES #: 0 K/UL
LYMPHOCYTES ABSOLUTE: 1.4 K/UL (ref 1.1–4.5)
LYMPHOCYTES RELATIVE PERCENT: 28 % (ref 20–40)
MAGNESIUM: 2.3 MG/DL (ref 1.6–2.6)
MCH RBC QN AUTO: 34.3 PG (ref 27–31)
MCHC RBC AUTO-ENTMCNC: 34 G/DL (ref 33–37)
MCV RBC AUTO: 101 FL (ref 80–94)
MONOCYTES ABSOLUTE: 0.4 K/UL (ref 0–0.9)
MONOCYTES RELATIVE PERCENT: 8.4 % (ref 0–10)
NEUTROPHILS ABSOLUTE: 3 K/UL (ref 1.5–7.5)
NEUTROPHILS RELATIVE PERCENT: 60.2 % (ref 50–65)
PDW BLD-RTO: 13.9 % (ref 11.5–14.5)
PLATELET # BLD: 80 K/UL (ref 130–400)
PMV BLD AUTO: 11.3 FL (ref 9.4–12.4)
POTASSIUM REFLEX MAGNESIUM: 3.2 MMOL/L (ref 3.5–5)
RBC # BLD: 4.2 M/UL (ref 4.7–6.1)
SODIUM BLD-SCNC: 140 MMOL/L (ref 136–145)
WBC # BLD: 5 K/UL (ref 4.8–10.8)

## 2021-03-31 PROCEDURE — 6360000002 HC RX W HCPCS: Performed by: NURSE PRACTITIONER

## 2021-03-31 PROCEDURE — 6370000000 HC RX 637 (ALT 250 FOR IP): Performed by: NURSE PRACTITIONER

## 2021-03-31 PROCEDURE — 82077 ASSAY SPEC XCP UR&BREATH IA: CPT

## 2021-03-31 PROCEDURE — 36415 COLL VENOUS BLD VENIPUNCTURE: CPT

## 2021-03-31 PROCEDURE — 2580000003 HC RX 258: Performed by: NURSE PRACTITIONER

## 2021-03-31 PROCEDURE — 85025 COMPLETE CBC W/AUTO DIFF WBC: CPT

## 2021-03-31 PROCEDURE — 80048 BASIC METABOLIC PNL TOTAL CA: CPT

## 2021-03-31 PROCEDURE — 83735 ASSAY OF MAGNESIUM: CPT

## 2021-03-31 PROCEDURE — 6370000000 HC RX 637 (ALT 250 FOR IP): Performed by: HOSPITALIST

## 2021-03-31 PROCEDURE — 1210000000 HC MED SURG R&B

## 2021-03-31 RX ORDER — CALCIUM CARBONATE 200(500)MG
1000 TABLET,CHEWABLE ORAL 3 TIMES DAILY PRN
Status: DISCONTINUED | OUTPATIENT
Start: 2021-03-31 | End: 2021-04-01 | Stop reason: HOSPADM

## 2021-03-31 RX ADMIN — ACETAMINOPHEN 650 MG: 325 TABLET ORAL at 20:15

## 2021-03-31 RX ADMIN — POTASSIUM CHLORIDE 40 MEQ: 1500 TABLET, EXTENDED RELEASE ORAL at 04:51

## 2021-03-31 RX ADMIN — LORAZEPAM 1 MG: 1 TABLET ORAL at 20:03

## 2021-03-31 RX ADMIN — Medication 1 TABLET: at 09:22

## 2021-03-31 RX ADMIN — FOLIC ACID 1 MG: 1 TABLET ORAL at 09:22

## 2021-03-31 RX ADMIN — CHLORDIAZEPOXIDE HYDROCHLORIDE 25 MG: 25 CAPSULE ORAL at 09:22

## 2021-03-31 RX ADMIN — ACETAMINOPHEN 650 MG: 325 TABLET ORAL at 13:32

## 2021-03-31 RX ADMIN — THIAMINE HCL TAB 100 MG 100 MG: 100 TAB at 09:22

## 2021-03-31 RX ADMIN — LORAZEPAM 2 MG: 1 TABLET ORAL at 07:52

## 2021-03-31 RX ADMIN — LORAZEPAM 2 MG: 1 TABLET ORAL at 16:29

## 2021-03-31 RX ADMIN — CHLORDIAZEPOXIDE HYDROCHLORIDE 25 MG: 25 CAPSULE ORAL at 13:33

## 2021-03-31 RX ADMIN — PROMETHAZINE HYDROCHLORIDE 12.5 MG: 25 TABLET ORAL at 04:27

## 2021-03-31 RX ADMIN — LORAZEPAM 1 MG: 1 TABLET ORAL at 01:38

## 2021-03-31 RX ADMIN — PROMETHAZINE HYDROCHLORIDE 12.5 MG: 25 TABLET ORAL at 13:33

## 2021-03-31 RX ADMIN — ENOXAPARIN SODIUM 40 MG: 40 INJECTION SUBCUTANEOUS at 20:03

## 2021-03-31 RX ADMIN — ACETAMINOPHEN 650 MG: 325 TABLET ORAL at 01:38

## 2021-03-31 RX ADMIN — LORAZEPAM 2 MG: 1 TABLET ORAL at 13:33

## 2021-03-31 RX ADMIN — ANTACID TABLETS 1000 MG: 500 TABLET, CHEWABLE ORAL at 15:24

## 2021-03-31 RX ADMIN — LORAZEPAM 2 MG: 1 TABLET ORAL at 04:26

## 2021-03-31 RX ADMIN — SODIUM CHLORIDE, PRESERVATIVE FREE 10 ML: 5 INJECTION INTRAVENOUS at 09:22

## 2021-03-31 ASSESSMENT — ENCOUNTER SYMPTOMS
COLOR CHANGE: 0
VOMITING: 0
CONSTIPATION: 0
SHORTNESS OF BREATH: 0
BLOOD IN STOOL: 0
NAUSEA: 0
WHEEZING: 0
ABDOMINAL DISTENTION: 0
COUGH: 0
ABDOMINAL PAIN: 0
DIARRHEA: 0

## 2021-03-31 ASSESSMENT — PAIN DESCRIPTION - LOCATION: LOCATION: HEAD

## 2021-03-31 ASSESSMENT — PAIN SCALES - GENERAL
PAINLEVEL_OUTOF10: 4
PAINLEVEL_OUTOF10: 3

## 2021-03-31 ASSESSMENT — PAIN DESCRIPTION - PAIN TYPE: TYPE: ACUTE PAIN

## 2021-03-31 NOTE — PROGRESS NOTES
[facility]      Progress Note    Patient:  Ruchi Garcia  YOB: 1980  Date of Service: 3/31/2021  MRN: [id number]   Acct: [de-identified]   Primary Care Physician: DOTTIE Caro CNP  Advance Directive: Full Code  Admit Date: 3/29/2021       Hospital Day: 1    Portions of this note have been copied forward, however, updated to reflect the most current clinical status of this patient. CHIEF COMPLAINT alcohol intoxication     SUBJECTIVE:  Mr. Shasta Warner again appears to be sleepy but easily arousal. He minimially participated in exam today. He is oriented X 3. Denies thoughts of harming himself or others at this time. CUMULATIVE HOSPITAL COURSE:   The patient is a 41 y.o. male with past medical history of alcohol abuse, and Suicidal ideation who presented to [address] [place] [address] ED for evaluation after he had an outpatient 72-hr hold placed on him. Patient was being taken to [facility] (outpatient psychiatric facility) but was found to be intoxicated with alcohol and could not be accepted at the facility and was sent to [address] [place] [address] ED for evaluation. Patient is a poor historian. He reported drinking \"lot of Vodka\". Upon review of chart, patient was recently admitted here with alcohol intoxication complications last month. Denied thoughts of harming himself or others at this time. Workup in ED revealed alcohol level of 346. Valium and Risperdal was given in ED to keep him relaxed, without improvement. Patient was admitted to hospital medicine with Alcohol intoxication. Alcohol level 102 today, recheck in am. Librium 25mg tid added initially, but discontinued today as he appears to be sleepy for most part of the day per nursing. Review of Systems   Constitutional: Negative for chills, diaphoresis, fatigue and fever. Limited ROS due to patient drowsy and minimally wants to participate in exam    HENT: Negative for congestion and ear pain. Eyes: Negative for visual disturbance. Respiratory: Negative for cough, shortness of breath and wheezing. Cardiovascular: Negative for chest pain, palpitations and leg swelling. Gastrointestinal: Negative for abdominal distention, abdominal pain, blood in stool, constipation, diarrhea, nausea and vomiting. Endocrine: Negative for cold intolerance and heat intolerance. Genitourinary: Negative for difficulty urinating, flank pain, frequency and urgency. Musculoskeletal: Negative for arthralgias and myalgias. Skin: Negative for color change and wound. Neurological: Negative for dizziness, syncope, weakness, light-headedness, numbness and headaches. Hematological: Does not bruise/bleed easily. Psychiatric/Behavioral: Negative for agitation, confusion and dysphoric mood. Objective:   VITALS:  BP (!) 141/98   Pulse 94   Temp 98.5 °F (36.9 °C) (Temporal)   Resp 18   Ht 5' 9\" (1.753 m)   Wt 168 lb 5 oz (76.3 kg)   SpO2 97%   BMI 24.86 kg/m²   24HR INTAKE/OUTPUT:      Intake/Output Summary (Last 24 hours) at 3/31/2021 1413  Last data filed at 3/31/2021 0835  Gross per 24 hour   Intake 660 ml   Output --   Net 660 ml       Physical Exam  Constitutional:       General: He is not in acute distress. Appearance: Normal appearance. He is not ill-appearing. Comments: Sleepy but easily arousal.    HENT:      Head: Normocephalic and atraumatic. Right Ear: External ear normal.      Left Ear: External ear normal.      Nose: Nose normal.      Mouth/Throat:      Mouth: Mucous membranes are moist.   Eyes:      Extraocular Movements: Extraocular movements intact. Conjunctiva/sclera: Conjunctivae normal.      Pupils: Pupils are equal, round, and reactive to light. Neck:      Musculoskeletal: Normal range of motion and neck supple. No muscular tenderness. Cardiovascular:      Rate and Rhythm: Regular rhythm. Tachycardia present. Pulses: Normal pulses. Heart sounds: Normal heart sounds.    Pulmonary:      Effort: *   ALT 78*   BILITOT 0.7   ALKPHOS 139*     UA:  Recent Labs     03/29/21  1913   COLORU YELLOW   PHUR 5.5   WBCUA 0-1   BACTERIA 1+*   CLARITYU Clear   SPECGRAV 1.005   LEUKOCYTESUR Negative   UROBILINOGEN 0.2   BILIRUBINUR Negative   BLOODU SMALL*   GLUCOSEU Negative       Micro:    Rapid COVID- negative     Assessment/Plan   Principal Problem:    Alcohol dependence with withdrawal delirium (HCC)  Active Problems:    Alcohol abuse with intoxication (HCC)    Alcohol withdrawal syndrome, uncomplicated (HCC)  Resolved Problems:    * No resolved hospital problems. *    Principal Problem:    Alcohol dependence with withdrawal delirium (HCC)/ Alcohol abuse with intoxication (HCC)/ Alcohol withdrawal syndrome, uncomplicated ([facility] [address]-              - Continue to monitor for withdrawal               - Alcohol level <10, still some tremors present at times per nursing               - Continue daily Folic acid, thiamine and multivitamin               - Continue CIWA protocol and PRN Ativan    - Librium discontinued as patient was staying drowsy. - Continue to monitor on telemetry               - Counseled               - Social work following to facilitate with placement to [facility], Will need to contact  GELY Tobias at discharge for transportation to [facility].                - Denies SI/HI    - Likely to be discharged tomorrow        DVT Prophylaxis: on Lovenox     Further Orders per Clinical course/attending.      Electronically signed by DOTTIE Gonzales CNP on 3/31/2021 at 2:13 PM

## 2021-03-31 NOTE — PLAN OF CARE
Problem: Falls - Risk of:  Goal: Will remain free from falls  Description: Will remain free from falls  Outcome: Ongoing  Goal: Absence of physical injury  Description: Absence of physical injury  Outcome: Ongoing     Problem: Discharge Planning:  Goal: Discharged to appropriate level of care  Description: Discharged to appropriate level of care  Outcome: Ongoing     Problem: Fluid Volume - Deficit:  Goal: Absence of fluid volume deficit signs and symptoms  Description: Absence of fluid volume deficit signs and symptoms  Outcome: Ongoing     Problem: Nutrition Deficit:  Goal: Ability to achieve adequate nutritional intake will improve  Description: Ability to achieve adequate nutritional intake will improve  Outcome: Ongoing     Problem: Sleep Pattern Disturbance:  Goal: Appears well-rested  Description: Appears well-rested  Outcome: Ongoing     Problem: Fluid Volume:  Goal: Ability to achieve a balanced intake and output will improve  Description: Ability to achieve a balanced intake and output will improve  Outcome: Ongoing     Problem: Physical Regulation:  Goal: Ability to maintain clinical measurements within normal limits will improve  Description: Ability to maintain clinical measurements within normal limits will improve  Outcome: Ongoing  Goal: Will show no signs and symptoms of electrolyte imbalance  Description: Will show no signs and symptoms of electrolyte imbalance  Outcome: Ongoing

## 2021-04-01 VITALS
DIASTOLIC BLOOD PRESSURE: 76 MMHG | SYSTOLIC BLOOD PRESSURE: 128 MMHG | WEIGHT: 168.31 LBS | HEIGHT: 69 IN | TEMPERATURE: 97.7 F | HEART RATE: 94 BPM | RESPIRATION RATE: 18 BRPM | BODY MASS INDEX: 24.93 KG/M2 | OXYGEN SATURATION: 97 %

## 2021-04-01 LAB
ANION GAP SERPL CALCULATED.3IONS-SCNC: 11 MMOL/L (ref 7–19)
BASOPHILS ABSOLUTE: 0 K/UL (ref 0–0.2)
BASOPHILS RELATIVE PERCENT: 0.8 % (ref 0–1)
BUN BLDV-MCNC: 18 MG/DL (ref 6–20)
CALCIUM SERPL-MCNC: 9.5 MG/DL (ref 8.6–10)
CHLORIDE BLD-SCNC: 100 MMOL/L (ref 98–111)
CO2: 28 MMOL/L (ref 22–29)
CREAT SERPL-MCNC: 0.6 MG/DL (ref 0.5–1.2)
EOSINOPHILS ABSOLUTE: 0.4 K/UL (ref 0–0.6)
EOSINOPHILS RELATIVE PERCENT: 6.9 % (ref 0–5)
GFR AFRICAN AMERICAN: >59
GFR NON-AFRICAN AMERICAN: >60
GLUCOSE BLD-MCNC: 98 MG/DL (ref 74–109)
HCT VFR BLD CALC: 42.8 % (ref 42–52)
HEMOGLOBIN: 14.6 G/DL (ref 14–18)
IMMATURE GRANULOCYTES #: 0 K/UL
LYMPHOCYTES ABSOLUTE: 1.9 K/UL (ref 1.1–4.5)
LYMPHOCYTES RELATIVE PERCENT: 36.7 % (ref 20–40)
MCH RBC QN AUTO: 34.5 PG (ref 27–31)
MCHC RBC AUTO-ENTMCNC: 34.1 G/DL (ref 33–37)
MCV RBC AUTO: 101.2 FL (ref 80–94)
MONOCYTES ABSOLUTE: 0.6 K/UL (ref 0–0.9)
MONOCYTES RELATIVE PERCENT: 11.1 % (ref 0–10)
NEUTROPHILS ABSOLUTE: 2.2 K/UL (ref 1.5–7.5)
NEUTROPHILS RELATIVE PERCENT: 44.3 % (ref 50–65)
PDW BLD-RTO: 13.5 % (ref 11.5–14.5)
PLATELET # BLD: 67 K/UL (ref 130–400)
PMV BLD AUTO: 11.8 FL (ref 9.4–12.4)
POTASSIUM REFLEX MAGNESIUM: 3.7 MMOL/L (ref 3.5–5)
RBC # BLD: 4.23 M/UL (ref 4.7–6.1)
SODIUM BLD-SCNC: 139 MMOL/L (ref 136–145)
WBC # BLD: 5 K/UL (ref 4.8–10.8)

## 2021-04-01 PROCEDURE — 6370000000 HC RX 637 (ALT 250 FOR IP): Performed by: NURSE PRACTITIONER

## 2021-04-01 PROCEDURE — 85025 COMPLETE CBC W/AUTO DIFF WBC: CPT

## 2021-04-01 PROCEDURE — 80048 BASIC METABOLIC PNL TOTAL CA: CPT

## 2021-04-01 PROCEDURE — 99253 IP/OBS CNSLTJ NEW/EST LOW 45: CPT | Performed by: PSYCHIATRY & NEUROLOGY

## 2021-04-01 PROCEDURE — 36415 COLL VENOUS BLD VENIPUNCTURE: CPT

## 2021-04-01 RX ADMIN — LORAZEPAM 1 MG: 1 TABLET ORAL at 00:16

## 2021-04-01 RX ADMIN — LORAZEPAM 1 MG: 1 TABLET ORAL at 03:27

## 2021-04-01 RX ADMIN — ACETAMINOPHEN 650 MG: 325 TABLET ORAL at 03:27

## 2021-04-01 RX ADMIN — Medication 1 TABLET: at 09:13

## 2021-04-01 RX ADMIN — THIAMINE HCL TAB 100 MG 100 MG: 100 TAB at 09:13

## 2021-04-01 RX ADMIN — PROMETHAZINE HYDROCHLORIDE 12.5 MG: 25 TABLET ORAL at 07:21

## 2021-04-01 RX ADMIN — FOLIC ACID 1 MG: 1 TABLET ORAL at 09:13

## 2021-04-01 ASSESSMENT — PAIN DESCRIPTION - DESCRIPTORS: DESCRIPTORS: HEADACHE

## 2021-04-01 ASSESSMENT — PAIN DESCRIPTION - PAIN TYPE: TYPE: ACUTE PAIN

## 2021-04-01 NOTE — PLAN OF CARE
Problem: Falls - Risk of:  Goal: Will remain free from falls  Description: Will remain free from falls  4/1/2021 0001 by Sena Echevarria RN  Outcome: Ongoing  3/31/2021 1736 by Anibal Sanchez RN  Outcome: Ongoing  Goal: Absence of physical injury  Description: Absence of physical injury  Outcome: Ongoing     Problem: Skin Integrity:  Goal: Will show no infection signs and symptoms  Description: Will show no infection signs and symptoms  4/1/2021 0001 by Sena Echevarria RN  Outcome: Ongoing  3/31/2021 1736 by Anibal Sanchez RN  Outcome: Ongoing  Goal: Absence of new skin breakdown  Description: Absence of new skin breakdown  Outcome: Ongoing     Problem: Discharge Planning:  Goal: Discharged to appropriate level of care  Description: Discharged to appropriate level of care  Outcome: Ongoing     Problem: Fluid Volume - Deficit:  Goal: Absence of fluid volume deficit signs and symptoms  Description: Absence of fluid volume deficit signs and symptoms  Outcome: Ongoing     Problem: Nutrition Deficit:  Goal: Ability to achieve adequate nutritional intake will improve  Description: Ability to achieve adequate nutritional intake will improve  Outcome: Ongoing     Problem: Fluid Volume:  Goal: Ability to achieve a balanced intake and output will improve  Description: Ability to achieve a balanced intake and output will improve  Outcome: Ongoing     Problem: Physical Regulation:  Goal: Ability to maintain clinical measurements within normal limits will improve  Description: Ability to maintain clinical measurements within normal limits will improve  Outcome: Ongoing  Goal: Will show no signs and symptoms of electrolyte imbalance  Description: Will show no signs and symptoms of electrolyte imbalance  Outcome: Ongoing

## 2021-04-01 NOTE — CARE COORDINATION
Pt's mother called, requesting that an involuntary rehab order be filed for pt. She stated that she has already initiated this through the [employer] court system, but they need an attending MD and psych MD to sign off stating that pt would be a danger to himself or others. Suggested that pt's mother follow Examination and Transport Order for 72 Hour Hospitalization ([place]RS 202A.051; 202A.028) for pt to be transported to [facility] for evaluation at [place]. She stated that she has talked with Dr. Jerry Garcia office and he was willing to do the evaluation. Spoke with Dr. Tyler Gilliam, suggested that he reach out to pt's attending MD re: signing form stating that pt would be a danger to himself, to inquire if MD would be agreeable to sign. He also plans to reach out to Dr. Breanna Emanuel for signature. Reached out to [employer]. Joesph Ritchie, [facility], sent this SW the Certification of [employer]essionals Involuntary Treatment-Substance Abuse (.005; 222.431; 626.057) to be completed and signed by both pt's Attending MD and Psychiatric Physician. She stated that court hearing would not be held until tomorrow morning. MD agreeable to sign necessary documentation. Informed Dr . Claudene Moeller RN that paperwork will need to be signed as well. This is to be sent to [email or web address]    Spoke with [facility], stated that pt is medically stable and will be [place] today. Examination and Transport Order still in place, pt can be transported by [place] to [facility] for evaluation at [place]. Electronically signed by Kacey Conn on 4/1/2021 at 11:14 AM      Spoke with Kathrin Durham with [facility] as well as Richardine Kehr, unit director for [facility].  Physician agreeable not to sign court docs d/t the fact that this pt is not an active pt with behavioral health physician nor following with outpt psych team.   Electronically signed by Kacey Conn on 4/1/2021 at 2:03 PM      Pt's MD agreeable to sign, sent form to [email or web address] and [email or web address]. Updated pt's mother about situation, called Co 's office, spoke with assistant, Celsa Bazzi, to update her on situation as well.    Electronically signed by Ashli Little on 4/1/2021 at 2:21 PM

## 2021-04-01 NOTE — PROGRESS NOTES
Called [place] for patient transport to [facility]  Electronically signed by Evelin Dias RN on 4/1/2021 at 2:28 PM

## 2021-04-01 NOTE — PLAN OF CARE
Problem: Falls - Risk of:  Goal: Will remain free from falls  Description: Will remain free from falls  4/1/2021 1150 by Chidi Riddle RN  Outcome: Ongoing  4/1/2021 0001 by Sena Echevarria RN  Outcome: Ongoing  Goal: Absence of physical injury  Description: Absence of physical injury  4/1/2021 1150 by Chidi Riddle RN  Outcome: Ongoing  4/1/2021 0001 by Sena Echevarria RN  Outcome: Ongoing     Problem: Skin Integrity:  Goal: Will show no infection signs and symptoms  Description: Will show no infection signs and symptoms  4/1/2021 1150 by Chidi Riddle RN  Outcome: Ongoing  4/1/2021 0001 by Sena Echevarria RN  Outcome: Ongoing  Goal: Absence of new skin breakdown  Description: Absence of new skin breakdown  4/1/2021 1150 by Chidi Riddle RN  Outcome: Ongoing  4/1/2021 0001 by Sena Echevarria RN  Outcome: Ongoing     Problem: Discharge Planning:  Goal: Discharged to appropriate level of care  Description: Discharged to appropriate level of care  4/1/2021 1150 by Chidi Riddle RN  Outcome: Ongoing  4/1/2021 0001 by Sena Echevarria RN  Outcome: Ongoing     Problem: Fluid Volume - Deficit:  Goal: Absence of fluid volume deficit signs and symptoms  Description: Absence of fluid volume deficit signs and symptoms  4/1/2021 1150 by Chidi Riddle RN  Outcome: Ongoing  4/1/2021 0001 by Sena Echevarria RN  Outcome: Ongoing     Problem: Nutrition Deficit:  Goal: Ability to achieve adequate nutritional intake will improve  Description: Ability to achieve adequate nutritional intake will improve  4/1/2021 1150 by Chidi Riddle RN  Outcome: Ongoing  4/1/2021 0001 by Sena Echevarria RN  Outcome: Ongoing     Problem: Sleep Pattern Disturbance:  Goal: Appears well-rested  Description: Appears well-rested  Outcome: Ongoing     Problem: Violence - Risk of, Self/Other-Directed:  Goal: Knowledge of developmental care interventions  Description: Absence of violence  Outcome: Ongoing     Problem: Suicide risk  Goal: Provide patient with safe environment  Description: Provide patient with safe environment  Outcome: Ongoing     Problem: Fluid Volume:  Goal: Ability to achieve a balanced intake and output will improve  Description: Ability to achieve a balanced intake and output will improve  4/1/2021 1150 by Tyler Chavez RN  Outcome: Ongoing  4/1/2021 0001 by Dejon Babin RN  Outcome: Ongoing     Problem: Physical Regulation:  Goal: Ability to maintain clinical measurements within normal limits will improve  Description: Ability to maintain clinical measurements within normal limits will improve  4/1/2021 1150 by Tyler Chavez RN  Outcome: Ongoing  4/1/2021 0001 by Dejon Babin RN  Outcome: Ongoing  Goal: Will show no signs and symptoms of electrolyte imbalance  Description: Will show no signs and symptoms of electrolyte imbalance  4/1/2021 1150 by Tyler Chavez RN  Outcome: Ongoing  4/1/2021 0001 by Dejon Babin RN  Outcome: Ongoing

## 2021-04-01 NOTE — DISCHARGE SUMMARY
[facility]      Natalie Humphries  :  1980  MRN:  [id number]    Admit date:  3/29/2021  Discharge date:  2021    Discharging Physician:  Dr. Saskia Sol     Advance Directive: Full Code    Consults: psychiatry    Primary Care Physician:  Gabe Marie, APRN - CNP    Discharge Diagnoses:  Principal Problem:    Alcohol dependence with withdrawal delirium ([address]  Active Problems:    Alcohol abuse with intoxication ([address]    Alcohol withdrawal syndrome, uncomplicated ([address]  Resolved Problems:    * No resolved hospital problems. *      Portions of this note have been copied forward, however, changed to reflect the most current clinical status of this patient. Hospital Course: The patient is a 41 y. o. male with past medical history of alcohol abuse, and Suicidal ideation who presented to [facility] ED for evaluation after he had an outpatient 72-hr hold placed on him. Patient was being taken to [facility] (outpatient psychiatric facility) but was found to be intoxicated with alcohol and could not be accepted at the facility and was sent to [facility] ED for evaluation. Patient is a poor historian. He reported drinking \"lot of Vodka\". Upon review of chart, patient was recently admitted here with alcohol intoxication complications last month. Denied thoughts of harming himself or others at this time. Workup in ED revealed alcohol level of 346. Valium and Risperdal was given in ED to keep him relaxed, without improvement. Patient was admitted to hospital medicine with Alcohol intoxication, CIWA protocol with PRN Ativan was initiated. Alcohol level <10 on 3/31/2021. Librium 25mg tid added initially, but discontinued 3/31/2021 as he appears to be sleepy for most part of the day per nursing. He is more alert today, able to carry on conversation and asked about rehab placement. Denies thoughts of suicidal ideation today.      Social service's note:   Pt's mother called, requesting that an involuntary rehab order be filed for pt. She stated that she has already initiated this through the [place] court system, but they need an attending MD and psych MD to sign off stating that pt would be a danger to himself or others. Suggested that pt's mother follow Examination and Transport Order for 72 Hour Hospitalization ([place]RS 202A.051; 202A.028) for pt to be transported to [facility] for evaluation at [place].      She stated that she has talked with Dr. Miguel Morales office and he was willing to do the evaluation. Spoke with Dr. Ulysses Natarajan, suggested that he reach out to pt's attending MD re: signing form stating that pt would be a danger to himself, to inquire if MD would be agreeable to sign. He also plans to reach out to Dr. Jeremiah Hernandez for signature.      Reached out to [employer]. Katherine Emanuel, [facility], sent this SW the Certification of Qual[employer] Professionals Involuntary Treatment-Substance Abuse (.005; 222.431; 248.344) to be completed and signed by both pt's Attending MD and Psychiatric Physician. She stated that court hearing would not be held until tomorrow morning. MD agreeable to sign necessary documentation. Informed Dr Jeanine Senior RN that paperwork will need to be signed as well. This is to be sent to [email or web address]     Spoke with [facility], stated that pt is medically stable and will be [place] today. Examination and Transport Order still in place, pt can be transported by [facility] deputy to [facility] for evaluation at [place]. Electronically signed by Jason Rayo on 4/1/2021 at 11:14 AM    Patient is currently in stable condition to be discharged to [facility].         Pertinent Labs:   CBC:   Recent Labs     03/30/21  0346 03/31/21 0244 04/01/21 0313   WBC 4.8 5.0 5.0   HGB 13.3* 14.4 14.6   PLT 98* 80* 67*     BMP:    Recent Labs     03/30/21  0346 03/31/21 0244 04/01/21 0313    140 139   K 3.4* 3.2* 3.7   CL 94* 98 100   CO2 24 28 28   BUN 10 12 18   CREATININE 0.6 0.5 0.6   GLUCOSE 144* 106 98     Physical Exam:   Vital Signs: /76   Pulse 94   Temp 97.7 °F (36.5 °C)   Resp 18   Ht 5' 9\" (1.753 m)   Wt 168 lb 5 oz (76.3 kg)   SpO2 97%   BMI 24.86 kg/m²   General appearance:. Alert and Cooperative   HEENT: Normocephalic. Chest: lung sounds clear bilaterally without wheezes or rhonchi. Cardiac: RRR, S1, S2 normal. No murmurs, gallops, or rubs auscultated. Abdomen:soft, non-tender; non-distended normal bowel sounds no masses, no organomegaly. Extremities: No clubbing or cyanosis. No peripheral edema. Peripheral pulses palpable. Neurologic: Grossly intact. Discharge Medications:       Early Sera   Home Medication Instructions [id number]    Printed on:04/01/21 1241   Medication Information                      folic acid (FOLVITE) 1 MG tablet  Take 1 tablet by mouth daily             Multiple Vitamin (MULTIVITAMIN) TABS tablet  Take 1 tablet by mouth daily             nicotine (NICODERM CQ) 21 MG/24HR  Place 1 patch onto the skin daily             PARoxetine (PAXIL) 40 MG tablet  Take 75 mg by mouth every morning             thiamine mononitrate 100 MG tablet  Take 1 tablet by mouth daily                 Discharge Instructions: Follow up with DOTTIE Grover CNP in 2 days. Take medications as directed. Resume activity as tolerated. Diet: DIET GENERAL;     Disposition: Patient is medically stable and will be discharged to [facility]. Time spent on discharge 40 minutes spent in assessing patient, reviewing medications, discussion with nursing, confirming safe discharge plan and preparation of discharge summary.     Signed:  Electronically signed by DOTTIE Vilchis CNP on 4/1/21 at 12:41 PM CDT

## 2021-04-01 NOTE — CONSULTS
[facility]  Psychiatry Consult    Reason for Consult: Concern  Evaluation for outpatient rehab placement    The primary source(s) of information include(s):  patient    The patient is a 41 y.o. male with previous psychiatric history of alcohol use disorder, history of stimulant use disorder, substance-induced mood disorder, who has been admitted to medical services secondary to alcohol intoxication, blood alcohol level 346. Patient's chart has been reviewed. Patient had multiple previous admissions to medical services in our hospital with alcohol intoxication and suicidal ideations with last admission 1 month ago. Patient has been consulted by psychiatry in the past due to alcohol-induced mood disorder and suicidal ideations. Primary treatment team addressed patient's withdrawal symptoms from alcohol during this hospital stay. Patient reported that he has been drinking \"for years\" vodka daily, up to fifth a day. Patient denies previous history of withdrawal seizures or DTs when he was coming off of alcohol. He denies any previous history of admissions to rehabilitation facilities for alcohol use disorder. During the interview patient endorses mild withdrawal symptoms from alcohol - \"just some shakes\". He reported that his withdrawal symptoms from alcohol were managed well by currently prescribed medications. Patient denies significant affective symptomatology during the interview, he denies any feeling of depression, anxiety or psychotic symptoms. Patient denies current active suicidal or homicidal ideations, denies any plans. Also, he denies any auditory and visual hallucinations. Discussed with patient option of rehabilitation treatment for alcohol use disorder. Patient stated that his mother is helping him with admission to [facility] rehabilitation facility.   Patient expressed personal desire to get into rehabilitation treatment for alcohol use disorder. PSYCHIATRIC HISTORY:  Diagnoses: Alcohol use disorder, stimulant use disorder in remission, substance-induced mood disorder  Suicide attempts/gestures: Denies   Prior hospitalizations: Denies   Medication trials: Paxil    Allergies:  Patient has no known allergies. Mental Status  Appearance: Poorly groomed, wearing hospital attire. Made intermittent eye contact. Behavior: Slightly withdrawn, calm, cooperative with interview, socially appropriate. Mild psychomotor retardation appreciated. Gait unremarkable. Speech: Normal in tone, volume, and quality. Mood: \"Fine\"   Affect: Mood congruent. Range is slightly restricted. Thought Process: Appears linear and goal oriented. Thought Content: Patient does not have any current active suicidal and homicidal ideations. No overt delusions or paranoia appreciated. Perceptions: Seems patient does not have any auditory or visual hallucinations at present time. Patient did not appear to be responding to internal stimuli. No overt psychosis. Executive Functions: Appear intact. Concentration: Decreased  Orientation: to person, place and situation. Alert. Language: Intact. Fund of information: Intact. Memory: recent and remote appear intact. Impulsivity: Limited  Neurovegitative: Improved appetite, improved sleep  Insight: Limited  Judgment: Limited    Assessment  DSM 5 DIAGNOSIS:  Alcohol use disorder, severe, dependence  Substance induced mood disorder      Recommendations  1. Currently patient is not suicidal, homicidal or psychotic. Patient endorses mild withdrawal symptoms from alcohol. Patient does not meet criteria for psychiatric hospitalization. 2. Patient does have a capacity of making his own medical decisions. 3.  Discussed with patient benefits from admission to rehabilitation facility. Patient reported that he wants to be admitted to [address] [place] [address] rehabilitation facility to address alcohol related issues.   4.  At this time, no recommendations with initiation of psychotropic medications for patient's mental health. 5.  Patient can be discharged from the hospital, when he is medically stable, and pursue a goal with admission to rehabilitation facility for alcohol use disorder. 6.  Psychiatry will sign off today.       Fausto Razo MD

## 2021-07-14 ENCOUNTER — OFFICE VISIT (OUTPATIENT)
Dept: URGENT CARE | Age: 41
End: 2021-07-14

## 2021-07-14 ENCOUNTER — APPOINTMENT (OUTPATIENT)
Dept: GENERAL RADIOLOGY | Age: 41
End: 2021-07-14
Payer: MEDICAID

## 2021-07-14 ENCOUNTER — HOSPITAL ENCOUNTER (EMERGENCY)
Age: 41
Discharge: HOME | End: 2021-07-14
Payer: MEDICAID

## 2021-07-14 VITALS
OXYGEN SATURATION: 94 % | SYSTOLIC BLOOD PRESSURE: 126 MMHG | HEART RATE: 87 BPM | RESPIRATION RATE: 24 BRPM | DIASTOLIC BLOOD PRESSURE: 72 MMHG | WEIGHT: 197 LBS | TEMPERATURE: 98.6 F | HEIGHT: 70 IN | BODY MASS INDEX: 28.2 KG/M2

## 2021-07-14 VITALS
RESPIRATION RATE: 14 BRPM | OXYGEN SATURATION: 93 % | DIASTOLIC BLOOD PRESSURE: 82 MMHG | BODY MASS INDEX: 28.2 KG/M2 | TEMPERATURE: 98.4 F | SYSTOLIC BLOOD PRESSURE: 121 MMHG | HEART RATE: 77 BPM | HEIGHT: 70 IN | WEIGHT: 197 LBS

## 2021-07-14 DIAGNOSIS — R60.9 PERIPHERAL EDEMA: ICD-10-CM

## 2021-07-14 DIAGNOSIS — R06.02 SHORTNESS OF BREATH: ICD-10-CM

## 2021-07-14 DIAGNOSIS — M79.89 LEG SWELLING: Primary | ICD-10-CM

## 2021-07-14 DIAGNOSIS — R53.1 GENERALIZED WEAKNESS: Primary | ICD-10-CM

## 2021-07-14 DIAGNOSIS — R53.83 FATIGUE, UNSPECIFIED TYPE: ICD-10-CM

## 2021-07-14 LAB
ADENOVIRUS BY PCR: NOT DETECTED
ALBUMIN SERPL-MCNC: 4.2 G/DL (ref 3.5–5.2)
ALP BLD-CCNC: 66 U/L (ref 40–130)
ALT SERPL-CCNC: 12 U/L (ref 5–41)
ANION GAP SERPL CALCULATED.3IONS-SCNC: 9 MMOL/L (ref 7–19)
APTT: 32.4 SEC (ref 26–36.2)
AST SERPL-CCNC: 18 U/L (ref 5–40)
BASOPHILS ABSOLUTE: 0.1 K/UL (ref 0–0.2)
BASOPHILS RELATIVE PERCENT: 0.9 % (ref 0–1)
BILIRUB SERPL-MCNC: <0.2 MG/DL (ref 0.2–1.2)
BILIRUBIN URINE: NEGATIVE
BLOOD, URINE: NEGATIVE
BORDETELLA PARAPERTUSSIS BY PCR: NOT DETECTED
BORDETELLA PERTUSSIS BY PCR: NOT DETECTED
BUN BLDV-MCNC: 9 MG/DL (ref 6–20)
CALCIUM SERPL-MCNC: 9.4 MG/DL (ref 8.6–10)
CHLAMYDOPHILIA PNEUMONIAE BY PCR: NOT DETECTED
CHLORIDE BLD-SCNC: 106 MMOL/L (ref 98–111)
CLARITY: CLEAR
CO2: 27 MMOL/L (ref 22–29)
COLOR: YELLOW
CORONAVIRUS 229E BY PCR: NOT DETECTED
CORONAVIRUS HKU1 BY PCR: NOT DETECTED
CORONAVIRUS NL63 BY PCR: NOT DETECTED
CORONAVIRUS OC43 BY PCR: NOT DETECTED
CREAT SERPL-MCNC: 0.6 MG/DL (ref 0.5–1.2)
D DIMER: 0.48 UG/ML FEU (ref 0–0.48)
EOSINOPHILS ABSOLUTE: 0.4 K/UL (ref 0–0.6)
EOSINOPHILS RELATIVE PERCENT: 5.4 % (ref 0–5)
ETHANOL: <10 MG/DL (ref 0–0.08)
GFR AFRICAN AMERICAN: >59
GFR NON-AFRICAN AMERICAN: >60
GLUCOSE BLD-MCNC: 121 MG/DL (ref 74–109)
GLUCOSE URINE: NEGATIVE MG/DL
HCT VFR BLD CALC: 38.1 % (ref 42–52)
HEMOGLOBIN: 12.7 G/DL (ref 14–18)
HUMAN METAPNEUMOVIRUS BY PCR: NOT DETECTED
HUMAN RHINOVIRUS/ENTEROVIRUS BY PCR: NOT DETECTED
IMMATURE GRANULOCYTES #: 0 K/UL
INFLUENZA A BY PCR: NOT DETECTED
INFLUENZA B BY PCR: NOT DETECTED
INR BLD: 0.88 (ref 0.88–1.18)
KETONES, URINE: NEGATIVE MG/DL
LEUKOCYTE ESTERASE, URINE: NEGATIVE
LYMPHOCYTES ABSOLUTE: 2.1 K/UL (ref 1.1–4.5)
LYMPHOCYTES RELATIVE PERCENT: 30.2 % (ref 20–40)
MCH RBC QN AUTO: 32.5 PG (ref 27–31)
MCHC RBC AUTO-ENTMCNC: 33.3 G/DL (ref 33–37)
MCV RBC AUTO: 97.4 FL (ref 80–94)
MONOCYTES ABSOLUTE: 0.7 K/UL (ref 0–0.9)
MONOCYTES RELATIVE PERCENT: 10.8 % (ref 0–10)
MYCOPLASMA PNEUMONIAE BY PCR: NOT DETECTED
NEUTROPHILS ABSOLUTE: 3.6 K/UL (ref 1.5–7.5)
NEUTROPHILS RELATIVE PERCENT: 52.4 % (ref 50–65)
NITRITE, URINE: NEGATIVE
PARAINFLUENZA VIRUS 1 BY PCR: NOT DETECTED
PARAINFLUENZA VIRUS 2 BY PCR: NOT DETECTED
PARAINFLUENZA VIRUS 3 BY PCR: NOT DETECTED
PARAINFLUENZA VIRUS 4 BY PCR: NOT DETECTED
PDW BLD-RTO: 13.2 % (ref 11.5–14.5)
PH UA: 6 (ref 5–8)
PLATELET # BLD: 243 K/UL (ref 130–400)
PMV BLD AUTO: 10.4 FL (ref 9.4–12.4)
POTASSIUM REFLEX MAGNESIUM: 3.6 MMOL/L (ref 3.5–5)
PRO-BNP: 76 PG/ML (ref 0–450)
PROTEIN UA: NEGATIVE MG/DL
PROTHROMBIN TIME: 12.2 SEC (ref 12–14.6)
RBC # BLD: 3.91 M/UL (ref 4.7–6.1)
RESPIRATORY SYNCYTIAL VIRUS BY PCR: NOT DETECTED
SARS-COV-2, PCR: NOT DETECTED
SODIUM BLD-SCNC: 142 MMOL/L (ref 136–145)
SPECIFIC GRAVITY UA: 1.01 (ref 1–1.03)
TOTAL PROTEIN: 6.8 G/DL (ref 6.6–8.7)
TROPONIN: <0.01 NG/ML (ref 0–0.03)
UROBILINOGEN, URINE: 1 E.U./DL
WBC # BLD: 6.8 K/UL (ref 4.8–10.8)

## 2021-07-14 PROCEDURE — 84484 ASSAY OF TROPONIN QUANT: CPT

## 2021-07-14 PROCEDURE — 99999 PR OFFICE/OUTPT VISIT,PROCEDURE ONLY: CPT | Performed by: NURSE PRACTITIONER

## 2021-07-14 PROCEDURE — 36415 COLL VENOUS BLD VENIPUNCTURE: CPT

## 2021-07-14 PROCEDURE — 82077 ASSAY SPEC XCP UR&BREATH IA: CPT

## 2021-07-14 PROCEDURE — 0202U NFCT DS 22 TRGT SARS-COV-2: CPT

## 2021-07-14 PROCEDURE — 99284 EMERGENCY DEPT VISIT MOD MDM: CPT

## 2021-07-14 PROCEDURE — 81003 URINALYSIS AUTO W/O SCOPE: CPT

## 2021-07-14 PROCEDURE — 85379 FIBRIN DEGRADATION QUANT: CPT

## 2021-07-14 PROCEDURE — 85730 THROMBOPLASTIN TIME PARTIAL: CPT

## 2021-07-14 PROCEDURE — 85025 COMPLETE CBC W/AUTO DIFF WBC: CPT

## 2021-07-14 PROCEDURE — 83880 ASSAY OF NATRIURETIC PEPTIDE: CPT

## 2021-07-14 PROCEDURE — 71045 X-RAY EXAM CHEST 1 VIEW: CPT

## 2021-07-14 PROCEDURE — 80053 COMPREHEN METABOLIC PANEL: CPT

## 2021-07-14 PROCEDURE — 85610 PROTHROMBIN TIME: CPT

## 2021-07-14 ASSESSMENT — ENCOUNTER SYMPTOMS
COUGH: 0
ABDOMINAL DISTENTION: 1
EYE DISCHARGE: 0
PHOTOPHOBIA: 0
EYE ITCHING: 0
APNEA: 0
COLOR CHANGE: 0
SHORTNESS OF BREATH: 1
BACK PAIN: 0

## 2021-07-14 NOTE — ED PROVIDER NOTES
[address] [facility] EMERGENCY DEPT  eMERGENCYdEPARTMENT eNCOUnter      Pt Name: Chris Mcneill  MRN: [id number]  [place] 1980  Date of evaluation: 7/14/2021  Provider:GAIL Sanchez    CHIEF COMPLAINT       Chief Complaint   Patient presents with    Leg Swelling    Fatigue         HISTORY OF PRESENT ILLNESS  (Location/Symptom, Timing/Onset, Context/Setting, Quality, Duration, Modifying Factors, Severity.)   Chris Mcneill is a 41 y.o. male who presents to the emergency department with complaints of leg swelling abdominal swelling and fatigue also having arm swelling correlates with ambulating. Heavy drinking hx has been clean 60 days was last admitted for delirium etoh complications. No fever. Admits to body aches chills no orthopnea. No chest pain. HPI    Nursing Notes were reviewed and I agree. REVIEW OF SYSTEMS    (2-9 systems for level 4, 10 or more for level 5)     Review of Systems   Constitutional: Positive for fatigue. Negative for activity change, appetite change, chills and fever. HENT: Negative for congestion and dental problem. Eyes: Negative for photophobia, discharge and itching. Respiratory: Positive for shortness of breath. Negative for apnea and cough. Cardiovascular: Negative for chest pain. Gastrointestinal: Positive for abdominal distention. Musculoskeletal: Positive for arthralgias and joint swelling. Negative for back pain, gait problem, myalgias and neck pain. Skin: Negative for color change, pallor and rash. Neurological: Negative for dizziness, seizures and syncope. Psychiatric/Behavioral: Negative for agitation. The patient is not nervous/anxious. Except as noted above the remainder of the review of systems was reviewed and negative.        PAST MEDICAL HISTORY     Past Medical History:   Diagnosis Date    Alcohol abuse     Dog bite     Psychiatric problem          SURGICAL HISTORY       Past Surgical History:   Procedure Laterality Date    ADENOIDECTOMY  COLONOSCOPY      TYMPANOSTOMY TUBE PLACEMENT           CURRENT MEDICATIONS       Previous Medications    DICLOFENAC POTASSIUM,MIGRAINE, 50 MG PACK    Take 75 mg by mouth 2 times daily    FOLIC ACID (FOLVITE) 1 MG TABLET    Take 1 tablet by mouth daily    MULTIPLE VITAMIN (MULTIVITAMIN) TABS TABLET    Take 1 tablet by mouth daily    NICOTINE (NICODERM CQ) 21 MG/24HR    Place 1 patch onto the skin daily    PAROXETINE (PAXIL) 40 MG TABLET    Take 75 mg by mouth every morning    THIAMINE MONONITRATE 100 MG TABLET    Take 1 tablet by mouth daily       ALLERGIES     Patient has no known allergies. FAMILY HISTORY       Family History   Problem Relation Age of Onset    High Blood Pressure Mother           SOCIAL HISTORY       Social History     Socioeconomic History    Marital status:      Spouse name: None    Number of children: None    Years of education: None    Highest education level: None   Occupational History    None   Tobacco Use    Smoking status: Current Every Day Smoker     Packs/day: 1.00     Years: 20.00     Pack years: 20.00     Types: Cigarettes    Smokeless tobacco: Never Used   Vaping Use    Vaping Use: Never used   Substance and Sexual Activity    Alcohol use: Not Currently     Comment: \"every chance I get\"    Drug use: No    Sexual activity: None   Other Topics Concern    None   Social History Narrative    None     Social Determinants of Health     Financial Resource Strain:     Difficulty of Paying Living Expenses:    Food Insecurity:     Worried About Running Out of Food in the Last Year:     Ran Out of Food in the Last Year:    Transportation Needs:     Lack of Transportation (Medical):      Lack of Transportation (Non-Medical):    Physical Activity:     Days of Exercise per Week:     Minutes of Exercise per Session:    Stress:     Feeling of Stress :    Social Connections:     Frequency of Communication with Friends and Family:     Frequency of Social Gatherings Neurological:      General: No focal deficit present. Mental Status: He is alert and oriented to person, place, and time. Mental status is at baseline. Psychiatric:         Mood and Affect: Mood normal.         Behavior: Behavior normal.         Thought Content: Thought content normal.         Judgment: Judgment normal.           DIAGNOSTIC RESULTS     RADIOLOGY:   Non-plain film images such as CT, Ultrasound and MRI are read by the radiologist. Plain radiographic images are visualized and preliminarilyinterpreted by No att. providers found with the below findings:      Interpretation per the Radiologist below, if available at the time of this note:    XR CHEST PORTABLE   Final Result   1. No radiographic evidence of acute cardiopulmonary process. Signed by Dr Terese Pickard:  Labs Reviewed   CBC WITH AUTO DIFFERENTIAL - Abnormal; Notable for the following components:       Result Value    RBC 3.91 (*)     Hemoglobin 12.7 (*)     Hematocrit 38.1 (*)     MCV 97.4 (*)     MCH 32.5 (*)     Monocytes % 10.8 (*)     Eosinophils % 5.4 (*)     All other components within normal limits   COMPREHENSIVE METABOLIC PANEL W/ REFLEX TO MG FOR LOW K - Abnormal; Notable for the following components:    Glucose 121 (*)     All other components within normal limits   RESPIRATORY PANEL, MOLECULAR, WITH COVID-19   PROTIME-INR   APTT   TROPONIN   BRAIN NATRIURETIC PEPTIDE   URINE RT REFLEX TO CULTURE   ETHANOL   D-DIMER, QUANTITATIVE       All other labs were within normal range or notreturned as of this dictation.     RE-ASSESSMENT        EMERGENCY DEPARTMENT COURSE and DIFFERENTIAL DIAGNOSIS/MDM:   Vitals:    Vitals:    07/14/21 1537 07/14/21 1601 07/14/21 1631 07/14/21 1649   BP: 130/83 122/83 121/82    Pulse:    77   Resp:    14   Temp:       TempSrc:       SpO2: 90% 91% 90% 93%   Weight:       Height:           MDM  Patient remains medically stable here no hypoxia no tachycardia dimer within normal limits did

## 2021-07-14 NOTE — PROGRESS NOTES
Danica Fernandes presents today with cough, weight gain (181 last week to 197 today), shortness of breath, fatigue, and swelling to his extremities and abdomen. This started a week ago. He has a history of alcoholism and multiple hospital admissions for intoxications. He is at [facility] currently. Lung sounds today on exam are not clear. He has edema noted. Due to exam findings and his presenting symptoms I am concerned for possible CHF. He reports he does not have a personal history. Advised pt to go to ER for higher level of care for further eval. He voiced understanding.

## 2021-07-20 LAB
EKG P AXIS: 29 DEGREES
EKG P-R INTERVAL: 192 MS
EKG Q-T INTERVAL: 394 MS
EKG QRS DURATION: 98 MS
EKG QTC CALCULATION (BAZETT): 413 MS
EKG T AXIS: 6 DEGREES

## 2022-01-28 ENCOUNTER — APPOINTMENT (OUTPATIENT)
Dept: GENERAL RADIOLOGY | Facility: HOSPITAL | Age: 42
End: 2022-01-28

## 2022-01-28 ENCOUNTER — HOSPITAL ENCOUNTER (EMERGENCY)
Facility: HOSPITAL | Age: 42
Discharge: HOME | End: 2022-01-28
Admitting: EMERGENCY MEDICINE

## 2022-01-28 VITALS
HEIGHT: 70 IN | HEART RATE: 76 BPM | WEIGHT: 190 LBS | TEMPERATURE: 98.3 F | DIASTOLIC BLOOD PRESSURE: 99 MMHG | RESPIRATION RATE: 14 BRPM | SYSTOLIC BLOOD PRESSURE: 138 MMHG | OXYGEN SATURATION: 95 % | BODY MASS INDEX: 27.2 KG/M2

## 2022-01-28 DIAGNOSIS — S80.251A FOREIGN BODY OF KNEE, RIGHT, INITIAL ENCOUNTER: Primary | ICD-10-CM

## 2022-01-28 PROCEDURE — 94799 UNLISTED PULMONARY SVC/PX: CPT

## 2022-01-28 PROCEDURE — 99283 EMERGENCY DEPT VISIT LOW MDM: CPT

## 2022-01-28 PROCEDURE — 73562 X-RAY EXAM OF KNEE 3: CPT

## 2022-01-28 PROCEDURE — 25010000002 PROPOFOL 10 MG/ML EMULSION

## 2022-01-28 PROCEDURE — 99284 EMERGENCY DEPT VISIT MOD MDM: CPT

## 2022-01-28 PROCEDURE — 99152 MOD SED SAME PHYS/QHP 5/>YRS: CPT

## 2022-01-28 PROCEDURE — 94760 N-INVAS EAR/PLS OXIMETRY 1: CPT

## 2022-01-28 RX ORDER — SULFAMETHOXAZOLE AND TRIMETHOPRIM 800; 160 MG/1; MG/1
1 TABLET ORAL 2 TIMES DAILY
Qty: 14 TABLET | Refills: 0 | Status: SHIPPED | OUTPATIENT
Start: 2022-01-28 | End: 2022-01-29 | Stop reason: SDUPTHER

## 2022-01-28 RX ORDER — SODIUM CHLORIDE 0.9 % (FLUSH) 0.9 %
10 SYRINGE (ML) INJECTION AS NEEDED
Status: DISCONTINUED | OUTPATIENT
Start: 2022-01-28 | End: 2022-01-28 | Stop reason: HOSPADM

## 2022-01-28 RX ORDER — PROPOFOL 10 MG/ML
VIAL (ML) INTRAVENOUS
Status: COMPLETED
Start: 2022-01-28 | End: 2022-01-28

## 2022-01-28 RX ORDER — KETAMINE HCL IN NACL, ISO-OSM 100MG/10ML
100 SYRINGE (ML) INJECTION ONCE
Status: COMPLETED | OUTPATIENT
Start: 2022-01-28 | End: 2022-01-28

## 2022-01-28 RX ORDER — CEPHALEXIN 500 MG/1
500 CAPSULE ORAL 3 TIMES DAILY
Qty: 21 CAPSULE | Refills: 0 | Status: SHIPPED | OUTPATIENT
Start: 2022-01-28 | End: 2022-01-29 | Stop reason: SDUPTHER

## 2022-01-28 RX ADMIN — PROPOFOL 30 MG: 10 INJECTION, EMULSION INTRAVENOUS at 20:19

## 2022-01-28 RX ADMIN — Medication 80 MG: at 20:06

## 2022-01-28 RX ADMIN — Medication 20 MG: at 20:19

## 2022-01-29 RX ORDER — SULFAMETHOXAZOLE AND TRIMETHOPRIM 800; 160 MG/1; MG/1
1 TABLET ORAL 2 TIMES DAILY
Qty: 14 TABLET | Refills: 1 | Status: SHIPPED | OUTPATIENT
Start: 2022-01-29 | End: 2022-02-05

## 2022-01-29 RX ORDER — CEPHALEXIN 500 MG/1
500 CAPSULE ORAL 3 TIMES DAILY
Qty: 21 CAPSULE | Refills: 1 | Status: SHIPPED | OUTPATIENT
Start: 2022-01-29 | End: 2022-02-05

## 2022-05-19 NOTE — PROGRESS NOTES
Pt states \"I just want to go home. Can my mom come get me? \" His tremors are progressively getting worse. He cannot stand well to urinate. He has been unable to hold a telephone or cups to get a drink. Elsi Bush, currently sitting with patient, previously discussed the rehab options with him that was provided by JOSE LUIS. He stated that his brother [place] [facility] and he would be willing to go there when he gets to leave here. He was assisted with getting back in the bed and his blankets were straightened. Detail Level: Detailed

## 2022-09-02 ENCOUNTER — HOSPITAL ENCOUNTER (OUTPATIENT)
Dept: ULTRASOUND IMAGING | Age: 42
Discharge: HOME | End: 2022-09-02
Payer: MEDICAID

## 2022-09-02 DIAGNOSIS — R22.1 LOCALIZED SWELLING, MASS AND LUMP, NECK: ICD-10-CM

## 2022-09-02 PROCEDURE — 76536 US EXAM OF HEAD AND NECK: CPT

## 2022-10-10 ENCOUNTER — OFFICE VISIT (OUTPATIENT)
Dept: ENT CLINIC | Age: 42
End: 2022-10-10
Payer: MEDICAID

## 2022-10-10 VITALS
WEIGHT: 187 LBS | HEIGHT: 69 IN | DIASTOLIC BLOOD PRESSURE: 74 MMHG | BODY MASS INDEX: 27.7 KG/M2 | SYSTOLIC BLOOD PRESSURE: 138 MMHG

## 2022-10-10 DIAGNOSIS — R59.0 CERVICAL LYMPHADENOPATHY: Primary | ICD-10-CM

## 2022-10-10 DIAGNOSIS — R59.0 ANTERIOR CERVICAL LYMPHADENOPATHY: ICD-10-CM

## 2022-10-10 PROCEDURE — 4004F PT TOBACCO SCREEN RCVD TLK: CPT | Performed by: PHYSICIAN ASSISTANT

## 2022-10-10 PROCEDURE — G8419 CALC BMI OUT NRM PARAM NOF/U: HCPCS | Performed by: PHYSICIAN ASSISTANT

## 2022-10-10 PROCEDURE — 31575 DIAGNOSTIC LARYNGOSCOPY: CPT | Performed by: PHYSICIAN ASSISTANT

## 2022-10-10 PROCEDURE — 99203 OFFICE O/P NEW LOW 30 MIN: CPT | Performed by: PHYSICIAN ASSISTANT

## 2022-10-10 PROCEDURE — G8427 DOCREV CUR MEDS BY ELIG CLIN: HCPCS | Performed by: PHYSICIAN ASSISTANT

## 2022-10-10 PROCEDURE — G8484 FLU IMMUNIZE NO ADMIN: HCPCS | Performed by: PHYSICIAN ASSISTANT

## 2022-10-10 RX ORDER — OLANZAPINE 15 MG/1
TABLET ORAL
COMMUNITY
Start: 2022-08-22

## 2022-10-10 RX ORDER — ERGOCALCIFEROL 1.25 MG/1
CAPSULE ORAL
COMMUNITY
Start: 2022-08-25

## 2022-10-10 RX ORDER — CELECOXIB 200 MG/1
CAPSULE ORAL
COMMUNITY
Start: 2022-07-29

## 2022-10-10 ASSESSMENT — ENCOUNTER SYMPTOMS
TROUBLE SWALLOWING: 0
SORE THROAT: 0
VOICE CHANGE: 0
SINUS PRESSURE: 0
EYE PAIN: 0
SINUS PAIN: 0
FACIAL SWELLING: 0
PHOTOPHOBIA: 0
RHINORRHEA: 0

## 2022-10-10 NOTE — ASSESSMENT & PLAN NOTE
Right cervical lymphadenopathy suspicious for possible lymphoma  Plan: I will get a CT of the soft tissue neck for further evaluation. I will also get an ultrasound-guided biopsy of the enlarged lymph nodes for tissue diagnosis. I will call the patient once we have the results completed for further recommendations.

## 2022-10-10 NOTE — PROGRESS NOTES
[facility] OTOLARYNGOLOGY/ENT  Alfonzo Crawley is a pleasant 40-year-old  male that was referred by Nicole Mclaughlin due to problems with lymphadenopathy. Patient reported that 3-months ago he noticed a mass-effect to the right lateral neck that has been enlarging with no pain. He reports that he is able to eat with no issues and has had no weight changes. He also denies any issues with fever, chills, or night sweats. Patient does admit to smoking a pack as and a half of cigarettes per day for several years. Allergies: Patient has no known allergies. Current Outpatient Medications   Medication Sig Dispense Refill    celecoxib (CELEBREX) 200 MG capsule TAKE 1 CAPSULE BY MOUTH TWICE DAILY      OLANZapine (ZYPREXA) 15 MG tablet TAKE 1 TABLET BY MOUTH ONCE DAILY AT BEDTIME      vitamin D (ERGOCALCIFEROL) 1.25 MG (82619 UT) CAPS capsule TAKE 1 CAPSULE BY MOUTH ONCE A WEEK      Diclofenac Potassium,Migraine, 50 MG PACK Take 75 mg by mouth 2 times daily      folic acid (FOLVITE) 1 MG tablet Take 1 tablet by mouth daily 30 tablet 0    PARoxetine (PAXIL) 40 MG tablet Take 75 mg by mouth every morning      nicotine (NICODERM CQ) 21 MG/24HR Place 1 patch onto the skin daily 30 patch 0    Multiple Vitamin (MULTIVITAMIN) TABS tablet Take 1 tablet by mouth daily 30 tablet 0    thiamine mononitrate 100 MG tablet Take 1 tablet by mouth daily 30 tablet 0     No current facility-administered medications for this visit.        Past Surgical History:   Procedure Laterality Date    ADENOIDECTOMY      COLONOSCOPY      TYMPANOSTOMY TUBE PLACEMENT         Past Medical History:   Diagnosis Date    Alcohol abuse     Dog bite     Psychiatric problem        Family History   Problem Relation Age of Onset    High Blood Pressure Mother        Social History     Tobacco Use    Smoking status: Every Day     Packs/day: 1.00     Years: 20.00     Pack years: 20.00     Types: Cigarettes    Smokeless tobacco: Never   Substance Use Topics Alcohol use: Not Currently     Comment: \"every chance I get\"           REVIEW OF SYSTEMS:  all other systems reviewed and are negative  Review of Systems   Constitutional:  Negative for chills and fever. HENT:  Negative for congestion, dental problem, ear discharge, ear pain, facial swelling, hearing loss, postnasal drip, rhinorrhea, sinus pressure, sinus pain, sore throat, tinnitus, trouble swallowing and voice change. Eyes:  Negative for photophobia and pain. Neurological:  Negative for dizziness, seizures, syncope and headaches. Comments:     PHYSICAL EXAM:    /74   Ht 5' 9\" (1.753 m)   Wt 187 lb (84.8 kg)   BMI 27.62 kg/m²   Body mass index is 27.62 kg/m². General Appearance: well developed  and well nourished  Head/ Face: normocephalic and atraumatic  Vocal Quality: good/ normal  Ears: Right Ear: External: external ears normal Otoscopy Ear Canal: canal clear Otoscopy TM: TM's normal and TM's mobile Left Ear: External: external ears normal Otoscopy Ear Canal: canal clear Otoscopy TM: TM's normal and TM's mobile  Hearing: grossly intact  Nose: see endoscopy report  Neck: supple and + for a firm palpable mass to the right anterior cervical area that appears to be a cluster of enlarged lymph nodes that were non-tender. No other lymphadenopathy appreciated to exam.  Thyroid: normal and nodules No  Oral exam was unremarkable. Assessment & Plan:    Problem List Items Addressed This Visit       Anterior cervical lymphadenopathy - Primary     Right cervical lymphadenopathy suspicious for possible lymphoma  Plan: I will get a CT of the soft tissue neck for further evaluation. I will also get an ultrasound-guided biopsy of the enlarged lymph nodes for tissue diagnosis. I will call the patient once we have the results completed for further recommendations.          Relevant Orders    US BIOPSY SOFT TISSUE NECK/THORAX    AK LARYNGOSCOPY FLEXIBLE DIAGNOSTIC (Completed)       Orders Placed This Encounter   Procedures    CT SOFT TISSUE NECK W CONTRAST     Standing Status:   Future     Standing Expiration Date:   10/10/2023     Scheduling Instructions:      LH     Order Specific Question:   STAT Creatinine as needed:     Answer:   No     Order Specific Question:   Reason for exam:     Answer:   Right cervical lymphadenopathy    US BIOPSY SOFT TISSUE NECK/THORAX     Standing Status:   Future     Standing Expiration Date:   10/10/2023     Scheduling Instructions:      Please do CT first before biopsy      WILL NEED FLOWCYTOMETRY     Order Specific Question:   Reason for exam:     Answer:   Right cervical lymphadenopathy     Order Specific Question:   Laterality     Answer:   Right    TX LARYNGOSCOPY FLEXIBLE DIAGNOSTIC     The nares were anesthetized with 4% lidocaine aerosol bilaterally. Each nare was individually evaluated where the patient was found to have engorged turbinates with postnasal drip. The left side was utilized for the full procedure. The posterior wall of the nasopharyngeal region demonstrated postnasal drip to be present with no masses. The scope was advanced to the oropharyngeal region. Patient was noted with normal swallowing with no ulcerations or masses at this level. The scope was advanced to the epiglottis where the vocal cords were well visualized. The vocal cords were symmetrical and fully functional with no masses or any gross abnormalities. The tongue was extended and demonstrated no lesions to the posterior surface. Patient tolerated the procedure nicely with no complications. Overall I did not see any mass-effect to contribute to the current lymphadenopathy. No orders of the defined types were placed in this encounter. Electronically signed by Nader Khan PA-C on 10/10/22 at 5:33 PM CDT        Please note that this chart was generated using dragon dictation software.   Although every effort was made to ensure the accuracy of this automated transcription, some errors in transcription may have occurred.

## 2022-10-21 ENCOUNTER — HOSPITAL ENCOUNTER (OUTPATIENT)
Dept: CT IMAGING | Age: 42
Discharge: HOME | End: 2022-10-21
Payer: MEDICAID

## 2022-10-21 ENCOUNTER — HOSPITAL ENCOUNTER (OUTPATIENT)
Dept: ULTRASOUND IMAGING | Age: 42
Discharge: HOME | End: 2022-10-21
Payer: MEDICAID

## 2022-10-21 DIAGNOSIS — R59.0 ANTERIOR CERVICAL LYMPHADENOPATHY: ICD-10-CM

## 2022-10-21 DIAGNOSIS — R59.0 CERVICAL LYMPHADENOPATHY: ICD-10-CM

## 2022-10-21 PROCEDURE — 88177 CYTP FNA EVAL EA ADDL: CPT

## 2022-10-21 PROCEDURE — 88173 CYTOPATH EVAL FNA REPORT: CPT

## 2022-10-21 PROCEDURE — 88172 CYTP DX EVAL FNA 1ST EA SITE: CPT

## 2022-10-21 PROCEDURE — 70491 CT SOFT TISSUE NECK W/DYE: CPT

## 2022-10-21 PROCEDURE — C1729 CATH, DRAINAGE: HCPCS

## 2022-10-21 PROCEDURE — 88305 TISSUE EXAM BY PATHOLOGIST: CPT

## 2022-10-21 PROCEDURE — 88342 IMHCHEM/IMCYTCHM 1ST ANTB: CPT

## 2022-10-21 PROCEDURE — 6360000004 HC RX CONTRAST MEDICATION: Performed by: PHYSICIAN ASSISTANT

## 2022-10-21 PROCEDURE — 88341 IMHCHEM/IMCYTCHM EA ADD ANTB: CPT

## 2022-10-21 RX ADMIN — IOPAMIDOL 70 ML: 755 INJECTION, SOLUTION INTRAVENOUS at 09:49

## 2022-10-28 ENCOUNTER — TELEPHONE (OUTPATIENT)
Dept: ENT CLINIC | Age: 42
End: 2022-10-28

## 2022-10-28 DIAGNOSIS — C79.89 METASTATIC SQUAMOUS CELL CARCINOMA TO HEAD AND NECK (HCC): Primary | ICD-10-CM

## 2022-10-28 NOTE — TELEPHONE ENCOUNTER
Pt would like biopsy results      I called the patient's home phone number which demonstrated a busy signal.  I called the patient's mobile number which was the patient's mom. Patient reports that her son does not want to be bothered at this time and wishes for his mom to take the report of the pathology. 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 I told the patient's mom that due to this being metastatic, we do not know where the primary is at and will need to do a PET scan. I will order this for next week and will call him the results with further recommendations. Patient is already had an unremarkable laryngoscopy from his last encounter. The patient does have a history of EtOH and tobacco abuse in the past.  I apologized to the patient's mom that the pathology results were not routed correctly and I never received the results until looking into his chart.       Electronically signed by Tara Carson PA-C on 10/28/22 at 2:10 PM CDT

## 2022-11-11 ENCOUNTER — HOSPITAL ENCOUNTER (OUTPATIENT)
Dept: NUCLEAR MEDICINE | Age: 42
Discharge: HOME | End: 2022-11-13
Payer: MEDICAID

## 2022-11-11 ENCOUNTER — TELEPHONE (OUTPATIENT)
Dept: ENT CLINIC | Age: 42
End: 2022-11-11

## 2022-11-11 DIAGNOSIS — C79.89 METASTATIC SQUAMOUS CELL CARCINOMA TO HEAD AND NECK (HCC): ICD-10-CM

## 2022-11-11 LAB
GLUCOSE BLD-MCNC: 93 MG/DL (ref 70–99)
PERFORMED ON: NORMAL

## 2022-11-11 PROCEDURE — 82947 ASSAY GLUCOSE BLOOD QUANT: CPT

## 2022-11-11 PROCEDURE — 78815 PET IMAGE W/CT SKULL-THIGH: CPT

## 2022-11-11 PROCEDURE — A9552 F18 FDG: HCPCS | Performed by: PHYSICIAN ASSISTANT

## 2022-11-11 PROCEDURE — 3430000000 HC RX DIAGNOSTIC RADIOPHARMACEUTICAL: Performed by: PHYSICIAN ASSISTANT

## 2022-11-11 RX ORDER — FLUDEOXYGLUCOSE F 18 200 MCI/ML
10 INJECTION, SOLUTION INTRAVENOUS
Status: COMPLETED | OUTPATIENT
Start: 2022-11-11 | End: 2022-11-11

## 2022-11-11 RX ADMIN — FLUDEOXYGLUCOSE F 18 10 MILLICURIE: 200 INJECTION, SOLUTION INTRAVENOUS at 15:44

## 2022-11-11 NOTE — TELEPHONE ENCOUNTER
PET scan preauthorized    [id number]    Electronically signed by Cheryl Howell PA-C on 11/11/22 at 2:28 PM CST

## 2022-11-16 ENCOUNTER — TELEPHONE (OUTPATIENT)
Dept: ENT CLINIC | Age: 42
End: 2022-11-16

## 2022-11-16 NOTE — TELEPHONE ENCOUNTER
Dee Dee [employer] called and would like Razia Thomas [phone] to be on his HIPAA form to received information about his care.

## 2022-11-21 ENCOUNTER — TELEPHONE (OUTPATIENT)
Dept: ENT CLINIC | Age: 42
End: 2022-11-21

## 2022-11-21 NOTE — TELEPHONE ENCOUNTER
Patient called to reschedule his appointment with dr Marsha Mehta. Patient could not get off work. Patient has cancer and wanted to get in before 01/03/23. [facility] did not have any availability before 01/23. Please return patient's call. Thank you.

## 2022-11-29 ENCOUNTER — TELEPHONE (OUTPATIENT)
Dept: OTHER | Age: 42
End: 2022-11-29

## 2022-11-29 NOTE — TELEPHONE ENCOUNTER
Lung nodule identified on 11/11/22 on PET scan. Pt notified and will f/u with his PCP. Report faxed to DOTTIE Mcmahan.

## 2023-01-03 ENCOUNTER — OFFICE VISIT (OUTPATIENT)
Dept: ENT CLINIC | Age: 43
End: 2023-01-03

## 2023-01-03 VITALS
WEIGHT: 190 LBS | HEIGHT: 69 IN | SYSTOLIC BLOOD PRESSURE: 132 MMHG | DIASTOLIC BLOOD PRESSURE: 86 MMHG | BODY MASS INDEX: 28.14 KG/M2

## 2023-01-03 DIAGNOSIS — C80.1 SQUAMOUS CELL CARCINOMA METASTATIC TO HEAD AND NECK WITH UNKNOWN PRIMARY SITE (HCC): Primary | ICD-10-CM

## 2023-01-03 DIAGNOSIS — C79.89 SQUAMOUS CELL CARCINOMA METASTATIC TO HEAD AND NECK WITH UNKNOWN PRIMARY SITE (HCC): Primary | ICD-10-CM

## 2023-01-03 ASSESSMENT — ENCOUNTER SYMPTOMS
EYES NEGATIVE: 1
ALLERGIC/IMMUNOLOGIC NEGATIVE: 1
GASTROINTESTINAL NEGATIVE: 1
RESPIRATORY NEGATIVE: 1

## 2023-01-03 NOTE — PROGRESS NOTES
1/3/2023    Karishma Patel (:  1980) is a 38 y.o. male, Established patient, here for evaluation of the following chief complaint(s):  New Patient (Cervical lymphadenopathy)      Vitals:    23 1524   BP: 132/86   Weight: 190 lb (86.2 kg)   Height: 5' 9\" (1.753 m)       Wt Readings from Last 3 Encounters:   23 190 lb (86.2 kg)   10/10/22 187 lb (84.8 kg)   21 197 lb (89.4 kg)       BP Readings from Last 3 Encounters:   23 132/86   10/10/22 138/74   21 121/82         SUBJECTIVE/OBJECTIVE:    Seen today for his neck. He has had a right neck mass for about 9 months. He says is nonpainful but is slowly enlarging. Fine-needle aspirate was performed showing metastatic squamous cell carcinoma. A PET/CT was also obtained showing no other abnormalities. Denies any dysphagia or odynophagia. No ear pain. He does smoke. The specimen is p16 positive. Review of Systems   Constitutional: Negative. HENT: Negative. Eyes: Negative. Respiratory: Negative. Cardiovascular: Negative. Gastrointestinal: Negative. Endocrine: Negative. Musculoskeletal: Negative. Skin: Negative. Allergic/Immunologic: Negative. Neurological: Negative. Hematological: Negative. Psychiatric/Behavioral: Negative. Physical Exam  Vitals reviewed. Constitutional:       Appearance: Normal appearance. He is normal weight. HENT:      Head: Normocephalic and atraumatic. Right Ear: Tympanic membrane, ear canal and external ear normal.      Left Ear: Tympanic membrane, ear canal and external ear normal.      Nose: Nose normal.      Mouth/Throat:      Mouth: Mucous membranes are moist.      Pharynx: Oropharynx is clear. Eyes:      Extraocular Movements: Extraocular movements intact. Pupils: Pupils are equal, round, and reactive to light. Cardiovascular:      Rate and Rhythm: Normal rate and regular rhythm.    Pulmonary:      Effort: Pulmonary effort is normal.      Breath sounds: Normal breath sounds.   Musculoskeletal:      Cervical back: Normal range of motion.   Lymphadenopathy:      Cervical: Cervical adenopathy present.   Skin:     General: Skin is warm and dry.   Neurological:      General: No focal deficit present.      Mental Status: He is alert and oriented to person, place, and time.   Psychiatric:         Mood and Affect: Mood normal.         Behavior: Behavior normal.      Procedure Note:    Flexible Laryngoscopy      Procedure : Flexible Laryngoscopy  Surgeon: Chapito Neely MD  Anesthesia: Afrin with 4% lidocaine  Indication: Laryngeal mirror examination was not tolerated due to gag reflex  Description:  After verbal consent was obtained, the scope was passed along the floor of the left naris to the level of the larynx. There was no evidence of concerning masses or lesions of the base of tongue, vallecula, epiglottis, aryepiglottic folds, arytenoids, false vocal folds, true vocal folds, or pyriform sinuses. True vocal folds exhibited symmetric motion bilaterally without evidence of paralysis or paresis.The scope was removed. The patient tolerated the procedure without difficulty. There were no complications.  Pertinent findings: No overt abnormalities          ASSESSMENT/PLAN:    1. 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  I will set him up with a head and neck cancer surgeon to evaluate this.  Due to his insurance we will keep in [facility] for evaluation and treatment.  Return in about 4 weeks (around 1/31/2023).    An electronic signature was used to authenticate this note.    Chapito Neely MD       Please note that this chart was generated using dragon dictation software.  Although every effort was made to ensure the accuracy of this automated transcription, some errors in transcription may have occurred.

## 2023-02-02 NOTE — ED NOTES
Bed: 01-A  Expected date: 12/9/20  Expected time:   Means of arrival: [facility]  Comments:  [address] [place] [address] etoh     Priscilla Sutherland RN  12/09/20 4144 Low Dose Naltrexone Counseling- I discussed with the patient the potential risks and side effects of low dose naltrexone including but not limited to: more vivid dreams, headaches, nausea, vomiting, abdominal pain, fatigue, dizziness, and anxiety.

## 2023-04-20 ENCOUNTER — TELEPHONE (OUTPATIENT)
Dept: RADIATION ONCOLOGY | Facility: HOSPITAL | Age: 43
End: 2023-04-20
Payer: COMMERCIAL

## 2023-04-27 ENCOUNTER — HOSPITAL ENCOUNTER (OUTPATIENT)
Dept: RADIATION ONCOLOGY | Facility: HOSPITAL | Age: 43
Setting detail: RADIATION/ONCOLOGY SERIES
End: 2023-04-27
Payer: COMMERCIAL

## 2023-04-28 ENCOUNTER — CONSULT (OUTPATIENT)
Dept: RADIATION ONCOLOGY | Facility: HOSPITAL | Age: 43
End: 2023-04-28
Payer: COMMERCIAL

## 2023-04-28 ENCOUNTER — HOSPITAL ENCOUNTER (OUTPATIENT)
Dept: RADIATION ONCOLOGY | Facility: HOSPITAL | Age: 43
Setting detail: RADIATION/ONCOLOGY SERIES
Discharge: HOME | End: 2023-04-28
Payer: COMMERCIAL

## 2023-04-28 VITALS
HEIGHT: 70 IN | DIASTOLIC BLOOD PRESSURE: 95 MMHG | SYSTOLIC BLOOD PRESSURE: 134 MMHG | BODY MASS INDEX: 24.05 KG/M2 | HEART RATE: 110 BPM | WEIGHT: 168 LBS | OXYGEN SATURATION: 96 %

## 2023-04-28 DIAGNOSIS — C09.9 TONSIL CANCER: Primary | ICD-10-CM

## 2023-04-28 DIAGNOSIS — Z90.89 S/P TONSILLECTOMY: ICD-10-CM

## 2023-04-28 DIAGNOSIS — F17.200 CURRENT EVERY DAY SMOKER: ICD-10-CM

## 2023-04-28 PROCEDURE — G0463 HOSPITAL OUTPT CLINIC VISIT: HCPCS | Performed by: RADIOLOGY

## 2023-04-28 RX ORDER — HYDROCODONE BITARTRATE AND ACETAMINOPHEN 5; 325 MG/1; MG/1
1 TABLET ORAL EVERY 6 HOURS PRN
COMMUNITY
Start: 2023-04-06

## 2023-04-28 RX ORDER — PAROXETINE HYDROCHLORIDE 40 MG/1
1 TABLET, FILM COATED ORAL DAILY
COMMUNITY
Start: 2023-04-28

## 2023-04-28 RX ORDER — OLANZAPINE 20 MG/1
20 TABLET ORAL
COMMUNITY
Start: 2023-04-24

## 2023-04-28 RX ORDER — CELECOXIB 200 MG/1
1 CAPSULE ORAL DAILY
COMMUNITY
Start: 2023-04-28

## 2023-04-28 RX ORDER — OMEPRAZOLE 40 MG/1
1 CAPSULE, DELAYED RELEASE ORAL DAILY
COMMUNITY
Start: 2023-04-24

## 2023-04-28 NOTE — PROGRESS NOTES
[facility]CCatherine Nickerson MD      Brando Cohen, APRN  ____________________________________________________________  [facility]  Department of Radiation Oncology  [address] [place] [id number]  Office:  [phone]  Fax: [phone]    DATE:  04/28/2023  PATIENT: Hammad Moctezuma  1980                         MEDICAL RECORD #:  [id number]                                                       REASON FOR VISIT:    Chief Complaint   Patient presents with   • Tonsil Cancer     Right      Hammad Moctezuma is a very pleasant male that has been referred to our office for radiotherapy considerations for head/neck carcinoma. Reports dental problem, sore throat, and trouble swallowing. Denies fatigue, unexpected weight change, nasuea/vomiting, diarrhea, light-headedness, weakness, and headaches. He follows MDs at [facility].     History of Present Illness:  09/02/2022 - US Head & Neck d/t localized swelling:  • 5.9x2.7 cm mass involving the right neck correlating with the area of palpable abnormality suspicious for malignancy/malignant adenopathy. Primary lymphoma also in the differential.Biopsy recommended for further evaluation if clinically indicated.    10/21/2022 - CT Soft tissue neck with contrast:  Complex cystic mass seen in the left parotid gland.Measuring 5.2 x 3.3 x 3.0 cm    10/21/2022 - Right submandibular FNA:  • Lymph node, right submandibular lymph node fine-needle core biopsies with touch imprint smears:   o Metastatic squamous cell carcinoma.   o Positive p16 immunohistochemical stain.   • Lymph node, fine-needle aspiration of right submandibular lymph node,   o ThinPrep, smears and cell block:   o Consistent with metastatic squamous cell carcinoma.   o Positive p16 immunohistochemical staining.     11/11/2022 - PET Scan:   • Large right neck mass with hypermetabolic uptake.   • Right middle lobe pulmonary nodule measuring 8 mm without evidence of  hypermetabolic uptake.Close continued follow-up advised.    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 I will set him up with a head and neck cancer surgeon to evaluate this. Due to his insurance we will keep in [facility] for evaluation and treatment.  • Return in about 4 weeks (around 1/31/2023).    01/13/2023 - Appointment with Bertha Pearce MD - [facility] physicians - ear, nose, and throat:  Assessment, Management and Plan:   • 42 year old male with right sided neck mass that is T0N1M0 metastatic p16+ SCCa with unknown primary. CT scans and Pet scan reviewed today with neurorads.  New head and neck cancer:  • Patient will need DL with biopsies and tonsillectomy to further evaluate for primary.   • Acceptable treatment options if primary is not found include selective neck dissection, radiation, or a combination therapy. It is possible that patient would still need radiation if neck dissection is chosen.   • We had in depth discussion about treatment options and patient is going to take some time to decide, but will proceed with DL/Bx/Tonsillectomy with possibility of adding on neck dissection at same time if patient would like. If we fid an obvious primary in the OP and can resect at that time, that may be an option as well.   • Again with the possibility of POART.   - plan for OR for DL/bx/tonsillectomy, possible neck dissection  - will follow up with patient for definitive treatment choice    03/27/2023 - Radical tonsillectomy and right neck dissection:  • Right tonsil, biopsy:   - HPV-mediated squamous cell carcinoma.   • Superior margin, biopsy:   - Squamous mucosa, negative for carcinoma.   • Medial margin, biopsy:   - Squamous mucosa, negative for carcinoma.   • Lateral margin, biopsy:   - Tonsillar-type squamous mucosa, negative for carcinoma.   • Base of tongue,  biopsy:   - Squamous mucosa and skeletal muscle, negative for carcinoma.   • Inferior lateral, biopsy:   - Squamous mucosa, negative for carcinoma.   • Right tonsil, radical tonsillectomy:   - HPV-mediated squamous cell carcinoma, 2.1 cm, excised.   - Positive for p16 by immunohistochemistry.   - See SYNOPTIC REPORT.   • Lymph nodes, right level IIB, neck dissection:   - Two benign lymph nodes (0/2).     SYNOPTIC REPORT:   CASE SUMMARY: (PHARYNX (OROPHARYNX, HYPOPHARYNX, NASOPHARYNX))   Standard(s):  AJCC-UICC 8   • Procedure : Radical tonsillectomy, right neck dissection   • Tumor Focality:  Unifocal   • Multiple Primary Sites:   Not applicable   • Tumor Site : Oropharynx   • Tumor Subsite: Camilla tonsil   • Tumor Laterality:  Right tonsil   • Tumor Size:  Greatest dimension: 2.1 cm   • Preexisting Pleomorphic Adenoma Component:    Not applicable   • Histologic Type:  Human papilloma virus (HPV) mediated squamous cell carcinoma   • Histologic Grade:  Not applicable   • Lymphovascular Invasion:   Preset   • Perineural Invasion:  Not identified   • Margin Status for Invasive Tumor:   All margins negative for invasive tumor   • Distance from Invasive Tumor to Closest Margin:   0.2 cm   • Closest Margin to Invasive Tumor:  Deep (tonsillar bed)   • Margin Status for Noninvasive Tumor:   Not applicable   • Regional Lymph Node Status:   Tumor present in regional lymph node   • Number of Lymph Nodes with Tumor:  1   • Laterality of Lymph Node with Tumor:  Ipsilateral   • Size of Largest Jamilah Metastatic Deposit:   7.5 cm   • Extranodal Extension:   Not identified   • Number of Lymph Nodes Examined:  39   • Distant Site(s) Involved:   Not applicable   • PATHOLOGIC STAGE CLASSIFICATION (pTNM, AJCC 8th Edition):    pT2 pN3a   o Ancillary Studies:  Tumor cells are positive for p16 by immunohistochemistry (>90%)     04/06/2023 - Appointment with Dr. 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 2.1 cm primary tumor with +LVI/-PNI, negative margins 1+ 7.5 cm LN/39 nodes with no SARAHI.   • He may qualify for de-escalated adjuvant radiation based on the ECOG 3311 trial. He does not need chemotherapy. He wishes for this to be done in [place] so we will arrange for a referral to radiation oncology there. It is challenging for him to make it to [facility] for follow up so he would like to follow up with his local ENT.      04/20/2023 - Documentation per Dr. Brandon - [facility] - Ear, Nose & Throat:  • I called Mr. Prabhakar to discuss his pain. He reports he has done well since surgery but now has an infected tooth. He reports \"no one will touch him\" since had recent surgery.   • Neck is healing well and he is eating and drinking. We again discussed his recommendation for radiation and that he has had a referral placed to radiation oncology in [place].   • He reports he has not received an apointment with them yet. He will need a dental evaluation and likely extractions in prepration of radiation, so he needs to establish care with a dentist [facility].   • I am happy to speak with his dentist - surgery should not interfere with need for extractions. Pain refill provided. Follow up at the completion of radiation.    History obtained from  PATIENT, FAMILY, and CHART    PAST MEDICAL HISTORY  Past Medical History:   Diagnosis Date   • GERD (gastroesophageal reflux disease)    • Tonsil cancer 03/27/2023    right      PAST SURGICAL HISTORY  Past Surgical History:   Procedure Laterality Date   • NECK DISSECTION Right 03/27/2023      FAMILY HISTORY  family history is not on file.     SOCIAL HISTORY  Social History     Tobacco Use   • Smoking status: Every Day     Types: Cigarettes     ALLERGIES  Patient has no known " "allergies.     MEDICATIONS    Current Outpatient Medications:   •  celecoxib (CeleBREX) 200 MG capsule, Take 1 capsule by mouth Daily., Disp: , Rfl:   •  HYDROcodone-acetaminophen (NORCO) 5-325 MG per tablet, Take 1 tablet by mouth Every 6 (Six) Hours As Needed for Pain., Disp: , Rfl:   •  OLANZapine (zyPREXA) 20 MG tablet, Take 1 tablet by mouth every night at bedtime., Disp: , Rfl:   •  omeprazole (priLOSEC) 40 MG capsule, Take 1 capsule by mouth Daily., Disp: , Rfl:   •  PARoxetine (PAXIL) 40 MG tablet, Take 1 tablet by mouth Daily., Disp: , Rfl:   •  nystatin (MYCOSTATIN) 100,000 unit/mL suspension, Swish and swallow 5 mL 4 (Four) Times a Day., Disp: 280 mL, Rfl: 0    The following portions of the patient's history were reviewed and updated as appropriate: allergies, current medications, past family history, past medical history, past social history, past surgical history and problem list.    REVIEW OF SYSTEMS  Review of Systems   Constitutional: Negative.  Negative for fatigue.   HENT: Positive for dental problem (abcess, pain), sore throat and trouble swallowing.    Respiratory: Negative.  Negative for cough and shortness of breath.    Cardiovascular: Negative.    Gastrointestinal: Negative.    Genitourinary: Negative.    Musculoskeletal: Negative.    Skin: Negative.    Neurological: Negative.  Negative for dizziness.   Hematological: Negative.  Negative for adenopathy.   Psychiatric/Behavioral: Negative.       I have reviewed and confirmed the accuracy of the ROS as documented by the MA/LPTAVARES/RN Justin Nickerson III, MD    PHYSICAL EXAM  VITAL SIGNS:   Vitals:    04/28/23 1427   BP: 134/95   Pulse: 110   SpO2: 96%  Comment: room air   Weight: 76.2 kg (168 lb)   Height: 177.8 cm (70\")   PainSc:   4   PainLoc: Throat      Physical Exam  Vitals reviewed.   Constitutional:       Appearance: Normal appearance.   HENT:      Head: Normocephalic.      Comments: Poor dentition noted; yellow-white plaques noted " throughout mucosa.  Cardiovascular:      Rate and Rhythm: Normal rate and regular rhythm.      Pulses: Normal pulses.      Heart sounds: Normal heart sounds.   Pulmonary:      Effort: Pulmonary effort is normal.      Breath sounds: Normal breath sounds.   Abdominal:      General: Bowel sounds are normal.   Musculoskeletal:         General: Normal range of motion.      Cervical back: Normal range of motion and neck supple.   Skin:     General: Skin is warm.      Capillary Refill: Capillary refill takes less than 2 seconds.   Neurological:      General: No focal deficit present.      Mental Status: He is alert and oriented to person, place, and time.   Psychiatric:         Mood and Affect: Mood normal.         Behavior: Behavior normal.          Performance Status: ECOG (0) Fully active, able to carry on all predisease performance without restriction    Clinical Quality Measures  -Pain Documented by Standardized Tool, FPS  Hammad Moctezuma reports a pain score of 4. Given his pain assessment as noted, treatment options were discussed and the following options were decided upon as a follow-up plan to address the patient's pain: continuation of current treatment plan for pain and use of non-medical modalities (ice, heat, stretching and/or behavior modifications).   Pain Medications             celecoxib (CeleBREX) 200 MG capsule Take 1 capsule by mouth Daily.    HYDROcodone-acetaminophen (NORCO) 5-325 MG per tablet Take 1 tablet by mouth Every 6 (Six) Hours As Needed for Pain.    OLANZapine (zyPREXA) 20 MG tablet Take 1 tablet by mouth every night at bedtime.    PARoxetine (PAXIL) 40 MG tablet Take 1 tablet by mouth Daily.        -Advanced Care Planning  Advance Care Planning   ACP discussion was held with the patient during this visit. Patient has an advance directive in EMR which is still valid.      -Body Mass Index Screening and Follow-Up Plan BMI is within normal parameters. No other follow-up for BMI  required.    -Tobacco Use: Screening and Cessation Intervention Social History    Tobacco Use      Smoking status: Every Day        Types: Cigarettes      Smokeless tobacco: Not on file    ASSESSMENT AND PLAN  1. Tonsil cancer    2. S/P tonsillectomy    3. Current every day smoker      Orders Placed This Encounter   Procedures   • Ambulatory Referral to ONC Social Work     Referral Priority:   Routine     Referral Type:   Clinical Pathway     Referral Reason:   Specialty Services Required     Requested Specialty:        Number of Visits Requested:   1     RECOMMENDATIONS: Hammad Moctezuma was seen today for treatment but will require dental work prior to proceeding with definitive radiotherapy.      We have discussed the indications and rationale of radiation therapy according to the NCCN Guidelines for head and neck carcinoma. I have extensively reviewed the risks, benefits and alternatives of therapy and progression of disease in spite of therapy with either local or systemic failure.  Potential complications of head neck irradiation have been thoroughly reviewed.  This includes but is not limited to mucositis, loss of taste, pain, skin erythema, salivary dysfunction (xerostomia), dysphagia with secondary weight loss and other potential complications or side effects.  Long-term loss of taste, induration and fibrosis of the neck are also potential long term side effects of radiation.      I have seen, examined and reviewed his medication list, appropriate labs and imaging studies as well as other physician notes. We discussed the goals/plans of care and answered all questions. The option of definitive surgery has also been reviewed as well as surveillance.    We discussed treatment options per the national guidelines could include surgical resection, a definitive course of radiation therapy, with or without concurrent chemotherapy. Prior to treatment, we would recommend dental clearance,  nutrition, speech and swallowing evaluations. Long-term salivary gland dysfunction is likely and will exacerbate future dental care problems. I recommended that he see a dentist urgently for extraction evaluation. He does report difficulty finding a dentist who accepts his insurance. He has been referred to social work for assistance with this. We will see him back for further recommendations after extractions. I will send oral nystatin for thrush.     The patient and his family verbalize understanding of this discussion, voice no further questions and wishes to proceed with recommendations.    Hammad Moctezuma  reports that he has been smoking cigarettes. He does not have any smokeless tobacco history on file.. I have educated himon the risk of diseases from using tobacco products such as cancer, COPD and heart disease. I advised him to quit and he is not willing to quit. I spent >10 minutes counseling the patient.    Thank you for allowing me to assist in this patients care.    Time Spent: I spent 58 minutes caring for Hammad on this date of service. This time includes time spent by me in the following activities: preparing for the visit, reviewing tests, obtaining and/or reviewing a separately obtained history, performing a medically appropriate examination and/or evaluation, counseling and educating the patient/family/caregiver, ordering medications, tests, or procedures, referring and communicating with other health care professionals, documenting information in the medical record, independently interpreting results and communicating that information with the patient/family/caregiver and care coordination.   Justin Nickerson III, MD   04/28/2023

## 2023-05-01 ENCOUNTER — TELEPHONE (OUTPATIENT)
Dept: RADIATION ONCOLOGY | Facility: HOSPITAL | Age: 43
End: 2023-05-01
Payer: COMMERCIAL

## 2023-05-02 ENCOUNTER — TELEPHONE (OUTPATIENT)
Dept: RADIATION ONCOLOGY | Facility: HOSPITAL | Age: 43
End: 2023-05-02
Payer: COMMERCIAL

## 2023-05-02 DIAGNOSIS — C09.9 TONSIL CANCER: Primary | ICD-10-CM

## 2023-05-02 NOTE — TELEPHONE ENCOUNTER
DARRYL called Mr. Moctezuma due to his distress score during his radiation consultation on 4-28-23 for tonsil cancer. DARRYL explained role and source of support. He is 42 years old and lives with his mother. His support system is limited but includes his mother and brother. Mr. Moctezuma does have financial concerns and states he is a . His hours are 7:00A.M- 3:30P.M. He has not been able to work for about four weeks. He does not have short term disability through his job. He plans to return to work as soon as possible. Once he starts treatments he will be evaluated for gas assistance through the [employer]. He does take paxil and Ativan for anxiety/depression. His PCP recently increased his anxiety medication. He does not see a counselor. Mr. Moctezuma states to help him relax he does woodworking. DARRYL did discuss other positive coping strategies including support and peer to peer groups.      He has been trying to find a dentist to help with his “infected tooth” and possible extractions. Several dentist were called but did not take his insurance. DARRYL informed him that the nursing staff was able to contact the  Dentistry at [facility] and they take his insurance. Mr. Moctezuma and his mother were informed that it is first come first serve at the walk in clinic. They will be seeing people this week Monday through Thursday and registration starts at 6:30A.M. This writer encouraged them to get there early and to make sure they bring a list of current medications and insurance cards. DARRYL provided them with Dr. Nickerson’s fax number. DARRYL will follow up with Mr. Moctezuma once he starts treatment.

## 2023-05-09 ENCOUNTER — OFFICE VISIT (OUTPATIENT)
Dept: ENT CLINIC | Age: 43
End: 2023-05-09
Payer: MEDICAID

## 2023-05-09 VITALS
WEIGHT: 166 LBS | BODY MASS INDEX: 24.59 KG/M2 | SYSTOLIC BLOOD PRESSURE: 138 MMHG | HEIGHT: 69 IN | DIASTOLIC BLOOD PRESSURE: 84 MMHG

## 2023-05-09 DIAGNOSIS — R13.12 OROPHARYNGEAL DYSPHAGIA: Primary | ICD-10-CM

## 2023-05-09 DIAGNOSIS — C09.9 CANCER OF TONSIL (HCC): ICD-10-CM

## 2023-05-09 PROCEDURE — G8427 DOCREV CUR MEDS BY ELIG CLIN: HCPCS | Performed by: PHYSICIAN ASSISTANT

## 2023-05-09 PROCEDURE — 31575 DIAGNOSTIC LARYNGOSCOPY: CPT | Performed by: PHYSICIAN ASSISTANT

## 2023-05-09 PROCEDURE — G8420 CALC BMI NORM PARAMETERS: HCPCS | Performed by: PHYSICIAN ASSISTANT

## 2023-05-09 PROCEDURE — 4004F PT TOBACCO SCREEN RCVD TLK: CPT | Performed by: PHYSICIAN ASSISTANT

## 2023-05-09 PROCEDURE — 99213 OFFICE O/P EST LOW 20 MIN: CPT | Performed by: PHYSICIAN ASSISTANT

## 2023-05-09 RX ORDER — TRAZODONE HYDROCHLORIDE 50 MG/1
TABLET ORAL
COMMUNITY
Start: 2023-05-08

## 2023-05-09 RX ORDER — POTASSIUM CHLORIDE 20 MEQ/1
TABLET, EXTENDED RELEASE ORAL
COMMUNITY
Start: 2023-05-08

## 2023-05-09 RX ORDER — AMOXICILLIN 500 MG/1
500 CAPSULE ORAL 3 TIMES DAILY
COMMUNITY

## 2023-05-09 RX ORDER — OMEPRAZOLE 40 MG/1
40 CAPSULE, DELAYED RELEASE ORAL DAILY
COMMUNITY
Start: 2023-04-24

## 2023-05-09 ASSESSMENT — ENCOUNTER SYMPTOMS
RHINORRHEA: 0
TROUBLE SWALLOWING: 0
EYE PAIN: 0
PHOTOPHOBIA: 0
FACIAL SWELLING: 0
SINUS PAIN: 0
SINUS PRESSURE: 0
SORE THROAT: 0
VOICE CHANGE: 0

## 2023-05-09 NOTE — PROGRESS NOTES
[facility] OTOLARYNGOLOGY/ENT  Hayden Estrada is a pleasant 43-year-old  male that is status post 6 weeks from a tonsillectomy with excision of an anterior cervical lymph node. Patient was initially noted with metastatic squamous cell carcinoma confirmed by biopsy that we had ordered initially. He was later found to have a primary tonsillar cancer that was removed without any complications. Currently he reports he is having some difficulty swallowing and is unable to tolerate bread or meat type of products. He reports that he has been utilizing Ensure shakes. He denies any vocal hoarseness or any additional problems. Due to this ongoing problem he is requesting evaluation. Patient does admit to having oral thrush that has been treated by his ENT surgeon in [place]. Allergies: Patient has no known allergies. Current Outpatient Medications   Medication Sig Dispense Refill    potassium chloride (KLOR-CON M) 20 MEQ extended release tablet       traZODone (DESYREL) 50 MG tablet       omeprazole (PRILOSEC) 40 MG delayed release capsule Take 1 capsule by mouth daily      amoxicillin (AMOXIL) 500 MG capsule Take 1 capsule by mouth 3 times daily      OLANZapine (ZYPREXA) 15 MG tablet TAKE 1 TABLET BY MOUTH ONCE DAILY AT BEDTIME      vitamin D (ERGOCALCIFEROL) 1.25 MG (42314 UT) CAPS capsule TAKE 1 CAPSULE BY MOUTH ONCE A WEEK      Diclofenac Potassium,Migraine, 50 MG PACK Take 75 mg by mouth 2 times daily      PARoxetine (PAXIL) 40 MG tablet Take 75 mg by mouth every morning      Multiple Vitamin (MULTIVITAMIN) TABS tablet Take 1 tablet by mouth daily 30 tablet 0     No current facility-administered medications for this visit.        Past Surgical History:   Procedure Laterality Date    ADENOIDECTOMY      COLONOSCOPY      TONSILLECTOMY Right     TYMPANOSTOMY TUBE PLACEMENT      US LYMPH NODE BIOPSY  10/21/2022    US LYMPH NODE BIOPSY 10/21/2022 [facility] ULTRASOUND       Past Medical History:   Diagnosis Date

## 2023-05-09 NOTE — ASSESSMENT & PLAN NOTE
Oral pharyngeal dysphagia felt to be secondary to edema postop  Plan: I will place the patient on Magic mouthwash without nystatin due to the confirming evidence that thrush was not present. Patient is to follow-up in 3 weeks for reevaluation. He was reminded to call if this worsens.

## 2023-05-09 NOTE — ASSESSMENT & PLAN NOTE
Tonsillar cancer with metastatic disease to anterior cervical node-currently scheduled for radiation therapy after dental extraction

## 2023-05-10 ENCOUNTER — TELEPHONE (OUTPATIENT)
Dept: RADIATION ONCOLOGY | Facility: HOSPITAL | Age: 43
End: 2023-05-10
Payer: COMMERCIAL

## 2023-05-10 NOTE — TELEPHONE ENCOUNTER
DARRYL called Florencia to follow up on Mr. Moctezuma. Florencia states he has been too sick and is unable to sit and wait at an appointment for hours to see a dentist. Therefore, he has not been to the  Dentistry at [facility]. Florencia did find a dentist in [place] who takes Medicaid and she plans to call their office tomorrow to see if she is able to get Mr. Moctezuma an appointment. If she is unable to get him an appointment she will look to see if she is able to cover the cost of his dental work.   DARRYL also informed her speech therapy has called Mr. Moctezuma but has not been able to get a hold of him. DARRYL informed her that Mr. Moctezuma can call the main number for the hospital and ask for the speech therapy department. Florencia will contact the office once a dental appointment has been made.

## 2023-06-07 ENCOUNTER — TELEPHONE (OUTPATIENT)
Dept: RADIATION ONCOLOGY | Facility: HOSPITAL | Age: 43
End: 2023-06-07
Payer: COMMERCIAL

## 2023-06-07 NOTE — TELEPHONE ENCOUNTER
DARRYL spoke to  Rhianna who states he planned to go to the  Dentistry [facility] in [place] yesterday, however he was unable to make. DARRYL informed him to call Dr. Nickerson's office once he got his teeth pulled so he could be scheduled.

## 2023-09-22 NOTE — ED NOTES
George SEYMOUR at bedside speaking with pt. Security at bedside.       Jus Stock RN  02/02/21 [phone]

## 2023-12-06 ENCOUNTER — OFFICE VISIT (OUTPATIENT)
Dept: ENT CLINIC | Age: 43
End: 2023-12-06
Payer: MEDICAID

## 2023-12-06 VITALS
HEIGHT: 69 IN | SYSTOLIC BLOOD PRESSURE: 136 MMHG | DIASTOLIC BLOOD PRESSURE: 88 MMHG | BODY MASS INDEX: 23.99 KG/M2 | WEIGHT: 162 LBS

## 2023-12-06 DIAGNOSIS — Z22.322 MRSA NASAL COLONIZATION: ICD-10-CM

## 2023-12-06 DIAGNOSIS — J34.89 NASAL OBSTRUCTION: Primary | ICD-10-CM

## 2023-12-06 PROBLEM — R59.0 ANTERIOR CERVICAL LYMPHADENOPATHY: Status: RESOLVED | Noted: 2022-10-10 | Resolved: 2023-12-06

## 2023-12-06 PROCEDURE — 4004F PT TOBACCO SCREEN RCVD TLK: CPT | Performed by: PHYSICIAN ASSISTANT

## 2023-12-06 PROCEDURE — G8420 CALC BMI NORM PARAMETERS: HCPCS | Performed by: PHYSICIAN ASSISTANT

## 2023-12-06 PROCEDURE — 31575 DIAGNOSTIC LARYNGOSCOPY: CPT | Performed by: PHYSICIAN ASSISTANT

## 2023-12-06 PROCEDURE — 99213 OFFICE O/P EST LOW 20 MIN: CPT | Performed by: PHYSICIAN ASSISTANT

## 2023-12-06 PROCEDURE — G8484 FLU IMMUNIZE NO ADMIN: HCPCS | Performed by: PHYSICIAN ASSISTANT

## 2023-12-06 PROCEDURE — G8428 CUR MEDS NOT DOCUMENT: HCPCS | Performed by: PHYSICIAN ASSISTANT

## 2023-12-06 ASSESSMENT — ENCOUNTER SYMPTOMS
VOICE CHANGE: 0
EYE PAIN: 0
SINUS PRESSURE: 0
RHINORRHEA: 0
TROUBLE SWALLOWING: 0
PHOTOPHOBIA: 0
SINUS PAIN: 0
SORE THROAT: 0
FACIAL SWELLING: 0

## 2023-12-06 NOTE — PROGRESS NOTES
[facility] OTOLARYNGOLOGY/ENT  Mr. Sharan Sparrow is a pleasant that presents for evaluation of the nasal passageway due to complaints of nasal obstruction to the left side. Patient has a history of having a tonsil cancer that was resected at [facility]. Postoperatively he was scheduled for radiation therapy and was advised to have dental extraction due to bad teeth prior to radiation. Unfortunately he has still not had the extraction and has not proceeded with radiation therapy. Due to issues with the nasal obstruction, he is concerned that he may have recurring disease. He denies any nasal bleeding or any dysphagia with solids or liquids. He also reports that his vocals have been normal.          Allergies: Patient has no known allergies. Current Outpatient Medications   Medication Sig Dispense Refill    mupirocin (BACTROBAN) 2 % ointment Apply gently with a Q-tip to both nostrils 3 times a day for 14 days 15 g 1    potassium chloride (KLOR-CON M) 20 MEQ extended release tablet       traZODone (DESYREL) 50 MG tablet       omeprazole (PRILOSEC) 40 MG delayed release capsule Take 1 capsule by mouth daily      OLANZapine (ZYPREXA) 15 MG tablet TAKE 1 TABLET BY MOUTH ONCE DAILY AT BEDTIME      vitamin D (ERGOCALCIFEROL) 1.25 MG (20882 UT) CAPS capsule TAKE 1 CAPSULE BY MOUTH ONCE A WEEK      Diclofenac Potassium,Migraine, 50 MG PACK Take 75 mg by mouth 2 times daily      PARoxetine (PAXIL) 40 MG tablet Take 75 mg by mouth every morning      Multiple Vitamin (MULTIVITAMIN) TABS tablet Take 1 tablet by mouth daily 30 tablet 0     No current facility-administered medications for this visit.        Past Surgical History:   Procedure Laterality Date    ADENOIDECTOMY      COLONOSCOPY      TONSILLECTOMY Right     TYMPANOSTOMY TUBE PLACEMENT      US LYMPH NODE BIOPSY  10/21/2022    US LYMPH NODE BIOPSY 10/21/2022 [facility] ULTRASOUND       Past Medical History:   Diagnosis Date    Alcohol abuse     Dog

## 2023-12-07 NOTE — ASSESSMENT & PLAN NOTE
Nasal obstruction with normal laryngoscopy with no evidence of recurring disease or any obstruction  Plan: Due to microabscesses noted to the surface of the anterior nose, I believe the patient has a staph infection that can be treated with mupirocin ointment. At this point the patient is to follow-up with me after he completes radiation therapy for monitoring for recurrent disease. Patient was reminded to call if he has any questions or concerns.

## 2024-04-19 ENCOUNTER — APPOINTMENT (OUTPATIENT)
Dept: GENERAL RADIOLOGY | Age: 44
End: 2024-04-19
Payer: MEDICAID

## 2024-04-19 ENCOUNTER — HOSPITAL ENCOUNTER (INPATIENT)
Age: 44
LOS: 14 days | Discharge: OTHER INSTITUTION | End: 2024-05-03
Attending: EMERGENCY MEDICINE
Payer: MEDICAID

## 2024-04-19 ENCOUNTER — APPOINTMENT (OUTPATIENT)
Dept: CT IMAGING | Age: 44
End: 2024-04-19
Payer: MEDICAID

## 2024-04-19 DIAGNOSIS — F19.10 SUBSTANCE ABUSE (HCC): ICD-10-CM

## 2024-04-19 DIAGNOSIS — R41.82 ALTERED MENTAL STATUS, UNSPECIFIED ALTERED MENTAL STATUS TYPE: Primary | ICD-10-CM

## 2024-04-19 DIAGNOSIS — F10.932 ALCOHOL WITHDRAWAL SYNDROME WITH PERCEPTUAL DISTURBANCE (HCC): ICD-10-CM

## 2024-04-19 PROBLEM — F10.931 ALCOHOL WITHDRAWAL DELIRIUM (HCC): Status: ACTIVE | Noted: 2024-04-19

## 2024-04-19 LAB
ALBUMIN SERPL-MCNC: 4.1 G/DL (ref 3.5–5.2)
ALP SERPL-CCNC: 222 U/L (ref 40–130)
ALT SERPL-CCNC: 52 U/L (ref 5–41)
AMMONIA PLAS-SCNC: 30 UMOL/L (ref 16–60)
AMPHET UR QL SCN: NEGATIVE
ANION GAP SERPL CALCULATED.3IONS-SCNC: 13 MMOL/L (ref 7–19)
APAP SERPL-MCNC: <5 UG/ML (ref 10–30)
APTT PPP: 27.9 SEC (ref 26–36.2)
AST SERPL-CCNC: 88 U/L (ref 5–40)
BARBITURATES UR QL SCN: NEGATIVE
BASE EXCESS ARTERIAL: 3.8 MMOL/L (ref -2–2)
BASOPHILS # BLD: 0 K/UL (ref 0–0.2)
BASOPHILS NFR BLD: 0.4 % (ref 0–1)
BENZODIAZ UR QL SCN: POSITIVE
BILIRUB SERPL-MCNC: 0.7 MG/DL (ref 0.2–1.2)
BILIRUB UR QL STRIP: NEGATIVE
BUN SERPL-MCNC: 8 MG/DL (ref 6–20)
BUPRENORPHINE URINE: NEGATIVE
CALCIUM SERPL-MCNC: 9.3 MG/DL (ref 8.6–10)
CANNABINOIDS UR QL SCN: NEGATIVE
CARBOXYHEMOGLOBIN ARTERIAL: 2.1 % (ref 0–5)
CHLORIDE SERPL-SCNC: 100 MMOL/L (ref 98–111)
CK SERPL-CCNC: 128 U/L (ref 39–308)
CK SERPL-CCNC: 199 U/L (ref 39–308)
CLARITY UR: CLEAR
CO2 SERPL-SCNC: 27 MMOL/L (ref 22–29)
COCAINE UR QL SCN: NEGATIVE
COLOR UR: YELLOW
CREAT SERPL-MCNC: 0.6 MG/DL (ref 0.5–1.2)
DRUG SCREEN COMMENT UR-IMP: ABNORMAL
EOSINOPHIL # BLD: 0.1 K/UL (ref 0–0.6)
EOSINOPHIL NFR BLD: 1.5 % (ref 0–5)
ERYTHROCYTE [DISTWIDTH] IN BLOOD BY AUTOMATED COUNT: 11.6 % (ref 11.5–14.5)
ETHANOLAMINE SERPL-MCNC: <10 MG/DL (ref 0–0.08)
FENTANYL SCREEN, URINE: NEGATIVE
FIO2: 30 %
GLUCOSE SERPL-MCNC: 105 MG/DL (ref 74–109)
GLUCOSE UR STRIP.AUTO-MCNC: NEGATIVE MG/DL
HCO3 ARTERIAL: 29.2 MMOL/L (ref 22–26)
HCT VFR BLD AUTO: 38 % (ref 42–52)
HEMOGLOBIN, ART, EXTENDED: 12.4 G/DL (ref 14–18)
HGB BLD-MCNC: 13.2 G/DL (ref 14–18)
HGB UR STRIP.AUTO-MCNC: NEGATIVE MG/L
IMM GRANULOCYTES # BLD: 0 K/UL
INR PPP: 1.07 (ref 0.88–1.18)
KETONES UR STRIP.AUTO-MCNC: NEGATIVE MG/DL
LEUKOCYTE ESTERASE UR QL STRIP.AUTO: NEGATIVE
LYMPHOCYTES # BLD: 0.9 K/UL (ref 1.1–4.5)
LYMPHOCYTES NFR BLD: 16.8 % (ref 20–40)
MAGNESIUM SERPL-MCNC: 1.7 MG/DL (ref 1.6–2.6)
MCH RBC QN AUTO: 34 PG (ref 27–31)
MCHC RBC AUTO-ENTMCNC: 34.7 G/DL (ref 33–37)
MCV RBC AUTO: 97.9 FL (ref 80–94)
MECHANICAL RATE IN BPM: 16
METHADONE UR QL SCN: NEGATIVE
METHAMPHETAMINE, URINE: POSITIVE
METHEMOGLOBIN ARTERIAL: 1.1 %
MODE: ABNORMAL
MONOCYTES # BLD: 0.4 K/UL (ref 0–0.9)
MONOCYTES NFR BLD: 7.6 % (ref 0–10)
NEUTROPHILS # BLD: 3.9 K/UL (ref 1.5–7.5)
NEUTS SEG NFR BLD: 73.5 % (ref 50–65)
NITRITE UR QL STRIP.AUTO: NEGATIVE
O2 CONTENT ARTERIAL: 17 ML/DL
O2 DELIVERY DEVICE: ABNORMAL
O2 SAT, ARTERIAL: 96.6 %
O2 THERAPY: ABNORMAL
OPIATES UR QL SCN: NEGATIVE
OXYCODONE UR QL SCN: NEGATIVE
PCO2 ARTERIAL: 46 MMHG (ref 35–45)
PCP UR QL SCN: NEGATIVE
PH ARTERIAL: 7.41 (ref 7.35–7.45)
PH UR STRIP.AUTO: 7 [PH] (ref 5–8)
PLATELET # BLD AUTO: 137 K/UL (ref 130–400)
PMV BLD AUTO: 10.5 FL (ref 9.4–12.4)
PO2 ARTERIAL: 107 MMHG (ref 80–100)
POSITIVE END EXP PRESS: 5
POTASSIUM BLD-SCNC: 2.7 MMOL/L
POTASSIUM SERPL-SCNC: 3.4 MMOL/L (ref 3.5–5)
PROT SERPL-MCNC: 7 G/DL (ref 6.6–8.7)
PROT UR STRIP.AUTO-MCNC: NEGATIVE MG/DL
PROTHROMBIN TIME: 13.6 SEC (ref 12–14.6)
RBC # BLD AUTO: 3.88 M/UL (ref 4.7–6.1)
SALICYLATES SERPL-MCNC: <0.3 MG/DL (ref 3–10)
SAMPLE SOURCE: ABNORMAL
SODIUM SERPL-SCNC: 140 MMOL/L (ref 136–145)
SP GR UR STRIP.AUTO: 1.01 (ref 1–1.03)
TRICYCLIC, URINE: NEGATIVE
TROPONIN, HIGH SENSITIVITY: <6 NG/L (ref 0–22)
UROBILINOGEN UR STRIP.AUTO-MCNC: 0.2 E.U./DL
VT MECHANICAL: 450 %
WBC # BLD AUTO: 5.4 K/UL (ref 4.8–10.8)

## 2024-04-19 PROCEDURE — 2580000003 HC RX 258

## 2024-04-19 PROCEDURE — 99285 EMERGENCY DEPT VISIT HI MDM: CPT

## 2024-04-19 PROCEDURE — 80143 DRUG ASSAY ACETAMINOPHEN: CPT

## 2024-04-19 PROCEDURE — 36600 WITHDRAWAL OF ARTERIAL BLOOD: CPT

## 2024-04-19 PROCEDURE — 82550 ASSAY OF CK (CPK): CPT

## 2024-04-19 PROCEDURE — 2700000000 HC OXYGEN THERAPY PER DAY

## 2024-04-19 PROCEDURE — 81003 URINALYSIS AUTO W/O SCOPE: CPT

## 2024-04-19 PROCEDURE — 36415 COLL VENOUS BLD VENIPUNCTURE: CPT

## 2024-04-19 PROCEDURE — 2500000003 HC RX 250 WO HCPCS

## 2024-04-19 PROCEDURE — 84484 ASSAY OF TROPONIN QUANT: CPT

## 2024-04-19 PROCEDURE — 0BH17EZ INSERTION OF ENDOTRACHEAL AIRWAY INTO TRACHEA, VIA NATURAL OR ARTIFICIAL OPENING: ICD-10-PCS | Performed by: EMERGENCY MEDICINE

## 2024-04-19 PROCEDURE — 71045 X-RAY EXAM CHEST 1 VIEW: CPT

## 2024-04-19 PROCEDURE — 80053 COMPREHEN METABOLIC PANEL: CPT

## 2024-04-19 PROCEDURE — 6360000002 HC RX W HCPCS: Performed by: INTERNAL MEDICINE

## 2024-04-19 PROCEDURE — C9113 INJ PANTOPRAZOLE SODIUM, VIA: HCPCS | Performed by: INTERNAL MEDICINE

## 2024-04-19 PROCEDURE — 2000000000 HC ICU R&B

## 2024-04-19 PROCEDURE — 6360000002 HC RX W HCPCS: Performed by: EMERGENCY MEDICINE

## 2024-04-19 PROCEDURE — 2580000003 HC RX 258: Performed by: INTERNAL MEDICINE

## 2024-04-19 PROCEDURE — 6360000002 HC RX W HCPCS

## 2024-04-19 PROCEDURE — 80179 DRUG ASSAY SALICYLATE: CPT

## 2024-04-19 PROCEDURE — 2500000003 HC RX 250 WO HCPCS: Performed by: INTERNAL MEDICINE

## 2024-04-19 PROCEDURE — 31500 INSERT EMERGENCY AIRWAY: CPT

## 2024-04-19 PROCEDURE — 82077 ASSAY SPEC XCP UR&BREATH IA: CPT

## 2024-04-19 PROCEDURE — 85730 THROMBOPLASTIN TIME PARTIAL: CPT

## 2024-04-19 PROCEDURE — 2500000003 HC RX 250 WO HCPCS: Performed by: EMERGENCY MEDICINE

## 2024-04-19 PROCEDURE — 85025 COMPLETE CBC W/AUTO DIFF WBC: CPT

## 2024-04-19 PROCEDURE — 82803 BLOOD GASES ANY COMBINATION: CPT

## 2024-04-19 PROCEDURE — G0480 DRUG TEST DEF 1-7 CLASSES: HCPCS

## 2024-04-19 PROCEDURE — 2580000003 HC RX 258: Performed by: EMERGENCY MEDICINE

## 2024-04-19 PROCEDURE — 82140 ASSAY OF AMMONIA: CPT

## 2024-04-19 PROCEDURE — 85610 PROTHROMBIN TIME: CPT

## 2024-04-19 PROCEDURE — 93005 ELECTROCARDIOGRAM TRACING: CPT | Performed by: EMERGENCY MEDICINE

## 2024-04-19 PROCEDURE — 51702 INSERT TEMP BLADDER CATH: CPT

## 2024-04-19 PROCEDURE — 80307 DRUG TEST PRSMV CHEM ANLYZR: CPT

## 2024-04-19 PROCEDURE — 83735 ASSAY OF MAGNESIUM: CPT

## 2024-04-19 PROCEDURE — 5A1955Z RESPIRATORY VENTILATION, GREATER THAN 96 CONSECUTIVE HOURS: ICD-10-PCS | Performed by: EMERGENCY MEDICINE

## 2024-04-19 PROCEDURE — HZ2ZZZZ DETOXIFICATION SERVICES FOR SUBSTANCE ABUSE TREATMENT: ICD-10-PCS | Performed by: HOSPITALIST

## 2024-04-19 PROCEDURE — 70450 CT HEAD/BRAIN W/O DYE: CPT

## 2024-04-19 RX ORDER — ENOXAPARIN SODIUM 100 MG/ML
40 INJECTION SUBCUTANEOUS DAILY
Status: DISCONTINUED | OUTPATIENT
Start: 2024-04-19 | End: 2024-04-19

## 2024-04-19 RX ORDER — OLANZAPINE 5 MG/1
15 TABLET ORAL NIGHTLY
Status: CANCELLED | OUTPATIENT
Start: 2024-04-19

## 2024-04-19 RX ORDER — FENTANYL CITRATE-0.9 % NACL/PF 10 MCG/ML
PLASTIC BAG, INJECTION (ML) INTRAVENOUS
Status: DISCONTINUED
Start: 2024-04-19 | End: 2024-04-19

## 2024-04-19 RX ORDER — LORAZEPAM 2 MG/ML
1 INJECTION INTRAMUSCULAR ONCE
Status: DISCONTINUED | OUTPATIENT
Start: 2024-04-19 | End: 2024-04-19

## 2024-04-19 RX ORDER — SODIUM CHLORIDE, SODIUM LACTATE, POTASSIUM CHLORIDE, CALCIUM CHLORIDE 600; 310; 30; 20 MG/100ML; MG/100ML; MG/100ML; MG/100ML
INJECTION, SOLUTION INTRAVENOUS CONTINUOUS
Status: DISCONTINUED | OUTPATIENT
Start: 2024-04-19 | End: 2024-04-19

## 2024-04-19 RX ORDER — SODIUM CHLORIDE 0.9 % (FLUSH) 0.9 %
5-40 SYRINGE (ML) INJECTION EVERY 12 HOURS SCHEDULED
Status: DISCONTINUED | OUTPATIENT
Start: 2024-04-19 | End: 2024-04-19

## 2024-04-19 RX ORDER — POTASSIUM CHLORIDE 7.45 MG/ML
10 INJECTION INTRAVENOUS PRN
Status: DISCONTINUED | OUTPATIENT
Start: 2024-04-19 | End: 2024-05-03 | Stop reason: HOSPADM

## 2024-04-19 RX ORDER — DIPHENHYDRAMINE HYDROCHLORIDE 50 MG/ML
INJECTION INTRAMUSCULAR; INTRAVENOUS
Status: DISCONTINUED
Start: 2024-04-19 | End: 2024-04-19

## 2024-04-19 RX ORDER — FOLIC ACID 1 MG/1
1 TABLET ORAL DAILY
Status: DISCONTINUED | OUTPATIENT
Start: 2024-04-19 | End: 2024-05-03 | Stop reason: HOSPADM

## 2024-04-19 RX ORDER — LORAZEPAM 2 MG/ML
2 INJECTION INTRAMUSCULAR
Status: DISCONTINUED | OUTPATIENT
Start: 2024-04-19 | End: 2024-04-30

## 2024-04-19 RX ORDER — POTASSIUM CHLORIDE 29.8 MG/ML
20 INJECTION INTRAVENOUS PRN
Status: DISCONTINUED | OUTPATIENT
Start: 2024-04-19 | End: 2024-05-03 | Stop reason: HOSPADM

## 2024-04-19 RX ORDER — DIPHENHYDRAMINE HYDROCHLORIDE 50 MG/ML
50 INJECTION INTRAMUSCULAR; INTRAVENOUS ONCE
Status: COMPLETED | OUTPATIENT
Start: 2024-04-19 | End: 2024-04-19

## 2024-04-19 RX ORDER — ONDANSETRON 2 MG/ML
4 INJECTION INTRAMUSCULAR; INTRAVENOUS EVERY 6 HOURS PRN
Status: DISCONTINUED | OUTPATIENT
Start: 2024-04-19 | End: 2024-05-03 | Stop reason: HOSPADM

## 2024-04-19 RX ORDER — SODIUM CHLORIDE 9 MG/ML
INJECTION, SOLUTION INTRAVENOUS CONTINUOUS
Status: DISCONTINUED | OUTPATIENT
Start: 2024-04-19 | End: 2024-04-19

## 2024-04-19 RX ORDER — POTASSIUM CHLORIDE 20 MEQ/1
40 TABLET, EXTENDED RELEASE ORAL PRN
Status: DISCONTINUED | OUTPATIENT
Start: 2024-04-19 | End: 2024-05-03 | Stop reason: HOSPADM

## 2024-04-19 RX ORDER — THIAMINE HYDROCHLORIDE 100 MG/ML
100 INJECTION, SOLUTION INTRAMUSCULAR; INTRAVENOUS DAILY
Status: DISCONTINUED | OUTPATIENT
Start: 2024-04-19 | End: 2024-04-30

## 2024-04-19 RX ORDER — ACETAMINOPHEN 650 MG/1
650 SUPPOSITORY RECTAL EVERY 6 HOURS PRN
Status: DISCONTINUED | OUTPATIENT
Start: 2024-04-19 | End: 2024-04-19

## 2024-04-19 RX ORDER — LORAZEPAM 2 MG/1
2 TABLET ORAL
Status: DISCONTINUED | OUTPATIENT
Start: 2024-04-19 | End: 2024-04-30

## 2024-04-19 RX ORDER — ETOMIDATE 2 MG/ML
20 INJECTION INTRAVENOUS ONCE
Status: DISCONTINUED | OUTPATIENT
Start: 2024-04-19 | End: 2024-04-19

## 2024-04-19 RX ORDER — POLYETHYLENE GLYCOL 3350 17 G/17G
17 POWDER, FOR SOLUTION ORAL DAILY PRN
Status: DISCONTINUED | OUTPATIENT
Start: 2024-04-19 | End: 2024-05-03 | Stop reason: HOSPADM

## 2024-04-19 RX ORDER — LORAZEPAM 1 MG/1
1 TABLET ORAL
Status: DISCONTINUED | OUTPATIENT
Start: 2024-04-19 | End: 2024-04-30

## 2024-04-19 RX ORDER — DEXMEDETOMIDINE HYDROCHLORIDE 4 UG/ML
INJECTION, SOLUTION INTRAVENOUS
Status: COMPLETED
Start: 2024-04-19 | End: 2024-04-19

## 2024-04-19 RX ORDER — ACETAMINOPHEN 325 MG/1
650 TABLET ORAL EVERY 6 HOURS PRN
Status: DISCONTINUED | OUTPATIENT
Start: 2024-04-19 | End: 2024-05-03 | Stop reason: HOSPADM

## 2024-04-19 RX ORDER — LORAZEPAM 2 MG/1
4 TABLET ORAL
Status: DISCONTINUED | OUTPATIENT
Start: 2024-04-19 | End: 2024-04-30

## 2024-04-19 RX ORDER — LORAZEPAM 2 MG/ML
4 INJECTION INTRAMUSCULAR
Status: DISCONTINUED | OUTPATIENT
Start: 2024-04-19 | End: 2024-04-30

## 2024-04-19 RX ORDER — ENOXAPARIN SODIUM 100 MG/ML
40 INJECTION SUBCUTANEOUS DAILY
Status: DISCONTINUED | OUTPATIENT
Start: 2024-04-19 | End: 2024-05-03 | Stop reason: HOSPADM

## 2024-04-19 RX ORDER — PAROXETINE 30 MG/1
75 TABLET, FILM COATED ORAL EVERY MORNING
Status: DISCONTINUED | OUTPATIENT
Start: 2024-04-20 | End: 2024-05-02

## 2024-04-19 RX ORDER — ACETAMINOPHEN 325 MG/1
650 TABLET ORAL EVERY 6 HOURS PRN
Status: DISCONTINUED | OUTPATIENT
Start: 2024-04-19 | End: 2024-04-19

## 2024-04-19 RX ORDER — LORAZEPAM 2 MG/ML
1 INJECTION INTRAMUSCULAR
Status: DISCONTINUED | OUTPATIENT
Start: 2024-04-19 | End: 2024-04-30

## 2024-04-19 RX ORDER — LORAZEPAM 2 MG/ML
3 INJECTION INTRAMUSCULAR
Status: DISCONTINUED | OUTPATIENT
Start: 2024-04-19 | End: 2024-04-30

## 2024-04-19 RX ORDER — PROPOFOL 10 MG/ML
5-50 INJECTION, EMULSION INTRAVENOUS ONCE
Status: COMPLETED | OUTPATIENT
Start: 2024-04-19 | End: 2024-04-19

## 2024-04-19 RX ORDER — TRAZODONE HYDROCHLORIDE 50 MG/1
50 TABLET ORAL NIGHTLY
Status: DISCONTINUED | OUTPATIENT
Start: 2024-04-19 | End: 2024-04-25

## 2024-04-19 RX ORDER — VECURONIUM BROMIDE 1 MG/ML
10 INJECTION, POWDER, LYOPHILIZED, FOR SOLUTION INTRAVENOUS ONCE
Status: DISCONTINUED | OUTPATIENT
Start: 2024-04-19 | End: 2024-04-19

## 2024-04-19 RX ORDER — ACETAMINOPHEN 650 MG/1
650 SUPPOSITORY RECTAL EVERY 6 HOURS PRN
Status: DISCONTINUED | OUTPATIENT
Start: 2024-04-19 | End: 2024-05-03 | Stop reason: HOSPADM

## 2024-04-19 RX ORDER — PROPOFOL 10 MG/ML
5-50 INJECTION, EMULSION INTRAVENOUS CONTINUOUS
Status: DISCONTINUED | OUTPATIENT
Start: 2024-04-19 | End: 2024-04-29

## 2024-04-19 RX ORDER — DEXMEDETOMIDINE HYDROCHLORIDE 4 UG/ML
.1-1.5 INJECTION, SOLUTION INTRAVENOUS CONTINUOUS
Status: DISCONTINUED | OUTPATIENT
Start: 2024-04-19 | End: 2024-04-28

## 2024-04-19 RX ORDER — ERGOCALCIFEROL 1.25 MG/1
50000 CAPSULE ORAL WEEKLY
Status: DISCONTINUED | OUTPATIENT
Start: 2024-04-19 | End: 2024-04-19

## 2024-04-19 RX ORDER — ONDANSETRON 4 MG/1
4 TABLET, ORALLY DISINTEGRATING ORAL EVERY 8 HOURS PRN
Status: CANCELLED | OUTPATIENT
Start: 2024-04-19

## 2024-04-19 RX ORDER — PROPOFOL 10 MG/ML
INJECTION, EMULSION INTRAVENOUS
Status: COMPLETED
Start: 2024-04-19 | End: 2024-04-19

## 2024-04-19 RX ORDER — SODIUM CHLORIDE 9 MG/ML
INJECTION, SOLUTION INTRAVENOUS PRN
Status: DISCONTINUED | OUTPATIENT
Start: 2024-04-19 | End: 2024-05-03 | Stop reason: HOSPADM

## 2024-04-19 RX ORDER — 0.9 % SODIUM CHLORIDE 0.9 %
1000 INTRAVENOUS SOLUTION INTRAVENOUS ONCE
Status: DISCONTINUED | OUTPATIENT
Start: 2024-04-19 | End: 2024-04-19

## 2024-04-19 RX ORDER — LORAZEPAM 2 MG/ML
2 INJECTION INTRAMUSCULAR ONCE
Status: COMPLETED | OUTPATIENT
Start: 2024-04-19 | End: 2024-04-19

## 2024-04-19 RX ORDER — MULTIVITAMIN WITH IRON
1 TABLET ORAL DAILY
Status: DISCONTINUED | OUTPATIENT
Start: 2024-04-19 | End: 2024-05-03 | Stop reason: HOSPADM

## 2024-04-19 RX ORDER — DEXMEDETOMIDINE HYDROCHLORIDE 4 UG/ML
.1-1.5 INJECTION, SOLUTION INTRAVENOUS CONTINUOUS
Status: DISCONTINUED | OUTPATIENT
Start: 2024-04-19 | End: 2024-04-19

## 2024-04-19 RX ORDER — POTASSIUM CHLORIDE 7.45 MG/ML
10 INJECTION INTRAVENOUS
Status: ACTIVE | OUTPATIENT
Start: 2024-04-19 | End: 2024-04-19

## 2024-04-19 RX ORDER — SODIUM CHLORIDE 0.9 % (FLUSH) 0.9 %
5-40 SYRINGE (ML) INJECTION PRN
Status: DISCONTINUED | OUTPATIENT
Start: 2024-04-19 | End: 2024-05-03 | Stop reason: HOSPADM

## 2024-04-19 RX ORDER — FENTANYL CITRATE-0.9 % NACL/PF 10 MCG/ML
25-400 PLASTIC BAG, INJECTION (ML) INTRAVENOUS CONTINUOUS
Status: DISCONTINUED | OUTPATIENT
Start: 2024-04-19 | End: 2024-04-29

## 2024-04-19 RX ORDER — PANTOPRAZOLE SODIUM 40 MG/1
40 TABLET, DELAYED RELEASE ORAL
Status: DISCONTINUED | OUTPATIENT
Start: 2024-04-20 | End: 2024-04-19

## 2024-04-19 RX ORDER — GAUZE BANDAGE 2" X 2"
100 BANDAGE TOPICAL DAILY
Status: DISCONTINUED | OUTPATIENT
Start: 2024-04-19 | End: 2024-04-19

## 2024-04-19 RX ORDER — MAGNESIUM SULFATE IN WATER 40 MG/ML
2000 INJECTION, SOLUTION INTRAVENOUS PRN
Status: DISCONTINUED | OUTPATIENT
Start: 2024-04-19 | End: 2024-05-03 | Stop reason: HOSPADM

## 2024-04-19 RX ADMIN — PROPOFOL 20 MCG/KG/MIN: 10 INJECTION, EMULSION INTRAVENOUS at 17:27

## 2024-04-19 RX ADMIN — ZIPRASIDONE MESYLATE 20 MG: 20 INJECTION, POWDER, LYOPHILIZED, FOR SOLUTION INTRAMUSCULAR at 16:56

## 2024-04-19 RX ADMIN — ENOXAPARIN SODIUM 40 MG: 100 INJECTION SUBCUTANEOUS at 23:44

## 2024-04-19 RX ADMIN — SODIUM CHLORIDE, POTASSIUM CHLORIDE, SODIUM LACTATE AND CALCIUM CHLORIDE: 600; 310; 30; 20 INJECTION, SOLUTION INTRAVENOUS at 17:50

## 2024-04-19 RX ADMIN — DEXMEDETOMIDINE HYDROCHLORIDE 0.2 MCG/KG/HR: 400 INJECTION, SOLUTION INTRAVENOUS at 16:34

## 2024-04-19 RX ADMIN — DEXMEDETOMIDINE HYDROCHLORIDE 1.2 MCG/KG/HR: 400 INJECTION, SOLUTION INTRAVENOUS at 21:00

## 2024-04-19 RX ADMIN — SODIUM BICARBONATE: 84 INJECTION, SOLUTION INTRAVENOUS at 18:50

## 2024-04-19 RX ADMIN — SODIUM CHLORIDE, PRESERVATIVE FREE 40 MG: 5 INJECTION INTRAVENOUS at 23:45

## 2024-04-19 RX ADMIN — DEXMEDETOMIDINE HYDROCHLORIDE 0.8 MCG/KG/HR: 400 INJECTION, SOLUTION INTRAVENOUS at 18:05

## 2024-04-19 RX ADMIN — DIPHENHYDRAMINE HYDROCHLORIDE 50 MG: 50 INJECTION, SOLUTION INTRAMUSCULAR; INTRAVENOUS at 16:19

## 2024-04-19 RX ADMIN — LORAZEPAM 2 MG: 2 INJECTION INTRAMUSCULAR; INTRAVENOUS at 15:58

## 2024-04-19 RX ADMIN — PROPOFOL 45 MCG/KG/MIN: 10 INJECTION, EMULSION INTRAVENOUS at 21:02

## 2024-04-19 RX ADMIN — Medication 50 MCG/HR: at 18:11

## 2024-04-19 RX ADMIN — PROPOFOL 40 MCG/KG/MIN: 10 INJECTION, EMULSION INTRAVENOUS at 18:03

## 2024-04-19 RX ADMIN — FOLIC ACID: 5 INJECTION, SOLUTION INTRAMUSCULAR; INTRAVENOUS; SUBCUTANEOUS at 20:18

## 2024-04-19 RX ADMIN — SODIUM CHLORIDE 1000 ML: 9 INJECTION, SOLUTION INTRAVENOUS at 16:35

## 2024-04-19 ASSESSMENT — PULMONARY FUNCTION TESTS
PIF_VALUE: 23
PIF_VALUE: 21
PIF_VALUE: 18

## 2024-04-19 ASSESSMENT — PAIN - FUNCTIONAL ASSESSMENT: PAIN_FUNCTIONAL_ASSESSMENT: NONE - DENIES PAIN

## 2024-04-19 NOTE — ED NOTES
Pt is becoming more agitated and harder to redirect. He is raising voice and attempting to put hands on people.

## 2024-04-19 NOTE — H&P
[facility]      Hospitalist - History & Physical      PCP: Rosangela Mtz APRN - CNP    Date of Admission: 4/19/2024    Date of Service: 4/19/2024    Chief Complaint:  hallucinations    History Of Present Illness:   The patient is a 43 y.o. male with alcohol and drug abuse who presents to [facility] ED for evaluation of AMS and agitation.  Patient was recently admitted to [facility] for rehab.  Today, patient was noted to be combative, altered, and is experiencing hallucinations.  He used to drink several shots of vodka daily, however has not had a drink in 2-3 days.  He was given 6 mg Ativan and 2.5mg Haldol p.o. per their protocol, however combative behavior continued.  Upon arrival, patient was drowsy but agitated, and was stating that he wanted to leave.  He attempted to get up, however was unable to ambulate.  Patient endorsed homicidal ideation, however he did not disclose who he intended to hurt.  Lab workup reveals mild hypokalemia with potassium 3.4, otherwise unremarkable chemistry panel.  LFTs mildly elevated.  UDS positive for benzos and methamphetamine, and etoh <10.  Mild macrocytic anemia consistent with chronic alcoholism.  Urinalysis negative for infection.  Chest x-ray shows atelectasis versus pneumonitis.  EKG sinus tach without ischemic changes.  Patient will be admitted to hospitalist with consult to psychiatry.    Prior to patient being admitted, he became combative and attempted to elope. He was given ativan, benadryl, and geodon without improvement. Decision was made to sedate and intubate the patient for his own safety and the safety of the staff. Will have psych evaluate the patient once extubated. His qtc was noted to be prolonged at 520. Will monitor rhythm closely. Patient will be admitted to ICU.     Past Medical History:        Diagnosis Date    Alcohol abuse     Dog bite     Psychiatric problem        Past Surgical History:        Procedure Laterality Date    ADENOIDECTOMY
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
## 2024-04-19 NOTE — ED NOTES
When this nurse left unit for rapid response, pt ran out of room and into hallway. Sitter was able to guide him back, but he is constantly needing reminded to stay in room.

## 2024-04-19 NOTE — ED NOTES
Pt changed into pt safety scrubs and belongings locked up with security. Room cleared from any strangulation objects.  Sitter at bedside.

## 2024-04-19 NOTE — PROGRESS NOTES
Pt intubated per Dr. Metzger with a 7.5 etube at 24 cm misty  Pt placed on vent at AC 16 450 30% 5 peep.

## 2024-04-19 NOTE — ED PROVIDER NOTES
at bedside, holding patient down. Multiple medications given. Patient intubated.    Medical Condition/Assessment: agitated tachycardic    Behavioral Condition/Assessment: agitated, aggressive    The patient’s review of systems, history, medications, and recent labs were reviewed at this time.     Continue/Discontinue restraints at this time: Discontinue as patient is now intubated.    One-Hour Review Evaluation Physician Notification:    Provider Notified (Name):  Rula Metzger MD      Date  4/19/24     Time  17:42     Was at bedside most of the time patient was restrained with multiple interventions attempted. Pt intubated and restraints will be d/c.    Physician or Designated One-Hour Reviewer  Rula Metzger MD             CONSULTS:  IP CONSULT TO PSYCHIATRY  IP CONSULT TO SOCIAL WORK  IP CONSULT TO SOCIAL WORK    PROCEDURES:  Unless otherwise notedbelow, none     Intubation    Date/Time: 4/19/2024 5:53 PM    Performed by: Rula Metzger MD  Authorized by: Poonam Gabriel MD    Consent:     Consent obtained:  Emergent situation    Risks discussed:  Aspiration and brain injury    Alternatives discussed:  Alternative treatment  Universal protocol:     Patient identity confirmed:  Hospital-assigned identification number  Pre-procedure details:     Indications: altered consciousness      Patient status:  Altered mental status    Look externally: no concerns      Mouth opening - incisor distance:  3 or more finger widths    Mallampati score:  III    Obstruction: none      Neck mobility: normal      Pharmacologic strategy: RSI      Induction agents:  Etomidate    Paralytic: vecuronium.  Procedure details:     Preoxygenation:  None    CPR in progress: no      Number of attempts:  1  Successful intubation attempt details:     Intubation method:  Oral    Intubation technique: video assisted      Laryngoscope blade:  Mac 3    Tube size (mm):  7.5    Tube type:  Cuffed    Tube visualized through cords: yes    Placement

## 2024-04-19 NOTE — ED NOTES
Pt has been unable to sit still since arriving. He picks at the floor, peed in trash can, is changing an engine, told this nurse she looks like Chrissie, (this nurse does not, nor can sing like her), amongst other several bizarre comments.

## 2024-04-20 ENCOUNTER — APPOINTMENT (OUTPATIENT)
Dept: GENERAL RADIOLOGY | Age: 44
End: 2024-04-20
Payer: MEDICAID

## 2024-04-20 LAB
ALBUMIN SERPL-MCNC: 3.6 G/DL (ref 3.5–5.2)
ALLENS TEST: ABNORMAL
ALP SERPL-CCNC: 186 U/L (ref 40–130)
ALT SERPL-CCNC: 38 U/L (ref 5–41)
ANION GAP SERPL CALCULATED.3IONS-SCNC: 15 MMOL/L (ref 7–19)
AST SERPL-CCNC: 64 U/L (ref 5–40)
BASE EXCESS ARTERIAL: 4.1 MMOL/L (ref -2–2)
BASOPHILS # BLD: 0 K/UL (ref 0–0.2)
BASOPHILS NFR BLD: 0.4 % (ref 0–1)
BILIRUB SERPL-MCNC: 0.6 MG/DL (ref 0.2–1.2)
BUN SERPL-MCNC: 7 MG/DL (ref 6–20)
CALCIUM SERPL-MCNC: 8.3 MG/DL (ref 8.6–10)
CARBOXYHEMOGLOBIN ARTERIAL: 1.9 % (ref 0–5)
CHLORIDE SERPL-SCNC: 101 MMOL/L (ref 98–111)
CK SERPL-CCNC: 282 U/L (ref 39–308)
CO2 SERPL-SCNC: 24 MMOL/L (ref 22–29)
CREAT SERPL-MCNC: 0.5 MG/DL (ref 0.5–1.2)
EOSINOPHIL # BLD: 0.2 K/UL (ref 0–0.6)
EOSINOPHIL NFR BLD: 2.7 % (ref 0–5)
ERYTHROCYTE [DISTWIDTH] IN BLOOD BY AUTOMATED COUNT: 11.5 % (ref 11.5–14.5)
FIO2: 30 %
GLUCOSE SERPL-MCNC: 153 MG/DL (ref 74–109)
HCO3 ARTERIAL: 28.5 MMOL/L (ref 22–26)
HCT VFR BLD AUTO: 36.6 % (ref 42–52)
HEMOGLOBIN, ART, EXTENDED: 12.4 G/DL (ref 14–18)
HGB BLD-MCNC: 12.3 G/DL (ref 14–18)
IMM GRANULOCYTES # BLD: 0 K/UL
LYMPHOCYTES # BLD: 1.5 K/UL (ref 1.1–4.5)
LYMPHOCYTES NFR BLD: 27.5 % (ref 20–40)
MAGNESIUM SERPL-MCNC: 1.7 MG/DL (ref 1.6–2.6)
MCH RBC QN AUTO: 33.4 PG (ref 27–31)
MCHC RBC AUTO-ENTMCNC: 33.6 G/DL (ref 33–37)
MCV RBC AUTO: 99.5 FL (ref 80–94)
MECHANICAL RATE IN BPM: 16
METHEMOGLOBIN ARTERIAL: 0.9 %
MODE: ABNORMAL
MONOCYTES # BLD: 0.4 K/UL (ref 0–0.9)
MONOCYTES NFR BLD: 6.6 % (ref 0–10)
NEUTROPHILS # BLD: 3.5 K/UL (ref 1.5–7.5)
NEUTS SEG NFR BLD: 62.6 % (ref 50–65)
O2 CONTENT ARTERIAL: 17.1 ML/DL
O2 DELIVERY DEVICE: ABNORMAL
O2 SAT, ARTERIAL: 97 %
O2 THERAPY: ABNORMAL
PCO2 ARTERIAL: 41 MMHG (ref 35–45)
PH ARTERIAL: 7.45 (ref 7.35–7.45)
PLATELET # BLD AUTO: 117 K/UL (ref 130–400)
PMV BLD AUTO: 10.6 FL (ref 9.4–12.4)
PO2 ARTERIAL: 119 MMHG (ref 80–100)
POSITIVE END EXP PRESS: 5
POTASSIUM BLD-SCNC: 2.6 MMOL/L
POTASSIUM SERPL-SCNC: 2.7 MMOL/L (ref 3.5–5)
PROT SERPL-MCNC: 6.2 G/DL (ref 6.6–8.7)
RBC # BLD AUTO: 3.68 M/UL (ref 4.7–6.1)
SAMPLE SOURCE: ABNORMAL
SODIUM SERPL-SCNC: 140 MMOL/L (ref 136–145)
VT MECHANICAL: 450 %
WBC # BLD AUTO: 5.6 K/UL (ref 4.8–10.8)

## 2024-04-20 PROCEDURE — 2500000003 HC RX 250 WO HCPCS: Performed by: INTERNAL MEDICINE

## 2024-04-20 PROCEDURE — 6370000000 HC RX 637 (ALT 250 FOR IP): Performed by: INTERNAL MEDICINE

## 2024-04-20 PROCEDURE — C9113 INJ PANTOPRAZOLE SODIUM, VIA: HCPCS | Performed by: INTERNAL MEDICINE

## 2024-04-20 PROCEDURE — 2700000000 HC OXYGEN THERAPY PER DAY

## 2024-04-20 PROCEDURE — 6360000002 HC RX W HCPCS: Performed by: INTERNAL MEDICINE

## 2024-04-20 PROCEDURE — 6370000000 HC RX 637 (ALT 250 FOR IP)

## 2024-04-20 PROCEDURE — 71045 X-RAY EXAM CHEST 1 VIEW: CPT

## 2024-04-20 PROCEDURE — 82550 ASSAY OF CK (CPK): CPT

## 2024-04-20 PROCEDURE — 85025 COMPLETE CBC W/AUTO DIFF WBC: CPT

## 2024-04-20 PROCEDURE — 80053 COMPREHEN METABOLIC PANEL: CPT

## 2024-04-20 PROCEDURE — 82803 BLOOD GASES ANY COMBINATION: CPT

## 2024-04-20 PROCEDURE — 36415 COLL VENOUS BLD VENIPUNCTURE: CPT

## 2024-04-20 PROCEDURE — 2580000003 HC RX 258: Performed by: INTERNAL MEDICINE

## 2024-04-20 PROCEDURE — 2000000000 HC ICU R&B

## 2024-04-20 PROCEDURE — 94760 N-INVAS EAR/PLS OXIMETRY 1: CPT

## 2024-04-20 PROCEDURE — 83735 ASSAY OF MAGNESIUM: CPT

## 2024-04-20 PROCEDURE — 36600 WITHDRAWAL OF ARTERIAL BLOOD: CPT

## 2024-04-20 PROCEDURE — 94002 VENT MGMT INPAT INIT DAY: CPT

## 2024-04-20 RX ORDER — SODIUM CHLORIDE, SODIUM LACTATE, POTASSIUM CHLORIDE, CALCIUM CHLORIDE 600; 310; 30; 20 MG/100ML; MG/100ML; MG/100ML; MG/100ML
INJECTION, SOLUTION INTRAVENOUS CONTINUOUS
Status: DISCONTINUED | OUTPATIENT
Start: 2024-04-20 | End: 2024-04-21

## 2024-04-20 RX ADMIN — PROPOFOL 30 MCG/KG/MIN: 10 INJECTION, EMULSION INTRAVENOUS at 12:08

## 2024-04-20 RX ADMIN — DEXMEDETOMIDINE HYDROCHLORIDE 1.2 MCG/KG/HR: 400 INJECTION, SOLUTION INTRAVENOUS at 00:04

## 2024-04-20 RX ADMIN — ENOXAPARIN SODIUM 40 MG: 100 INJECTION SUBCUTANEOUS at 07:40

## 2024-04-20 RX ADMIN — DEXMEDETOMIDINE HYDROCHLORIDE 1 MCG/KG/HR: 400 INJECTION, SOLUTION INTRAVENOUS at 14:16

## 2024-04-20 RX ADMIN — PROPOFOL 30 MCG/KG/MIN: 10 INJECTION, EMULSION INTRAVENOUS at 19:50

## 2024-04-20 RX ADMIN — PAROXETINE 75 MG: 30 TABLET, FILM COATED ORAL at 07:41

## 2024-04-20 RX ADMIN — PROPOFOL 25 MCG/KG/MIN: 10 INJECTION, EMULSION INTRAVENOUS at 06:53

## 2024-04-20 RX ADMIN — THERA TABS 1 TABLET: TAB at 07:41

## 2024-04-20 RX ADMIN — POTASSIUM CHLORIDE 10 MEQ: 7.46 INJECTION, SOLUTION INTRAVENOUS at 04:36

## 2024-04-20 RX ADMIN — POTASSIUM CHLORIDE 10 MEQ: 7.46 INJECTION, SOLUTION INTRAVENOUS at 08:02

## 2024-04-20 RX ADMIN — PROPOFOL 30 MCG/KG/MIN: 10 INJECTION, EMULSION INTRAVENOUS at 18:04

## 2024-04-20 RX ADMIN — POTASSIUM CHLORIDE 10 MEQ: 7.46 INJECTION, SOLUTION INTRAVENOUS at 06:56

## 2024-04-20 RX ADMIN — SODIUM CHLORIDE, POTASSIUM CHLORIDE, SODIUM LACTATE AND CALCIUM CHLORIDE: 600; 310; 30; 20 INJECTION, SOLUTION INTRAVENOUS at 14:08

## 2024-04-20 RX ADMIN — DEXMEDETOMIDINE HYDROCHLORIDE 1 MCG/KG/HR: 400 INJECTION, SOLUTION INTRAVENOUS at 18:06

## 2024-04-20 RX ADMIN — DEXMEDETOMIDINE HYDROCHLORIDE 1.2 MCG/KG/HR: 400 INJECTION, SOLUTION INTRAVENOUS at 23:03

## 2024-04-20 RX ADMIN — SODIUM BICARBONATE: 84 INJECTION, SOLUTION INTRAVENOUS at 07:39

## 2024-04-20 RX ADMIN — THIAMINE HYDROCHLORIDE 100 MG: 100 INJECTION, SOLUTION INTRAMUSCULAR; INTRAVENOUS at 07:41

## 2024-04-20 RX ADMIN — SODIUM CHLORIDE, PRESERVATIVE FREE 40 MG: 5 INJECTION INTRAVENOUS at 07:40

## 2024-04-20 RX ADMIN — SODIUM CHLORIDE, POTASSIUM CHLORIDE, SODIUM LACTATE AND CALCIUM CHLORIDE: 600; 310; 30; 20 INJECTION, SOLUTION INTRAVENOUS at 21:29

## 2024-04-20 RX ADMIN — DEXMEDETOMIDINE HYDROCHLORIDE 0.8 MCG/KG/HR: 400 INJECTION, SOLUTION INTRAVENOUS at 04:56

## 2024-04-20 RX ADMIN — POTASSIUM CHLORIDE 10 MEQ: 7.46 INJECTION, SOLUTION INTRAVENOUS at 02:32

## 2024-04-20 RX ADMIN — POTASSIUM CHLORIDE 10 MEQ: 7.46 INJECTION, SOLUTION INTRAVENOUS at 05:46

## 2024-04-20 RX ADMIN — PROPOFOL 40 MCG/KG/MIN: 10 INJECTION, EMULSION INTRAVENOUS at 01:30

## 2024-04-20 RX ADMIN — POTASSIUM CHLORIDE 10 MEQ: 7.46 INJECTION, SOLUTION INTRAVENOUS at 03:24

## 2024-04-20 RX ADMIN — FOLIC ACID 1 MG: 1 TABLET ORAL at 07:41

## 2024-04-20 RX ADMIN — Medication 50 MCG/HR: at 07:38

## 2024-04-20 ASSESSMENT — PULMONARY FUNCTION TESTS
PIF_VALUE: 18
PIF_VALUE: 19
PIF_VALUE: 18
PIF_VALUE: 17
PIF_VALUE: 20
PIF_VALUE: 20
PIF_VALUE: 19
PIF_VALUE: 20
PIF_VALUE: 19
PIF_VALUE: 18
PIF_VALUE: 21
PIF_VALUE: 20
PIF_VALUE: 20
PIF_VALUE: 21
PIF_VALUE: 19
PIF_VALUE: 18
PIF_VALUE: 19
PIF_VALUE: 19
PIF_VALUE: 17
PIF_VALUE: 18

## 2024-04-20 NOTE — PLAN OF CARE
Problem: Safety - Violent/Self-destructive Restraint  Goal: Remains free of injury from restraints (Restraint for Violent/Self-Destructive Behavior)  Description: INTERVENTIONS:  1. Determine that de-escalation and other, less restrictive measures have been tried or would not be effective before applying the restraint  2. Identify and document the criteria for restraint  3. Evaluate the patient's condition at the time of restraint application  4. Inform patient/family regarding the reason for restraint/seclusion  5. Q2H: Monitor comfort, nutrition and hydration needs  6. Q15M: Perform safety checks including skin, circulation, sensory, respiratory and psychological status  7. Ensure continuous observation  8. Identify and implement measures to help patient regain control, assess readiness for release and initiate progressive release per policy  4/20/2024 1012 by Michelle Echeverria RN  Outcome: Progressing  4/20/2024 0053 by Angélica Andrade RN  Outcome: Progressing  Flowsheets  Taken 4/19/2024 1720 by Cadence Rasmussen RN  Remains Free of Injury from Restraints (Restraint for Violent/Self-destructive Behavior):   Identify and implement measures to help patient regain control, assess readiness for release and initiate progressive release per policy   Ensure continuous observation   Every 15 minutes: Perform safety checks including skin, circulation, sensory, respiratory and psychological status   Every 2 hours: Monitor comfort, nutrition and hydration needs  Taken 4/19/2024 1705 by Cadence Rasmussen RN  Remains Free of Injury from Restraints (Restraint for Violent/Self-destructive Behavior):   Ensure continuous observation   Every 15 minutes: Perform safety checks including skin, circulation, sensory, respiratory and psychological status   Every 2 hours: Monitor comfort, nutrition and hydration needs   Inform patient/family regarding the reason for restraint/seclusion  Taken 4/19/2024 1650 by Cadence Rasmussen

## 2024-04-20 NOTE — PLAN OF CARE
Problem: Discharge Planning  Goal: Discharge to home or other facility with appropriate resources  4/20/2024 0053 by Angélica Andrade RN  Outcome: Progressing  4/20/2024 0053 by Angélica Andrade RN  Outcome: Progressing     Problem: Pain  Goal: Verbalizes/displays adequate comfort level or baseline comfort level  4/20/2024 0053 by Angélica Andrade RN  Outcome: Progressing  4/20/2024 0053 by Angélica Andrade RN  Outcome: Progressing     Problem: Safety - Adult  Goal: Free from fall injury  4/20/2024 0053 by Angélica Andrade RN  Outcome: Progressing  4/20/2024 0053 by Angélica Andrade RN  Outcome: Progressing     Problem: Skin/Tissue Integrity  Goal: Absence of new skin breakdown  Description: 1.  Monitor for areas of redness and/or skin breakdown  2.  Assess vascular access sites hourly  3.  Every 4-6 hours minimum:  Change oxygen saturation probe site  4.  Every 4-6 hours:  If on nasal continuous positive airway pressure, respiratory therapy assess nares and determine need for appliance change or resting period.  4/20/2024 0053 by Angélica Andrade RN  Outcome: Progressing  4/20/2024 0053 by Angélica Andrade RN  Outcome: Progressing

## 2024-04-20 NOTE — PROGRESS NOTES
Comprehensive Nutrition Assessment    Type and Reason for Visit:  Initial    Nutrition Recommendations/Plan:   If EN is desired suggest Vital AF 1.2 goal rate 55mml/hr with 20ml free water flush       Malnutrition Assessment:  Malnutrition Status:  At risk for malnutrition (Comment) (04/20/24 1304)    Context:  Acute Illness     Findings of the 6 clinical characteristics of malnutrition:  Energy Intake:  Mild decrease in energy intake (Comment)  Weight Loss:  No significant weight loss     Body Fat Loss:  No significant body fat loss     Muscle Mass Loss:  No significant muscle mass loss    Fluid Accumulation:  No significant fluid accumulation Extremities   Strength:  Not Performed    Nutrition Assessment:    Following patient for vent status.  Pt is NPO.  Receiving 14.7ml Propofol/hour.    Weight has increased by 11 since 12/2023.  Glucose 153.  K+ 2.7    Nutrition Related Findings:    K+ 2.7.  Glucose 153 Wound Type:  (blister,scabs)       Current Nutrition Intake & Therapies:    Average Meal Intake: NPO  Average Supplements Intake: NPO  Diet NPO    Anthropometric Measures:  Height: 175.3 cm (5' 9.02\")  Ideal Body Weight (IBW): 160 lbs (73 kg)    Admission Body Weight: 81.6 kg (179 lb 14.3 oz) (stated)  Current Body Weight: 78.8 kg (173 lb 11.6 oz), 108.6 % IBW. Weight Source: Bed Scale  Current BMI (kg/m2): 25.6  Usual Body Weight: 73.5 kg (162 lb 0.6 oz) (12/6/2024)  % Weight Change (Calculated): 7.2  BMI Categories: Overweight (BMI 25.0-29.9)    Estimated Daily Nutrient Needs:  Energy Requirements Based On: Kcal/kg  Weight Used for Energy Requirements: Current  Energy (kcal/day): 7252-2225 kcals (20-25 kcals/kg)  Weight Used for Protein Requirements: Ideal  Protein (g/day): 87-145g  Method Used for Fluid Requirements: 1 ml/kcal  Fluid (ml/day): 9708-3724 ml    Nutrition Diagnosis:   Inadequate oral intake related to acute injury/trauma, impaired respiratory function as evidenced by NPO or clear liquid  status due to medical condition, intubation    Nutrition Interventions:   Food and/or Nutrient Delivery: Continue NPO  Nutrition Education/Counseling: No recommendation at this time  Coordination of Nutrition Care: Continue to monitor while inpatient  Plan of Care discussed with: nursing    Goals:     Goals: Meet at least 75% of estimated needs       Nutrition Monitoring and Evaluation:   Behavioral-Environmental Outcomes: None Identified  Food/Nutrient Intake Outcomes: Diet Advancement/Tolerance, Enteral Nutrition Intake/Tolerance  Physical Signs/Symptoms Outcomes: Biochemical Data, Fluid Status or Edema, Weight, Skin    Discharge Planning:    Too soon to determine     Rosa Sheridan MS, RD, LD  Contact: [phone]

## 2024-04-20 NOTE — PROGRESS NOTES
Patient arrived to ICU room 152 from ER via stretcher. Patient is currently intubated and sedated.     Electronically signed by Angélica Andrade RN on 4/19/2024 at 7:54 PM

## 2024-04-20 NOTE — PLAN OF CARE
Nutrition Problem #1: Inadequate oral intake  Intervention: Food and/or Nutrient Delivery: Continue NPO  Nutritional

## 2024-04-20 NOTE — PROGRESS NOTES
Hospitalist Progress Note  [facility]     Patient: Evens De Leon  : 1980  MRN: [id number]  Code Status: Full Code    Hospital Day: 1   Date of Service: 2024    Subjective:   Patient seen and examined.  Intubated and sedated.    Past Medical History:   Diagnosis Date    Alcohol abuse     Dog bite     Psychiatric problem        Past Surgical History:   Procedure Laterality Date    ADENOIDECTOMY      COLONOSCOPY      TONSILLECTOMY Right     TYMPANOSTOMY TUBE PLACEMENT      US LYMPH NODE BIOPSY  10/21/2022    US LYMPH NODE BIOPSY 10/21/2022 [facility] ULTRASOUND       Family History   Problem Relation Age of Onset    High Blood Pressure Mother        Social History     Socioeconomic History    Marital status:      Spouse name: Not on file    Number of children: Not on file    Years of education: Not on file    Highest education level: Not on file   Occupational History    Not on file   Tobacco Use    Smoking status: Every Day     Current packs/day: 1.00     Average packs/day: 1 pack/day for 20.0 years (20.0 ttl pk-yrs)     Types: Cigarettes    Smokeless tobacco: Never   Vaping Use    Vaping Use: Never used   Substance and Sexual Activity    Alcohol use: Not Currently     Comment: \"every chance I get\"    Drug use: No    Sexual activity: Not on file   Other Topics Concern    Not on file   Social History Narrative    Not on file     Social Determinants of Health     Financial Resource Strain: Not on file   Food Insecurity: Not on file   Transportation Needs: Not on file   Physical Activity: Not on file   Stress: Not on file   Social Connections: Not on file   Intimate Partner Violence: Not on file   Housing Stability: Not on file       Current Facility-Administered Medications   Medication Dose Route Frequency Provider Last Rate Last Admin    multivitamin 1 tablet  1 tablet Oral Daily Maria Victoria Grace, APRN - CNP   1 tablet at 24 0741    PARoxetine (PAXIL) tablet 75 mg  75 mg Oral  Continuous Brandon Holloway MD 14.7 mL/hr at 04/20/24 0812 30 mcg/kg/min at 04/20/24 0812    sodium bicarbonate 150 mEq in dextrose 5 % 1,000 mL infusion   IntraVENous Continuous Brandon Holloway  mL/hr at 04/20/24 0739 New Bag at 04/20/24 0739    pantoprazole (PROTONIX) 40 mg in sodium chloride (PF) 0.9 % 10 mL injection  40 mg IntraVENous Daily Brandon Holloway MD   40 mg at 04/20/24 0740    [Held by provider] OLANZapine (ZYPREXA) tablet 15 mg  15 mg Oral Nightly Brandon Holloway MD        [Held by provider] traZODone (DESYREL) tablet 50 mg  50 mg Oral Nightly Brandon Holloway MD        potassium chloride 20 mEq/50 mL IVPB (Central Line)  20 mEq IntraVENous PRN Brandon Holloway MD        Or    potassium chloride 10 mEq/100 mL IVPB (Peripheral Line)  10 mEq IntraVENous PRN Brandon Holloway  mL/hr at 04/20/24 0802 10 mEq at 04/20/24 0802    acetaminophen (TYLENOL) tablet 650 mg  650 mg Oral Q6H PRN Brandon Holloway MD        Or    acetaminophen (TYLENOL) suppository 650 mg  650 mg Rectal Q6H PRN Brandon Holloway MD        enoxaparin (LOVENOX) injection 40 mg  40 mg SubCUTAneous Daily Brandon Holloway MD   40 mg at 04/20/24 0740    thiamine (B-1) injection 100 mg  100 mg IntraVENous Daily Brandon Holloway MD   100 mg at 04/20/24 0741    folic acid (FOLVITE) tablet 1 mg  1 mg Oral Daily Brandon Holloway MD   1 mg at 04/20/24 0741    dexmedeTOMIDine (PRECEDEX) 400 mcg in sodium chloride 0.9 % 100 mL infusion  0.1-1.5 mcg/kg/hr IntraVENous Continuous Brandon Holloway MD 20.4 mL/hr at 04/20/24 0812 1 mcg/kg/hr at 04/20/24 0812         sodium chloride      fentaNYL 50 mcg/hr (04/20/24 0738)    propofol 30 mcg/kg/min (04/20/24 0812)    sodium bicarbonate 150 mEq in dextrose 5 % 1,000 mL infusion 100 mL/hr at 04/20/24 0739    dexmedeTOMIDine HCl in NaCl 1 mcg/kg/hr (04/20/24 0812)        Objective:   /76   Pulse 65   Temp 97.9 °F (36.6 °C) (Temporal)   Resp 16   Ht 1.753 m (5' 9.02\")   Wt 78.8 kg (173 lb 12.8 oz)

## 2024-04-21 LAB
ALBUMIN SERPL-MCNC: 3.1 G/DL (ref 3.5–5.2)
ALP SERPL-CCNC: 247 U/L (ref 40–130)
ALT SERPL-CCNC: 28 U/L (ref 5–41)
ANION GAP SERPL CALCULATED.3IONS-SCNC: 12 MMOL/L (ref 7–19)
ANION GAP SERPL CALCULATED.3IONS-SCNC: 14 MMOL/L (ref 7–19)
AST SERPL-CCNC: 48 U/L (ref 5–40)
BASOPHILS # BLD: 0 K/UL (ref 0–0.2)
BASOPHILS NFR BLD: 0.2 % (ref 0–1)
BILIRUB SERPL-MCNC: 0.7 MG/DL (ref 0.2–1.2)
BUN SERPL-MCNC: 8 MG/DL (ref 6–20)
BUN SERPL-MCNC: 8 MG/DL (ref 6–20)
CALCIUM SERPL-MCNC: 8.1 MG/DL (ref 8.6–10)
CALCIUM SERPL-MCNC: 8.2 MG/DL (ref 8.6–10)
CHLORIDE SERPL-SCNC: 100 MMOL/L (ref 98–111)
CHLORIDE SERPL-SCNC: 99 MMOL/L (ref 98–111)
CO2 SERPL-SCNC: 24 MMOL/L (ref 22–29)
CO2 SERPL-SCNC: 28 MMOL/L (ref 22–29)
CREAT SERPL-MCNC: 0.5 MG/DL (ref 0.5–1.2)
CREAT SERPL-MCNC: 0.6 MG/DL (ref 0.5–1.2)
EKG P AXIS: 66 DEGREES
EKG P-R INTERVAL: 160 MS
EKG Q-T INTERVAL: 350 MS
EKG QRS DURATION: 90 MS
EKG QTC CALCULATION (BAZETT): 444 MS
EKG T AXIS: 75 DEGREES
EOSINOPHIL # BLD: 0.2 K/UL (ref 0–0.6)
EOSINOPHIL NFR BLD: 2.1 % (ref 0–5)
ERYTHROCYTE [DISTWIDTH] IN BLOOD BY AUTOMATED COUNT: 11.7 % (ref 11.5–14.5)
GLUCOSE BLD-MCNC: 58 MG/DL (ref 70–99)
GLUCOSE BLD-MCNC: 96 MG/DL (ref 70–99)
GLUCOSE SERPL-MCNC: 54 MG/DL (ref 74–109)
GLUCOSE SERPL-MCNC: 74 MG/DL (ref 74–109)
HCT VFR BLD AUTO: 36.9 % (ref 42–52)
HGB BLD-MCNC: 12.3 G/DL (ref 14–18)
IMM GRANULOCYTES # BLD: 0 K/UL
LYMPHOCYTES # BLD: 1.2 K/UL (ref 1.1–4.5)
LYMPHOCYTES NFR BLD: 12.3 % (ref 20–40)
MAGNESIUM SERPL-MCNC: 1.6 MG/DL (ref 1.6–2.6)
MAGNESIUM SERPL-MCNC: 1.8 MG/DL (ref 1.6–2.6)
MCH RBC QN AUTO: 32.9 PG (ref 27–31)
MCHC RBC AUTO-ENTMCNC: 33.3 G/DL (ref 33–37)
MCV RBC AUTO: 98.7 FL (ref 80–94)
MONOCYTES # BLD: 0.6 K/UL (ref 0–0.9)
MONOCYTES NFR BLD: 5.8 % (ref 0–10)
NEUTROPHILS # BLD: 7.6 K/UL (ref 1.5–7.5)
NEUTS SEG NFR BLD: 79.3 % (ref 50–65)
PERFORMED ON: ABNORMAL
PERFORMED ON: NORMAL
PLATELET # BLD AUTO: 109 K/UL (ref 130–400)
PMV BLD AUTO: 10.8 FL (ref 9.4–12.4)
POTASSIUM SERPL-SCNC: 2.7 MMOL/L (ref 3.5–5)
POTASSIUM SERPL-SCNC: 3.3 MMOL/L (ref 3.5–5)
PROT SERPL-MCNC: 6.1 G/DL (ref 6.6–8.7)
RBC # BLD AUTO: 3.74 M/UL (ref 4.7–6.1)
SODIUM SERPL-SCNC: 137 MMOL/L (ref 136–145)
SODIUM SERPL-SCNC: 140 MMOL/L (ref 136–145)
WBC # BLD AUTO: 9.6 K/UL (ref 4.8–10.8)

## 2024-04-21 PROCEDURE — 2580000003 HC RX 258: Performed by: INTERNAL MEDICINE

## 2024-04-21 PROCEDURE — 36415 COLL VENOUS BLD VENIPUNCTURE: CPT

## 2024-04-21 PROCEDURE — 2000000000 HC ICU R&B

## 2024-04-21 PROCEDURE — 83735 ASSAY OF MAGNESIUM: CPT

## 2024-04-21 PROCEDURE — 94760 N-INVAS EAR/PLS OXIMETRY 1: CPT

## 2024-04-21 PROCEDURE — 94003 VENT MGMT INPAT SUBQ DAY: CPT

## 2024-04-21 PROCEDURE — 2580000003 HC RX 258: Performed by: HOSPITALIST

## 2024-04-21 PROCEDURE — 2700000000 HC OXYGEN THERAPY PER DAY

## 2024-04-21 PROCEDURE — 6370000000 HC RX 637 (ALT 250 FOR IP)

## 2024-04-21 PROCEDURE — 93010 ELECTROCARDIOGRAM REPORT: CPT | Performed by: INTERNAL MEDICINE

## 2024-04-21 PROCEDURE — 2500000003 HC RX 250 WO HCPCS: Performed by: INTERNAL MEDICINE

## 2024-04-21 PROCEDURE — 6360000002 HC RX W HCPCS: Performed by: INTERNAL MEDICINE

## 2024-04-21 PROCEDURE — 85025 COMPLETE CBC W/AUTO DIFF WBC: CPT

## 2024-04-21 PROCEDURE — 82962 GLUCOSE BLOOD TEST: CPT

## 2024-04-21 PROCEDURE — 80053 COMPREHEN METABOLIC PANEL: CPT

## 2024-04-21 PROCEDURE — 80048 BASIC METABOLIC PNL TOTAL CA: CPT

## 2024-04-21 PROCEDURE — C9113 INJ PANTOPRAZOLE SODIUM, VIA: HCPCS | Performed by: INTERNAL MEDICINE

## 2024-04-21 PROCEDURE — 6370000000 HC RX 637 (ALT 250 FOR IP): Performed by: INTERNAL MEDICINE

## 2024-04-21 RX ORDER — MAGNESIUM SULFATE IN WATER 40 MG/ML
2000 INJECTION, SOLUTION INTRAVENOUS ONCE
Status: COMPLETED | OUTPATIENT
Start: 2024-04-21 | End: 2024-04-21

## 2024-04-21 RX ORDER — DEXTROSE MONOHYDRATE 100 MG/ML
INJECTION, SOLUTION INTRAVENOUS CONTINUOUS PRN
Status: DISCONTINUED | OUTPATIENT
Start: 2024-04-21 | End: 2024-05-03 | Stop reason: HOSPADM

## 2024-04-21 RX ADMIN — POTASSIUM CHLORIDE: 2 INJECTION, SOLUTION, CONCENTRATE INTRAVENOUS at 14:08

## 2024-04-21 RX ADMIN — THIAMINE HYDROCHLORIDE 100 MG: 100 INJECTION, SOLUTION INTRAMUSCULAR; INTRAVENOUS at 07:27

## 2024-04-21 RX ADMIN — PAROXETINE 75 MG: 30 TABLET, FILM COATED ORAL at 07:28

## 2024-04-21 RX ADMIN — POTASSIUM BICARBONATE 40 MEQ: 782 TABLET, EFFERVESCENT ORAL at 22:26

## 2024-04-21 RX ADMIN — PROPOFOL 50 MCG/KG/MIN: 10 INJECTION, EMULSION INTRAVENOUS at 18:14

## 2024-04-21 RX ADMIN — DEXMEDETOMIDINE HYDROCHLORIDE 1.3 MCG/KG/HR: 400 INJECTION, SOLUTION INTRAVENOUS at 07:30

## 2024-04-21 RX ADMIN — ENOXAPARIN SODIUM 40 MG: 100 INJECTION SUBCUTANEOUS at 07:27

## 2024-04-21 RX ADMIN — FOLIC ACID 1 MG: 1 TABLET ORAL at 07:28

## 2024-04-21 RX ADMIN — POTASSIUM CHLORIDE 10 MEQ: 7.46 INJECTION, SOLUTION INTRAVENOUS at 05:13

## 2024-04-21 RX ADMIN — PROPOFOL 50 MCG/KG/MIN: 10 INJECTION, EMULSION INTRAVENOUS at 22:58

## 2024-04-21 RX ADMIN — PROPOFOL 50 MCG/KG/MIN: 10 INJECTION, EMULSION INTRAVENOUS at 19:51

## 2024-04-21 RX ADMIN — Medication 100 MCG/HR: at 18:24

## 2024-04-21 RX ADMIN — PROPOFOL 40 MCG/KG/MIN: 10 INJECTION, EMULSION INTRAVENOUS at 06:04

## 2024-04-21 RX ADMIN — POTASSIUM CHLORIDE 10 MEQ: 7.46 INJECTION, SOLUTION INTRAVENOUS at 01:54

## 2024-04-21 RX ADMIN — THERA TABS 1 TABLET: TAB at 07:27

## 2024-04-21 RX ADMIN — Medication 50 MCG/HR: at 03:46

## 2024-04-21 RX ADMIN — DEXMEDETOMIDINE HYDROCHLORIDE 1.2 MCG/KG/HR: 400 INJECTION, SOLUTION INTRAVENOUS at 03:08

## 2024-04-21 RX ADMIN — PROPOFOL 40 MCG/KG/MIN: 10 INJECTION, EMULSION INTRAVENOUS at 10:23

## 2024-04-21 RX ADMIN — DEXMEDETOMIDINE HYDROCHLORIDE 1.5 MCG/KG/HR: 400 INJECTION, SOLUTION INTRAVENOUS at 20:24

## 2024-04-21 RX ADMIN — POTASSIUM CHLORIDE 10 MEQ: 7.46 INJECTION, SOLUTION INTRAVENOUS at 07:31

## 2024-04-21 RX ADMIN — MAGNESIUM SULFATE HEPTAHYDRATE 2000 MG: 40 INJECTION, SOLUTION INTRAVENOUS at 12:04

## 2024-04-21 RX ADMIN — DEXMEDETOMIDINE HYDROCHLORIDE 1.5 MCG/KG/HR: 400 INJECTION, SOLUTION INTRAVENOUS at 23:52

## 2024-04-21 RX ADMIN — PROPOFOL 40 MCG/KG/MIN: 10 INJECTION, EMULSION INTRAVENOUS at 00:28

## 2024-04-21 RX ADMIN — POTASSIUM CHLORIDE 10 MEQ: 7.46 INJECTION, SOLUTION INTRAVENOUS at 04:09

## 2024-04-21 RX ADMIN — SODIUM CHLORIDE, PRESERVATIVE FREE 40 MG: 5 INJECTION INTRAVENOUS at 07:27

## 2024-04-21 RX ADMIN — DEXTROSE MONOHYDRATE 125 ML: 100 INJECTION, SOLUTION INTRAVENOUS at 22:37

## 2024-04-21 RX ADMIN — DEXMEDETOMIDINE HYDROCHLORIDE 1.5 MCG/KG/HR: 400 INJECTION, SOLUTION INTRAVENOUS at 11:17

## 2024-04-21 RX ADMIN — DEXMEDETOMIDINE HYDROCHLORIDE 1.5 MCG/KG/HR: 400 INJECTION, SOLUTION INTRAVENOUS at 17:07

## 2024-04-21 RX ADMIN — POTASSIUM CHLORIDE 10 MEQ: 7.46 INJECTION, SOLUTION INTRAVENOUS at 06:22

## 2024-04-21 RX ADMIN — PROPOFOL 50 MCG/KG/MIN: 10 INJECTION, EMULSION INTRAVENOUS at 14:18

## 2024-04-21 RX ADMIN — POTASSIUM CHLORIDE 10 MEQ: 7.46 INJECTION, SOLUTION INTRAVENOUS at 03:03

## 2024-04-21 ASSESSMENT — PULMONARY FUNCTION TESTS
PIF_VALUE: 18
PIF_VALUE: 19
PIF_VALUE: 18
PIF_VALUE: 19
PIF_VALUE: 18
PIF_VALUE: 19
PIF_VALUE: 19
PIF_VALUE: 17
PIF_VALUE: 18
PIF_VALUE: 18
PIF_VALUE: 19
PIF_VALUE: 18
PIF_VALUE: 19
PIF_VALUE: 18
PIF_VALUE: 18
PIF_VALUE: 19
PIF_VALUE: 18
PIF_VALUE: 19
PIF_VALUE: 18
PIF_VALUE: 18
PIF_VALUE: 19
PIF_VALUE: 16
PIF_VALUE: 18
PIF_VALUE: 17
PIF_VALUE: 18
PIF_VALUE: 18
PIF_VALUE: 19
PIF_VALUE: 19
PIF_VALUE: 18
PIF_VALUE: 17
PIF_VALUE: 18
PIF_VALUE: 19
PIF_VALUE: 18
PIF_VALUE: 19

## 2024-04-21 NOTE — PROGRESS NOTES
4 Eyes Skin Assessment     NAME:  Evens De Leon  YOB: 1980  MEDICAL RECORD NUMBER:  [id number]    The patient is being assessed for  Admission    I agree that at least one RN has performed a thorough Head to Toe Skin Assessment on the patient. ALL assessment sites listed below have been assessed.      Areas assessed by both nurses:    Head, Face, Ears, Shoulders, Back, Chest, Arms, Elbows, Hands, Sacrum. Buttock, Coccyx, Ischium, Legs. Feet and Heels, Under Medical Devices , and Other          Does the Patient have a Wound? No noted wound(s)       Peter Prevention initiated by RN: Yes  Wound Care Orders initiated by RN: No    Pressure Injury (Stage 3,4, Unstageable, DTI, NWPT, and Complex wounds) if present, place Wound referral order by RN under : No    New Ostomies, if present place, Ostomy referral order under : No     Nurse 1 eSignature: Electronically signed by Angélica Andrade RN on 4/20/24 at 10:47 PM CDT    **SHARE this note so that the co-signing nurse can place an eSignature**    Nurse 2 eSignature: {Esignature:[id number]}

## 2024-04-21 NOTE — CARE COORDINATION
JOSE LUIS spoke with Pt's Mother Cynthia in the room to go over questions she had regarding guardianship. Cynthia stated Pt's brother Buck De Leon is planning to file for emergent guardianship. Cynthia was asking for assistance from the hospital on getting the appropriate paperwork together. JOSE LUIS will provide Buck's address/phone below as well as Cynthia's.     Cynthia De Leon     Buck De [address]  [address] [place] [address]  [phone]  Filing for emergent guardianship in [facility]    Electronically signed by Rudi Garcia on 4/21/2024 at 1:23 PM

## 2024-04-21 NOTE — PROGRESS NOTES
Continuous Brandon Holloway MD 5 mL/hr at 04/21/24 0346 50 mcg/hr at 04/21/24 0346    propofol infusion  5-50 mcg/kg/min IntraVENous Continuous Brandon Holloway MD 24.5 mL/hr at 04/21/24 1117 50 mcg/kg/min at 04/21/24 1117    pantoprazole (PROTONIX) 40 mg in sodium chloride (PF) 0.9 % 10 mL injection  40 mg IntraVENous Daily Brandon Holloway MD   40 mg at 04/21/24 0727    [Held by provider] OLANZapine (ZYPREXA) tablet 15 mg  15 mg Oral Nightly Brandon Holloway MD        [Held by provider] traZODone (DESYREL) tablet 50 mg  50 mg Oral Nightly Brandon Holloway MD        potassium chloride 20 mEq/50 mL IVPB (Central Line)  20 mEq IntraVENous PRN Brandon Holloway MD        Or    potassium chloride 10 mEq/100 mL IVPB (Peripheral Line)  10 mEq IntraVENous PRN Brandon Holloway  mL/hr at 04/21/24 0731 10 mEq at 04/21/24 0731    acetaminophen (TYLENOL) tablet 650 mg  650 mg Oral Q6H PRN Brandon Holloway MD        Or    acetaminophen (TYLENOL) suppository 650 mg  650 mg Rectal Q6H PRN Brandon Holloway MD        enoxaparin (LOVENOX) injection 40 mg  40 mg SubCUTAneous Daily Brandon Holloway MD   40 mg at 04/21/24 0727    thiamine (B-1) injection 100 mg  100 mg IntraVENous Daily Brandon Holloway MD   100 mg at 04/21/24 0727    folic acid (FOLVITE) tablet 1 mg  1 mg Oral Daily Brandon Holloway MD   1 mg at 04/21/24 0728    dexmedeTOMIDine (PRECEDEX) 400 mcg in sodium chloride 0.9 % 100 mL infusion  0.1-1.5 mcg/kg/hr IntraVENous Continuous Brandon Holloway MD 30.6 mL/hr at 04/21/24 1117 1.5 mcg/kg/hr at 04/21/24 1117         lactated ringers IV soln 100 mL/hr at 04/20/24 2129    sodium chloride      fentaNYL 50 mcg/hr (04/21/24 0346)    propofol 50 mcg/kg/min (04/21/24 1117)    dexmedeTOMIDine HCl in NaCl 1.5 mcg/kg/hr (04/21/24 1117)        Objective:   BP (!) 141/93   Pulse 71   Temp 98.9 °F (37.2 °C) (Temporal)   Resp 16   Ht 1.753 m (5' 9.02\")   Wt 84.4 kg (186 lb)   SpO2 97%   BMI 27.45 kg/m²     HEENT: normocephalic  Lungs:  ______________________________________ Electronically signed by: LUPILLO SNELL M.D. Date:     04/19/2024 Time:    19:14     XR CHEST PORTABLE    Result Date: 4/19/2024  EXAM:  SINGLE VIEW OF THE CHEST.  History:  Endotracheal tube placement.  Comparison:  Chest radiograph 04/19/2024  FINDINGS:  Heart size is normal.  Lungs are clear.  No pleural fluid and no pneumothorax.  No acute osseous abnormalities.  Interval placement of endotracheal tube with tip at the level of the clavicles.      Impression:  Endotracheal tube placement.   ______________________________________ Electronically signed by: LUPILLO SNELL M.D. Date:     04/19/2024 Time:    18:04     XR CHEST PORTABLE    Result Date: 4/19/2024  EXAM: CHEST, SINGLE VIEW  HISTORY: Altered mental status  COMPARISON: 07/14/2021      Cardiomediastinal contours appear stable.  Basilar interstitial opacitis may relate to atelectasis or pneumonitis.  There is no focal pulmonary consolidation.  No pleural effusion or pneumothorax.   ______________________________________ Electronically signed by: YUAN MUELLER M.D. Date:     04/19/2024 Time:    14:07      Assessment and Plan:   43-year-old male with alcohol dependence and illicit drug use admitted to critical care unit for severe alcohol withdrawal, methamphetamine intoxication, severe agitation, acute toxic encephalopathy, homicidal ideation, and ventilator dependent acute respiratory failure.     Required intubation in ER for severe agitation/airway protection  CIWA protocol  Thiamine/folic acid/MVI  IVF  Suicide precautions  One-to-one observation  Psychiatry consult once able  Titrate minute ventilation for pH 7.35  Plateau pressure 17  SBT once able  Replacing electrolytes  Lovenox for DVT prophylaxis  Discussed treatment plan with RN    Total critical care time: 45 minutes    The above note was generated using voice recognition software. Inadvertent typographical errors in transcription may have

## 2024-04-21 NOTE — PROGRESS NOTES
Evens De Leon arrived to room # 152.   Presented with: Alcohol withdrawal delirium  Mental Status: Patient is  intubated, sedated .   Vitals:    04/20/24 2207   BP:    Pulse: 73   Resp: 16   Temp:    SpO2: 98%     Patient safety contract and falls prevention contract reviewed with patient pt sedated.  Oriented pt sedated to room.  Call light within reach. Pt sedated.  Needs, issues or concerns expressed at this time: no.      Electronically signed by Angélica Andrade RN on 4/20/2024 at 10:44 PM

## 2024-04-22 LAB
ANION GAP SERPL CALCULATED.3IONS-SCNC: 13 MMOL/L (ref 7–19)
BASOPHILS # BLD: 0 K/UL (ref 0–0.2)
BASOPHILS NFR BLD: 0.4 % (ref 0–1)
BUN SERPL-MCNC: 7 MG/DL (ref 6–20)
CALCIUM SERPL-MCNC: 8.2 MG/DL (ref 8.6–10)
CHLORIDE SERPL-SCNC: 99 MMOL/L (ref 98–111)
CO2 SERPL-SCNC: 25 MMOL/L (ref 22–29)
CREAT SERPL-MCNC: 0.6 MG/DL (ref 0.5–1.2)
EOSINOPHIL # BLD: 0.2 K/UL (ref 0–0.6)
EOSINOPHIL NFR BLD: 2.1 % (ref 0–5)
ERYTHROCYTE [DISTWIDTH] IN BLOOD BY AUTOMATED COUNT: 11.5 % (ref 11.5–14.5)
GLUCOSE BLD-MCNC: 102 MG/DL (ref 70–99)
GLUCOSE BLD-MCNC: 107 MG/DL (ref 70–99)
GLUCOSE BLD-MCNC: 86 MG/DL (ref 70–99)
GLUCOSE BLD-MCNC: 87 MG/DL (ref 70–99)
GLUCOSE BLD-MCNC: 88 MG/DL (ref 70–99)
GLUCOSE SERPL-MCNC: 83 MG/DL (ref 74–109)
HCT VFR BLD AUTO: 37 % (ref 42–52)
HGB BLD-MCNC: 12.5 G/DL (ref 14–18)
IMM GRANULOCYTES # BLD: 0 K/UL
LYMPHOCYTES # BLD: 1.3 K/UL (ref 1.1–4.5)
LYMPHOCYTES NFR BLD: 15.6 % (ref 20–40)
MAGNESIUM SERPL-MCNC: 1.8 MG/DL (ref 1.6–2.6)
MCH RBC QN AUTO: 33.4 PG (ref 27–31)
MCHC RBC AUTO-ENTMCNC: 33.8 G/DL (ref 33–37)
MCV RBC AUTO: 98.9 FL (ref 80–94)
MONOCYTES # BLD: 0.8 K/UL (ref 0–0.9)
MONOCYTES NFR BLD: 9 % (ref 0–10)
NEUTROPHILS # BLD: 6.1 K/UL (ref 1.5–7.5)
NEUTS SEG NFR BLD: 72.4 % (ref 50–65)
PERFORMED ON: ABNORMAL
PERFORMED ON: ABNORMAL
PERFORMED ON: NORMAL
PLATELET # BLD AUTO: 115 K/UL (ref 130–400)
PMV BLD AUTO: 11 FL (ref 9.4–12.4)
POTASSIUM SERPL-SCNC: 3.4 MMOL/L (ref 3.5–5)
POTASSIUM SERPL-SCNC: 3.5 MMOL/L (ref 3.5–5)
RBC # BLD AUTO: 3.74 M/UL (ref 4.7–6.1)
SODIUM SERPL-SCNC: 137 MMOL/L (ref 136–145)
WBC # BLD AUTO: 8.5 K/UL (ref 4.8–10.8)

## 2024-04-22 PROCEDURE — 6370000000 HC RX 637 (ALT 250 FOR IP)

## 2024-04-22 PROCEDURE — 6360000002 HC RX W HCPCS

## 2024-04-22 PROCEDURE — 83735 ASSAY OF MAGNESIUM: CPT

## 2024-04-22 PROCEDURE — 2700000000 HC OXYGEN THERAPY PER DAY

## 2024-04-22 PROCEDURE — 82962 GLUCOSE BLOOD TEST: CPT

## 2024-04-22 PROCEDURE — C9113 INJ PANTOPRAZOLE SODIUM, VIA: HCPCS | Performed by: INTERNAL MEDICINE

## 2024-04-22 PROCEDURE — 2500000003 HC RX 250 WO HCPCS: Performed by: INTERNAL MEDICINE

## 2024-04-22 PROCEDURE — 2580000003 HC RX 258: Performed by: INTERNAL MEDICINE

## 2024-04-22 PROCEDURE — 80048 BASIC METABOLIC PNL TOTAL CA: CPT

## 2024-04-22 PROCEDURE — 6360000002 HC RX W HCPCS: Performed by: INTERNAL MEDICINE

## 2024-04-22 PROCEDURE — 6370000000 HC RX 637 (ALT 250 FOR IP): Performed by: INTERNAL MEDICINE

## 2024-04-22 PROCEDURE — 36415 COLL VENOUS BLD VENIPUNCTURE: CPT

## 2024-04-22 PROCEDURE — 2000000000 HC ICU R&B

## 2024-04-22 PROCEDURE — 94003 VENT MGMT INPAT SUBQ DAY: CPT

## 2024-04-22 PROCEDURE — 85025 COMPLETE CBC W/AUTO DIFF WBC: CPT

## 2024-04-22 PROCEDURE — 84132 ASSAY OF SERUM POTASSIUM: CPT

## 2024-04-22 RX ORDER — ALBUTEROL SULFATE 2.5 MG/3ML
2.5 SOLUTION RESPIRATORY (INHALATION) EVERY 4 HOURS PRN
Status: DISCONTINUED | OUTPATIENT
Start: 2024-04-22 | End: 2024-05-03 | Stop reason: HOSPADM

## 2024-04-22 RX ADMIN — Medication 150 MCG/HR: at 19:12

## 2024-04-22 RX ADMIN — DEXMEDETOMIDINE HYDROCHLORIDE 1.5 MCG/KG/HR: 400 INJECTION, SOLUTION INTRAVENOUS at 19:12

## 2024-04-22 RX ADMIN — LORAZEPAM 2 MG: 2 INJECTION INTRAMUSCULAR; INTRAVENOUS at 02:44

## 2024-04-22 RX ADMIN — DEXMEDETOMIDINE HYDROCHLORIDE 1.5 MCG/KG/HR: 400 INJECTION, SOLUTION INTRAVENOUS at 02:53

## 2024-04-22 RX ADMIN — DEXMEDETOMIDINE HYDROCHLORIDE 1.5 MCG/KG/HR: 400 INJECTION, SOLUTION INTRAVENOUS at 23:00

## 2024-04-22 RX ADMIN — THERA TABS 1 TABLET: TAB at 07:13

## 2024-04-22 RX ADMIN — PROPOFOL 45 MCG/KG/MIN: 10 INJECTION, EMULSION INTRAVENOUS at 14:24

## 2024-04-22 RX ADMIN — DEXMEDETOMIDINE HYDROCHLORIDE 1.5 MCG/KG/HR: 400 INJECTION, SOLUTION INTRAVENOUS at 09:34

## 2024-04-22 RX ADMIN — DEXMEDETOMIDINE HYDROCHLORIDE 1.5 MCG/KG/HR: 400 INJECTION, SOLUTION INTRAVENOUS at 12:38

## 2024-04-22 RX ADMIN — THIAMINE HYDROCHLORIDE 100 MG: 100 INJECTION, SOLUTION INTRAMUSCULAR; INTRAVENOUS at 07:13

## 2024-04-22 RX ADMIN — DEXMEDETOMIDINE HYDROCHLORIDE 1.5 MCG/KG/HR: 400 INJECTION, SOLUTION INTRAVENOUS at 15:47

## 2024-04-22 RX ADMIN — Medication 150 MCG/HR: at 15:04

## 2024-04-22 RX ADMIN — PROPOFOL 50 MCG/KG/MIN: 10 INJECTION, EMULSION INTRAVENOUS at 07:11

## 2024-04-22 RX ADMIN — PROPOFOL 50 MCG/KG/MIN: 10 INJECTION, EMULSION INTRAVENOUS at 10:25

## 2024-04-22 RX ADMIN — POTASSIUM CHLORIDE: 2 INJECTION, SOLUTION, CONCENTRATE INTRAVENOUS at 20:05

## 2024-04-22 RX ADMIN — POLYETHYLENE GLYCOL 3350 17 G: 17 POWDER, FOR SOLUTION ORAL at 12:37

## 2024-04-22 RX ADMIN — PROPOFOL 40 MCG/KG/MIN: 10 INJECTION, EMULSION INTRAVENOUS at 20:23

## 2024-04-22 RX ADMIN — ENOXAPARIN SODIUM 40 MG: 100 INJECTION SUBCUTANEOUS at 07:13

## 2024-04-22 RX ADMIN — PROPOFOL 50 MCG/KG/MIN: 10 INJECTION, EMULSION INTRAVENOUS at 02:52

## 2024-04-22 RX ADMIN — DEXMEDETOMIDINE HYDROCHLORIDE 1.5 MCG/KG/HR: 400 INJECTION, SOLUTION INTRAVENOUS at 06:03

## 2024-04-22 RX ADMIN — FOLIC ACID 1 MG: 1 TABLET ORAL at 07:12

## 2024-04-22 RX ADMIN — POTASSIUM CHLORIDE: 2 INJECTION, SOLUTION, CONCENTRATE INTRAVENOUS at 06:02

## 2024-04-22 RX ADMIN — Medication 200 MCG/HR: at 02:51

## 2024-04-22 RX ADMIN — PROPOFOL 50 MCG/KG/MIN: 10 INJECTION, EMULSION INTRAVENOUS at 22:59

## 2024-04-22 RX ADMIN — SODIUM CHLORIDE, PRESERVATIVE FREE 40 MG: 5 INJECTION INTRAVENOUS at 07:13

## 2024-04-22 RX ADMIN — PAROXETINE 75 MG: 30 TABLET, FILM COATED ORAL at 07:13

## 2024-04-22 RX ADMIN — Medication 150 MCG/HR: at 09:03

## 2024-04-22 SDOH — ECONOMIC STABILITY: FOOD INSECURITY: WITHIN THE PAST 12 MONTHS, YOU WORRIED THAT YOUR FOOD WOULD RUN OUT BEFORE YOU GOT MONEY TO BUY MORE.: OFTEN TRUE

## 2024-04-22 SDOH — ECONOMIC STABILITY: INCOME INSECURITY: HOW HARD IS IT FOR YOU TO PAY FOR THE VERY BASICS LIKE FOOD, HOUSING, MEDICAL CARE, AND HEATING?: SOMEWHAT HARD

## 2024-04-22 SDOH — ECONOMIC STABILITY: INCOME INSECURITY: IN THE PAST 12 MONTHS, HAS THE ELECTRIC, GAS, OIL, OR WATER COMPANY THREATENED TO SHUT OFF SERVICE IN YOUR HOME?: YES

## 2024-04-22 ASSESSMENT — PULMONARY FUNCTION TESTS
PIF_VALUE: 20
PIF_VALUE: 19
PIF_VALUE: 21
PIF_VALUE: 19
PIF_VALUE: 20
PIF_VALUE: 18
PIF_VALUE: 19
PIF_VALUE: 20
PIF_VALUE: 21
PIF_VALUE: 19
PIF_VALUE: 19
PIF_VALUE: 20
PIF_VALUE: 17
PIF_VALUE: 17
PIF_VALUE: 20
PIF_VALUE: 20
PIF_VALUE: 18
PIF_VALUE: 20
PIF_VALUE: 19
PIF_VALUE: 21
PIF_VALUE: 19
PIF_VALUE: 20
PIF_VALUE: 20
PIF_VALUE: 19
PIF_VALUE: 21
PIF_VALUE: 20
PIF_VALUE: 18
PIF_VALUE: 20
PIF_VALUE: 21
PIF_VALUE: 19
PIF_VALUE: 20
PIF_VALUE: 20
PIF_VALUE: 21
PIF_VALUE: 20
PIF_VALUE: 20
PIF_VALUE: 21
PIF_VALUE: 49
PIF_VALUE: 21
PIF_VALUE: 36
PIF_VALUE: 21
PIF_VALUE: 19
PIF_VALUE: 20
PIF_VALUE: 19
PIF_VALUE: 20

## 2024-04-22 ASSESSMENT — PATIENT HEALTH QUESTIONNAIRE - PHQ9
SUM OF ALL RESPONSES TO PHQ QUESTIONS 1-9: 2
SUM OF ALL RESPONSES TO PHQ QUESTIONS 1-9: 2
SUM OF ALL RESPONSES TO PHQ9 QUESTIONS 1 & 2: 2
1. LITTLE INTEREST OR PLEASURE IN DOING THINGS: SEVERAL DAYS
2. FEELING DOWN, DEPRESSED OR HOPELESS: SEVERAL DAYS
SUM OF ALL RESPONSES TO PHQ QUESTIONS 1-9: 2
SUM OF ALL RESPONSES TO PHQ QUESTIONS 1-9: 2

## 2024-04-22 NOTE — PLAN OF CARE
Problem: Discharge Planning  Goal: Discharge to home or other facility with appropriate resources  Outcome: Progressing     Problem: Pain  Goal: Verbalizes/displays adequate comfort level or baseline comfort level  Outcome: Progressing     Problem: Safety - Adult  Goal: Free from fall injury  Outcome: Progressing     Problem: Skin/Tissue Integrity  Goal: Absence of new skin breakdown  Description: 1.  Monitor for areas of redness and/or skin breakdown  2.  Assess vascular access sites hourly  3.  Every 4-6 hours minimum:  Change oxygen saturation probe site  4.  Every 4-6 hours:  If on nasal continuous positive airway pressure, respiratory therapy assess nares and determine need for appliance change or resting period.  Outcome: Progressing     Problem: ABCDS Injury Assessment  Goal: Absence of physical injury  Outcome: Progressing     Problem: Nutrition Deficit:  Goal: Optimize nutritional status  Outcome: Progressing     Problem: Safety - Violent/Self-destructive Restraint  Goal: Remains free of injury from restraints (Restraint for Violent/Self-Destructive Behavior)  Description: INTERVENTIONS:  1. Determine that de-escalation and other, less restrictive measures have been tried or would not be effective before applying the restraint  2. Identify and document the criteria for restraint  3. Evaluate the patient's condition at the time of restraint application  4. Inform patient/family regarding the reason for restraint/seclusion  5. Q2H: Monitor comfort, nutrition and hydration needs  6. Q15M: Perform safety checks including skin, circulation, sensory, respiratory and psychological status  7. Ensure continuous observation  8. Identify and implement measures to help patient regain control, assess readiness for release and initiate progressive release per policy  Outcome: Completed

## 2024-04-22 NOTE — PROGRESS NOTES
Hospitalist Progress Note  [facility]     Patient: Evens De Leon  : 1980  MRN: [id number]  Code Status: Full Code    Hospital Day: 3   Date of Service: 2024    Subjective:   Patient seen and examined.  Intubated and sedated.    Past Medical History:   Diagnosis Date    Alcohol abuse     Dog bite     Psychiatric problem        Past Surgical History:   Procedure Laterality Date    ADENOIDECTOMY      COLONOSCOPY      TONSILLECTOMY Right     TYMPANOSTOMY TUBE PLACEMENT      US LYMPH NODE BIOPSY  10/21/2022    US LYMPH NODE BIOPSY 10/21/2022 [facility] ULTRASOUND       Family History   Problem Relation Age of Onset    High Blood Pressure Mother        Social History     Socioeconomic History    Marital status:      Spouse name: Not on file    Number of children: Not on file    Years of education: Not on file    Highest education level: Not on file   Occupational History    Not on file   Tobacco Use    Smoking status: Every Day     Current packs/day: 1.00     Average packs/day: 1 pack/day for 20.0 years (20.0 ttl pk-yrs)     Types: Cigarettes    Smokeless tobacco: Never   Vaping Use    Vaping Use: Never used   Substance and Sexual Activity    Alcohol use: Not Currently     Comment: \"every chance I get\"    Drug use: No    Sexual activity: Not on file   Other Topics Concern    Not on file   Social History Narrative    Not on file     Social Determinants of Health     Financial Resource Strain: Not on file   Food Insecurity: Not on file   Transportation Needs: Not on file   Physical Activity: Not on file   Stress: Not on file   Social Connections: Not on file   Intimate Partner Violence: Not on file   Housing Stability: Not on file       Current Facility-Administered Medications   Medication Dose Route Frequency Provider Last Rate Last Admin    albuterol (PROVENTIL) (2.5 MG/3ML) 0.083% nebulizer solution 2.5 mg  2.5 mg Nebulization Q4H PRN Alphonse Ronquillo MD        potassium chloride 20 mEq

## 2024-04-22 NOTE — CONSULTS
Comprehensive Nutrition Assessment    Type and Reason for Visit:  Consult    Nutrition Recommendations/Plan:   Continue to work toward goal rate for TF--Vital AF 1.2 goal rate 53ml/hr     Malnutrition Assessment:  Malnutrition Status:  At risk for malnutrition (Comment) (04/22/24 6538)    Context:  Acute Illness     Findings of the 6 clinical characteristics of malnutrition:  Energy Intake:  Mild decrease in energy intake (Comment)  Weight Loss:  No significant weight loss     Body Fat Loss:  No significant body fat loss     Muscle Mass Loss:  No significant muscle mass loss    Fluid Accumulation:  No significant fluid accumulation Extremities   Strength:  Not Performed    Nutrition Assessment:    Received consult for tf orders and management.  Pt receiving Propofol at 24.5ml/hr.  Starting Vital AF 1.2  and is currently at 30ml/hr with 20ml free water flush.  Residual 2-70ml.  No BM has been documented.    Nutrition Related Findings:    K+ 2.7.  Glucose 153 Wound Type: None       Current Nutrition Intake & Therapies:    Average Meal Intake: NPO  Average Supplements Intake: NPO  Current Tube Feeding (TF) Orders:  Feeding Route: Orogastric  Formula: Peptide Based  Schedule: Continuous  Feeding Regimen: Vital AF 1.2 goal rate 53ml/hr with 20ml free water flush hourly  Additives/Modulars: None  Water Flushes: 20ml  Current TF & Flush Orders Provides: Vital AF at 30ml/hr with 20ml free water flush hourly = 864 kcals with 54g protein, 79g CHO and 583ml free water from formula and 480ml free water from flush.  Goal TF & Flush Orders Provides: Vital AF 1.2 goal rate 53ml/hr with 20ml free water flush = 1526 kcals with 95g protein, 140g CHO and 1031 ml free water.  Flush gives another 480ml free water.  Propofol adds another 647 NP kcals    Anthropometric Measures:  Height: 175.3 cm (5' 9.02\")  Ideal Body Weight (IBW): 160 lbs (73 kg)    Admission Body Weight: 81.6 kg (179 lb 14.3 oz) (stated)  Current Body Weight: 87.5  kg (192 lb 14.4 oz), 120.6 % IBW. Weight Source: Bed Scale  Current BMI (kg/m2): 28.5  Usual Body Weight: 73.5 kg (162 lb 0.6 oz) (12/6/2024)  % Weight Change (Calculated): 7.2                    BMI Categories: Overweight (BMI 25.0-29.9)    Estimated Daily Nutrient Needs:  Energy Requirements Based On: Kcal/kg  Weight Used for Energy Requirements: Current  Energy (kcal/day): 4274-9800 kcals  (20-25 kcals/kg)  Weight Used for Protein Requirements: Ideal  Protein (g/day): 87-145g  Method Used for Fluid Requirements: 1 ml/kcal  Fluid (ml/day): 7268-7862 ml    Nutrition Diagnosis:   Inadequate oral intake related to acute injury/trauma, impaired respiratory function as evidenced by NPO or clear liquid status due to medical condition, intubation, nutrition support - enteral nutrition    Nutrition Interventions:   Food and/or Nutrient Delivery: Continue NPO, Continue Current Tube Feeding  Nutrition Education/Counseling: No recommendation at this time  Coordination of Nutrition Care: Continue to monitor while inpatient  Plan of Care discussed with: nursing    Goals:  Previous Goal Met: Progressing toward Goal(s)  Goals: Meet at least 75% of estimated needs, Tolerate nutrition support at goal rate       Nutrition Monitoring and Evaluation:   Behavioral-Environmental Outcomes: None Identified  Food/Nutrient Intake Outcomes: Enteral Nutrition Intake/Tolerance  Physical Signs/Symptoms Outcomes: Biochemical Data, Weight, Skin, Nutrition Focused Physical Findings, Fluid Status or Edema    Discharge Planning:    Too soon to determine     Rosa Sheridan MS, RD, LD  Contact: [phone]

## 2024-04-22 NOTE — CARE COORDINATION
Pt would have to be awake and alert and oriented x4 to assign a POA or HCS; otherwise, would be petitioning for guardianship; currently Pt's NOK would be his adult children (majority rules) see note today with [place] hierarchy  Electronically signed by TATIANA Ronquillo on 4/22/2024 at 3:37 PM    [phone]  [place] revised statutes: Responsible parties authorized to make health care decisions.    (1) If an adult patient whose physician has determined that he or she does not have  decisional capacity has not executed an advance directive, or to the extent the  advance directive does not address a decision that must be made, any one (1) of the  following responsible parties, in the following order of priority if no individual in a  prior class is reasonably available, willing, and competent to act, shall be authorized  to make health care decisions on behalf of the patient:    (a) The judicially-appointed guardian of the patient, if the guardian has been  appointed and if medical decisions are within the scope of the guardianship;  (b) The -in-fact named in a durable power of , if the durable  power of  specifically includes authority for health care decisions;  (c) The spouse of the patient;  (d) An adult child of the patient, or if the patient has more than one (1) child, the  majority of the adult children who are reasonably available for consultation;  (e) The parents of the patient;  (f) The nearest living relative of the patient, or if more than one (1) relative of the  same relation is reasonably available for consultation, a majority of the nearest  living relatives.

## 2024-04-23 ENCOUNTER — APPOINTMENT (OUTPATIENT)
Dept: GENERAL RADIOLOGY | Age: 44
End: 2024-04-23
Payer: MEDICAID

## 2024-04-23 LAB
ALLENS TEST: ABNORMAL
ANION GAP SERPL CALCULATED.3IONS-SCNC: 12 MMOL/L (ref 7–19)
BASE EXCESS ARTERIAL: 3.3 MMOL/L (ref -2–2)
BASOPHILS # BLD: 0 K/UL (ref 0–0.2)
BASOPHILS NFR BLD: 0.1 % (ref 0–1)
BUN SERPL-MCNC: 4 MG/DL (ref 6–20)
CALCIUM SERPL-MCNC: 8.2 MG/DL (ref 8.6–10)
CARBOXYHEMOGLOBIN ARTERIAL: 2 % (ref 0–5)
CHLORIDE SERPL-SCNC: 100 MMOL/L (ref 98–111)
CO2 SERPL-SCNC: 26 MMOL/L (ref 22–29)
CREAT SERPL-MCNC: 0.5 MG/DL (ref 0.5–1.2)
EOSINOPHIL # BLD: 0.1 K/UL (ref 0–0.6)
EOSINOPHIL NFR BLD: 1.4 % (ref 0–5)
ERYTHROCYTE [DISTWIDTH] IN BLOOD BY AUTOMATED COUNT: 11.9 % (ref 11.5–14.5)
FIO2: 35 %
GLUCOSE SERPL-MCNC: 112 MG/DL (ref 74–109)
HCO3 ARTERIAL: 29.1 MMOL/L (ref 22–26)
HCT VFR BLD AUTO: 38.1 % (ref 42–52)
HEMOGLOBIN, ART, EXTENDED: 12.7 G/DL (ref 14–18)
HGB BLD-MCNC: 12.9 G/DL (ref 14–18)
IMM GRANULOCYTES # BLD: 0 K/UL
LYMPHOCYTES # BLD: 1.1 K/UL (ref 1.1–4.5)
LYMPHOCYTES NFR BLD: 12.9 % (ref 20–40)
MAGNESIUM SERPL-MCNC: 1.9 MG/DL (ref 1.6–2.6)
MCH RBC QN AUTO: 34.5 PG (ref 27–31)
MCHC RBC AUTO-ENTMCNC: 33.9 G/DL (ref 33–37)
MCV RBC AUTO: 101.9 FL (ref 80–94)
MECHANICAL RATE IN BPM: 16
METHEMOGLOBIN ARTERIAL: 1 %
MODE: ABNORMAL
MONOCYTES # BLD: 1.2 K/UL (ref 0–0.9)
MONOCYTES NFR BLD: 13.5 % (ref 0–10)
NEUTROPHILS # BLD: 6.4 K/UL (ref 1.5–7.5)
NEUTS SEG NFR BLD: 71.8 % (ref 50–65)
O2 CONTENT ARTERIAL: 15.9 ML/DL
O2 SAT, ARTERIAL: 89.1 %
O2 THERAPY: ABNORMAL
PCO2 ARTERIAL: 48 MMHG (ref 35–45)
PH ARTERIAL: 7.39 (ref 7.35–7.45)
PLATELET # BLD AUTO: 119 K/UL (ref 130–400)
PMV BLD AUTO: 11.1 FL (ref 9.4–12.4)
PO2 ARTERIAL: 50 MMHG (ref 80–100)
POSITIVE END EXP PRESS: 5
POTASSIUM BLD-SCNC: 3.5 MMOL/L
POTASSIUM SERPL-SCNC: 3.6 MMOL/L (ref 3.5–5)
RBC # BLD AUTO: 3.74 M/UL (ref 4.7–6.1)
SAMPLE SOURCE: ABNORMAL
SODIUM SERPL-SCNC: 138 MMOL/L (ref 136–145)
VT MECHANICAL: 450 %
WBC # BLD AUTO: 8.8 K/UL (ref 4.8–10.8)

## 2024-04-23 PROCEDURE — 6360000002 HC RX W HCPCS: Performed by: INTERNAL MEDICINE

## 2024-04-23 PROCEDURE — 85025 COMPLETE CBC W/AUTO DIFF WBC: CPT

## 2024-04-23 PROCEDURE — 2500000003 HC RX 250 WO HCPCS: Performed by: INTERNAL MEDICINE

## 2024-04-23 PROCEDURE — 6370000000 HC RX 637 (ALT 250 FOR IP)

## 2024-04-23 PROCEDURE — 2700000000 HC OXYGEN THERAPY PER DAY

## 2024-04-23 PROCEDURE — 94640 AIRWAY INHALATION TREATMENT: CPT

## 2024-04-23 PROCEDURE — C9113 INJ PANTOPRAZOLE SODIUM, VIA: HCPCS | Performed by: INTERNAL MEDICINE

## 2024-04-23 PROCEDURE — 2580000003 HC RX 258: Performed by: INTERNAL MEDICINE

## 2024-04-23 PROCEDURE — 87077 CULTURE AEROBIC IDENTIFY: CPT

## 2024-04-23 PROCEDURE — 6370000000 HC RX 637 (ALT 250 FOR IP): Performed by: INTERNAL MEDICINE

## 2024-04-23 PROCEDURE — 2000000000 HC ICU R&B

## 2024-04-23 PROCEDURE — 87040 BLOOD CULTURE FOR BACTERIA: CPT

## 2024-04-23 PROCEDURE — 87205 SMEAR GRAM STAIN: CPT

## 2024-04-23 PROCEDURE — 6360000002 HC RX W HCPCS

## 2024-04-23 PROCEDURE — 94003 VENT MGMT INPAT SUBQ DAY: CPT

## 2024-04-23 PROCEDURE — 86403 PARTICLE AGGLUT ANTBDY SCRN: CPT

## 2024-04-23 PROCEDURE — 87186 SC STD MICRODIL/AGAR DIL: CPT

## 2024-04-23 PROCEDURE — 80048 BASIC METABOLIC PNL TOTAL CA: CPT

## 2024-04-23 PROCEDURE — 51702 INSERT TEMP BLADDER CATH: CPT

## 2024-04-23 PROCEDURE — 71045 X-RAY EXAM CHEST 1 VIEW: CPT

## 2024-04-23 PROCEDURE — 82803 BLOOD GASES ANY COMBINATION: CPT

## 2024-04-23 PROCEDURE — 36600 WITHDRAWAL OF ARTERIAL BLOOD: CPT

## 2024-04-23 PROCEDURE — 83735 ASSAY OF MAGNESIUM: CPT

## 2024-04-23 PROCEDURE — 36415 COLL VENOUS BLD VENIPUNCTURE: CPT

## 2024-04-23 PROCEDURE — 87070 CULTURE OTHR SPECIMN AEROBIC: CPT

## 2024-04-23 RX ORDER — FUROSEMIDE 10 MG/ML
40 INJECTION INTRAMUSCULAR; INTRAVENOUS ONCE
Status: COMPLETED | OUTPATIENT
Start: 2024-04-23 | End: 2024-04-23

## 2024-04-23 RX ORDER — IPRATROPIUM BROMIDE AND ALBUTEROL SULFATE 2.5; .5 MG/3ML; MG/3ML
1 SOLUTION RESPIRATORY (INHALATION)
Status: DISCONTINUED | OUTPATIENT
Start: 2024-04-23 | End: 2024-05-03 | Stop reason: HOSPADM

## 2024-04-23 RX ORDER — FUROSEMIDE 10 MG/ML
INJECTION INTRAMUSCULAR; INTRAVENOUS
Status: COMPLETED
Start: 2024-04-23 | End: 2024-04-23

## 2024-04-23 RX ORDER — IPRATROPIUM BROMIDE AND ALBUTEROL SULFATE 2.5; .5 MG/3ML; MG/3ML
1 SOLUTION RESPIRATORY (INHALATION)
Status: DISCONTINUED | OUTPATIENT
Start: 2024-04-23 | End: 2024-04-23

## 2024-04-23 RX ORDER — IPRATROPIUM BROMIDE AND ALBUTEROL SULFATE 2.5; .5 MG/3ML; MG/3ML
SOLUTION RESPIRATORY (INHALATION)
Status: DISPENSED
Start: 2024-04-23 | End: 2024-04-23

## 2024-04-23 RX ORDER — FUROSEMIDE 10 MG/ML
20 INJECTION INTRAMUSCULAR; INTRAVENOUS ONCE
Status: COMPLETED | OUTPATIENT
Start: 2024-04-23 | End: 2024-04-23

## 2024-04-23 RX ADMIN — SODIUM CHLORIDE, PRESERVATIVE FREE 40 MG: 5 INJECTION INTRAVENOUS at 09:50

## 2024-04-23 RX ADMIN — ACETAMINOPHEN 650 MG: 325 TABLET ORAL at 12:44

## 2024-04-23 RX ADMIN — MAGNESIUM HYDROXIDE 30 ML: 400 SUSPENSION ORAL at 16:55

## 2024-04-23 RX ADMIN — OLANZAPINE 15 MG: 10 TABLET, FILM COATED ORAL at 20:14

## 2024-04-23 RX ADMIN — LORAZEPAM 2 MG: 2 INJECTION INTRAMUSCULAR; INTRAVENOUS at 09:29

## 2024-04-23 RX ADMIN — FUROSEMIDE 40 MG: 10 INJECTION, SOLUTION INTRAMUSCULAR; INTRAVENOUS at 09:51

## 2024-04-23 RX ADMIN — PIPERACILLIN AND TAZOBACTAM 3375 MG: 3; .375 INJECTION, POWDER, FOR SOLUTION INTRAVENOUS at 16:54

## 2024-04-23 RX ADMIN — PIPERACILLIN AND TAZOBACTAM 4500 MG: 4; .5 INJECTION, POWDER, LYOPHILIZED, FOR SOLUTION INTRAVENOUS; PARENTERAL at 13:21

## 2024-04-23 RX ADMIN — PROPOFOL 50 MCG/KG/MIN: 10 INJECTION, EMULSION INTRAVENOUS at 10:24

## 2024-04-23 RX ADMIN — FOLIC ACID 1 MG: 1 TABLET ORAL at 09:50

## 2024-04-23 RX ADMIN — DEXMEDETOMIDINE HYDROCHLORIDE 1.5 MCG/KG/HR: 400 INJECTION, SOLUTION INTRAVENOUS at 01:41

## 2024-04-23 RX ADMIN — PAROXETINE 75 MG: 30 TABLET, FILM COATED ORAL at 09:50

## 2024-04-23 RX ADMIN — Medication 150 MCG/HR: at 02:27

## 2024-04-23 RX ADMIN — DEXMEDETOMIDINE HYDROCHLORIDE 1.5 MCG/KG/HR: 400 INJECTION, SOLUTION INTRAVENOUS at 12:00

## 2024-04-23 RX ADMIN — PROPOFOL 50 MCG/KG/MIN: 10 INJECTION, EMULSION INTRAVENOUS at 23:13

## 2024-04-23 RX ADMIN — THIAMINE HYDROCHLORIDE 100 MG: 100 INJECTION, SOLUTION INTRAMUSCULAR; INTRAVENOUS at 09:51

## 2024-04-23 RX ADMIN — ENOXAPARIN SODIUM 40 MG: 100 INJECTION SUBCUTANEOUS at 09:51

## 2024-04-23 RX ADMIN — IPRATROPIUM BROMIDE AND ALBUTEROL SULFATE 1 DOSE: 2.5; .5 SOLUTION RESPIRATORY (INHALATION) at 10:00

## 2024-04-23 RX ADMIN — IPRATROPIUM BROMIDE AND ALBUTEROL SULFATE 1 DOSE: 2.5; .5 SOLUTION RESPIRATORY (INHALATION) at 18:26

## 2024-04-23 RX ADMIN — POLYETHYLENE GLYCOL 3350 17 G: 17 POWDER, FOR SOLUTION ORAL at 16:55

## 2024-04-23 RX ADMIN — Medication 150 MCG/HR: at 16:57

## 2024-04-23 RX ADMIN — Medication 150 MCG/HR: at 21:43

## 2024-04-23 RX ADMIN — DEXMEDETOMIDINE HYDROCHLORIDE 1.5 MCG/KG/HR: 400 INJECTION, SOLUTION INTRAVENOUS at 09:38

## 2024-04-23 RX ADMIN — PROPOFOL 50 MCG/KG/MIN: 10 INJECTION, EMULSION INTRAVENOUS at 20:09

## 2024-04-23 RX ADMIN — DEXMEDETOMIDINE HYDROCHLORIDE 1.5 MCG/KG/HR: 400 INJECTION, SOLUTION INTRAVENOUS at 05:06

## 2024-04-23 RX ADMIN — PROPOFOL 50 MCG/KG/MIN: 10 INJECTION, EMULSION INTRAVENOUS at 12:30

## 2024-04-23 RX ADMIN — THERA TABS 1 TABLET: TAB at 09:00

## 2024-04-23 RX ADMIN — IPRATROPIUM BROMIDE AND ALBUTEROL SULFATE 1 DOSE: 2.5; .5 SOLUTION RESPIRATORY (INHALATION) at 14:26

## 2024-04-23 RX ADMIN — Medication 150 MCG/HR: at 10:23

## 2024-04-23 RX ADMIN — TRAZODONE HYDROCHLORIDE 50 MG: 50 TABLET ORAL at 20:14

## 2024-04-23 RX ADMIN — FUROSEMIDE 20 MG: 10 INJECTION, SOLUTION INTRAMUSCULAR; INTRAVENOUS at 18:09

## 2024-04-23 RX ADMIN — DEXMEDETOMIDINE HYDROCHLORIDE 1.5 MCG/KG/HR: 400 INJECTION, SOLUTION INTRAVENOUS at 21:54

## 2024-04-23 RX ADMIN — PROPOFOL 50 MCG/KG/MIN: 10 INJECTION, EMULSION INTRAVENOUS at 16:56

## 2024-04-23 RX ADMIN — PROPOFOL 50 MCG/KG/MIN: 10 INJECTION, EMULSION INTRAVENOUS at 02:46

## 2024-04-23 RX ADMIN — FUROSEMIDE 40 MG: 10 INJECTION INTRAMUSCULAR; INTRAVENOUS at 09:51

## 2024-04-23 RX ADMIN — PROPOFOL 50 MCG/KG/MIN: 10 INJECTION, EMULSION INTRAVENOUS at 06:57

## 2024-04-23 ASSESSMENT — PULMONARY FUNCTION TESTS
PIF_VALUE: 25
PIF_VALUE: 26
PIF_VALUE: 22
PIF_VALUE: 36
PIF_VALUE: 22
PIF_VALUE: 22
PIF_VALUE: 23
PIF_VALUE: 25
PIF_VALUE: 23
PIF_VALUE: 25
PIF_VALUE: 26
PIF_VALUE: 24
PIF_VALUE: 22
PIF_VALUE: 24
PIF_VALUE: 31
PIF_VALUE: 25
PIF_VALUE: 20
PIF_VALUE: 21
PIF_VALUE: 21
PIF_VALUE: 23
PIF_VALUE: 25
PIF_VALUE: 21
PIF_VALUE: 23
PIF_VALUE: 23
PIF_VALUE: 28
PIF_VALUE: 22
PIF_VALUE: 26
PIF_VALUE: 21
PIF_VALUE: 26
PIF_VALUE: 23
PIF_VALUE: 22
PIF_VALUE: 32
PIF_VALUE: 23
PIF_VALUE: 26
PIF_VALUE: 26
PIF_VALUE: 24
PIF_VALUE: 29
PIF_VALUE: 24
PIF_VALUE: 21
PIF_VALUE: 24
PIF_VALUE: 22
PIF_VALUE: 21
PIF_VALUE: 23
PIF_VALUE: 26
PIF_VALUE: 21
PIF_VALUE: 23
PIF_VALUE: 23
PIF_VALUE: 22
PIF_VALUE: 20
PIF_VALUE: 21
PIF_VALUE: 22
PIF_VALUE: 27
PIF_VALUE: 27
PIF_VALUE: 23
PIF_VALUE: 26
PIF_VALUE: 22

## 2024-04-23 NOTE — PROGRESS NOTES
Comprehensive Nutrition Assessment    Type and Reason for Visit:  Reassess    Nutrition Recommendations/Plan:   Monitor for extubation or continue to goal rate of tf     Malnutrition Assessment:  Malnutrition Status:  At risk for malnutrition (Comment) (04/23/24 1035)    Context:  Acute Illness     Findings of the 6 clinical characteristics of malnutrition:  Energy Intake:  Mild decrease in energy intake (Comment)  Weight Loss:  No significant weight loss     Body Fat Loss:  No significant body fat loss     Muscle Mass Loss:  No significant muscle mass loss    Fluid Accumulation:  Mild Extremities   Strength:  Not Performed    Nutrition Assessment:    Pt continues to receive Propofol at 24.5ml.  Tf has been started and is currntly at 35ml/hr.  Rsiduals 0-130ml. Per nursing there is a possibility of extubation today.  Will monitor.    Nutrition Related Findings:    Glucose 112.   Accuchek's  Wound Type:  (blister)       Current Nutrition Intake & Therapies:    Average Meal Intake: NPO  Average Supplements Intake: NPO  Current Tube Feeding (TF) Orders:  Feeding Route: Orogastric  Formula: Peptide Based  Schedule: Continuous  Feeding Regimen: Vital AF 1.2 goal rate 53ml/hr with 20ml free water flush hourly  Additives/Modulars: None  Water Flushes: 20ml  Current TF & Flush Orders Provides: Vital AF 1.2 at 35ml/hr with 20ml free water flush = 1008 kcals with 63g protein, 92g CHO and 681ml free water from formula.  480ml free water from flush  Goal TF & Flush Orders Provides: Vital AF 1.2 goal rate 53ml/hr with 20ml free water flush = 1526 kcals with 95g protein, 140g CHO and 1031 ml free water.  Flush gives another 480ml free water.  Propofol adds another 647 NP kcals    Anthropometric Measures:  Height: 175.3 cm (5' 9.02\")  Ideal Body Weight (IBW): 160 lbs (73 kg)    Admission Body Weight: 81.6 kg (179 lb 14.3 oz) (stated)  Current Body Weight: 89.4 kg (197 lb 1.5 oz), 120.6 % IBW. Weight Source: Bed

## 2024-04-23 NOTE — PLAN OF CARE
Nutrition Problem #1: Inadequate oral intake  Intervention: Food and/or Nutrient Delivery: Continue NPO, Continue Current Tube Feeding  Nutritional

## 2024-04-23 NOTE — PROGRESS NOTES
04/23/24 1615   Encounter Summary   Encounter Overview/Reason  Initial Encounter   Service Provided For: Patient  (in ICU)   Referral/Consult From: Rounding   Support System Family members   Complexity of Encounter Moderate   Begin Time 1130   End Time  1200   Total Time Calculated 30 min   Spiritual/Emotional needs   Type Spiritual Distress   Assessment/Intervention/Outcome   Assessment Coping   Intervention Active listening;Prayer (assurance of)/[place]   Outcome Did not respond   Plan and Referrals   Plan/Referrals Continue to visit, (comment)     Electronically signed by trish Fried Mai on 4/23/2024 at 4:16 PM

## 2024-04-23 NOTE — PLAN OF CARE
Problem: Discharge Planning  Goal: Discharge to home or other facility with appropriate resources  Outcome: Progressing  Flowsheets  Taken 4/22/2024 2000 by Kim Arias RN  Discharge to home or other facility with appropriate resources: Identify barriers to discharge with patient and caregiver  Taken 4/22/2024 0800 by Mary Carson RN  Discharge to home or other facility with appropriate resources: Identify barriers to discharge with patient and caregiver     Problem: Pain  Goal: Verbalizes/displays adequate comfort level or baseline comfort level  Outcome: Progressing  Flowsheets  Taken 4/22/2024 1600 by Mary Carson RN  Verbalizes/displays adequate comfort level or baseline comfort level: Encourage patient to monitor pain and request assistance  Taken 4/22/2024 1200 by Mary Carson RN  Verbalizes/displays adequate comfort level or baseline comfort level: Encourage patient to monitor pain and request assistance  Taken 4/22/2024 0800 by Mary Carson RN  Verbalizes/displays adequate comfort level or baseline comfort level: Encourage patient to monitor pain and request assistance     Problem: Safety - Adult  Goal: Free from fall injury  Outcome: Progressing     Problem: Skin/Tissue Integrity  Goal: Absence of new skin breakdown  Description: 1.  Monitor for areas of redness and/or skin breakdown  2.  Assess vascular access sites hourly  3.  Every 4-6 hours minimum:  Change oxygen saturation probe site  4.  Every 4-6 hours:  If on nasal continuous positive airway pressure, respiratory therapy assess nares and determine need for appliance change or resting period.  Outcome: Progressing     Problem: ABCDS Injury Assessment  Goal: Absence of physical injury  Outcome: Progressing     Problem: Nutrition Deficit:  Goal: Optimize nutritional status  4/22/2024 2043 by Kim Arias RN  Outcome: Progressing  4/22/2024 1013 by Rosa Sheridan, MS, RD, LD  Outcome:

## 2024-04-23 NOTE — PROGRESS NOTES
Hospitalist Progress Note  [facility]     Patient: Evens De Leon  : 1980  MRN: [id number]  Code Status: Full Code    Hospital Day: 4   Date of Service: 2024    Subjective:   Patient seen and examined.  Intubated and sedated.    Past Medical History:   Diagnosis Date    Alcohol abuse     Dog bite     Psychiatric problem        Past Surgical History:   Procedure Laterality Date    ADENOIDECTOMY      COLONOSCOPY      TONSILLECTOMY Right     TYMPANOSTOMY TUBE PLACEMENT      US LYMPH NODE BIOPSY  10/21/2022    US LYMPH NODE BIOPSY 10/21/2022 [facility] ULTRASOUND       Family History   Problem Relation Age of Onset    High Blood Pressure Mother        Social History     Socioeconomic History    Marital status:      Spouse name: Not on file    Number of children: Not on file    Years of education: Not on file    Highest education level: Not on file   Occupational History    Not on file   Tobacco Use    Smoking status: Every Day     Current packs/day: 1.00     Average packs/day: 1 pack/day for 20.0 years (20.0 ttl pk-yrs)     Types: Cigarettes    Smokeless tobacco: Never   Vaping Use    Vaping Use: Never used   Substance and Sexual Activity    Alcohol use: Not Currently     Comment: \"every chance I get\"    Drug use: No    Sexual activity: Not on file   Other Topics Concern    Not on file   Social History Narrative    Not on file     Social Determinants of Health     Financial Resource Strain: Medium Risk (2024)    Overall Financial Resource Strain (CARDIA)     Difficulty of Paying Living Expenses: Somewhat hard   Food Insecurity: Food Insecurity Present (2024)    Hunger Vital Sign     Worried About Running Out of Food in the Last Year: Often true     Ran Out of Food in the Last Year: Often true   Transportation Needs: Not on file (2024)   Physical Activity: Sufficiently Active (2024)    Physical Activity ([facility] HRSN)     In the last 30 days, other than the activities you  Pt change in condition bp dropped to 31/20 Dr. Vanda Means notified, orders to add another bolus.   Pt responds to voice new bp 74/56 chloride 0.9% 100 mL infusion   mcg/hr IntraVENous Continuous Brandon Holloway MD 15 mL/hr at 04/23/24 1023 150 mcg/hr at 04/23/24 1023    propofol infusion  5-50 mcg/kg/min IntraVENous Continuous Brandon Holloway MD 24.5 mL/hr at 04/23/24 1230 50 mcg/kg/min at 04/23/24 1230    pantoprazole (PROTONIX) 40 mg in sodium chloride (PF) 0.9 % 10 mL injection  40 mg IntraVENous Daily Brandon Holloway MD   40 mg at 04/23/24 0950    [Held by provider] OLANZapine (ZYPREXA) tablet 15 mg  15 mg Oral Nightly Brandon Holloway MD        [Held by provider] traZODone (DESYREL) tablet 50 mg  50 mg Oral Nightly Brandon Holloway MD        potassium chloride 20 mEq/50 mL IVPB (Central Line)  20 mEq IntraVENous PRN Brandon Holloway MD        Or    potassium chloride 10 mEq/100 mL IVPB (Peripheral Line)  10 mEq IntraVENous PRN Brandon Holloway MD   Stopped at 04/21/24 0840    acetaminophen (TYLENOL) tablet 650 mg  650 mg Oral Q6H PRN Brandon Holloway MD   650 mg at 04/23/24 1244    Or    acetaminophen (TYLENOL) suppository 650 mg  650 mg Rectal Q6H PRN Brnadon Holloway MD        enoxaparin (LOVENOX) injection 40 mg  40 mg SubCUTAneous Daily Brandon Holloway MD   40 mg at 04/23/24 0951    thiamine (B-1) injection 100 mg  100 mg IntraVENous Daily Brandon Hololway MD   100 mg at 04/23/24 0951    folic acid (FOLVITE) tablet 1 mg  1 mg Oral Daily Brandon Holloway MD   1 mg at 04/23/24 0950    dexmedeTOMIDine (PRECEDEX) 400 mcg in sodium chloride 0.9 % 100 mL infusion  0.1-1.5 mcg/kg/hr IntraVENous Continuous Brandon Holloway MD 30.6 mL/hr at 04/23/24 0938 1.5 mcg/kg/hr at 04/23/24 0938         potassium chloride 20 mEq in lactated ringers IV soln 1,000 mL infusion Stopped (04/23/24 1000)    dextrose      sodium chloride      fentaNYL 150 mcg/hr (04/23/24 1023)    propofol 50 mcg/kg/min (04/23/24 1230)    dexmedeTOMIDine HCl in NaCl 1.5 mcg/kg/hr (04/23/24 0938)        Objective:   BP (!) 105/58   Pulse 95   Temp (!) 102.1 °F (38.9 °C)   Resp 15

## 2024-04-24 ENCOUNTER — APPOINTMENT (OUTPATIENT)
Dept: GENERAL RADIOLOGY | Age: 44
End: 2024-04-24
Payer: MEDICAID

## 2024-04-24 PROBLEM — R50.9 FEVER: Status: ACTIVE | Noted: 2024-04-24

## 2024-04-24 LAB
ANION GAP SERPL CALCULATED.3IONS-SCNC: 11 MMOL/L (ref 7–19)
BASE EXCESS ARTERIAL: 8.8 MMOL/L (ref -2–2)
BASE EXCESS ARTERIAL: 9.2 MMOL/L (ref -2–2)
BASOPHILS # BLD: 0 K/UL (ref 0–0.2)
BASOPHILS NFR BLD: 0 % (ref 0–1)
BUN SERPL-MCNC: 5 MG/DL (ref 6–20)
CALCIUM SERPL-MCNC: 8.2 MG/DL (ref 8.6–10)
CARBOXYHEMOGLOBIN ARTERIAL: 1.4 % (ref 0–5)
CARBOXYHEMOGLOBIN ARTERIAL: 1.5 % (ref 0–5)
CHLORIDE SERPL-SCNC: 100 MMOL/L (ref 98–111)
CO2 SERPL-SCNC: 29 MMOL/L (ref 22–29)
CREAT SERPL-MCNC: 0.6 MG/DL (ref 0.5–1.2)
EOSINOPHIL # BLD: 0.11 K/UL (ref 0–0.6)
EOSINOPHIL NFR BLD: 1 % (ref 0–5)
ERYTHROCYTE [DISTWIDTH] IN BLOOD BY AUTOMATED COUNT: 12 % (ref 11.5–14.5)
FIO2: 100 %
FIO2: 100 %
GLUCOSE BLD-MCNC: 125 MG/DL (ref 70–99)
GLUCOSE SERPL-MCNC: 132 MG/DL (ref 74–109)
HCO3 ARTERIAL: 35.7 MMOL/L (ref 22–26)
HCO3 ARTERIAL: 36.3 MMOL/L (ref 22–26)
HCT VFR BLD AUTO: 36.8 % (ref 42–52)
HEMOGLOBIN, ART, EXTENDED: 12.6 G/DL (ref 14–18)
HEMOGLOBIN, ART, EXTENDED: 12.8 G/DL (ref 14–18)
HGB BLD-MCNC: 12.2 G/DL (ref 14–18)
IMM GRANULOCYTES # BLD: 0.1 K/UL
LYMPHOCYTES # BLD: 1.7 K/UL (ref 1.1–4.5)
LYMPHOCYTES NFR BLD: 16 % (ref 20–40)
MACROCYTES BLD QL SMEAR: ABNORMAL
MAGNESIUM SERPL-MCNC: 1.7 MG/DL (ref 1.6–2.6)
MCH RBC QN AUTO: 34.1 PG (ref 27–31)
MCHC RBC AUTO-ENTMCNC: 33.2 G/DL (ref 33–37)
MCV RBC AUTO: 102.8 FL (ref 80–94)
MECHANICAL RATE IN BPM: 16
METHEMOGLOBIN ARTERIAL: 1.2 %
METHEMOGLOBIN ARTERIAL: 1.2 %
MODE: ABNORMAL
MODE: ABNORMAL
MONOCYTES # BLD: 1.2 K/UL (ref 0–0.9)
MONOCYTES NFR BLD: 11 % (ref 0–10)
MYELOCYTES NFR BLD MANUAL: 2 %
NEUTROPHILS # BLD: 7.6 K/UL (ref 1.5–7.5)
NEUTS BAND NFR BLD MANUAL: 2 % (ref 0–5)
NEUTS SEG NFR BLD: 68 % (ref 50–65)
O2 CONTENT ARTERIAL: 17.4 ML/DL
O2 CONTENT ARTERIAL: 17.8 ML/DL
O2 SAT, ARTERIAL: 97.4 %
O2 SAT, ARTERIAL: 97.4 %
O2 THERAPY: ABNORMAL
O2 THERAPY: ABNORMAL
PCO2 ARTERIAL: 59 MMHG (ref 35–45)
PCO2 ARTERIAL: 60 MMHG (ref 35–45)
PERFORMED ON: ABNORMAL
PH ARTERIAL: 7.39 (ref 7.35–7.45)
PH ARTERIAL: 7.39 (ref 7.35–7.45)
PLATELET # BLD AUTO: 154 K/UL (ref 130–400)
PMV BLD AUTO: 10.9 FL (ref 9.4–12.4)
PO2 ARTERIAL: 124 MMHG (ref 80–100)
PO2 ARTERIAL: 134 MMHG (ref 80–100)
POSITIVE END EXP PRESS: 7
POSITIVE END EXP PRESS: 7
POTASSIUM BLD-SCNC: 3 MMOL/L
POTASSIUM BLD-SCNC: 3.2 MMOL/L
POTASSIUM SERPL-SCNC: 3.4 MMOL/L (ref 3.5–5)
RBC # BLD AUTO: 3.58 M/UL (ref 4.7–6.1)
SODIUM SERPL-SCNC: 140 MMOL/L (ref 136–145)
TOXIC GRANULATION: ABNORMAL
VT MECHANICAL: 450 %
VT MECHANICAL: 450 %
WBC # BLD AUTO: 10.6 K/UL (ref 4.8–10.8)

## 2024-04-24 PROCEDURE — 6360000002 HC RX W HCPCS: Performed by: INTERNAL MEDICINE

## 2024-04-24 PROCEDURE — 6370000000 HC RX 637 (ALT 250 FOR IP)

## 2024-04-24 PROCEDURE — 82803 BLOOD GASES ANY COMBINATION: CPT

## 2024-04-24 PROCEDURE — 85025 COMPLETE CBC W/AUTO DIFF WBC: CPT

## 2024-04-24 PROCEDURE — 2580000003 HC RX 258: Performed by: INTERNAL MEDICINE

## 2024-04-24 PROCEDURE — 71045 X-RAY EXAM CHEST 1 VIEW: CPT

## 2024-04-24 PROCEDURE — 2500000003 HC RX 250 WO HCPCS: Performed by: INTERNAL MEDICINE

## 2024-04-24 PROCEDURE — 36415 COLL VENOUS BLD VENIPUNCTURE: CPT

## 2024-04-24 PROCEDURE — 6370000000 HC RX 637 (ALT 250 FOR IP): Performed by: INTERNAL MEDICINE

## 2024-04-24 PROCEDURE — 94640 AIRWAY INHALATION TREATMENT: CPT

## 2024-04-24 PROCEDURE — 2000000000 HC ICU R&B

## 2024-04-24 PROCEDURE — 80048 BASIC METABOLIC PNL TOTAL CA: CPT

## 2024-04-24 PROCEDURE — 94003 VENT MGMT INPAT SUBQ DAY: CPT

## 2024-04-24 PROCEDURE — 74018 RADEX ABDOMEN 1 VIEW: CPT

## 2024-04-24 PROCEDURE — 36600 WITHDRAWAL OF ARTERIAL BLOOD: CPT

## 2024-04-24 PROCEDURE — 2700000000 HC OXYGEN THERAPY PER DAY

## 2024-04-24 PROCEDURE — 99254 IP/OBS CNSLTJ NEW/EST MOD 60: CPT | Performed by: PHYSICIAN ASSISTANT

## 2024-04-24 PROCEDURE — 6360000002 HC RX W HCPCS

## 2024-04-24 PROCEDURE — 83735 ASSAY OF MAGNESIUM: CPT

## 2024-04-24 PROCEDURE — C9113 INJ PANTOPRAZOLE SODIUM, VIA: HCPCS | Performed by: INTERNAL MEDICINE

## 2024-04-24 RX ORDER — BUMETANIDE 0.25 MG/ML
2 INJECTION INTRAMUSCULAR; INTRAVENOUS ONCE
Status: COMPLETED | OUTPATIENT
Start: 2024-04-24 | End: 2024-04-24

## 2024-04-24 RX ORDER — FUROSEMIDE 10 MG/ML
40 INJECTION INTRAMUSCULAR; INTRAVENOUS 2 TIMES DAILY
Status: DISCONTINUED | OUTPATIENT
Start: 2024-04-24 | End: 2024-04-25

## 2024-04-24 RX ORDER — BUMETANIDE 0.25 MG/ML
INJECTION INTRAMUSCULAR; INTRAVENOUS
Status: COMPLETED
Start: 2024-04-24 | End: 2024-04-24

## 2024-04-24 RX ADMIN — IPRATROPIUM BROMIDE AND ALBUTEROL SULFATE 1 DOSE: 2.5; .5 SOLUTION RESPIRATORY (INHALATION) at 18:11

## 2024-04-24 RX ADMIN — FOLIC ACID 1 MG: 1 TABLET ORAL at 08:36

## 2024-04-24 RX ADMIN — PROPOFOL 50 MCG/KG/MIN: 10 INJECTION, EMULSION INTRAVENOUS at 22:59

## 2024-04-24 RX ADMIN — PROPOFOL 50 MCG/KG/MIN: 10 INJECTION, EMULSION INTRAVENOUS at 13:51

## 2024-04-24 RX ADMIN — PIPERACILLIN AND TAZOBACTAM 3375 MG: 3; .375 INJECTION, POWDER, FOR SOLUTION INTRAVENOUS at 01:22

## 2024-04-24 RX ADMIN — SODIUM CHLORIDE, PRESERVATIVE FREE 40 MG: 5 INJECTION INTRAVENOUS at 08:37

## 2024-04-24 RX ADMIN — FUROSEMIDE 40 MG: 10 INJECTION, SOLUTION INTRAMUSCULAR; INTRAVENOUS at 17:39

## 2024-04-24 RX ADMIN — PROPOFOL 50 MCG/KG/MIN: 10 INJECTION, EMULSION INTRAVENOUS at 21:07

## 2024-04-24 RX ADMIN — PROPOFOL 50 MCG/KG/MIN: 10 INJECTION, EMULSION INTRAVENOUS at 09:49

## 2024-04-24 RX ADMIN — DEXMEDETOMIDINE HYDROCHLORIDE 1.5 MCG/KG/HR: 400 INJECTION, SOLUTION INTRAVENOUS at 17:22

## 2024-04-24 RX ADMIN — DEXMEDETOMIDINE HYDROCHLORIDE 1.5 MCG/KG/HR: 400 INJECTION, SOLUTION INTRAVENOUS at 20:41

## 2024-04-24 RX ADMIN — PIPERACILLIN AND TAZOBACTAM 3375 MG: 3; .375 INJECTION, POWDER, FOR SOLUTION INTRAVENOUS at 08:36

## 2024-04-24 RX ADMIN — IPRATROPIUM BROMIDE AND ALBUTEROL SULFATE 1 DOSE: 2.5; .5 SOLUTION RESPIRATORY (INHALATION) at 15:13

## 2024-04-24 RX ADMIN — BUMETANIDE 2 MG: 0.25 INJECTION INTRAMUSCULAR; INTRAVENOUS at 09:46

## 2024-04-24 RX ADMIN — ENOXAPARIN SODIUM 40 MG: 100 INJECTION SUBCUTANEOUS at 08:37

## 2024-04-24 RX ADMIN — DEXMEDETOMIDINE HYDROCHLORIDE 1.5 MCG/KG/HR: 400 INJECTION, SOLUTION INTRAVENOUS at 23:42

## 2024-04-24 RX ADMIN — ACETAMINOPHEN 650 MG: 325 TABLET ORAL at 10:21

## 2024-04-24 RX ADMIN — DEXMEDETOMIDINE HYDROCHLORIDE 1.5 MCG/KG/HR: 400 INJECTION, SOLUTION INTRAVENOUS at 07:50

## 2024-04-24 RX ADMIN — Medication 175 MCG/HR: at 03:00

## 2024-04-24 RX ADMIN — DEXMEDETOMIDINE HYDROCHLORIDE 1.5 MCG/KG/HR: 400 INJECTION, SOLUTION INTRAVENOUS at 04:43

## 2024-04-24 RX ADMIN — OLANZAPINE 15 MG: 10 TABLET, FILM COATED ORAL at 21:32

## 2024-04-24 RX ADMIN — PROPOFOL 50 MCG/KG/MIN: 10 INJECTION, EMULSION INTRAVENOUS at 17:23

## 2024-04-24 RX ADMIN — PROPOFOL 50 MCG/KG/MIN: 10 INJECTION, EMULSION INTRAVENOUS at 06:49

## 2024-04-24 RX ADMIN — VANCOMYCIN HYDROCHLORIDE 2000 MG: 10 INJECTION, POWDER, LYOPHILIZED, FOR SOLUTION INTRAVENOUS at 15:31

## 2024-04-24 RX ADMIN — PIPERACILLIN AND TAZOBACTAM 3375 MG: 3; .375 INJECTION, POWDER, FOR SOLUTION INTRAVENOUS at 17:26

## 2024-04-24 RX ADMIN — VANCOMYCIN HYDROCHLORIDE 1250 MG: 10 INJECTION, POWDER, LYOPHILIZED, FOR SOLUTION INTRAVENOUS at 23:03

## 2024-04-24 RX ADMIN — Medication 150 MCG/HR: at 14:00

## 2024-04-24 RX ADMIN — PAROXETINE 75 MG: 30 TABLET, FILM COATED ORAL at 08:36

## 2024-04-24 RX ADMIN — THIAMINE HYDROCHLORIDE 100 MG: 100 INJECTION, SOLUTION INTRAMUSCULAR; INTRAVENOUS at 08:38

## 2024-04-24 RX ADMIN — DEXMEDETOMIDINE HYDROCHLORIDE 1.5 MCG/KG/HR: 400 INJECTION, SOLUTION INTRAVENOUS at 13:56

## 2024-04-24 RX ADMIN — POTASSIUM CHLORIDE 40 MEQ: 1500 TABLET, EXTENDED RELEASE ORAL at 06:51

## 2024-04-24 RX ADMIN — Medication 125 MCG/HR: at 21:41

## 2024-04-24 RX ADMIN — MAGNESIUM HYDROXIDE 30 ML: 400 SUSPENSION ORAL at 16:11

## 2024-04-24 RX ADMIN — TRAZODONE HYDROCHLORIDE 50 MG: 50 TABLET ORAL at 21:32

## 2024-04-24 RX ADMIN — DEXMEDETOMIDINE HYDROCHLORIDE 1.5 MCG/KG/HR: 400 INJECTION, SOLUTION INTRAVENOUS at 10:44

## 2024-04-24 RX ADMIN — IPRATROPIUM BROMIDE AND ALBUTEROL SULFATE 1 DOSE: 2.5; .5 SOLUTION RESPIRATORY (INHALATION) at 10:00

## 2024-04-24 RX ADMIN — PROPOFOL 50 MCG/KG/MIN: 10 INJECTION, EMULSION INTRAVENOUS at 02:44

## 2024-04-24 RX ADMIN — Medication 175 MCG/HR: at 07:49

## 2024-04-24 RX ADMIN — THERA TABS 1 TABLET: TAB at 08:36

## 2024-04-24 RX ADMIN — POLYETHYLENE GLYCOL 3350 17 G: 17 POWDER, FOR SOLUTION ORAL at 16:11

## 2024-04-24 RX ADMIN — DEXMEDETOMIDINE HYDROCHLORIDE 1.5 MCG/KG/HR: 400 INJECTION, SOLUTION INTRAVENOUS at 01:13

## 2024-04-24 RX ADMIN — IPRATROPIUM BROMIDE AND ALBUTEROL SULFATE 1 DOSE: 2.5; .5 SOLUTION RESPIRATORY (INHALATION) at 06:42

## 2024-04-24 ASSESSMENT — PULMONARY FUNCTION TESTS
PIF_VALUE: 23
PIF_VALUE: 22
PIF_VALUE: 21
PIF_VALUE: 21
PIF_VALUE: 22
PIF_VALUE: 22
PIF_VALUE: 21
PIF_VALUE: 22
PIF_VALUE: 60
PIF_VALUE: 25
PIF_VALUE: 21
PIF_VALUE: 27
PIF_VALUE: 22
PIF_VALUE: 30
PIF_VALUE: 22
PIF_VALUE: 22
PIF_VALUE: 24
PIF_VALUE: 22
PIF_VALUE: 21
PIF_VALUE: 21
PIF_VALUE: 23
PIF_VALUE: 22
PIF_VALUE: 21
PIF_VALUE: 22
PIF_VALUE: 29
PIF_VALUE: 22
PIF_VALUE: 20
PIF_VALUE: 22
PIF_VALUE: 27
PIF_VALUE: 21
PIF_VALUE: 23
PIF_VALUE: 21
PIF_VALUE: 21
PIF_VALUE: 22
PIF_VALUE: 21
PIF_VALUE: 21
PIF_VALUE: 23
PIF_VALUE: 22
PIF_VALUE: 21
PIF_VALUE: 21
PIF_VALUE: 58
PIF_VALUE: 21
PIF_VALUE: 22
PIF_VALUE: 23

## 2024-04-24 NOTE — PLAN OF CARE
Problem: Discharge Planning  Goal: Discharge to home or other facility with appropriate resources  Outcome: Progressing     Problem: Pain  Goal: Verbalizes/displays adequate comfort level or baseline comfort level  Outcome: Progressing     Problem: Safety - Adult  Goal: Free from fall injury  Outcome: Progressing     Problem: Skin/Tissue Integrity  Goal: Absence of new skin breakdown  Description: 1.  Monitor for areas of redness and/or skin breakdown  2.  Assess vascular access sites hourly  3.  Every 4-6 hours minimum:  Change oxygen saturation probe site  4.  Every 4-6 hours:  If on nasal continuous positive airway pressure, respiratory therapy assess nares and determine need for appliance change or resting period.  Outcome: Progressing     Problem: ABCDS Injury Assessment  Goal: Absence of physical injury  Outcome: Progressing     Problem: Nutrition Deficit:  Goal: Optimize nutritional status  4/23/2024 1922 by Fabio Escalera, RN  Outcome: Progressing  4/23/2024 1039 by Rosa Sheridan MS, RD, LD  Outcome: Progressing  Flowsheets (Taken 4/23/2024 1029)  Nutrient intake appropriate for improving, restoring, or maintaining nutritional needs:   Assess nutritional status and recommend course of action   Recommend, monitor, and adjust tube feedings and TPN/PPN based on assessed needs

## 2024-04-24 NOTE — CONSULTS
Pulmonary and Critical Care Consult Note    [facility]    Evens De Leon    MRN# [id number]    Acct# [id number]  4/24/2024   9:17 AM CDT    Referring Provider:Narinder Munoz DO      Chief Complaint: Respiratory failure on mechanical ventilation    Requesting physician: Dr. Munoz    Reason for consult: Respiratory failure on mechanical ventilation      HPI: We have been consulted to see this 43 y.o. year old male born on 1980.  The patient was brought to the hospital on the 19th of this month due to homicidal ideations and combativeness.  He has a history of alcohol and drug abuse.  He was at a rehab facility.  He had been without alcohol for 2 to 3 days.  It was felt that he is going through withdrawals.  He was given Ativan and Haldol.  In the ER he tried to abscond.  He tried to get up but he could not move.  He became worse with worsening agitation.  He was intubated and admitted to the intensive care unit for control of agitation.  Currently patient is intubated on mechanical ventilation sedated in intensive care unit.  I was asked to see him regarding the above.  He had been on 100% FiO2 until earlier this morning.  His PaO2 this morning showed a pO2 of 124.  Chest x-ray shows bilateral pleural effusions and left lingular infiltrate.  He is on empirical antibiotic therapy.  His sputum is growing strep and Klebsiella.      Past Medical History      Past Medical History:   Diagnosis Date    Alcohol abuse     Dog bite     Psychiatric problem      SurgicalHistory  Past Surgical History:   Procedure Laterality Date    ADENOIDECTOMY      COLONOSCOPY      TONSILLECTOMY Right     TYMPANOSTOMY TUBE PLACEMENT      US LYMPH NODE BIOPSY  10/21/2022    US LYMPH NODE BIOPSY 10/21/2022 [facility] ULTRASOUND     Allergies  No Known Allergies      The following medications were reviewed and  36.8*   * 119* 154   MCV 98.9* 101.9* 102.8*   MCH 33.4* 34.5* 34.1*   MCHC 33.8 33.9 33.2   RDW 11.5 11.9 12.0   BANDSPCT  --   --  2      BMP   Recent Labs     04/21/24 2151 04/22/24 0105 04/22/24 0105 04/22/24  0501 04/23/24  0132 04/23/24 0358 04/24/24  0112 04/24/24  0356    137  --   --  138  --  140  --    K 3.3* 3.4*   < > 3.5 3.6 3.5 3.4* 3.0   CL 99 99  --   --  100  --  100  --    CO2 24 25  --   --  26  --  29  --    BUN 8 7  --   --  4*  --  5*  --    CREATININE 0.6 0.6  --   --  0.5  --  0.6  --    CALCIUM 8.2* 8.2*  --   --  8.2*  --  8.2*  --    GLUCOSE 54* 83  --   --  112*  --  132*  --     < > = values in this interval not displayed.      ABG   Recent Labs     04/23/24 0358 04/24/24 0356   PHART 7.390 7.390   JJB3EWC 48.0* 59.0*   PO2ART 50.0* 124.0*   SLU4HSD 29.1* 35.7*   F5UFMKTS 89.1* 97.4   BEART 3.3* 8.8*     Liver function studies and cardiac markers   Recent Labs     04/21/24 2151 04/22/24 0105 04/24/24 0112   AST 48*  --   --    ALT 28  --   --    ALKPHOS 247*  --   --    BILITOT 0.7  --   --    MG 1.8   < > 1.7   CALCIUM 8.2*   < > 8.2*    < > = values in this interval not displayed.     Microbiology and Cultures   Recent Labs     04/23/24  0945   LABGRAM Many WBC's (Polymorphonuclear)  No Epithelial Cells seen  Many Mixed bacterial morphotypes suggestive of  Normal respiratory claudio           Radiographs reviewed:     XR CHEST PORTABLE    Result Date: 4/24/2024   1.  Stable, satisfactory position of the endotracheal and enteric tubes. 2.  Stable pneumonia versus atelectasis of the left lower lobe. 3.  Stable cardiomegaly with possible small left pleural effusion. 4.  Stable bilateral perihilar ground-glass, pulmonary edema versus pneumonia.    ______________________________________ Electronically signed by: CAMMY GUERRERO M.D. Date:     04/24/2024 Time:    06:08     XR CHEST PORTABLE    Result Date: 4/23/2024  1.  Increasing retrocardiac and right perihilar airspace

## 2024-04-24 NOTE — PROGRESS NOTES
[facility]   Pharmacy Pharmacokinetic Monitoring Service - Vancomycin     Evens De Leon is a 43 y.o. male starting on vancomycin therapy for MRSA in sputum. Pharmacy consulted by Dr. Munoz for monitoring and adjustment.    Target Concentration: Goal AUC/ROSIE 400-600 mg*hr/L    Additional Antimicrobials: Zosyn    Pertinent Laboratory Values:   Wt Readings from Last 1 Encounters:   04/23/24 89.4 kg (197 lb)     Temp Readings from Last 1 Encounters:   04/24/24 (!) 100.5 °F (38.1 °C)     Estimated Creatinine Clearance: 172 mL/min (based on SCr of 0.6 mg/dL).  Recent Labs     04/23/24  0131 04/23/24  0132 04/24/24  0111 04/24/24  0112   CREATININE  --  0.5  --  0.6   BUN  --  4*  --  5*   WBC 8.8  --  10.6  --      Procalcitonin: N/A    Pertinent Cultures:  Culture Date Source Results   04/23/24 Respiratory Klebsiella pneumoniae ,Staph aureus   , Streptococcus pneumoniae    04/23/24 Blood x 2 ordered        MRSA Nasal Swab: showed MRSA positive result on 04/24/24    Plan:  Dosing recommendations based on Bayesian software  Start vancomycin 2000 mg IV x 1 dose; followed by 1250 mg IV Q 8 hours.  Anticipated AUC of 494 and trough concentration of 15.1 at steady state  Renal labs as indicated   Vancomycin concentration ordered for 04/25/24 @ 0630   Pharmacy will continue to monitor patient and adjust therapy as indicated    Thank you for the consult,  Shahram Lacey RPH  4/24/2024 2:31 PM

## 2024-04-24 NOTE — CARE COORDINATION
JOSE LUIS reached out to Pt's brother, edvin Jane: NOK contact and to confirm that Pt has adult children and get names/numbers; he stated that Pt has one adult ze, Parth De Leon and he is 16 y/o [phone]; JOSE LUIS did note that if Pt's son doesn't want to be NOK decision-maker he can defer to the NOK, which would be Pt's mother, and if she doesn't want to, she can defer to him. He voiced understanding.    Electronically signed by TATIANA Ronquillo on 4/24/2024 at 3:06 PM    [phone]  [place] revised statutes: Responsible parties authorized to make health care decisions.    (1) If an adult patient whose physician has determined that he or she does not have  decisional capacity has not executed an advance directive, or to the extent the  advance directive does not address a decision that must be made, any one (1) of the  following responsible parties, in the following order of priority if no individual in a  prior class is reasonably available, willing, and competent to act, shall be authorized  to make health care decisions on behalf of the patient:    (a) The judicially-appointed guardian of the patient, if the guardian has been  appointed and if medical decisions are within the scope of the guardianship;  (b) The -in-fact named in a durable power of , if the durable  power of  specifically includes authority for health care decisions;  (c) The spouse of the patient;  (d) An adult child of the patient, or if the patient has more than one (1) child, the  majority of the adult children who are reasonably available for consultation;  (e) The parents of the patient;  (f) The nearest living relative of the patient, or if more than one (1) relative of the  same relation is reasonably available for consultation, a majority of the nearest  living relatives.

## 2024-04-24 NOTE — PROGRESS NOTES
Comprehensive Nutrition Assessment    Type and Reason for Visit:  Reassess    Nutrition Recommendations/Plan:   Continue to work toward goal rate for tf or monitor for extubtion     Malnutrition Assessment:  Malnutrition Status:  At risk for malnutrition (Comment) (04/24/24 1106)    Context:  Acute Illness     Findings of the 6 clinical characteristics of malnutrition:  Energy Intake:  Mild decrease in energy intake (Comment)  Weight Loss:  No significant weight loss     Body Fat Loss:  No significant body fat loss     Muscle Mass Loss:  No significant muscle mass loss    Fluid Accumulation:  Mild Generalized   Strength:  Not Performed    Nutrition Assessment:    Propofol remains at 24.5ml and TF continues at 35ml/hr with 20ml free water flush hourly.  New wt not available.  Glucose 132    Nutrition Related Findings:    Glucose 132 Wound Type:  (blister)       Current Nutrition Intake & Therapies:    Average Meal Intake: NPO  Average Supplements Intake: NPO  Current Tube Feeding (TF) Orders:  Feeding Route: Orogastric  Formula: Peptide Based  Schedule: Continuous  Feeding Regimen: Vital AF 1.2 goal rate 53ml/hr with 20ml free water flush hourly  Additives/Modulars: None  Water Flushes: 20ml  Current TF & Flush Orders Provides: Vital AF 1.2 at 35ml/hr with 20ml free water flush = 1008 kcals with 63g protein, 92g CHO and 681ml free water from formula.  480ml free water from flush  Goal TF & Flush Orders Provides: Vital AF 1.2 goal rate 53ml/hr with 20ml free water flush = 1526 kcals with 95g protein, 140g CHO and 1031 ml free water.  Flush gives another 480ml free water.  Propofol adds another 647 NP kcals    Anthropometric Measures:  Height: 175.3 cm (5' 9.02\")  Ideal Body Weight (IBW): 160 lbs (73 kg)    Admission Body Weight: 81.6 kg (179 lb 14.3 oz) (stated)  Current Body Weight: 89.4 kg (197 lb 1.5 oz), 120.6 % IBW. Weight Source: Bed Scale  Current BMI (kg/m2): 29.1  Usual Body Weight: 73.5 kg (162 lb 0.6

## 2024-04-24 NOTE — PROGRESS NOTES
Fio2 titrated:    Fio2 1349: 80% to 70%. SAT 95.  Fio2 1400: 70% to 60%. SAT 94.    Sputum cultures positive for MRSA. Attending notified and order for Vancomycin 1g Q12H placed per physician order.

## 2024-04-24 NOTE — PROGRESS NOTES
CHEST PORTABLE    Result Date: 4/19/2024  EXAM:  SINGLE VIEW OF THE CHEST.  History:  Nasogastric tube placement.  Comparison:  Chest radiograph 04/19/2024  FINDINGS:  Heart size is upper limits of normal.  No consolidation.  No pleural fluid and no pneumothorax.  Stable endotracheal tube.  Interval placement of nasogastric tube with tip in the proximal aspect of the stomach.  No acute osseous abnormalities.      Impression:  Nasogastric tube placement   ______________________________________ Electronically signed by: LUPILLO SNELL M.D. Date:     04/19/2024 Time:    19:14     XR CHEST PORTABLE    Result Date: 4/19/2024  EXAM:  SINGLE VIEW OF THE CHEST.  History:  Endotracheal tube placement.  Comparison:  Chest radiograph 04/19/2024  FINDINGS:  Heart size is normal.  Lungs are clear.  No pleural fluid and no pneumothorax.  No acute osseous abnormalities.  Interval placement of endotracheal tube with tip at the level of the clavicles.      Impression:  Endotracheal tube placement.   ______________________________________ Electronically signed by: LUPILLO SNELL M.D. Date:     04/19/2024 Time:    18:04     XR CHEST PORTABLE    Result Date: 4/19/2024  EXAM: CHEST, SINGLE VIEW  HISTORY: Altered mental status  COMPARISON: 07/14/2021      Cardiomediastinal contours appear stable.  Basilar interstitial opacitis may relate to atelectasis or pneumonitis.  There is no focal pulmonary consolidation.  No pleural effusion or pneumothorax.   ______________________________________ Electronically signed by: YUAN MUELLER M.D. Date:     04/19/2024 Time:    14:07        Assessment     Alcohol withdrawal delirium.  Now out of the window for withdrawal.  Supportive care.    Methamphetamine abuse.  Supportive care.    Fever.  Probable aspiration.    Hypoxemic respiratory failure.  Continue ventilatory support.  Pulmonology evaluation.    Please document 40 minutes of critical care time for patient assessment, chart review,  discussion with staff, .      Narinder Munoz, DO

## 2024-04-24 NOTE — CONSULTS
Palliative Care Consult Note    4/24/2024 3:46 PM  Subjective:  Admit Date: 4/19/2024  PCP: Rosangela Mtz APRN - CNP    Date of Service: 4/24/2024    Reason for Consultation:  Goals of Care, Code Status, Family Support     History Obtained From: EMR/Patient and their Family    History Of Present Illness:   The patient is a 43 y.o. 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  According to ED documentation he had concern for alcohol withdrawal and was extremely combative with agitation and reported that he wanted to cause harm to someone.  UDS was positive for benzos, methamphetamine and EtOH was less than 10.  He reportedly received 6 mg of Ativan and 2.5 mg of Haldol prior to arrival.  Chest x-ray reported atelectasis versus pneumonitis.  EKG with sinus tachycardia without ischemic changes.  They reported he was stumbling, altered, hallucinating and attempted to elope.  He received Ativan, Benadryl and Geodon without improvement and the decision was made at that time according to documentation for sedation and intubation for patient safety with plans for psychiatric evaluation once the patient is extubated.  He was admitted to hospitalist service and placed on CIWA protocol.  He was found to be hypokalemic and replacement was provided. Ventilator weaning has been difficult.  He has febrile with sputum culture concerning for Klebsiella pneumonia, MRSA, strep pneumo.  He has been placed on vancomycin and Zosyn.  Palliative care has been consulted for  hobbies/housework  Occasional assistance  Normal/reduced intake  LOC full/confusion  [] 50% Mainly sit/lie  Extensive disease: Can't do any work  Considerable assistance  Normal/reduced intake  LOC full/confusion  [] 40% Mainly in bed  Extensive disease  Mainly assistance  Normal/reduced intake  LOC full/confusion  [] 30% Bed Bound  Extensive disease  Total care  Reduced Intake  LOC full/confusion  [] 20% Bed Bound  Extensive disease  Total care  Minimal intake  Drowsy/coma  [x] 10% Bed Bound  Extensive disease  Total care  Mouth care only  Drowsy/coma  [] 0% Death    ECO    CLINICAL PAIN ASSESSMENT:     NVPS 0    Assessment/Plan:  Principal Problem:    Alcohol withdrawal delirium (HCC)  Active Problems:    Methamphetamine abuse (HCC)    Fever  Resolved Problems:    * No resolved hospital problems. *     Visit Summary:  Chart reviewed, patient seen at bedside and discussed with ICU RN as well as hospitalist.  Temperature elevated today with Tmax 100.5.  He has been placed on Vancomycin, Zosyn.  Pulmonology consulted to assist with ventilator weaning.  He has noted to be volume overloaded and Bumex has been continued.  No family present at bedside during my visit today.  Discussed with social work and the patient does have an adult child who is 19, in addition to a mother and a brother are living and who have been present.  I was able to reach the patient's mother and I introduced myself, the role of inpatient palliative care, the reason for consultation.  I reviewed with her current clinical concerns, workup and treatment provided thus far.  She states that the patient is estranged from his son at this time.  She further states that she and her other son Buck were hoping to talk to social work about obtaining guardianship.  She confirms that the patient is full code at this time and they do want to continue current level of support in hopes he will be liberated from the ventilator soon.

## 2024-04-24 NOTE — PLAN OF CARE
Problem: Nutrition Deficit:  Goal: Optimize nutritional status  Outcome: Progressing  Flowsheets (Taken 4/24/2024 1102)  Nutrient intake appropriate for improving, restoring, or maintaining nutritional needs:   Assess nutritional status and recommend course of action   Recommend, monitor, and adjust tube feedings and TPN/PPN based on assessed needs

## 2024-04-25 ENCOUNTER — APPOINTMENT (OUTPATIENT)
Dept: GENERAL RADIOLOGY | Age: 44
End: 2024-04-25
Payer: MEDICAID

## 2024-04-25 ENCOUNTER — APPOINTMENT (OUTPATIENT)
Dept: CT IMAGING | Age: 44
End: 2024-04-25
Payer: MEDICAID

## 2024-04-25 PROBLEM — R41.82 ALTERED MENTAL STATUS: Status: ACTIVE | Noted: 2024-04-25

## 2024-04-25 PROBLEM — Z51.5 PALLIATIVE CARE PATIENT: Status: ACTIVE | Noted: 2024-04-25

## 2024-04-25 LAB
ALBUMIN SERPL-MCNC: 2.9 G/DL (ref 3.5–5.2)
ALLENS TEST: ABNORMAL
ALLENS TEST: ABNORMAL
ALP SERPL-CCNC: 305 U/L (ref 40–130)
ALT SERPL-CCNC: 35 U/L (ref 5–41)
ANION GAP SERPL CALCULATED.3IONS-SCNC: 9 MMOL/L (ref 7–19)
AST SERPL-CCNC: 63 U/L (ref 5–40)
BASE EXCESS ARTERIAL: 11.6 MMOL/L (ref -2–2)
BASE EXCESS ARTERIAL: 16.7 MMOL/L (ref -2–2)
BILIRUB SERPL-MCNC: 0.3 MG/DL (ref 0.2–1.2)
BUN SERPL-MCNC: 8 MG/DL (ref 6–20)
CALCIUM SERPL-MCNC: 8.5 MG/DL (ref 8.6–10)
CARBOXYHEMOGLOBIN ARTERIAL: 1.6 % (ref 0–5)
CARBOXYHEMOGLOBIN ARTERIAL: 2.2 % (ref 0–5)
CHLORIDE SERPL-SCNC: 98 MMOL/L (ref 98–111)
CO2 SERPL-SCNC: 37 MMOL/L (ref 22–29)
CREAT SERPL-MCNC: 0.7 MG/DL (ref 0.5–1.2)
FIO2: 70 %
FIO2: 90 %
GLUCOSE SERPL-MCNC: 131 MG/DL (ref 74–109)
HCO3 ARTERIAL: 38.3 MMOL/L (ref 22–26)
HCO3 ARTERIAL: 42.3 MMOL/L (ref 22–26)
HEMOGLOBIN, ART, EXTENDED: 12.3 G/DL (ref 14–18)
HEMOGLOBIN, ART, EXTENDED: 12.8 G/DL (ref 14–18)
MAGNESIUM SERPL-MCNC: 2.1 MG/DL (ref 1.6–2.6)
MECHANICAL RATE IN BPM: 16
MECHANICAL RATE IN BPM: 16
METHEMOGLOBIN ARTERIAL: 1.2 %
METHEMOGLOBIN ARTERIAL: 1.2 %
MODE: ABNORMAL
MODE: ABNORMAL
O2 CONTENT ARTERIAL: 16.7 ML/DL
O2 CONTENT ARTERIAL: 17.7 ML/DL
O2 SAT, ARTERIAL: 96 %
O2 SAT, ARTERIAL: 97.2 %
O2 THERAPY: ABNORMAL
O2 THERAPY: ABNORMAL
PCO2 ARTERIAL: 53 MMHG (ref 35–45)
PCO2 ARTERIAL: 59 MMHG (ref 35–45)
PH ARTERIAL: 7.42 (ref 7.35–7.45)
PH ARTERIAL: 7.51 (ref 7.35–7.45)
PO2 ARTERIAL: 116 MMHG (ref 80–100)
PO2 ARTERIAL: 86 MMHG (ref 80–100)
POSITIVE END EXP PRESS: 10
POSITIVE END EXP PRESS: 7
POTASSIUM BLD-SCNC: 3.3 MMOL/L
POTASSIUM BLD-SCNC: 3.4 MMOL/L
POTASSIUM SERPL-SCNC: 3.2 MMOL/L (ref 3.5–5)
PROT SERPL-MCNC: 6.7 G/DL (ref 6.6–8.7)
SAMPLE SOURCE: ABNORMAL
SAMPLE SOURCE: ABNORMAL
SODIUM SERPL-SCNC: 144 MMOL/L (ref 136–145)
VANCOMYCIN TROUGH SERPL-MCNC: 11.6 UG/ML (ref 10–20)
VT MECHANICAL: 450 %
VT MECHANICAL: 450 %

## 2024-04-25 PROCEDURE — 2500000003 HC RX 250 WO HCPCS: Performed by: INTERNAL MEDICINE

## 2024-04-25 PROCEDURE — 82962 GLUCOSE BLOOD TEST: CPT

## 2024-04-25 PROCEDURE — 87077 CULTURE AEROBIC IDENTIFY: CPT

## 2024-04-25 PROCEDURE — 6360000002 HC RX W HCPCS

## 2024-04-25 PROCEDURE — 99233 SBSQ HOSP IP/OBS HIGH 50: CPT | Performed by: PHYSICIAN ASSISTANT

## 2024-04-25 PROCEDURE — 94003 VENT MGMT INPAT SUBQ DAY: CPT

## 2024-04-25 PROCEDURE — 71045 X-RAY EXAM CHEST 1 VIEW: CPT

## 2024-04-25 PROCEDURE — 6360000002 HC RX W HCPCS: Performed by: INTERNAL MEDICINE

## 2024-04-25 PROCEDURE — 82803 BLOOD GASES ANY COMBINATION: CPT

## 2024-04-25 PROCEDURE — 2580000003 HC RX 258: Performed by: INTERNAL MEDICINE

## 2024-04-25 PROCEDURE — 71275 CT ANGIOGRAPHY CHEST: CPT

## 2024-04-25 PROCEDURE — 87070 CULTURE OTHR SPECIMN AEROBIC: CPT

## 2024-04-25 PROCEDURE — 80053 COMPREHEN METABOLIC PANEL: CPT

## 2024-04-25 PROCEDURE — 99291 CRITICAL CARE FIRST HOUR: CPT | Performed by: INTERNAL MEDICINE

## 2024-04-25 PROCEDURE — 6360000004 HC RX CONTRAST MEDICATION: Performed by: INTERNAL MEDICINE

## 2024-04-25 PROCEDURE — 36600 WITHDRAWAL OF ARTERIAL BLOOD: CPT

## 2024-04-25 PROCEDURE — 94640 AIRWAY INHALATION TREATMENT: CPT

## 2024-04-25 PROCEDURE — 6370000000 HC RX 637 (ALT 250 FOR IP): Performed by: INTERNAL MEDICINE

## 2024-04-25 PROCEDURE — 2000000000 HC ICU R&B

## 2024-04-25 PROCEDURE — 2700000000 HC OXYGEN THERAPY PER DAY

## 2024-04-25 PROCEDURE — 80202 ASSAY OF VANCOMYCIN: CPT

## 2024-04-25 PROCEDURE — 36415 COLL VENOUS BLD VENIPUNCTURE: CPT

## 2024-04-25 PROCEDURE — C9113 INJ PANTOPRAZOLE SODIUM, VIA: HCPCS | Performed by: INTERNAL MEDICINE

## 2024-04-25 PROCEDURE — C8929 TTE W OR WO FOL WCON,DOPPLER: HCPCS

## 2024-04-25 PROCEDURE — 6370000000 HC RX 637 (ALT 250 FOR IP)

## 2024-04-25 PROCEDURE — 87205 SMEAR GRAM STAIN: CPT

## 2024-04-25 PROCEDURE — 83735 ASSAY OF MAGNESIUM: CPT

## 2024-04-25 PROCEDURE — 86403 PARTICLE AGGLUT ANTBDY SCRN: CPT

## 2024-04-25 PROCEDURE — 87186 SC STD MICRODIL/AGAR DIL: CPT

## 2024-04-25 PROCEDURE — 94760 N-INVAS EAR/PLS OXIMETRY 1: CPT

## 2024-04-25 RX ORDER — BISACODYL 10 MG
10 SUPPOSITORY, RECTAL RECTAL DAILY PRN
Status: DISCONTINUED | OUTPATIENT
Start: 2024-04-25 | End: 2024-05-03 | Stop reason: HOSPADM

## 2024-04-25 RX ADMIN — DEXMEDETOMIDINE HYDROCHLORIDE 1.5 MCG/KG/HR: 400 INJECTION, SOLUTION INTRAVENOUS at 05:43

## 2024-04-25 RX ADMIN — VANCOMYCIN HYDROCHLORIDE 1250 MG: 10 INJECTION, POWDER, LYOPHILIZED, FOR SOLUTION INTRAVENOUS at 08:16

## 2024-04-25 RX ADMIN — IPRATROPIUM BROMIDE AND ALBUTEROL SULFATE 1 DOSE: 2.5; .5 SOLUTION RESPIRATORY (INHALATION) at 18:34

## 2024-04-25 RX ADMIN — VANCOMYCIN HYDROCHLORIDE 1250 MG: 10 INJECTION, POWDER, LYOPHILIZED, FOR SOLUTION INTRAVENOUS at 17:28

## 2024-04-25 RX ADMIN — PIPERACILLIN AND TAZOBACTAM 3375 MG: 3; .375 INJECTION, POWDER, FOR SOLUTION INTRAVENOUS at 01:33

## 2024-04-25 RX ADMIN — PROPOFOL 50 MCG/KG/MIN: 10 INJECTION, EMULSION INTRAVENOUS at 17:41

## 2024-04-25 RX ADMIN — FUROSEMIDE 40 MG: 10 INJECTION, SOLUTION INTRAMUSCULAR; INTRAVENOUS at 08:20

## 2024-04-25 RX ADMIN — THERA TABS 1 TABLET: TAB at 08:19

## 2024-04-25 RX ADMIN — DEXMEDETOMIDINE HYDROCHLORIDE 1.5 MCG/KG/HR: 400 INJECTION, SOLUTION INTRAVENOUS at 15:28

## 2024-04-25 RX ADMIN — PIPERACILLIN AND TAZOBACTAM 3375 MG: 3; .375 INJECTION, POWDER, FOR SOLUTION INTRAVENOUS at 08:44

## 2024-04-25 RX ADMIN — FUROSEMIDE 5 MG/HR: 10 INJECTION, SOLUTION INTRAMUSCULAR; INTRAVENOUS at 20:48

## 2024-04-25 RX ADMIN — PROPOFOL 50 MCG/KG/MIN: 10 INJECTION, EMULSION INTRAVENOUS at 06:18

## 2024-04-25 RX ADMIN — PROPOFOL 50 MCG/KG/MIN: 10 INJECTION, EMULSION INTRAVENOUS at 10:06

## 2024-04-25 RX ADMIN — DEXMEDETOMIDINE HYDROCHLORIDE 1.5 MCG/KG/HR: 400 INJECTION, SOLUTION INTRAVENOUS at 20:47

## 2024-04-25 RX ADMIN — PROPOFOL 50 MCG/KG/MIN: 10 INJECTION, EMULSION INTRAVENOUS at 13:43

## 2024-04-25 RX ADMIN — THIAMINE HYDROCHLORIDE 100 MG: 100 INJECTION, SOLUTION INTRAMUSCULAR; INTRAVENOUS at 08:20

## 2024-04-25 RX ADMIN — ENOXAPARIN SODIUM 40 MG: 100 INJECTION SUBCUTANEOUS at 08:19

## 2024-04-25 RX ADMIN — ACETAMINOPHEN 650 MG: 325 TABLET ORAL at 17:26

## 2024-04-25 RX ADMIN — PROPOFOL 50 MCG/KG/MIN: 10 INJECTION, EMULSION INTRAVENOUS at 20:45

## 2024-04-25 RX ADMIN — IPRATROPIUM BROMIDE AND ALBUTEROL SULFATE 1 DOSE: 2.5; .5 SOLUTION RESPIRATORY (INHALATION) at 06:56

## 2024-04-25 RX ADMIN — DEXMEDETOMIDINE HYDROCHLORIDE 1.5 MCG/KG/HR: 400 INJECTION, SOLUTION INTRAVENOUS at 12:44

## 2024-04-25 RX ADMIN — PAROXETINE 75 MG: 30 TABLET, FILM COATED ORAL at 08:20

## 2024-04-25 RX ADMIN — VANCOMYCIN HYDROCHLORIDE 1250 MG: 10 INJECTION, POWDER, LYOPHILIZED, FOR SOLUTION INTRAVENOUS at 23:39

## 2024-04-25 RX ADMIN — FUROSEMIDE 10 MG/HR: 10 INJECTION, SOLUTION INTRAMUSCULAR; INTRAVENOUS at 11:17

## 2024-04-25 RX ADMIN — Medication 225 MCG/HR: at 20:25

## 2024-04-25 RX ADMIN — POTASSIUM BICARBONATE 40 MEQ: 782 TABLET, EFFERVESCENT ORAL at 17:51

## 2024-04-25 RX ADMIN — LORAZEPAM 2 MG: 2 INJECTION INTRAMUSCULAR; INTRAVENOUS at 23:00

## 2024-04-25 RX ADMIN — Medication 125 MCG/HR: at 05:08

## 2024-04-25 RX ADMIN — FOLIC ACID 1 MG: 1 TABLET ORAL at 08:20

## 2024-04-25 RX ADMIN — DEXMEDETOMIDINE HYDROCHLORIDE 1.5 MCG/KG/HR: 400 INJECTION, SOLUTION INTRAVENOUS at 02:44

## 2024-04-25 RX ADMIN — PROPOFOL 50 MCG/KG/MIN: 10 INJECTION, EMULSION INTRAVENOUS at 02:34

## 2024-04-25 RX ADMIN — IOPAMIDOL 75 ML: 755 INJECTION, SOLUTION INTRAVENOUS at 15:56

## 2024-04-25 RX ADMIN — DEXMEDETOMIDINE HYDROCHLORIDE 1.5 MCG/KG/HR: 400 INJECTION, SOLUTION INTRAVENOUS at 18:12

## 2024-04-25 RX ADMIN — IPRATROPIUM BROMIDE AND ALBUTEROL SULFATE 1 DOSE: 2.5; .5 SOLUTION RESPIRATORY (INHALATION) at 10:37

## 2024-04-25 RX ADMIN — PERFLUTREN 1.5 ML: 6.52 INJECTION, SUSPENSION INTRAVENOUS at 10:26

## 2024-04-25 RX ADMIN — DEXMEDETOMIDINE HYDROCHLORIDE 1.5 MCG/KG/HR: 400 INJECTION, SOLUTION INTRAVENOUS at 09:25

## 2024-04-25 RX ADMIN — PIPERACILLIN AND TAZOBACTAM 3375 MG: 3; .375 INJECTION, POWDER, FOR SOLUTION INTRAVENOUS at 17:29

## 2024-04-25 RX ADMIN — Medication 200 MCG/HR: at 11:18

## 2024-04-25 RX ADMIN — SODIUM CHLORIDE, PRESERVATIVE FREE 40 MG: 5 INJECTION INTRAVENOUS at 08:19

## 2024-04-25 RX ADMIN — IPRATROPIUM BROMIDE AND ALBUTEROL SULFATE 1 DOSE: 2.5; .5 SOLUTION RESPIRATORY (INHALATION) at 14:26

## 2024-04-25 ASSESSMENT — PULMONARY FUNCTION TESTS
PIF_VALUE: 25
PIF_VALUE: 30
PIF_VALUE: 24
PIF_VALUE: 31
PIF_VALUE: 21
PIF_VALUE: 26
PIF_VALUE: 22
PIF_VALUE: 21
PIF_VALUE: 21
PIF_VALUE: 25
PIF_VALUE: 25
PIF_VALUE: 29
PIF_VALUE: 20
PIF_VALUE: 25
PIF_VALUE: 24
PIF_VALUE: 25
PIF_VALUE: 50
PIF_VALUE: 26
PIF_VALUE: 25
PIF_VALUE: 22
PIF_VALUE: 29
PIF_VALUE: 22
PIF_VALUE: 24
PIF_VALUE: 23

## 2024-04-25 ASSESSMENT — PAIN SCALES - GENERAL: PAINLEVEL_OUTOF10: 0

## 2024-04-25 NOTE — PROGRESS NOTES
[facility]   Pharmacy Pharmacokinetic Monitoring Service - Vancomycin    Consulting Provider: Dr. Munoz  Indication: MRSA in sputum  Target Concentration: Goal AUC/ROSIE 400-600 mg*hr/L  Day of Therapy: 2  Additional Antimicrobials: Zosyn    Pertinent Laboratory Values:   Wt Readings from Last 1 Encounters:   04/25/24 88.4 kg (194 lb 12.8 oz)     Temp Readings from Last 1 Encounters:   04/25/24 99.9 °F (37.7 °C)     Estimated Creatinine Clearance: 171 mL/min (based on SCr of 0.6 mg/dL).  Recent Labs     04/23/24  0131 04/23/24  0132 04/24/24  0111 04/24/24  0112   CREATININE  --  0.5  --  0.6   BUN  --  4*  --  5*   WBC 8.8  --  10.6  --      Procalcitonin: None ordered at this time    Pertinent Cultures:  Culture Date Source Results   04/23/24 Respiratory Klebsiella pneumoniae   Staph aureus MRSA  Streptococcus pneumoniae    04/23/24 Blood x 2 No growth to date    Respiratory Ordered   MRSA Nasal Swab:  N/A.  Staph aureus MRSA in the sputum.    Recent vancomycin administrations                     vancomycin (VANCOCIN) 1,250 mg in sodium chloride 0.9 % 250 mL IVPB (mg) 1,250 mg New Bag 04/24/24 2303    vancomycin (VANCOCIN) 2,000 mg in sodium chloride 0.9 % 500 mL IVPB (mg) 2,000 mg New Bag 04/24/24 1531                    Assessment:  Date/Time Current Dose Concentration Timing of Concentration (h) AUC   04/25/24 1250 mg IV q 8 hours 11.6 7 h 13 m 462 / 491   Note: Serum concentrations collected for AUC dosing may appear elevated if collected in close proximity to the dose administered, this is not necessarily an indication of toxicity    Loading dose: N/A  Regimen: 1250 mg IV every 8 hours.  Start time: 07:20 on 04/25/2024  Exposure target: AUC24 (range)400-600 mg/L.hr   [id number]: 462 mg/L.hr  AUC24,ss: 491 mg/L.hr  Probability of AUC24 > 400: 86 %  Ctrough,ss: 13.3 mg/L  Probability of Ctrough,ss > 20: 6 %    Plan:  Current dosing regimen is therapeutic  Continue current dose  No repeat

## 2024-04-25 NOTE — DISCHARGE INSTRUCTIONS
[facility]  Mental Health Resources*    Crisis Resources  988 Suicide and Crisis Hotline  If you or someone you know needs support now, call or text 988 or chat [employer]    Suicide Prevention Lifeline  0-705-779-WQZN(9689)    Crisis Text Link  Text [place] to 161585    [facility]  Emergency Crisis Intervention Line   [phone]    Mental Health Providers     [facility]   [facility]   [address] [address]   www.childwatchca.org   [phone]   Trauma (Child/Adolescent/Teen)  Patients under 18 years old accepted   Free Service  [facility]   [address] [place] [address] [address]   [phone]   Individual, couple, family counseling, children's counseling, depression, anxiety etc.  Patients under 18 years old accepted   Accepts Medicaid, Medicare, most Commercial Insurances  [facility]   [address] [facility] [address]   [email or web address]   [phone]   Patients under 18 years old accepted   Accepts Medicaid, Multiple Commercial   [facility]   [address] [place] [address]   [email or web address].[email or web address]/   [phone]   Patients under 18 years old accepted    Accepts Most Commercial Insurances, [place] Medicaid, Medicare  [facility]  [email or web address]   Patients under 18 years old accepted    Accepts Medicaid, Medicare, Most Private Insurances  [facility] - Critical access hospital  [address] [facility]; [place]  [address]  [phone] option 6  [facility] [place]note 2[facility] - [address] [facility]; [place]  [address]  [phone] option 5  [facility] [place]note 2[facility] - Information Age  [address]; [place] [address]  [phone] option 7  [facility]   [address] [place] [address]  [address] [facility]  ([facility]) - [facility] [place], [place], [place], [place], [place], [place], [facility]  [address] [place] [address]  [phone]  http://www.\[facility]\"".org/Transportation    [place] [address] [address]  [phone]  [email or web address]    Public Transportation provided by [place] Medicaid [place] [facility] ([facility]) - Medicaid-broker for Transportation Services for [place], [facility], [place], [place], Merg[facility] with [facility], [place], [place], and [facility]   [email or web address]  [address] [place] [address]  [phone]    The [place] [facility] Transit System ([facility]) provides clean, safe, and reliable public transportation at no or low cost for patients who are eligible for [place] Medicaid. Medicaid eligible clients without vehicles are provided transportation to approved Medicaid appointments without cost. You will need to schedule your trip at 72 hours in advance!   If you are DENIED GRITS transportation and need more information on this, please contact the Office of Transportation Delivery [phone]    [facility] ([facility]) - Medicaid- for Transportation Services for [facility] [place], [place], [place], [place], [place], [place], [facility]   http://www.\[facility]\"".org/Transportation   [address] [place] [address]  [phone]    [facility] ([facility]) provides clean, safe, and reliable public transportation at no or low cost for patients who are eligible for [place] Medicaid. Medicaid eligible clients without vehicles are provided transportation to approved Medicaid appointments without cost. You will need to schedule your trip at 72 hours in advance.   If you are DENIED GRITS transportation and need more information on this, please contact the Office of

## 2024-04-25 NOTE — PLAN OF CARE
Problem: Discharge Planning  Goal: Discharge to home or other facility with appropriate resources  Outcome: Progressing     Problem: Pain  Goal: Verbalizes/displays adequate comfort level or baseline comfort level  Outcome: Progressing     Problem: Safety - Adult  Goal: Free from fall injury  Outcome: Progressing     Problem: Skin/Tissue Integrity  Goal: Absence of new skin breakdown  Description: 1.  Monitor for areas of redness and/or skin breakdown  2.  Assess vascular access sites hourly  3.  Every 4-6 hours minimum:  Change oxygen saturation probe site  4.  Every 4-6 hours:  If on nasal continuous positive airway pressure, respiratory therapy assess nares and determine need for appliance change or resting period.  Outcome: Progressing     Problem: ABCDS Injury Assessment  Goal: Absence of physical injury  Outcome: Progressing     Problem: Nutrition Deficit:  Goal: Optimize nutritional status  4/24/2024 1926 by Sinan Lau RN  Outcome: Progressing  4/24/2024 1108 by Rosa Sheridan MS, RD, LD  Outcome: Progressing  Flowsheets (Taken 4/24/2024 1102)  Nutrient intake appropriate for improving, restoring, or maintaining nutritional needs:   Assess nutritional status and recommend course of action   Recommend, monitor, and adjust tube feedings and TPN/PPN based on assessed needs

## 2024-04-25 NOTE — CARE COORDINATION
SW received return call from Pt's son, Parth De Leon (name also showed on caller id); to f/u on phone call placed earlier by this writer and Yina with Palliative re: VIOLET decision-maker; Pt's son verbalized that he wishes to defer decision-making to Pt's mother, Cynthia De Leon. Phone call witnessed by Wellington Horta CM .     Electronically signed by TATIANA Ronquillo on 4/25/2024 at 1:01 PM

## 2024-04-25 NOTE — PLAN OF CARE
Problem: Discharge Planning  Goal: Discharge to home or other facility with appropriate resources  4/25/2024 0842 by Saira Asencio, RN  Outcome: Not Progressing  Flowsheets  Taken 4/25/2024 0800 by Saira Asencio RN  Discharge to home or other facility with appropriate resources: Identify barriers to discharge with patient and caregiver  Taken 4/24/2024 2000 by Erickson Douglass RN  Discharge to home or other facility with appropriate resources:   Identify barriers to discharge with patient and caregiver   Identify discharge learning needs (meds, wound care, etc)   Arrange for needed discharge resources and transportation as appropriate   Arrange for interpreters to assist at discharge as needed   Refer to discharge planning if patient needs post-hospital services based on physician order or complex needs related to functional status, cognitive ability or social support system  4/24/2024 1926 by Sinan Lau RN  Outcome: Progressing     Problem: Pain  Goal: Verbalizes/displays adequate comfort level or baseline comfort level  4/25/2024 0842 by Saira Asencio RN  Outcome: Not Progressing  Flowsheets (Taken 4/25/2024 0800)  Verbalizes/displays adequate comfort level or baseline comfort level: Assess pain using appropriate pain scale  4/24/2024 1926 by Sinan Lau RN  Outcome: Progressing     Problem: Safety - Adult  Goal: Free from fall injury  4/24/2024 1926 by Sinan Lau RN  Outcome: Progressing     Problem: Skin/Tissue Integrity  Goal: Absence of new skin breakdown  Description: 1.  Monitor for areas of redness and/or skin breakdown  2.  Assess vascular access sites hourly  3.  Every 4-6 hours minimum:  Change oxygen saturation probe site  4.  Every 4-6 hours:  If on nasal continuous positive airway pressure, respiratory therapy assess nares and determine need for appliance change or resting period.  4/25/2024 0842 by Saira Asencio, RN  Outcome: Not Progressing  4/24/2024 1926 by Judi  JADE Menon  Outcome: Progressing     Problem: ABCDS Injury Assessment  Goal: Absence of physical injury  4/25/2024 0842 by Saira Asencio RN  Outcome: Not Progressing  4/24/2024 1926 by Sinan Lau RN  Outcome: Progressing     Problem: Nutrition Deficit:  Goal: Optimize nutritional status  4/25/2024 0842 by Saira Asencio RN  Outcome: Not Progressing  4/24/2024 1926 by Sinan Lau RN  Outcome: Progressing     Problem: Discharge Planning  Goal: Discharge to home or other facility with appropriate resources  4/25/2024 0842 by Saira Asencio RN  Outcome: Not Progressing  Flowsheets  Taken 4/25/2024 0800 by Saira Asencio RN  Discharge to home or other facility with appropriate resources: Identify barriers to discharge with patient and caregiver  Taken 4/24/2024 2000 by Erickson Douglass RN  Discharge to home or other facility with appropriate resources:   Identify barriers to discharge with patient and caregiver   Identify discharge learning needs (meds, wound care, etc)   Arrange for needed discharge resources and transportation as appropriate   Arrange for interpreters to assist at discharge as needed   Refer to discharge planning if patient needs post-hospital services based on physician order or complex needs related to functional status, cognitive ability or social support system  4/24/2024 1926 by Sinan Lau RN  Outcome: Progressing     Problem: Pain  Goal: Verbalizes/displays adequate comfort level or baseline comfort level  4/25/2024 0842 by Saira Asencio RN  Outcome: Not Progressing  Flowsheets (Taken 4/25/2024 0800)  Verbalizes/displays adequate comfort level or baseline comfort level: Assess pain using appropriate pain scale  4/24/2024 1926 by Sinan Lau RN  Outcome: Progressing     Problem: Skin/Tissue Integrity  Goal: Absence of new skin breakdown  Description: 1.  Monitor for areas of redness and/or skin breakdown  2.  Assess vascular access sites hourly  3.  Every 4-6 hours

## 2024-04-25 NOTE — CARE COORDINATION
JOSE LUIS and PA-Saira with Palliative care placed call to Pt's son, re: decision-making; he did not answer, but Saira left VM for return call; call also placed to Pt's brother, Buck; and he stated that he can get in-contact with Pt's son via Pt's mother and have him call one of us back.    Awaiting return call.     Electronically signed by TATIANA Ronquillo on 4/25/2024 at 11:42 AM

## 2024-04-25 NOTE — PROGRESS NOTES
Hospitalist Progress Note    Patient:  Evens De Leon  YOB: 1980  Date of Service: 4/25/2024  MRN: [id number]   Acct: [id number]   Primary Care Physician: Rosangela Mtz APRN - CNP  Advance Directive: Full Code  Admit Date: 4/19/2024       Hospital Day: 6  Referring Provider: Narinder Munoz DO    Patient Seen, Chart, Consults, Notes, Labs, Radiology studies reviewed.    Subjective:  Evens De Leon is a 43 y.o. male  whom we are following for alcohol intoxication, agitation, probable aspiration.  His O2 requirements have plateaued.  He is still having intermittent fever.  Hemodynamics are stable.  He is not requiring any pressors.  I appreciate the input from pulmonology.    Allergies:  Patient has no known allergies.    Medicines:  Current Facility-Administered Medications   Medication Dose Route Frequency Provider Last Rate Last Admin    furosemide (LASIX) 100 mg in sodium chloride 0.9 % 100 mL infusion  10 mg/hr IntraVENous Continuous Gillian Dean MD        vancomycin (VANCOCIN) 1,250 mg in sodium chloride 0.9 % 250 mL IVPB  1,250 mg IntraVENous Q8H Narinder Munoz DO   Stopped at 04/25/24 0946    vancomycin (VANCOCIN) intermittent dosing (placeholder)   Other RX Placeholder Narinder Munoz DO        ipratropium 0.5 mg-albuterol 2.5 mg (DUONEB) nebulizer solution 1 Dose  1 Dose Inhalation Q4H WA RT Brandon Holloway MD   1 Dose at 04/25/24 1037    piperacillin-tazobactam (ZOSYN) 3,375 mg in sodium chloride 0.9 % 50 mL IVPB ([id number])  3,375 mg IntraVENous Q8H Brandon Holloway MD 12.5 mL/hr at 04/25/24 0844 3,375 mg at 04/25/24 0844    magnesium hydroxide (MILK OF MAGNESIA) 400 MG/5ML suspension 30 mL  30 mL Oral Daily PRN Brandon Holloway MD   30 mL at 04/24/24 1611    albuterol (PROVENTIL) (2.5 MG/3ML) 0.083% nebulizer solution 2.5 mg  2.5 mg Nebulization Q4H PRN Alphonse Ronquillo MD        glucose chewable tablet 16 g  4 tablet Oral PRN Alphonse Ronquillo MD        dextrose  CHEST PORTABLE    Result Date: 4/19/2024  EXAM:  SINGLE VIEW OF THE CHEST.  History:  Nasogastric tube placement.  Comparison:  Chest radiograph 04/19/2024  FINDINGS:  Heart size is upper limits of normal.  No consolidation.  No pleural fluid and no pneumothorax.  Stable endotracheal tube.  Interval placement of nasogastric tube with tip in the proximal aspect of the stomach.  No acute osseous abnormalities.      Impression:  Nasogastric tube placement   ______________________________________ Electronically signed by: LUPILLO SNELL M.D. Date:     04/19/2024 Time:    19:14     XR CHEST PORTABLE    Result Date: 4/19/2024  EXAM:  SINGLE VIEW OF THE CHEST.  History:  Endotracheal tube placement.  Comparison:  Chest radiograph 04/19/2024  FINDINGS:  Heart size is normal.  Lungs are clear.  No pleural fluid and no pneumothorax.  No acute osseous abnormalities.  Interval placement of endotracheal tube with tip at the level of the clavicles.      Impression:  Endotracheal tube placement.   ______________________________________ Electronically signed by: LUPILLO SNELL M.D. Date:     04/19/2024 Time:    18:04     XR CHEST PORTABLE    Result Date: 4/19/2024  EXAM: CHEST, SINGLE VIEW  HISTORY: Altered mental status  COMPARISON: 07/14/2021      Cardiomediastinal contours appear stable.  Basilar interstitial opacitis may relate to atelectasis or pneumonitis.  There is no focal pulmonary consolidation.  No pleural effusion or pneumothorax.   ______________________________________ Electronically signed by: YUAN MUELLER M.D. Date:     04/19/2024 Time:    14:07        Assessment     Alcohol withdrawal delirium.  Now out of the window for withdrawal.  Supportive care.     Methamphetamine abuse.  Supportive care.     Fever.  Probable aspiration.  Continue broad-spectrum antibiotics.     Hypoxemic respiratory failure.  Continue ventilatory support.  Pulmonology following.     Please document 40 minutes of critical care time for

## 2024-04-25 NOTE — PROGRESS NOTES
Palliative Care Progress Note  4/25/2024 4:05 PM    Patient:  Evens De Leon  YOB: 1980  Primary Care Physician: Rosangela Mtz APRN - CNP  Advance Directive: Full Code  Admit Date: 4/19/2024       Hospital Day: 6  Portions of this note have been copied forward, however, changed to reflect the most current clinical status of this patient.    CHIEF COMPLAINT/REASON FOR CONSULTATION Goals of care, family support, Code status, symptom management     SUBJECTIVE:  Mr. De Leon continues to require significant support from the ventilator. Pulmonology is following as well. Febrile.    HPI:  The patient is a 43 y.o. 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  According to ED documentation he had concern for alcohol withdrawal and was extremely combative with agitation and reported that he wanted to cause harm to someone.  UDS was positive for benzos, methamphetamine and EtOH was less than 10.  He reportedly received 6 mg of Ativan and 2.5 mg of Haldol prior to arrival.  Chest x-ray reported atelectasis versus pneumonitis.  EKG with sinus tachycardia without ischemic changes.  They reported he was stumbling, altered, hallucinating and attempted to elope.  He received Ativan, Benadryl and Geodon without improvement and the decision was made at that time according to documentation for sedation and intubation for patient safety with plans for psychiatric evaluation once the patient is extubated.  He was admitted to hospitalist service and  added  Constipation-adjusted bowel regimen, consider erythrocin or reglan if continues   Hx of head/neck SCC-s/p radical tonsillectomy and right neck dissection at [facility] and was documented to need radiation therapy but that was never completed per ENT documentation 12/2023  Hx alcohol abuse-receiving thiamine, MV, folic acid     Thank you for consulting Palliative Care and allowing us to participate in the care of this patient.     Total Time Spent with patient assessment, interview of independent historian/HCS, workup/treatment review, discussion with medical team, review of current and home medications, and placement of orders/preparation of this note: 55 minutes    Electronically signed by Saira Carrillo PA-C on 4/25/2024 at 4:05 PM    (Please note that portions of this note were completed with a voice recognition program.  Effortswere made to edit the dictations but occasionally words are mis-transcribed.)

## 2024-04-25 NOTE — PROGRESS NOTES
Comprehensive Nutrition Assessment    Type and Reason for Visit:  Reassess    Nutrition Recommendations/Plan:   Continue to work towards tube feeding goal rate     Malnutrition Assessment:  Malnutrition Status:  No malnutrition (04/25/24 1256)    Context:  Acute Illness     Findings of the 6 clinical characteristics of malnutrition:  Energy Intake:  No significant decrease in energy intake  Weight Loss:  No significant weight loss     Body Fat Loss:  No significant body fat loss     Muscle Mass Loss:  No significant muscle mass loss    Fluid Accumulation:  No significant fluid accumulation Generalized   Strength:  Not Performed    Nutrition Assessment:    Pt is tolerating tube feeding at this time. Continue to work towards goal rate of 53 mL/hr. Pt is currently on Propofol, which is providing 646.8 kcals/day.    Nutrition Related Findings:    Glucose 132       Current Nutrition Intake & Therapies:    Average Meal Intake: NPO  Average Supplements Intake: NPO  Current Tube Feeding (TF) Orders:  Feeding Route: Orogastric  Formula: Peptide Based (Vital AF 1.2)  Schedule: Continuous  Feeding Regimen: Vital AF 1.2 at 53 mL/hr = 1272 mL/day  Additives/Modulars: None  Water Flushes: 20 mL/hr = 480 mL/day  Current TF & Flush Orders Provides: 1798 kcals, 72 g/protein, 106 g/CHO, and 1258 mL/fluid daily (includes Propofol)  Goal TF & Flush Orders Provides: 2172 kcals, 95 g/protein, 140 g/CHO, and 1511 mL/fluid daily (includes Propofol)    Anthropometric Measures:  Height: 175.3 cm (5' 9.02\")  Ideal Body Weight (IBW): 160 lbs (73 kg)    Admission Body Weight: 81.6 kg (179 lb 14.3 oz) (stated)  Current Body Weight: 88.4 kg (194 lb 14.2 oz), 120.6 % IBW. Weight Source: Bed Scale  Current BMI (kg/m2): 28.8  Usual Body Weight: 73.5 kg (162 lb 0.6 oz) (12/6/2024)  % Weight Change (Calculated): 7.2  BMI Categories: Overweight (BMI 25.0-29.9)    Estimated Daily Nutrient Needs:  Energy Requirements Based On: Kcal/kg  Weight Used

## 2024-04-25 NOTE — PROGRESS NOTES
04/25/24 1540   Encounter Summary   Encounter Overview/Reason Spiritual/Emotional Needs;Crisis   Service Provided For Patient  (in ICU)   Referral/Consult From Rounding   Support System Family members   Complexity of Encounter Moderate   Begin Time 1100   End Time  1115   Total Time Calculated 15 min   Spiritual/Emotional needs   Type Spiritual Support   Assessment/Intervention/Outcome   Assessment Coping   Intervention Active listening;Prayer (assurance of)/Fisher   Outcome Did not respond   Plan and Referrals   Plan/Referrals Continue Support (comment)     No family present.    Electronically signed by radu Fried Mai on 4/25/2024 at 3:43 PM

## 2024-04-25 NOTE — PROGRESS NOTES
Stable consolidation of the left lower lobe, either atelectasis, pulmonary edema, and/or pneumonia.    ______________________________________ Electronically signed by: CAMMY GUERRERO M.D. Date:     04/25/2024 Time:    06:35     XR CHEST PORTABLE    Result Date: 4/24/2024   1.  Stable, satisfactory position of the endotracheal and enteric tubes. 2.  Stable cardiomegaly and small bilateral pleural effusions. 3.  Bilateral air space opacities as described, progressed on the left and grossly stable on the right.  Differential diagnosis includes pulmonary edema and pneumonia.    ______________________________________ Electronically signed by: CAMMY GUERRERO M.D. Date:     04/24/2024 Time:    10:01     XR ABDOMEN (KUB) (SINGLE AP VIEW)    Result Date: 4/24/2024   Nonobstructive bowel gas pattern.     ______________________________________ Electronically signed by: ANNETTE NUNEZ M.D. Date:     04/24/2024 Time:    09:50     XR CHEST PORTABLE    Result Date: 4/24/2024   1.  Stable, satisfactory position of the endotracheal and enteric tubes. 2.  Stable pneumonia versus atelectasis of the left lower lobe. 3.  Stable cardiomegaly with possible small left pleural effusion. 4.  Stable bilateral perihilar ground-glass, pulmonary edema versus pneumonia.    ______________________________________ Electronically signed by: CAMMY GUERRERO M.D. Date:     04/24/2024 Time:    06:08     XR CHEST PORTABLE    Result Date: 4/23/2024  1.  Increasing retrocardiac and right perihilar airspace disease and effusion.    ______________________________________ Electronically signed by: ASHA DUARTE D.O. Date:     04/23/2024 Time:    06:44     XR CHEST PORTABLE    Result Date: 4/20/2024  No developing opacities.  No acute cardiopulmonary disease.    ______________________________________ Electronically signed by: BEVERLY LONGORIA M.D. Date:     04/20/2024 Time:    06:44     CT HEAD WO CONTRAST    Result Date: 4/19/2024  No acute intracranial abnormality.   therapy.  Bilateral pleural effusions with I's and O's +13 L since admission.  Diuresis.  History of alcohol and drug abuse.  He did have homicidal ideation likely secondary to withdrawal.  Psych evaluation once patient is extubated.  DVT prophylaxis.  Nutritional support.     Critical care time 36 min     Gillian Dean MD, [facility], [facility]    The above note was generated using voice recognition software.  Inadvertent typographical errors in transcription may have occurred.      Electronically signed by Gillian Dean MD on 04/25/24 at 10:24 AM

## 2024-04-26 ENCOUNTER — APPOINTMENT (OUTPATIENT)
Dept: GENERAL RADIOLOGY | Age: 44
End: 2024-04-26
Payer: MEDICAID

## 2024-04-26 LAB
ALBUMIN SERPL-MCNC: 2.9 G/DL (ref 3.5–5.2)
ALLENS TEST: ABNORMAL
ALP SERPL-CCNC: 299 U/L (ref 40–130)
ALT SERPL-CCNC: 32 U/L (ref 5–41)
ANION GAP SERPL CALCULATED.3IONS-SCNC: 10 MMOL/L (ref 7–19)
AST SERPL-CCNC: 59 U/L (ref 5–40)
BASE EXCESS ARTERIAL: 18.4 MMOL/L (ref -2–2)
BASOPHILS # BLD: 0.1 K/UL (ref 0–0.2)
BASOPHILS NFR BLD: 0.8 % (ref 0–1)
BILIRUB SERPL-MCNC: 0.3 MG/DL (ref 0.2–1.2)
BUN SERPL-MCNC: 8 MG/DL (ref 6–20)
CALCIUM SERPL-MCNC: 8.6 MG/DL (ref 8.6–10)
CARBOXYHEMOGLOBIN ARTERIAL: 1.9 % (ref 0–5)
CHLORIDE SERPL-SCNC: 96 MMOL/L (ref 98–111)
CO2 SERPL-SCNC: 37 MMOL/L (ref 22–29)
CREAT SERPL-MCNC: 0.7 MG/DL (ref 0.5–1.2)
EOSINOPHIL # BLD: 0.2 K/UL (ref 0–0.6)
EOSINOPHIL NFR BLD: 2.2 % (ref 0–5)
ERYTHROCYTE [DISTWIDTH] IN BLOOD BY AUTOMATED COUNT: 13 % (ref 11.5–14.5)
FIO2: 50 %
GLUCOSE SERPL-MCNC: 123 MG/DL (ref 74–109)
HCO3 ARTERIAL: 45 MMOL/L (ref 22–26)
HCT VFR BLD AUTO: 37.8 % (ref 42–52)
HEMOGLOBIN, ART, EXTENDED: 13.3 G/DL (ref 14–18)
HGB BLD-MCNC: 12.2 G/DL (ref 14–18)
IMM GRANULOCYTES # BLD: 0.2 K/UL
LYMPHOCYTES # BLD: 1.4 K/UL (ref 1.1–4.5)
LYMPHOCYTES NFR BLD: 13.1 % (ref 20–40)
MAGNESIUM SERPL-MCNC: 1.8 MG/DL (ref 1.6–2.6)
MCH RBC QN AUTO: 33.1 PG (ref 27–31)
MCHC RBC AUTO-ENTMCNC: 32.3 G/DL (ref 33–37)
MCV RBC AUTO: 102.4 FL (ref 80–94)
MECHANICAL RATE IN BPM: 16
METHEMOGLOBIN ARTERIAL: 1.6 %
MODE: ABNORMAL
MONOCYTES # BLD: 1.9 K/UL (ref 0–0.9)
MONOCYTES NFR BLD: 16.9 % (ref 0–10)
NEUTROPHILS # BLD: 7.1 K/UL (ref 1.5–7.5)
NEUTS SEG NFR BLD: 64.9 % (ref 50–65)
O2 CONTENT ARTERIAL: 17.3 ML/DL
O2 SAT, ARTERIAL: 92.6 %
O2 THERAPY: ABNORMAL
PCO2 ARTERIAL: 59 MMHG (ref 35–45)
PH ARTERIAL: 7.49 (ref 7.35–7.45)
PHOSPHATE SERPL-MCNC: 3.1 MG/DL (ref 2.5–4.5)
PLATELET # BLD AUTO: 272 K/UL (ref 130–400)
PMV BLD AUTO: 9.7 FL (ref 9.4–12.4)
PO2 ARTERIAL: 65 MMHG (ref 80–100)
POSITIVE END EXP PRESS: 7
POTASSIUM BLD-SCNC: 2.9 MMOL/L
POTASSIUM SERPL-SCNC: 3.2 MMOL/L (ref 3.5–5)
PROT SERPL-MCNC: 6.8 G/DL (ref 6.6–8.7)
RBC # BLD AUTO: 3.69 M/UL (ref 4.7–6.1)
SAMPLE SOURCE: ABNORMAL
SODIUM SERPL-SCNC: 143 MMOL/L (ref 136–145)
VT MECHANICAL: 450 %
WBC # BLD AUTO: 11 K/UL (ref 4.8–10.8)

## 2024-04-26 PROCEDURE — 6360000002 HC RX W HCPCS: Performed by: INTERNAL MEDICINE

## 2024-04-26 PROCEDURE — C9113 INJ PANTOPRAZOLE SODIUM, VIA: HCPCS | Performed by: INTERNAL MEDICINE

## 2024-04-26 PROCEDURE — 84100 ASSAY OF PHOSPHORUS: CPT

## 2024-04-26 PROCEDURE — 85025 COMPLETE CBC W/AUTO DIFF WBC: CPT

## 2024-04-26 PROCEDURE — 80053 COMPREHEN METABOLIC PANEL: CPT

## 2024-04-26 PROCEDURE — 94003 VENT MGMT INPAT SUBQ DAY: CPT

## 2024-04-26 PROCEDURE — 83735 ASSAY OF MAGNESIUM: CPT

## 2024-04-26 PROCEDURE — 99291 CRITICAL CARE FIRST HOUR: CPT | Performed by: INTERNAL MEDICINE

## 2024-04-26 PROCEDURE — 94760 N-INVAS EAR/PLS OXIMETRY 1: CPT

## 2024-04-26 PROCEDURE — 2000000000 HC ICU R&B

## 2024-04-26 PROCEDURE — 2500000003 HC RX 250 WO HCPCS: Performed by: INTERNAL MEDICINE

## 2024-04-26 PROCEDURE — 6370000000 HC RX 637 (ALT 250 FOR IP): Performed by: INTERNAL MEDICINE

## 2024-04-26 PROCEDURE — 94640 AIRWAY INHALATION TREATMENT: CPT

## 2024-04-26 PROCEDURE — 82803 BLOOD GASES ANY COMBINATION: CPT

## 2024-04-26 PROCEDURE — 2580000003 HC RX 258: Performed by: INTERNAL MEDICINE

## 2024-04-26 PROCEDURE — 2700000000 HC OXYGEN THERAPY PER DAY

## 2024-04-26 PROCEDURE — 36600 WITHDRAWAL OF ARTERIAL BLOOD: CPT

## 2024-04-26 PROCEDURE — 71045 X-RAY EXAM CHEST 1 VIEW: CPT

## 2024-04-26 PROCEDURE — 36415 COLL VENOUS BLD VENIPUNCTURE: CPT

## 2024-04-26 PROCEDURE — 6370000000 HC RX 637 (ALT 250 FOR IP)

## 2024-04-26 PROCEDURE — 99231 SBSQ HOSP IP/OBS SF/LOW 25: CPT | Performed by: PHYSICIAN ASSISTANT

## 2024-04-26 PROCEDURE — 6360000002 HC RX W HCPCS

## 2024-04-26 RX ORDER — ACETYLCYSTEINE 200 MG/ML
600 SOLUTION ORAL; RESPIRATORY (INHALATION)
Status: DISCONTINUED | OUTPATIENT
Start: 2024-04-26 | End: 2024-04-30

## 2024-04-26 RX ORDER — LACTULOSE 10 G/15ML
20 SOLUTION ORAL DAILY PRN
Status: DISCONTINUED | OUTPATIENT
Start: 2024-04-26 | End: 2024-05-03 | Stop reason: HOSPADM

## 2024-04-26 RX ADMIN — Medication 300 MCG/HR: at 21:30

## 2024-04-26 RX ADMIN — SODIUM CHLORIDE, PRESERVATIVE FREE 40 MG: 5 INJECTION INTRAVENOUS at 07:31

## 2024-04-26 RX ADMIN — POTASSIUM BICARBONATE 40 MEQ: 782 TABLET, EFFERVESCENT ORAL at 07:31

## 2024-04-26 RX ADMIN — Medication 250 MCG/HR: at 17:32

## 2024-04-26 RX ADMIN — DEXMEDETOMIDINE HYDROCHLORIDE 1.5 MCG/KG/HR: 400 INJECTION, SOLUTION INTRAVENOUS at 03:52

## 2024-04-26 RX ADMIN — VANCOMYCIN HYDROCHLORIDE 1250 MG: 10 INJECTION, POWDER, LYOPHILIZED, FOR SOLUTION INTRAVENOUS at 07:30

## 2024-04-26 RX ADMIN — LORAZEPAM 4 MG: 2 INJECTION INTRAMUSCULAR; INTRAVENOUS at 22:01

## 2024-04-26 RX ADMIN — DEXMEDETOMIDINE HYDROCHLORIDE 1.5 MCG/KG/HR: 400 INJECTION, SOLUTION INTRAVENOUS at 17:30

## 2024-04-26 RX ADMIN — LORAZEPAM 4 MG: 2 INJECTION INTRAMUSCULAR; INTRAVENOUS at 18:21

## 2024-04-26 RX ADMIN — DEXMEDETOMIDINE HYDROCHLORIDE 1.5 MCG/KG/HR: 400 INJECTION, SOLUTION INTRAVENOUS at 07:47

## 2024-04-26 RX ADMIN — IPRATROPIUM BROMIDE AND ALBUTEROL SULFATE 1 DOSE: 2.5; .5 SOLUTION RESPIRATORY (INHALATION) at 10:30

## 2024-04-26 RX ADMIN — Medication 225 MCG/HR: at 03:54

## 2024-04-26 RX ADMIN — PROPOFOL 50 MCG/KG/MIN: 10 INJECTION, EMULSION INTRAVENOUS at 17:29

## 2024-04-26 RX ADMIN — IPRATROPIUM BROMIDE AND ALBUTEROL SULFATE 1 DOSE: 2.5; .5 SOLUTION RESPIRATORY (INHALATION) at 06:24

## 2024-04-26 RX ADMIN — IPRATROPIUM BROMIDE AND ALBUTEROL SULFATE 1 DOSE: 2.5; .5 SOLUTION RESPIRATORY (INHALATION) at 18:41

## 2024-04-26 RX ADMIN — IPRATROPIUM BROMIDE AND ALBUTEROL SULFATE 1 DOSE: 2.5; .5 SOLUTION RESPIRATORY (INHALATION) at 14:05

## 2024-04-26 RX ADMIN — PIPERACILLIN AND TAZOBACTAM 3375 MG: 3; .375 INJECTION, POWDER, FOR SOLUTION INTRAVENOUS at 03:58

## 2024-04-26 RX ADMIN — PAROXETINE 75 MG: 30 TABLET, FILM COATED ORAL at 07:31

## 2024-04-26 RX ADMIN — THIAMINE HYDROCHLORIDE 100 MG: 100 INJECTION, SOLUTION INTRAMUSCULAR; INTRAVENOUS at 07:31

## 2024-04-26 RX ADMIN — ACETYLCYSTEINE 600 MG: 200 SOLUTION ORAL; RESPIRATORY (INHALATION) at 18:41

## 2024-04-26 RX ADMIN — PIPERACILLIN AND TAZOBACTAM 3375 MG: 3; .375 INJECTION, POWDER, FOR SOLUTION INTRAVENOUS at 07:45

## 2024-04-26 RX ADMIN — PROPOFOL 50 MCG/KG/MIN: 10 INJECTION, EMULSION INTRAVENOUS at 00:28

## 2024-04-26 RX ADMIN — DEXMEDETOMIDINE HYDROCHLORIDE 1.5 MCG/KG/HR: 400 INJECTION, SOLUTION INTRAVENOUS at 00:29

## 2024-04-26 RX ADMIN — DEXMEDETOMIDINE HYDROCHLORIDE 1.5 MCG/KG/HR: 400 INJECTION, SOLUTION INTRAVENOUS at 14:01

## 2024-04-26 RX ADMIN — DEXMEDETOMIDINE HYDROCHLORIDE 1.5 MCG/KG/HR: 400 INJECTION, SOLUTION INTRAVENOUS at 15:11

## 2024-04-26 RX ADMIN — PROPOFOL 50 MCG/KG/MIN: 10 INJECTION, EMULSION INTRAVENOUS at 03:57

## 2024-04-26 RX ADMIN — FOLIC ACID 1 MG: 1 TABLET ORAL at 07:31

## 2024-04-26 RX ADMIN — CEFAZOLIN 2000 MG: 2 INJECTION, POWDER, FOR SOLUTION INTRAMUSCULAR; INTRAVENOUS at 14:53

## 2024-04-26 RX ADMIN — FUROSEMIDE 10 MG/HR: 10 INJECTION, SOLUTION INTRAMUSCULAR; INTRAVENOUS at 16:36

## 2024-04-26 RX ADMIN — DEXMEDETOMIDINE HYDROCHLORIDE 1.5 MCG/KG/HR: 400 INJECTION, SOLUTION INTRAVENOUS at 11:23

## 2024-04-26 RX ADMIN — CEFAZOLIN 2000 MG: 2 INJECTION, POWDER, FOR SOLUTION INTRAMUSCULAR; INTRAVENOUS at 21:42

## 2024-04-26 RX ADMIN — DEXMEDETOMIDINE HYDROCHLORIDE 1.5 MCG/KG/HR: 400 INJECTION, SOLUTION INTRAVENOUS at 21:38

## 2024-04-26 RX ADMIN — ENOXAPARIN SODIUM 40 MG: 100 INJECTION SUBCUTANEOUS at 07:31

## 2024-04-26 RX ADMIN — Medication 225 MCG/HR: at 09:50

## 2024-04-26 RX ADMIN — PROPOFOL 50 MCG/KG/MIN: 10 INJECTION, EMULSION INTRAVENOUS at 13:01

## 2024-04-26 RX ADMIN — PROPOFOL 50 MCG/KG/MIN: 10 INJECTION, EMULSION INTRAVENOUS at 09:14

## 2024-04-26 RX ADMIN — Medication 250 MCG/HR: at 14:02

## 2024-04-26 RX ADMIN — PROPOFOL 50 MCG/KG/MIN: 10 INJECTION, EMULSION INTRAVENOUS at 21:37

## 2024-04-26 RX ADMIN — THERA TABS 1 TABLET: TAB at 07:31

## 2024-04-26 RX ADMIN — Medication 225 MCG/HR: at 00:29

## 2024-04-26 ASSESSMENT — PULMONARY FUNCTION TESTS
PIF_VALUE: 20
PIF_VALUE: 24
PIF_VALUE: 23
PIF_VALUE: 22
PIF_VALUE: 23
PIF_VALUE: 34
PIF_VALUE: 27
PIF_VALUE: 22
PIF_VALUE: 18
PIF_VALUE: 21
PIF_VALUE: 22
PIF_VALUE: 24
PIF_VALUE: 23
PIF_VALUE: 32
PIF_VALUE: 32
PIF_VALUE: 22
PIF_VALUE: 22
PIF_VALUE: 28
PIF_VALUE: 26
PIF_VALUE: 22
PIF_VALUE: 22
PIF_VALUE: 27
PIF_VALUE: 28
PIF_VALUE: 31
PIF_VALUE: 21
PIF_VALUE: 22
PIF_VALUE: 27
PIF_VALUE: 23
PIF_VALUE: 28
PIF_VALUE: 27
PIF_VALUE: 22
PIF_VALUE: 24
PIF_VALUE: 22
PIF_VALUE: 28

## 2024-04-26 ASSESSMENT — PAIN SCALES - GENERAL
PAINLEVEL_OUTOF10: 0

## 2024-04-26 NOTE — PROGRESS NOTES
(PROTONIX) 40 mg in sodium chloride (PF) 0.9 % 10 mL injection  40 mg IntraVENous Daily    enoxaparin  40 mg SubCUTAneous Daily    thiamine  100 mg IntraVENous Daily    folic acid  1 mg Oral Daily     lactulose, bisacodyl, magnesium hydroxide, albuterol, glucose, dextrose bolus **OR** dextrose bolus, glucagon (rDNA), dextrose, sodium chloride flush, sodium chloride, potassium chloride **OR** potassium alternative oral replacement **OR** potassium chloride, magnesium sulfate, [PENDING] ondansetron **OR** ondansetron, polyethylene glycol, LORazepam **OR** LORazepam **OR** LORazepam **OR** LORazepam **OR** LORazepam **OR** LORazepam **OR** LORazepam **OR** LORazepam, potassium chloride **OR** potassium chloride, acetaminophen **OR** acetaminophen  Diet NPO  ADULT TUBE FEEDING; Orogastric; Peptide Based; Continuous; 30; Yes; 5; Q 4 hours; 53; 20; Q 1 hour     Lab and other Data:     Recent Labs     04/24/24  0111 04/26/24  0129   WBC 10.6 11.0*   HGB 12.2* 12.2*    272       Recent Labs     04/24/24  0112 04/24/24  0356 04/25/24  1639 04/26/24  0129 04/26/24  0406     --  144 143  --    K 3.4*   < > 3.2* 3.2* 2.9     --  98 96*  --    CO2 29  --  37* 37*  --    BUN 5*  --  8 8  --    CREATININE 0.6  --  0.7 0.7  --    GLUCOSE 132*  --  131* 123*  --     < > = values in this interval not displayed.         Palliative Performance Scale:  [] 80% Full ambulation  Some disease: Normal activity w/ some effort  Full self-care  Normal/reduced intake  Full LOC  [] 70% Reduced ambulation  Some disease: Can't do normal job or work  Full self-care  Normal/reduced intake  Full LOC  [] 60% Reduced ambulation  Significant disease: Can't do hobbies/housework  Occasional assistance  Normal/reduced intake  LOC full/confusion  [] 50% Mainly sit/lie  Extensive disease: Can't do any work  Considerable assistance  Normal/reduced intake  LOC full/confusion  [] 40% Mainly in bed  Extensive disease  Mainly  assistance  Normal/reduced intake  LOC full/confusion  [] 30% Bed Bound  Extensive disease  Total care  Reduced Intake  LOC full/confusion  [] 20% Bed Bound  Extensive disease  Total care  Minimal intake  Drowsy/coma  [x] 10% Bed Bound  Extensive disease  Total care  Mouth care only  Drowsy/coma  [] 0% Death    ECO    CLINICAL PAIN ASSESSMENT:     NVPS 0    Assessment/Plan   Principal Problem:    Alcohol withdrawal delirium (HCC)  Active Problems:    Methamphetamine abuse (HCC)    Fever    Palliative care patient    Altered mental status  Resolved Problems:    * No resolved hospital problems. *    Visit Summary:  Chart reviewed.  No acute overnight events noted.  The patient is tolerating decrease in ventilator settings.  He appears comfortable.  Volume status improving with diuresis.  Temperature curve improved.  He still has not had a bowel movement.  Will add some lactulose as needed and discussed that he may need addition of Reglan or erythromycin with ICU RN.    Palliative team will follow as needed.    Recommendations:   Palliative Care-GOC unchanged, continue current level of support Code status: Full code  Acute hypoxemic respiratory failure-intubated 2023 for airway protection/patient safety per report, has been difficult to wean, concern for pneumonia/volume overload -empiric abx/bronchodilators/diuretic therapy, pulmonology consulted, sputum cx polymicrobial to include MRSA, lasix drip added, tolerating decrease in FiO2  Constipation-lactulose as needed, consider Reglan or erythrocin   Hx of head/neck SCC-s/p radical tonsillectomy and right neck dissection at [facility] and was documented to need radiation therapy but that was never completed per ENT documentation 2023  Hx alcohol abuse-receiving thiamine, MV, folic acid     Thank you for consulting Palliative Care and allowing us to participate in the care of this patient.     Total Time Spent with patient assessment, interview of

## 2024-04-26 NOTE — PROGRESS NOTES
placement of nasogastric tube with tip in the proximal aspect of the stomach.  No acute osseous abnormalities.      Impression:  Nasogastric tube placement   ______________________________________ Electronically signed by: LUPILLO SNELL M.D. Date:     04/19/2024 Time:    19:14     XR CHEST PORTABLE    Result Date: 4/19/2024  EXAM:  SINGLE VIEW OF THE CHEST.  History:  Endotracheal tube placement.  Comparison:  Chest radiograph 04/19/2024  FINDINGS:  Heart size is normal.  Lungs are clear.  No pleural fluid and no pneumothorax.  No acute osseous abnormalities.  Interval placement of endotracheal tube with tip at the level of the clavicles.      Impression:  Endotracheal tube placement.   ______________________________________ Electronically signed by: LUPILLO SNELL M.D. Date:     04/19/2024 Time:    18:04     XR CHEST PORTABLE    Result Date: 4/19/2024  EXAM: CHEST, SINGLE VIEW  HISTORY: Altered mental status  COMPARISON: 07/14/2021      Cardiomediastinal contours appear stable.  Basilar interstitial opacitis may relate to atelectasis or pneumonitis.  There is no focal pulmonary consolidation.  No pleural effusion or pneumothorax.   ______________________________________ Electronically signed by: YUAN MUELLER M.D. Date:     04/19/2024 Time:    14:07        Assessment     Alcohol withdrawal delirium.  Now out of the window for withdrawal.  Supportive care.     Methamphetamine abuse.  Supportive care.     Bilateral pneumonia.  Continue broad-spectrum antibiotics.     Hypoxemic respiratory failure.  Continue ventilatory support.  Pulmonology following.     Please document 40 minutes of critical care time for patient assessment, chart review, discussion with staff, .      Narinder Munoz, DO

## 2024-04-26 NOTE — PROGRESS NOTES
Pulmonary and Critical Care Progress note.    [facility] [id number]    [id number]  4/26/2024   10:24 AM CDT    Referring Provider:Narinder Munoz DO      Chief Complaint: Respiratory failure on mechanical ventilation    HPI: Intubated sedated on mechanical ventilation.     Medications:  The following medications were reviewed and adjustments made when necessary.    [COMPLETED] vancomycin, 2,000 mg, IntraVENous, Once **FOLLOWED BY** vancomycin, 1,250 mg, IntraVENous, Q8H    vancomycin (VANCOCIN) intermittent dosing (placeholder), , Other, RX Placeholder    ipratropium 0.5 mg-albuterol 2.5 mg, 1 Dose, Inhalation, Q4H WA RT    [COMPLETED] piperacillin-tazobactam, 4,500 mg, IntraVENous, Once **FOLLOWED BY** piperacillin-tazobactam, 3,375 mg, IntraVENous, Q8H    multivitamin, 1 tablet, Oral, Daily    PARoxetine, 75 mg, Oral, QAM    pantoprazole (PROTONIX) 40 mg in sodium chloride (PF) 0.9 % 10 mL injection, 40 mg, IntraVENous, Daily    enoxaparin, 40 mg, SubCUTAneous, Daily    thiamine, 100 mg, IntraVENous, Daily    folic acid, 1 mg, Oral, Daily         Physical Exam:  Vitals:    04/26/24 0800 04/26/24 0900 04/26/24 1000 04/26/24 1100   BP: 108/68 109/67 112/70 (!) 105/59   Pulse: 89 77 71 79   Resp: 17 16 16 16   Temp: 97.1 °F (36.2 °C) 97.2 °F (36.2 °C) 97.1 °F (36.2 °C) 97.2 °F (36.2 °C)   TempSrc: Bladder      SpO2: 94% 93% 93% 91%   Weight:       Height:          Intake/Output Summary (Last 24 hours) at 4/26/2024 1225  Last data filed at 4/26/2024 1000  Gross per 24 hour   Intake 3268.81 ml   Output 5875 ml   Net -2606.19 ml         General appearance: Middle-aged male intubated on mechanical ventilation sedated   HEENT:normocephalic atraumatic  Heart:S1S2 no murmurs  Lungs:diminished bilaterally no rubs or tenderness or dullness to percussion  Abdomen:Soft non tender no organomegaly  Extremities:no clubbing cyanosis or edema  Neuro:no focal

## 2024-04-26 NOTE — PROGRESS NOTES
Pt failed SBT heart rate in the 140s pt sp02 reading 80s. RT at bedside, pt vent changed back to previous settings and sedation turned back on at previous settings.

## 2024-04-26 NOTE — PLAN OF CARE
Problem: Discharge Planning  Goal: Discharge to home or other facility with appropriate resources  Outcome: Not Progressing  Flowsheets  Taken 4/26/2024 0800 by Saira Asencio RN  Discharge to home or other facility with appropriate resources: Identify barriers to discharge with patient and caregiver  Taken 4/25/2024 2000 by Yvonne Melendez RN  Discharge to home or other facility with appropriate resources: Identify barriers to discharge with patient and caregiver     Problem: Pain  Goal: Verbalizes/displays adequate comfort level or baseline comfort level  Outcome: Progressing  Flowsheets  Taken 4/26/2024 0800 by Saira Asencio RN  Verbalizes/displays adequate comfort level or baseline comfort level: Assess pain using appropriate pain scale  Taken 4/26/2024 0400 by Yvonne Melendez RN  Verbalizes/displays adequate comfort level or baseline comfort level: Assess pain using appropriate pain scale  Taken 4/26/2024 0000 by Yvonne Melendez RN  Verbalizes/displays adequate comfort level or baseline comfort level: Assess pain using appropriate pain scale  Taken 4/25/2024 2000 by Yvonne Melendez RN  Verbalizes/displays adequate comfort level or baseline comfort level: Assess pain using appropriate pain scale     Problem: Safety - Adult  Goal: Free from fall injury  Outcome: Progressing  Flowsheets (Taken 4/26/2024 0800)  Free From Fall Injury: Instruct family/caregiver on patient safety     Problem: Skin/Tissue Integrity  Goal: Absence of new skin breakdown  Description: 1.  Monitor for areas of redness and/or skin breakdown  2.  Assess vascular access sites hourly  3.  Every 4-6 hours minimum:  Change oxygen saturation probe site  4.  Every 4-6 hours:  If on nasal continuous positive airway pressure, respiratory therapy assess nares and determine need for appliance change or resting period.  Outcome: Progressing     Problem: ABCDS Injury Assessment  Goal: Absence of physical injury  Outcome: Not Progressing

## 2024-04-27 ENCOUNTER — APPOINTMENT (OUTPATIENT)
Dept: GENERAL RADIOLOGY | Age: 44
End: 2024-04-27
Payer: MEDICAID

## 2024-04-27 LAB
ALBUMIN SERPL-MCNC: 3 G/DL (ref 3.5–5.2)
ALLENS TEST: ABNORMAL
ALP SERPL-CCNC: 278 U/L (ref 40–130)
ALT SERPL-CCNC: 32 U/L (ref 5–41)
ANION GAP SERPL CALCULATED.3IONS-SCNC: 9 MMOL/L (ref 7–19)
AST SERPL-CCNC: 52 U/L (ref 5–40)
BASE EXCESS ARTERIAL: 19.2 MMOL/L (ref -2–2)
BASOPHILS # BLD: 0.1 K/UL (ref 0–0.2)
BASOPHILS NFR BLD: 0.9 % (ref 0–1)
BILIRUB SERPL-MCNC: <0.2 MG/DL (ref 0.2–1.2)
BUN SERPL-MCNC: 10 MG/DL (ref 6–20)
CALCIUM SERPL-MCNC: 9 MG/DL (ref 8.6–10)
CARBOXYHEMOGLOBIN ARTERIAL: 1.9 % (ref 0–5)
CHLORIDE SERPL-SCNC: 93 MMOL/L (ref 98–111)
CO2 SERPL-SCNC: 41 MMOL/L (ref 22–29)
CREAT SERPL-MCNC: 0.6 MG/DL (ref 0.5–1.2)
EOSINOPHIL # BLD: 0.2 K/UL (ref 0–0.6)
EOSINOPHIL NFR BLD: 2.1 % (ref 0–5)
ERYTHROCYTE [DISTWIDTH] IN BLOOD BY AUTOMATED COUNT: 12.6 % (ref 11.5–14.5)
FIO2: 60 %
GLUCOSE SERPL-MCNC: 118 MG/DL (ref 74–109)
HCO3 ARTERIAL: 46.6 MMOL/L (ref 22–26)
HCT VFR BLD AUTO: 37.7 % (ref 42–52)
HEMOGLOBIN, ART, EXTENDED: 13.5 G/DL (ref 14–18)
HGB BLD-MCNC: 12.3 G/DL (ref 14–18)
IMM GRANULOCYTES # BLD: 0.5 K/UL
LYMPHOCYTES # BLD: 1.6 K/UL (ref 1.1–4.5)
LYMPHOCYTES NFR BLD: 16.3 % (ref 20–40)
MAGNESIUM SERPL-MCNC: 2.2 MG/DL (ref 1.6–2.6)
MCH RBC QN AUTO: 33.6 PG (ref 27–31)
MCHC RBC AUTO-ENTMCNC: 32.6 G/DL (ref 33–37)
MCV RBC AUTO: 103 FL (ref 80–94)
MECHANICAL RATE IN BPM: 16
METHEMOGLOBIN ARTERIAL: 1.4 %
MODE: ABNORMAL
MONOCYTES # BLD: 1.3 K/UL (ref 0–0.9)
MONOCYTES NFR BLD: 12.8 % (ref 0–10)
NEUTROPHILS # BLD: 6.2 K/UL (ref 1.5–7.5)
NEUTS SEG NFR BLD: 62.7 % (ref 50–65)
O2 CONTENT ARTERIAL: 17.6 ML/DL
O2 SAT, ARTERIAL: 92.7 %
O2 THERAPY: ABNORMAL
PCO2 ARTERIAL: 64 MMHG (ref 35–45)
PH ARTERIAL: 7.47 (ref 7.35–7.45)
PHOSPHATE SERPL-MCNC: 3.1 MG/DL (ref 2.5–4.5)
PLATELET # BLD AUTO: 337 K/UL (ref 130–400)
PMV BLD AUTO: 10.2 FL (ref 9.4–12.4)
PO2 ARTERIAL: 68 MMHG (ref 80–100)
POSITIVE END EXP PRESS: 6
POTASSIUM BLD-SCNC: 3.4 MMOL/L
POTASSIUM SERPL-SCNC: 2.8 MMOL/L (ref 3.5–5)
POTASSIUM SERPL-SCNC: 3.4 MMOL/L (ref 3.5–5)
POTASSIUM SERPL-SCNC: 3.6 MMOL/L (ref 3.5–5)
PROT SERPL-MCNC: 7 G/DL (ref 6.6–8.7)
RBC # BLD AUTO: 3.66 M/UL (ref 4.7–6.1)
SAMPLE SOURCE: ABNORMAL
SODIUM SERPL-SCNC: 143 MMOL/L (ref 136–145)
VT MECHANICAL: 450 %
WBC # BLD AUTO: 9.8 K/UL (ref 4.8–10.8)

## 2024-04-27 PROCEDURE — 94760 N-INVAS EAR/PLS OXIMETRY 1: CPT

## 2024-04-27 PROCEDURE — 84132 ASSAY OF SERUM POTASSIUM: CPT

## 2024-04-27 PROCEDURE — 6360000002 HC RX W HCPCS: Performed by: INTERNAL MEDICINE

## 2024-04-27 PROCEDURE — 2000000000 HC ICU R&B

## 2024-04-27 PROCEDURE — 2500000003 HC RX 250 WO HCPCS: Performed by: INTERNAL MEDICINE

## 2024-04-27 PROCEDURE — 82803 BLOOD GASES ANY COMBINATION: CPT

## 2024-04-27 PROCEDURE — 71045 X-RAY EXAM CHEST 1 VIEW: CPT

## 2024-04-27 PROCEDURE — 6360000002 HC RX W HCPCS

## 2024-04-27 PROCEDURE — 83735 ASSAY OF MAGNESIUM: CPT

## 2024-04-27 PROCEDURE — 2580000003 HC RX 258: Performed by: INTERNAL MEDICINE

## 2024-04-27 PROCEDURE — 85025 COMPLETE CBC W/AUTO DIFF WBC: CPT

## 2024-04-27 PROCEDURE — 6370000000 HC RX 637 (ALT 250 FOR IP): Performed by: INTERNAL MEDICINE

## 2024-04-27 PROCEDURE — 36415 COLL VENOUS BLD VENIPUNCTURE: CPT

## 2024-04-27 PROCEDURE — 84100 ASSAY OF PHOSPHORUS: CPT

## 2024-04-27 PROCEDURE — 94003 VENT MGMT INPAT SUBQ DAY: CPT

## 2024-04-27 PROCEDURE — C9113 INJ PANTOPRAZOLE SODIUM, VIA: HCPCS | Performed by: INTERNAL MEDICINE

## 2024-04-27 PROCEDURE — 94640 AIRWAY INHALATION TREATMENT: CPT

## 2024-04-27 PROCEDURE — 36600 WITHDRAWAL OF ARTERIAL BLOOD: CPT

## 2024-04-27 PROCEDURE — 6370000000 HC RX 637 (ALT 250 FOR IP)

## 2024-04-27 PROCEDURE — 2700000000 HC OXYGEN THERAPY PER DAY

## 2024-04-27 PROCEDURE — 80053 COMPREHEN METABOLIC PANEL: CPT

## 2024-04-27 RX ORDER — LORAZEPAM 2 MG/ML
1 INJECTION INTRAMUSCULAR
Status: DISCONTINUED | OUTPATIENT
Start: 2024-04-27 | End: 2024-05-03 | Stop reason: HOSPADM

## 2024-04-27 RX ORDER — LORAZEPAM 2 MG/ML
4 INJECTION INTRAMUSCULAR ONCE
Status: COMPLETED | OUTPATIENT
Start: 2024-04-27 | End: 2024-04-27

## 2024-04-27 RX ADMIN — DEXMEDETOMIDINE HYDROCHLORIDE 1.5 MCG/KG/HR: 400 INJECTION, SOLUTION INTRAVENOUS at 21:46

## 2024-04-27 RX ADMIN — DEXMEDETOMIDINE HYDROCHLORIDE 1.5 MCG/KG/HR: 400 INJECTION, SOLUTION INTRAVENOUS at 06:16

## 2024-04-27 RX ADMIN — PROPOFOL 50 MCG/KG/MIN: 10 INJECTION, EMULSION INTRAVENOUS at 04:55

## 2024-04-27 RX ADMIN — LORAZEPAM 4 MG: 2 INJECTION INTRAMUSCULAR; INTRAVENOUS at 17:37

## 2024-04-27 RX ADMIN — PROPOFOL 40 MCG/KG/MIN: 10 INJECTION, EMULSION INTRAVENOUS at 09:03

## 2024-04-27 RX ADMIN — DEXMEDETOMIDINE HYDROCHLORIDE 1.5 MCG/KG/HR: 400 INJECTION, SOLUTION INTRAVENOUS at 10:10

## 2024-04-27 RX ADMIN — PROPOFOL 50 MCG/KG/MIN: 10 INJECTION, EMULSION INTRAVENOUS at 23:58

## 2024-04-27 RX ADMIN — ENOXAPARIN SODIUM 40 MG: 100 INJECTION SUBCUTANEOUS at 07:51

## 2024-04-27 RX ADMIN — DEXMEDETOMIDINE HYDROCHLORIDE 1 MCG/KG/HR: 400 INJECTION, SOLUTION INTRAVENOUS at 19:50

## 2024-04-27 RX ADMIN — IPRATROPIUM BROMIDE AND ALBUTEROL SULFATE 1 DOSE: 2.5; .5 SOLUTION RESPIRATORY (INHALATION) at 06:25

## 2024-04-27 RX ADMIN — LORAZEPAM 4 MG: 2 INJECTION INTRAMUSCULAR; INTRAVENOUS at 21:44

## 2024-04-27 RX ADMIN — CEFAZOLIN 2000 MG: 2 INJECTION, POWDER, FOR SOLUTION INTRAMUSCULAR; INTRAVENOUS at 14:45

## 2024-04-27 RX ADMIN — PROPOFOL 50 MCG/KG/MIN: 10 INJECTION, EMULSION INTRAVENOUS at 12:38

## 2024-04-27 RX ADMIN — Medication 275 MCG/HR: at 06:18

## 2024-04-27 RX ADMIN — MAGNESIUM HYDROXIDE 30 ML: 400 SUSPENSION ORAL at 08:05

## 2024-04-27 RX ADMIN — CEFAZOLIN 2000 MG: 2 INJECTION, POWDER, FOR SOLUTION INTRAMUSCULAR; INTRAVENOUS at 23:05

## 2024-04-27 RX ADMIN — IPRATROPIUM BROMIDE AND ALBUTEROL SULFATE 1 DOSE: 2.5; .5 SOLUTION RESPIRATORY (INHALATION) at 10:12

## 2024-04-27 RX ADMIN — LORAZEPAM 4 MG: 2 INJECTION INTRAMUSCULAR; INTRAVENOUS at 16:08

## 2024-04-27 RX ADMIN — POTASSIUM BICARBONATE 40 MEQ: 782 TABLET, EFFERVESCENT ORAL at 08:05

## 2024-04-27 RX ADMIN — CEFAZOLIN 2000 MG: 2 INJECTION, POWDER, FOR SOLUTION INTRAMUSCULAR; INTRAVENOUS at 06:00

## 2024-04-27 RX ADMIN — PROPOFOL 50 MCG/KG/MIN: 10 INJECTION, EMULSION INTRAVENOUS at 01:57

## 2024-04-27 RX ADMIN — LORAZEPAM 4 MG: 2 INJECTION INTRAMUSCULAR; INTRAVENOUS at 23:09

## 2024-04-27 RX ADMIN — LORAZEPAM 4 MG: 2 INJECTION INTRAMUSCULAR; INTRAVENOUS at 18:31

## 2024-04-27 RX ADMIN — IPRATROPIUM BROMIDE AND ALBUTEROL SULFATE 1 DOSE: 2.5; .5 SOLUTION RESPIRATORY (INHALATION) at 18:40

## 2024-04-27 RX ADMIN — Medication 225 MCG/HR: at 14:52

## 2024-04-27 RX ADMIN — ACETYLCYSTEINE 600 MG: 200 SOLUTION ORAL; RESPIRATORY (INHALATION) at 18:40

## 2024-04-27 RX ADMIN — POTASSIUM BICARBONATE 40 MEQ: 782 TABLET, EFFERVESCENT ORAL at 02:44

## 2024-04-27 RX ADMIN — PAROXETINE 75 MG: 30 TABLET, FILM COATED ORAL at 07:51

## 2024-04-27 RX ADMIN — LORAZEPAM 2 MG: 2 INJECTION INTRAMUSCULAR; INTRAVENOUS at 13:20

## 2024-04-27 RX ADMIN — DEXMEDETOMIDINE HYDROCHLORIDE 1.5 MCG/KG/HR: 400 INJECTION, SOLUTION INTRAVENOUS at 03:11

## 2024-04-27 RX ADMIN — SODIUM CHLORIDE, PRESERVATIVE FREE 40 MG: 5 INJECTION INTRAVENOUS at 07:51

## 2024-04-27 RX ADMIN — THIAMINE HYDROCHLORIDE 100 MG: 100 INJECTION, SOLUTION INTRAMUSCULAR; INTRAVENOUS at 07:51

## 2024-04-27 RX ADMIN — Medication 325 MCG/HR: at 00:12

## 2024-04-27 RX ADMIN — LORAZEPAM 4 MG: 2 INJECTION INTRAMUSCULAR; INTRAVENOUS at 16:15

## 2024-04-27 RX ADMIN — FUROSEMIDE 10 MG/HR: 10 INJECTION, SOLUTION INTRAMUSCULAR; INTRAVENOUS at 04:06

## 2024-04-27 RX ADMIN — DEXMEDETOMIDINE HYDROCHLORIDE 1.5 MCG/KG/HR: 400 INJECTION, SOLUTION INTRAVENOUS at 13:05

## 2024-04-27 RX ADMIN — THERA TABS 1 TABLET: TAB at 07:51

## 2024-04-27 RX ADMIN — FOLIC ACID 1 MG: 1 TABLET ORAL at 07:53

## 2024-04-27 RX ADMIN — Medication 325 MCG/HR: at 03:08

## 2024-04-27 RX ADMIN — ACETYLCYSTEINE 600 MG: 200 SOLUTION ORAL; RESPIRATORY (INHALATION) at 06:25

## 2024-04-27 RX ADMIN — LORAZEPAM 4 MG: 2 INJECTION INTRAMUSCULAR; INTRAVENOUS at 19:43

## 2024-04-27 RX ADMIN — IPRATROPIUM BROMIDE AND ALBUTEROL SULFATE 1 DOSE: 2.5; .5 SOLUTION RESPIRATORY (INHALATION) at 14:19

## 2024-04-27 RX ADMIN — DEXMEDETOMIDINE HYDROCHLORIDE 1.5 MCG/KG/HR: 400 INJECTION, SOLUTION INTRAVENOUS at 00:14

## 2024-04-27 RX ADMIN — LORAZEPAM 4 MG: 2 INJECTION INTRAMUSCULAR; INTRAVENOUS at 17:09

## 2024-04-27 RX ADMIN — Medication 200 MCG/HR: at 10:55

## 2024-04-27 RX ADMIN — LACTULOSE 20 G: 20 SOLUTION ORAL at 15:57

## 2024-04-27 ASSESSMENT — PULMONARY FUNCTION TESTS
PIF_VALUE: 23
PIF_VALUE: 24
PIF_VALUE: 25
PIF_VALUE: 24
PIF_VALUE: 26
PIF_VALUE: 23
PIF_VALUE: 23
PIF_VALUE: 24
PIF_VALUE: 22
PIF_VALUE: 22
PIF_VALUE: 23
PIF_VALUE: 22
PIF_VALUE: 23
PIF_VALUE: 23
PIF_VALUE: 22
PIF_VALUE: 22
PIF_VALUE: 23
PIF_VALUE: 23
PIF_VALUE: 22
PIF_VALUE: 24
PIF_VALUE: 27
PIF_VALUE: 22
PIF_VALUE: 33
PIF_VALUE: 23
PIF_VALUE: 29
PIF_VALUE: 25
PIF_VALUE: 29
PIF_VALUE: 23
PIF_VALUE: 22
PIF_VALUE: 22
PIF_VALUE: 25
PIF_VALUE: 22
PIF_VALUE: 25
PIF_VALUE: 23
PIF_VALUE: 30
PIF_VALUE: 22
PIF_VALUE: 25
PIF_VALUE: 22
PIF_VALUE: 23
PIF_VALUE: 22
PIF_VALUE: 23
PIF_VALUE: 23
PIF_VALUE: 24
PIF_VALUE: 25
PIF_VALUE: 22
PIF_VALUE: 23
PIF_VALUE: 24
PIF_VALUE: 25
PIF_VALUE: 24
PIF_VALUE: 22
PIF_VALUE: 23
PIF_VALUE: 22
PIF_VALUE: 23
PIF_VALUE: 27
PIF_VALUE: 24
PIF_VALUE: 23
PIF_VALUE: 24

## 2024-04-27 ASSESSMENT — PAIN SCALES - GENERAL
PAINLEVEL_OUTOF10: 0
PAINLEVEL_OUTOF10: 6

## 2024-04-27 NOTE — PROGRESS NOTES
Pt's mother at bedside to visit. Pt awake and agitated; unable to follow commands.   Pt failed SBT after 5-10 mins; HR sustained in 130's, O2 sat dropped to 84%, vent alarmed as volumes were not adequate.   Sedation restarted, vent settings changed back to prior settings; updated pt's mom who remains at bedside. Notified MD.     Electronically signed by Angelina Cabrera RN on 4/27/2024 at 1:43 PM

## 2024-04-27 NOTE — PROGRESS NOTES
Hospitalist Progress Note    Patient:  Evens De Leon  YOB: 1980  Date of Service: 4/27/2024  MRN: [id number]   Acct: [id number]   Primary Care Physician: Rosangela Mtz APRN - CNP  Advance Directive: Full Code  Admit Date: 4/19/2024       Hospital Day: 8  Referring Provider: Narinder Munoz DO    Patient Seen, Chart, Consults, Notes, Labs, Radiology studies reviewed.    Subjective:  Evens De Leon is a 43 y.o. male  whom we are following for alcohol intoxication, agitation, probable aspiration.  His O2 requirements have plateaued.  We did a weaning trial earlier and he dropped his O2 sats.  He is on assist-control rate of 18, tidal volume of 450, 6 of PEEP, FiO2 0.45.    Allergies:  Patient has no known allergies.    Medicines:  Current Facility-Administered Medications   Medication Dose Route Frequency Provider Last Rate Last Admin    lactulose (CHRONULAC) 10 GM/15ML solution 20 g  20 g Oral Daily PRN Narinder Munoz DO        ceFAZolin (ANCEF) 2,000 mg in sterile water 20 mL IV syringe  2,000 mg IntraVENous Q8H Narinder Munoz DO   2,000 mg at 04/27/24 0600    acetylcysteine (MUCOMYST) 20 % solution 600 mg  600 mg Inhalation BID RT Narinder Munoz DO   600 mg at 04/27/24 0625    bisacodyl (DULCOLAX) suppository 10 mg  10 mg Rectal Daily PRN Narinder Munoz DO        ipratropium 0.5 mg-albuterol 2.5 mg (DUONEB) nebulizer solution 1 Dose  1 Dose Inhalation Q4H WA RT Brandon Holloway MD   1 Dose at 04/27/24 1012    magnesium hydroxide (MILK OF MAGNESIA) 400 MG/5ML suspension 30 mL  30 mL Oral Daily PRN Narinder Munoz DO   30 mL at 04/27/24 0805    albuterol (PROVENTIL) (2.5 MG/3ML) 0.083% nebulizer solution 2.5 mg  2.5 mg Nebulization Q4H PRN Alphonse Ronquillo MD        glucose chewable tablet 16 g  4 tablet Oral PRN Alphonse Ronquillo MD        dextrose bolus 10% 125 mL  125 mL IntraVENous PRN Alphonse Ronquillo MD   Stopped at 04/21/24 2055    Or    dextrose bolus 10%  Alexander, DO

## 2024-04-28 ENCOUNTER — APPOINTMENT (OUTPATIENT)
Dept: CT IMAGING | Age: 44
End: 2024-04-28
Payer: MEDICAID

## 2024-04-28 LAB
ANION GAP SERPL CALCULATED.3IONS-SCNC: 10 MMOL/L (ref 7–19)
BACTERIA BLD CULT ORG #2: NORMAL
BACTERIA BLD CULT: NORMAL
BASE EXCESS ARTERIAL: 17.6 MMOL/L (ref -2–2)
BUN SERPL-MCNC: 13 MG/DL (ref 6–20)
CALCIUM SERPL-MCNC: 9.5 MG/DL (ref 8.6–10)
CARBOXYHEMOGLOBIN ARTERIAL: 1.8 % (ref 0–5)
CHLORIDE SERPL-SCNC: 93 MMOL/L (ref 98–111)
CO2 SERPL-SCNC: 34 MMOL/L (ref 22–29)
CREAT SERPL-MCNC: 0.5 MG/DL (ref 0.5–1.2)
ERYTHROCYTE [DISTWIDTH] IN BLOOD BY AUTOMATED COUNT: 12.7 % (ref 11.5–14.5)
FIO2: 40 %
GLUCOSE SERPL-MCNC: 129 MG/DL (ref 74–109)
HCO3 ARTERIAL: 43.3 MMOL/L (ref 22–26)
HCT VFR BLD AUTO: 38.1 % (ref 42–52)
HEMOGLOBIN, ART, EXTENDED: 13.7 G/DL (ref 14–18)
HGB BLD-MCNC: 12.4 G/DL (ref 14–18)
MAGNESIUM SERPL-MCNC: 2.2 MG/DL (ref 1.6–2.6)
MCH RBC QN AUTO: 32.7 PG (ref 27–31)
MCHC RBC AUTO-ENTMCNC: 32.5 G/DL (ref 33–37)
MCV RBC AUTO: 100.5 FL (ref 80–94)
METHEMOGLOBIN ARTERIAL: 1.6 %
O2 CONTENT ARTERIAL: 17.7 ML/DL
O2 DELIVERY DEVICE: ABNORMAL
O2 SAT, ARTERIAL: 92.2 %
O2 THERAPY: ABNORMAL
OXYGEN FLOW: 15
PCO2 ARTERIAL: 53 MMHG (ref 35–45)
PH ARTERIAL: 7.52 (ref 7.35–7.45)
PLATELET # BLD AUTO: 488 K/UL (ref 130–400)
PMV BLD AUTO: 9.7 FL (ref 9.4–12.4)
PO2 ARTERIAL: 61 MMHG (ref 80–100)
POTASSIUM BLD-SCNC: 3.4 MMOL/L
POTASSIUM SERPL-SCNC: 3.1 MMOL/L (ref 3.5–5)
RBC # BLD AUTO: 3.79 M/UL (ref 4.7–6.1)
SAMPLE SOURCE: ABNORMAL
SODIUM SERPL-SCNC: 137 MMOL/L (ref 136–145)
WBC # BLD AUTO: 13.2 K/UL (ref 4.8–10.8)

## 2024-04-28 PROCEDURE — 83735 ASSAY OF MAGNESIUM: CPT

## 2024-04-28 PROCEDURE — 6360000002 HC RX W HCPCS: Performed by: INTERNAL MEDICINE

## 2024-04-28 PROCEDURE — 6370000000 HC RX 637 (ALT 250 FOR IP): Performed by: INTERNAL MEDICINE

## 2024-04-28 PROCEDURE — C9113 INJ PANTOPRAZOLE SODIUM, VIA: HCPCS | Performed by: INTERNAL MEDICINE

## 2024-04-28 PROCEDURE — 74176 CT ABD & PELVIS W/O CONTRAST: CPT

## 2024-04-28 PROCEDURE — 6360000002 HC RX W HCPCS

## 2024-04-28 PROCEDURE — 36415 COLL VENOUS BLD VENIPUNCTURE: CPT

## 2024-04-28 PROCEDURE — 2500000003 HC RX 250 WO HCPCS: Performed by: INTERNAL MEDICINE

## 2024-04-28 PROCEDURE — 94640 AIRWAY INHALATION TREATMENT: CPT

## 2024-04-28 PROCEDURE — 80048 BASIC METABOLIC PNL TOTAL CA: CPT

## 2024-04-28 PROCEDURE — 2700000000 HC OXYGEN THERAPY PER DAY

## 2024-04-28 PROCEDURE — 94003 VENT MGMT INPAT SUBQ DAY: CPT

## 2024-04-28 PROCEDURE — 2580000003 HC RX 258: Performed by: INTERNAL MEDICINE

## 2024-04-28 PROCEDURE — 6370000000 HC RX 637 (ALT 250 FOR IP)

## 2024-04-28 PROCEDURE — 85027 COMPLETE CBC AUTOMATED: CPT

## 2024-04-28 PROCEDURE — 82803 BLOOD GASES ANY COMBINATION: CPT

## 2024-04-28 PROCEDURE — 36600 WITHDRAWAL OF ARTERIAL BLOOD: CPT

## 2024-04-28 PROCEDURE — 2000000000 HC ICU R&B

## 2024-04-28 RX ORDER — HALOPERIDOL 5 MG/ML
5 INJECTION INTRAMUSCULAR EVERY 6 HOURS PRN
Status: DISCONTINUED | OUTPATIENT
Start: 2024-04-28 | End: 2024-05-03 | Stop reason: HOSPADM

## 2024-04-28 RX ORDER — METOPROLOL TARTRATE 1 MG/ML
2.5 INJECTION, SOLUTION INTRAVENOUS EVERY 6 HOURS PRN
Status: DISCONTINUED | OUTPATIENT
Start: 2024-04-28 | End: 2024-05-03 | Stop reason: HOSPADM

## 2024-04-28 RX ORDER — HALOPERIDOL 5 MG/1
5 TABLET ORAL 3 TIMES DAILY
Status: DISCONTINUED | OUTPATIENT
Start: 2024-04-28 | End: 2024-05-01

## 2024-04-28 RX ORDER — BUMETANIDE 0.25 MG/ML
2 INJECTION INTRAMUSCULAR; INTRAVENOUS ONCE
Status: COMPLETED | OUTPATIENT
Start: 2024-04-28 | End: 2024-04-28

## 2024-04-28 RX ORDER — OLANZAPINE 20 MG/1
20 TABLET ORAL NIGHTLY
Status: ON HOLD | COMMUNITY
Start: 2024-02-08 | End: 2024-05-03 | Stop reason: HOSPADM

## 2024-04-28 RX ORDER — OLANZAPINE 10 MG/1
20 TABLET ORAL NIGHTLY
Status: DISCONTINUED | OUTPATIENT
Start: 2024-04-28 | End: 2024-04-28

## 2024-04-28 RX ORDER — OLANZAPINE 10 MG/1
20 TABLET, ORALLY DISINTEGRATING ORAL NIGHTLY
Status: DISCONTINUED | OUTPATIENT
Start: 2024-04-28 | End: 2024-05-01

## 2024-04-28 RX ORDER — HALOPERIDOL 5 MG/1
5 TABLET ORAL EVERY 8 HOURS
COMMUNITY
Start: 2024-04-19

## 2024-04-28 RX ADMIN — HALOPERIDOL LACTATE 5 MG: 5 INJECTION, SOLUTION INTRAMUSCULAR at 20:18

## 2024-04-28 RX ADMIN — PROPOFOL 50 MCG/KG/MIN: 10 INJECTION, EMULSION INTRAVENOUS at 07:13

## 2024-04-28 RX ADMIN — HALOPERIDOL 5 MG: 5 TABLET ORAL at 12:13

## 2024-04-28 RX ADMIN — THIAMINE HYDROCHLORIDE 100 MG: 100 INJECTION, SOLUTION INTRAMUSCULAR; INTRAVENOUS at 08:00

## 2024-04-28 RX ADMIN — FOLIC ACID 1 MG: 1 TABLET ORAL at 08:00

## 2024-04-28 RX ADMIN — PROPOFOL 50 MCG/KG/MIN: 10 INJECTION, EMULSION INTRAVENOUS at 04:18

## 2024-04-28 RX ADMIN — METOPROLOL TARTRATE 2.5 MG: 5 INJECTION INTRAVENOUS at 11:29

## 2024-04-28 RX ADMIN — IPRATROPIUM BROMIDE AND ALBUTEROL SULFATE 1 DOSE: 2.5; .5 SOLUTION RESPIRATORY (INHALATION) at 15:00

## 2024-04-28 RX ADMIN — POLYETHYLENE GLYCOL 3350 17 G: 17 POWDER, FOR SOLUTION ORAL at 08:43

## 2024-04-28 RX ADMIN — LORAZEPAM 2 MG/HR: 2 INJECTION INTRAMUSCULAR; INTRAVENOUS at 09:05

## 2024-04-28 RX ADMIN — THERA TABS 1 TABLET: TAB at 08:00

## 2024-04-28 RX ADMIN — DEXMEDETOMIDINE HYDROCHLORIDE 1.5 MCG/KG/HR: 400 INJECTION, SOLUTION INTRAVENOUS at 04:16

## 2024-04-28 RX ADMIN — LORAZEPAM 4 MG: 2 INJECTION INTRAMUSCULAR; INTRAVENOUS at 07:10

## 2024-04-28 RX ADMIN — CEFAZOLIN 2000 MG: 2 INJECTION, POWDER, FOR SOLUTION INTRAMUSCULAR; INTRAVENOUS at 14:02

## 2024-04-28 RX ADMIN — IPRATROPIUM BROMIDE AND ALBUTEROL SULFATE 1 DOSE: 2.5; .5 SOLUTION RESPIRATORY (INHALATION) at 18:23

## 2024-04-28 RX ADMIN — PROPOFOL 30 MCG/KG/MIN: 10 INJECTION, EMULSION INTRAVENOUS at 06:12

## 2024-04-28 RX ADMIN — DEXMEDETOMIDINE HYDROCHLORIDE 1.5 MCG/KG/HR: 400 INJECTION, SOLUTION INTRAVENOUS at 00:56

## 2024-04-28 RX ADMIN — POTASSIUM BICARBONATE 40 MEQ: 782 TABLET, EFFERVESCENT ORAL at 14:01

## 2024-04-28 RX ADMIN — CEFAZOLIN 2000 MG: 2 INJECTION, POWDER, FOR SOLUTION INTRAMUSCULAR; INTRAVENOUS at 06:11

## 2024-04-28 RX ADMIN — METOPROLOL TARTRATE 2.5 MG: 5 INJECTION INTRAVENOUS at 17:25

## 2024-04-28 RX ADMIN — ACETYLCYSTEINE 600 MG: 200 SOLUTION ORAL; RESPIRATORY (INHALATION) at 18:23

## 2024-04-28 RX ADMIN — DEXMEDETOMIDINE HYDROCHLORIDE 1.5 MCG/KG/HR: 400 INJECTION, SOLUTION INTRAVENOUS at 06:12

## 2024-04-28 RX ADMIN — ENOXAPARIN SODIUM 40 MG: 100 INJECTION SUBCUTANEOUS at 09:13

## 2024-04-28 RX ADMIN — MAGNESIUM HYDROXIDE 30 ML: 400 SUSPENSION ORAL at 08:43

## 2024-04-28 RX ADMIN — SODIUM CHLORIDE, PRESERVATIVE FREE 40 MG: 5 INJECTION INTRAVENOUS at 08:00

## 2024-04-28 RX ADMIN — OLANZAPINE 20 MG: 10 TABLET, ORALLY DISINTEGRATING ORAL at 20:18

## 2024-04-28 RX ADMIN — ACETYLCYSTEINE 600 MG: 200 SOLUTION ORAL; RESPIRATORY (INHALATION) at 10:13

## 2024-04-28 RX ADMIN — DEXMEDETOMIDINE HYDROCHLORIDE 1.5 MCG/KG/HR: 400 INJECTION, SOLUTION INTRAVENOUS at 07:12

## 2024-04-28 RX ADMIN — LORAZEPAM 4 MG: 2 INJECTION INTRAMUSCULAR; INTRAVENOUS at 00:40

## 2024-04-28 RX ADMIN — CEFAZOLIN 2000 MG: 2 INJECTION, POWDER, FOR SOLUTION INTRAMUSCULAR; INTRAVENOUS at 22:06

## 2024-04-28 RX ADMIN — IPRATROPIUM BROMIDE AND ALBUTEROL SULFATE 1 DOSE: 2.5; .5 SOLUTION RESPIRATORY (INHALATION) at 06:42

## 2024-04-28 RX ADMIN — BUMETANIDE 2 MG: 0.25 INJECTION INTRAMUSCULAR; INTRAVENOUS at 10:11

## 2024-04-28 RX ADMIN — IPRATROPIUM BROMIDE AND ALBUTEROL SULFATE 1 DOSE: 2.5; .5 SOLUTION RESPIRATORY (INHALATION) at 10:13

## 2024-04-28 RX ADMIN — LORAZEPAM 2 MG: 2 INJECTION INTRAMUSCULAR; INTRAVENOUS at 08:44

## 2024-04-28 RX ADMIN — PAROXETINE 75 MG: 30 TABLET, FILM COATED ORAL at 08:00

## 2024-04-28 ASSESSMENT — PULMONARY FUNCTION TESTS
PIF_VALUE: 20
PIF_VALUE: 18
PIF_VALUE: 21
PIF_VALUE: 23
PIF_VALUE: 21
PIF_VALUE: 21
PIF_VALUE: 8.2
PIF_VALUE: 22
PIF_VALUE: 24
PIF_VALUE: 28
PIF_VALUE: 21
PIF_VALUE: 23
PIF_VALUE: 22
PIF_VALUE: 32
PIF_VALUE: 26
PIF_VALUE: 27
PIF_VALUE: 9.3
PIF_VALUE: 18
PIF_VALUE: 9.3
PIF_VALUE: 17
PIF_VALUE: 25
PIF_VALUE: 9.3
PIF_VALUE: 9.3
PIF_VALUE: 23
PIF_VALUE: 23
PIF_VALUE: 16
PIF_VALUE: 22
PIF_VALUE: 23
PIF_VALUE: 18
PIF_VALUE: 9.3
PIF_VALUE: 18
PIF_VALUE: 18
PIF_VALUE: 22
PIF_VALUE: 16
PIF_VALUE: 24
PIF_VALUE: 8.3
PIF_VALUE: 11
PIF_VALUE: 27
PIF_VALUE: 22
PIF_VALUE: 9.3
PIF_VALUE: 22
PIF_VALUE: 9.3
PIF_VALUE: 35
PIF_VALUE: 9.3
PIF_VALUE: 9.3
PIF_VALUE: 16
PIF_VALUE: 38
PIF_VALUE: 16
PIF_VALUE: 9.3
PIF_VALUE: 24
PIF_VALUE: 9.3
PIF_VALUE: 21

## 2024-04-28 ASSESSMENT — PAIN SCALES - GENERAL
PAINLEVEL_OUTOF10: 0

## 2024-04-28 NOTE — PROGRESS NOTES
Hospitalist Progress Note    Patient:  Evens De Leon  YOB: 1980  Date of Service: 4/28/2024  MRN: [id number]   Acct: [id number]   Primary Care Physician: Rosangela Mtz APRN - CNP  Advance Directive: Full Code  Admit Date: 4/19/2024       Hospital Day: 9  Referring Provider: Narinder Munoz DO    Patient Seen, Chart, Consults, Notes, Labs, Radiology studies reviewed.    Subjective:  Evens De Leon is a 43 y.o. male  whom we are following for alcohol intoxication, agitation, probable aspiration.  He became agitated again overnight requiring increased sedation.  His O2 requirements are now down to the lowest point since he has been on the ventilator.  This may be our window of opportunity to extubate him.    Allergies:  Patient has no known allergies.    Medicines:  Current Facility-Administered Medications   Medication Dose Route Frequency Provider Last Rate Last Admin    LORazepam (ATIVAN) 100 mg in sodium chloride 0.9 % 100 mL infusion  0.5-12 mg/hr IntraVENous Continuous Narinder Munoz DO 4 mL/hr at 04/28/24 1105 4 mg/hr at 04/28/24 1105    haloperidol (HALDOL) tablet 5 mg  5 mg Oral TID Narinder Munoz DO   5 mg at 04/28/24 1213    OLANZapine (ZYPREXA) tablet 20 mg  20 mg Oral Nightly Narinder Munoz DO        metoprolol (LOPRESSOR) injection 2.5 mg  2.5 mg IntraVENous Q6H PRN Narinder Munoz DO   2.5 mg at 04/28/24 1129    LORazepam (ATIVAN) injection 1 mg  1 mg IntraVENous Q2H PRN Gillian Dean MD        lactulose (CHRONULAC) 10 GM/15ML solution 20 g  20 g Oral Daily PRN Narinder Munoz DO   20 g at 04/27/24 1557    ceFAZolin (ANCEF) 2,000 mg in sterile water 20 mL IV syringe  2,000 mg IntraVENous Q8H Narinder Munoz DO   2,000 mg at 04/28/24 0611    acetylcysteine (MUCOMYST) 20 % solution 600 mg  600 mg Inhalation BID RT Narinder Munoz DO   600 mg at 04/28/24 1013    bisacodyl (DULCOLAX) suppository 10 mg  10 mg Rectal Daily PRN  rigidity.   Skin:     General: Skin is warm and dry.      Coloration: Skin is pale.         Labs:  BMP:   Recent Labs       0000 04/25/24  1639 04/26/24  0129 04/26/24  0406 04/27/24  0105 04/27/24  0340 04/27/24  0557 04/27/24  1007   NA  --  144 143  --  143  --   --   --    K   < > 3.2* 3.2*   < > 2.8* 3.4 3.4* 3.6   CL  --  98 96*  --  93*  --   --   --    CO2  --  37* 37*  --  41*  --   --   --    PHOS  --   --  3.1  --  3.1  --   --   --    BUN  --  8 8  --  10  --   --   --    CREATININE  --  0.7 0.7  --  0.6  --   --   --    CALCIUM  --  8.5* 8.6  --  9.0  --   --   --     < > = values in this interval not displayed.     CBC:   Recent Labs     04/26/24  0129 04/27/24  0105 04/28/24  1305   WBC 11.0* 9.8 13.2*   HGB 12.2* 12.3* 12.4*   HCT 37.8* 37.7* 38.1*   .4* 103.0* 100.5*    337 488*     LIVER PROFILE:   Recent Labs     04/25/24  1639 04/26/24  0129 04/27/24  0105   AST 63* 59* 52*   ALT 35 32 32   BILITOT 0.3 0.3 <0.2   ALKPHOS 305* 299* 278*     PT/INR: No results for input(s): \"PROTIME\", \"INR\" in the last 72 hours.  APTT: No results for input(s): \"APTT\" in the last 72 hours.  BNP:  No results for input(s): \"BNP\" in the last 72 hours.  Ionized Calcium:No results for input(s): \"IONCA\" in the last 72 hours.  Magnesium:  Recent Labs     04/25/24  1639 04/26/24  0129 04/27/24  0105   MG 2.1 1.8 2.2     Phosphorus:  Recent Labs     04/26/24  0129 04/27/24  0105   PHOS 3.1 3.1     HgbA1C: No results for input(s): \"LABA1C\" in the last 72 hours.  Hepatic:   Recent Labs     04/25/24  1639 04/26/24  0129 04/27/24  0105   ALKPHOS 305* 299* 278*   ALT 35 32 32   AST 63* 59* 52*   PROT 6.7 6.8 7.0   BILITOT 0.3 0.3 <0.2     Lactic Acid: No results for input(s): \"LACTA\" in the last 72 hours.  Troponin: No results for input(s): \"CKTOTAL\", \"CKMB\", \"TROPONINT\" in the last 72 hours.  ABGs: No results for input(s): \"PH\", \"PCO2\", \"PO2\", \"HCO3\", \"O2SAT\" in the last 72 hours.  CRP:  No results for input(s):

## 2024-04-28 NOTE — PROGRESS NOTES
Approx 40ml of ativan wasted with JADE Crain.      Electronically signed by Angelina Cabrera RN on 4/28/2024 at 6:21 PM

## 2024-04-29 LAB
ALBUMIN SERPL-MCNC: 3.4 G/DL (ref 3.5–5.2)
ALP SERPL-CCNC: 247 U/L (ref 40–130)
ALT SERPL-CCNC: 30 U/L (ref 5–41)
ANION GAP SERPL CALCULATED.3IONS-SCNC: 15 MMOL/L (ref 7–19)
AST SERPL-CCNC: 75 U/L (ref 5–40)
BACTERIA SPEC RESP CULT: ABNORMAL
BILIRUB SERPL-MCNC: 0.3 MG/DL (ref 0.2–1.2)
BUN SERPL-MCNC: 13 MG/DL (ref 6–20)
CALCIUM SERPL-MCNC: 9.7 MG/DL (ref 8.6–10)
CHLORIDE SERPL-SCNC: 95 MMOL/L (ref 98–111)
CO2 SERPL-SCNC: 33 MMOL/L (ref 22–29)
CREAT SERPL-MCNC: 0.5 MG/DL (ref 0.5–1.2)
ERYTHROCYTE [DISTWIDTH] IN BLOOD BY AUTOMATED COUNT: 12.7 % (ref 11.5–14.5)
GLUCOSE SERPL-MCNC: 106 MG/DL (ref 74–109)
GRAM STN SPEC: ABNORMAL
GRAM STN SPEC: ABNORMAL
HCT VFR BLD AUTO: 41.3 % (ref 42–52)
HGB BLD-MCNC: 13.6 G/DL (ref 14–18)
MAGNESIUM SERPL-MCNC: 2.5 MG/DL (ref 1.6–2.6)
MCH RBC QN AUTO: 33.3 PG (ref 27–31)
MCHC RBC AUTO-ENTMCNC: 32.9 G/DL (ref 33–37)
MCV RBC AUTO: 101.2 FL (ref 80–94)
ORGANISM: ABNORMAL
PHOSPHATE SERPL-MCNC: 3.2 MG/DL (ref 2.5–4.5)
PLATELET # BLD AUTO: 588 K/UL (ref 130–400)
PMV BLD AUTO: 9.8 FL (ref 9.4–12.4)
POTASSIUM SERPL-SCNC: 3.7 MMOL/L (ref 3.5–5)
POTASSIUM SERPL-SCNC: 3.7 MMOL/L (ref 3.5–5)
PROT SERPL-MCNC: 7.9 G/DL (ref 6.6–8.7)
RBC # BLD AUTO: 4.08 M/UL (ref 4.7–6.1)
SODIUM SERPL-SCNC: 143 MMOL/L (ref 136–145)
WBC # BLD AUTO: 16.7 K/UL (ref 4.8–10.8)

## 2024-04-29 PROCEDURE — 6360000002 HC RX W HCPCS: Performed by: INTERNAL MEDICINE

## 2024-04-29 PROCEDURE — 87324 CLOSTRIDIUM AG IA: CPT

## 2024-04-29 PROCEDURE — 6360000002 HC RX W HCPCS

## 2024-04-29 PROCEDURE — 6370000000 HC RX 637 (ALT 250 FOR IP): Performed by: INTERNAL MEDICINE

## 2024-04-29 PROCEDURE — 2000000000 HC ICU R&B

## 2024-04-29 PROCEDURE — 94760 N-INVAS EAR/PLS OXIMETRY 1: CPT

## 2024-04-29 PROCEDURE — 87449 NOS EACH ORGANISM AG IA: CPT

## 2024-04-29 PROCEDURE — 87040 BLOOD CULTURE FOR BACTERIA: CPT

## 2024-04-29 PROCEDURE — 80053 COMPREHEN METABOLIC PANEL: CPT

## 2024-04-29 PROCEDURE — 2580000003 HC RX 258: Performed by: INTERNAL MEDICINE

## 2024-04-29 PROCEDURE — C9113 INJ PANTOPRAZOLE SODIUM, VIA: HCPCS | Performed by: INTERNAL MEDICINE

## 2024-04-29 PROCEDURE — 83735 ASSAY OF MAGNESIUM: CPT

## 2024-04-29 PROCEDURE — 36415 COLL VENOUS BLD VENIPUNCTURE: CPT

## 2024-04-29 PROCEDURE — 99232 SBSQ HOSP IP/OBS MODERATE 35: CPT | Performed by: INTERNAL MEDICINE

## 2024-04-29 PROCEDURE — 85027 COMPLETE CBC AUTOMATED: CPT

## 2024-04-29 PROCEDURE — 92610 EVALUATE SWALLOWING FUNCTION: CPT

## 2024-04-29 PROCEDURE — 94640 AIRWAY INHALATION TREATMENT: CPT

## 2024-04-29 PROCEDURE — 2500000003 HC RX 250 WO HCPCS: Performed by: INTERNAL MEDICINE

## 2024-04-29 PROCEDURE — 87507 IADNA-DNA/RNA PROBE TQ 12-25: CPT

## 2024-04-29 PROCEDURE — 2700000000 HC OXYGEN THERAPY PER DAY

## 2024-04-29 PROCEDURE — 84100 ASSAY OF PHOSPHORUS: CPT

## 2024-04-29 PROCEDURE — 92523 SPEECH SOUND LANG COMPREHEN: CPT

## 2024-04-29 RX ADMIN — ACETYLCYSTEINE 600 MG: 200 SOLUTION ORAL; RESPIRATORY (INHALATION) at 18:15

## 2024-04-29 RX ADMIN — VANCOMYCIN HYDROCHLORIDE 1500 MG: 10 INJECTION, POWDER, LYOPHILIZED, FOR SOLUTION INTRAVENOUS at 22:40

## 2024-04-29 RX ADMIN — VANCOMYCIN HYDROCHLORIDE 2000 MG: 10 INJECTION, POWDER, LYOPHILIZED, FOR SOLUTION INTRAVENOUS at 15:30

## 2024-04-29 RX ADMIN — SODIUM CHLORIDE, PRESERVATIVE FREE 40 MG: 5 INJECTION INTRAVENOUS at 07:40

## 2024-04-29 RX ADMIN — LORAZEPAM 4 MG: 2 INJECTION INTRAMUSCULAR; INTRAVENOUS at 13:11

## 2024-04-29 RX ADMIN — METOPROLOL TARTRATE 2.5 MG: 5 INJECTION INTRAVENOUS at 13:11

## 2024-04-29 RX ADMIN — IPRATROPIUM BROMIDE AND ALBUTEROL SULFATE 1 DOSE: 2.5; .5 SOLUTION RESPIRATORY (INHALATION) at 18:15

## 2024-04-29 RX ADMIN — HALOPERIDOL LACTATE 5 MG: 5 INJECTION, SOLUTION INTRAMUSCULAR at 00:19

## 2024-04-29 RX ADMIN — THIAMINE HYDROCHLORIDE 100 MG: 100 INJECTION, SOLUTION INTRAMUSCULAR; INTRAVENOUS at 07:40

## 2024-04-29 RX ADMIN — LORAZEPAM 4 MG: 2 INJECTION INTRAMUSCULAR; INTRAVENOUS at 15:56

## 2024-04-29 RX ADMIN — CEFAZOLIN 2000 MG: 2 INJECTION, POWDER, FOR SOLUTION INTRAMUSCULAR; INTRAVENOUS at 14:59

## 2024-04-29 RX ADMIN — LORAZEPAM 4 MG: 2 INJECTION INTRAMUSCULAR; INTRAVENOUS at 17:17

## 2024-04-29 RX ADMIN — CEFAZOLIN 2000 MG: 2 INJECTION, POWDER, FOR SOLUTION INTRAMUSCULAR; INTRAVENOUS at 21:08

## 2024-04-29 RX ADMIN — IPRATROPIUM BROMIDE AND ALBUTEROL SULFATE 1 DOSE: 2.5; .5 SOLUTION RESPIRATORY (INHALATION) at 10:38

## 2024-04-29 RX ADMIN — LORAZEPAM 4 MG: 2 INJECTION INTRAMUSCULAR; INTRAVENOUS at 03:47

## 2024-04-29 RX ADMIN — IPRATROPIUM BROMIDE AND ALBUTEROL SULFATE 1 DOSE: 2.5; .5 SOLUTION RESPIRATORY (INHALATION) at 14:27

## 2024-04-29 RX ADMIN — HALOPERIDOL LACTATE 5 MG: 5 INJECTION, SOLUTION INTRAMUSCULAR at 15:50

## 2024-04-29 RX ADMIN — OLANZAPINE 20 MG: 10 TABLET, ORALLY DISINTEGRATING ORAL at 21:08

## 2024-04-29 RX ADMIN — ACETAMINOPHEN 650 MG: 650 SUPPOSITORY RECTAL at 00:19

## 2024-04-29 RX ADMIN — ENOXAPARIN SODIUM 40 MG: 100 INJECTION SUBCUTANEOUS at 07:41

## 2024-04-29 RX ADMIN — LORAZEPAM 4 MG: 2 INJECTION INTRAMUSCULAR; INTRAVENOUS at 08:06

## 2024-04-29 RX ADMIN — CEFAZOLIN 2000 MG: 2 INJECTION, POWDER, FOR SOLUTION INTRAMUSCULAR; INTRAVENOUS at 05:15

## 2024-04-29 RX ADMIN — METOPROLOL TARTRATE 2.5 MG: 5 INJECTION INTRAVENOUS at 08:06

## 2024-04-29 ASSESSMENT — PAIN SCALES - GENERAL
PAINLEVEL_OUTOF10: 0

## 2024-04-29 NOTE — PROGRESS NOTES
Hospitalist Progress Note    Patient:  Evens De Leon  YOB: 1980  Date of Service: 4/29/2024  MRN: [id number]   Acct: [id number]   Primary Care Physician: Rosangela Mtz APRN - CNP  Advance Directive: Full Code  Admit Date: 4/19/2024       Hospital Day: 10  Referring Provider: Narinder Munoz DO    Patient Seen, Chart, Consults, Notes, Labs, Radiology studies reviewed.    Subjective:  Evens De Leon is a 43 y.o. male  whom we are following for alcohol intoxication, agitation, probable aspiration.  We were able to successfully liberate him from the ventilator this morning.  He has done well postextubation.  CT of the abdomen shows some dilated loops of bowel.  He has been evaluated by general surgery.  No intervention needed at this point.    Allergies:  Patient has no known allergies.    Medicines:  Current Facility-Administered Medications   Medication Dose Route Frequency Provider Last Rate Last Admin    vancomycin (VANCOCIN) intermittent dosing (placeholder)   Other RX Placeholder Narinder Munoz DO        vancomycin (VANCOCIN) 1500 mg in sodium chloride 0.9 % 250 mL IVPB  1,500 mg IntraVENous Q8H Narinder Munoz DO        LORazepam (ATIVAN) 100 mg in sodium chloride 0.9 % 100 mL infusion  0.5-12 mg/hr IntraVENous Continuous Narinder Munoz DO   Stopped at 04/28/24 1340    haloperidol (HALDOL) tablet 5 mg  5 mg Oral TID Narinder Munoz DO   5 mg at 04/28/24 1213    metoprolol (LOPRESSOR) injection 2.5 mg  2.5 mg IntraVENous Q6H PRN Narinder Munoz DO   2.5 mg at 04/29/24 1311    OLANZapine zydis (ZYPREXA) disintegrating tablet 20 mg  20 mg Oral Nightly Narinder Munoz DO   20 mg at 04/28/24 2018    haloperidol lactate (HALDOL) injection 5 mg  5 mg IntraMUSCular Q6H PRN Narinder Munoz DO   5 mg at 04/29/24 1550    LORazepam (ATIVAN) injection 1 mg  1 mg IntraVENous Q2H PRN Gillian Dean MD        lactulose (CHRONULAC) 10 GM/15ML solution

## 2024-04-29 NOTE — PROGRESS NOTES
Pulmonary and Critical Care Progress note.    [facility]    MR[facility] [id number]    Acct# [id number]  4/29/2024   10:24 AM CDT    Referring Provider:Narinder Munoz DO      Chief Complaint: Respiratory failure on mechanical ventilation    HPI: The patient is on nasal cannula oxygen.  He is drowsy but arouses to verbal stimuli    Medications:  The following medications were reviewed and adjustments made when necessary.    haloperidol, 5 mg, Oral, TID    OLANZapine zydis, 20 mg, Oral, Nightly    ceFAZolin, 2,000 mg, IntraVENous, Q8H    acetylcysteine, 600 mg, Inhalation, BID RT    ipratropium 0.5 mg-albuterol 2.5 mg, 1 Dose, Inhalation, Q4H WA RT    multivitamin, 1 tablet, Oral, Daily    PARoxetine, 75 mg, Oral, QAM    pantoprazole (PROTONIX) 40 mg in sodium chloride (PF) 0.9 % 10 mL injection, 40 mg, IntraVENous, Daily    enoxaparin, 40 mg, SubCUTAneous, Daily    thiamine, 100 mg, IntraVENous, Daily    folic acid, 1 mg, Oral, Daily         Physical Exam:  Vitals:    04/29/24 0815 04/29/24 0830 04/29/24 0845 04/29/24 0900   BP: (!) 145/68 (!) 144/65  (!) 155/73   Pulse: (!) 121 (!) 120 (!) 125 (!) 120   Resp: 18 25 23 25   Temp: (!) 101.2 °F (38.4 °C)      TempSrc: Bladder      SpO2: 96% 96% 97% 98%   Weight:       Height:          Intake/Output Summary (Last 24 hours) at 4/29/2024 0918  Last data filed at 4/29/2024 0600  Gross per 24 hour   Intake 60.87 ml   Output 2910 ml   Net -2849.13 ml         General appearance: Middle-aged male intubated on nasal cannula oxygen  HEENT:normocephalic atraumatic  Heart:S1S2 no murmurs  Lungs:diminished bilaterally no rubs or tenderness or dullness to percussion  Abdomen:Soft non tender no organomegaly  Extremities:no clubbing cyanosis or edema  Neuro:no focal findings  Skin:intact        The following lab data was reviewed:  CBC   Recent Labs     04/27/24  0105 04/28/24  1305 04/29/24  0023   WBC 9.8 13.2* 16.7*   RBC 3.66* 3.79* 4.08*   HGB 12.3*  recognition software.  Inadvertent typographical errors in transcription may have occurred.      Electronically signed by Gillian Dean MD on 04/29/24 at 9:18 AM

## 2024-04-29 NOTE — CONSULTS
[facility]    Patient ID: Evens De Leon  43 y.o.  male  YOB: 1980    Admitting Diagnosis: Alcohol withdrawal delirium ([facility]) [F10.931]  Substance abuse (HCC) [F19.10]  Alcohol withdrawal syndrome with perceptual disturbance (HCC) [F10.932]  Altered mental status, unspecified altered mental status type [R41.82]    Chief Complaint:  Chief Complaint   Patient presents with    Altered Mental Status     Pt arrives via EMS from [facility]. Pt is there for alcohol detox. They initially called for combative behavior. Pt was given by [employer], 6mg ativan po and 2.5haldol po. In hopes of pt \"sleeping it off\". Pt hallucinations increased and is currently altered. EMS denies any combative behavior with them.       Subjective:    Mr. De Leon is a 43 y.o. male who is admitted to the ICU with withdrawal and delirium. General surgery is consulted for evaluation given CT scan findings of dilated small bowel. He is seen and examined. He is awake and does answer some questions, and per the nurse this is the first time he has done such. He denies abdominal pain. He knows he is in the hospital but doesn't know why.   He is having diarrhea and rectal tube is in place.    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    Dog bite     Oropharyngeal dysphagia 05/09/2023    Psychiatric problem     Suicidal ideation 04/21/2019     Past Surgical History:   Procedure Laterality Date    ADENOIDECTOMY      COLONOSCOPY      TONSILLECTOMY Right     TYMPANOSTOMY TUBE PLACEMENT      US LYMPH NODE BIOPSY  10/21/2022    US LYMPH NODE BIOPSY 10/21/2022 L ULTRASOUND     Current Facility-Administered Medications   Medication Dose Route Frequency Provider Last Rate Last Admin    LORazepam (ATIVAN) 100 mg in sodium chloride 0.9 % 100 mL infusion  0.5-12 mg/hr IntraVENous Continuous Narinder Munoz DO   Stopped at 04/28/24 1340    haloperidol (HALDOL) tablet  personally reviewed by me. Agree with dilated small bowel.     No clinical signs of obstruction. Once more alert, would start diet and advance as tolerated.     Repeat AXR tomorrow.      General surgery will follow.       Electronically signed by Radha Moses DO on 4/29/2024 at 9:30 AM

## 2024-04-29 NOTE — PROGRESS NOTES
[facility]   Pharmacy Pharmacokinetic Monitoring Service - Vancomycin     Evens De Leon is a 43 y.o. male starting on vancomycin therapy for Pneumonia. Pharmacy consulted by Dr. Munoz for monitoring and adjustment.    Target Concentration: Goal AUC/ROSIE 400-600 mg*hr/L    Additional Antimicrobials: cefazolin    Pertinent Laboratory Values:   Wt Readings from Last 1 Encounters:   04/29/24 78 kg (171 lb 14.4 oz)     Temp Readings from Last 1 Encounters:   04/29/24 (!) 101.2 °F (38.4 °C) (Bladder)     Estimated Creatinine Clearance: 187 mL/min (based on SCr of 0.5 mg/dL).  Recent Labs     04/28/24  1305 04/29/24  0023   CREATININE 0.5 0.5   BUN 13 13   WBC 13.2* 16.7*     Procalcitonin: not ordered    Pertinent Cultures:  Culture Date Source Results   04/29/24 Blood Sent   04/25/24 Sputum Klebsiella pneumoniae  MRSA   04/23/24 Sputum Klebsiella pneumoniae  MSSA  Strep pneumoniae   MRSA Nasal Swab:  not ordered. MRSA in sputum.    Plan:  Dosing recommendations based on Bayesian software  Start vancomycin 2000 mg IV once followed by 1500 mg IV every 8 hours  Anticipated AUC of 591 and trough concentration of 15.6 at steady state  Renal labs as indicated   Vancomycin concentration ordered for 05/01 @ 0630   Pharmacy will continue to monitor patient and adjust therapy as indicated    Thank you for the consult,  Nikita Amaro RPH  4/29/2024 3:04 PM

## 2024-04-29 NOTE — PROGRESS NOTES
Facility/Department: [facility] ICU   CLINICAL BEDSIDE SWALLOW EVALUATION  SPEECH LANGUAGE EVALUATION     NAME: Evens De Leon  : 1980  MRN: [id number]    ADMISSION DATE: 2024  ADMITTING DIAGNOSIS: has Psychosis (HCC); Methamphetamine abuse (HCC); Suicidal ideation; Macrocytic anemia; Alcohol abuse with physiological dependence (HCC); Alcohol intoxication, episodic, uncomplicated (HCC); Alcohol intoxication delirium (HCC); Alcohol dependence with withdrawal delirium (HCC); Alcohol abuse with intoxication (HCC); Alcohol withdrawal syndrome, uncomplicated (HCC); Oropharyngeal dysphagia; Cancer of tonsil (HCC); MRSA nasal colonization; Nasal obstruction; Alcohol withdrawal delirium (HCC); Fever; Palliative care patient; and Altered mental status on their problem list.    Date of Eval: 2024  Evaluating Therapist: ANDREAS De Santiago    Current Diet level:  NPO    Pain:  Pain Assessment: Critical Care Pain Observation Tool (CPOT)  Pain Level: 0    Reason for Referral  Evens De Leon was referred for a bedside swallow evaluation to assess the efficiency of his swallow function, identify signs and symptoms of aspiration and make recommendations regarding safe dietary consistencies, effective compensatory strategies, and safe eating environment.    Impression  Assessed patient's swallowing function. Patient exhibited sluggish, mildly decreased laryngeal elevation for swallow airway protection. No outward S/S penetration/aspiration was noted with any ice chip trial presented during evaluation this date. Did not observe swallow function with any additional consistency secondary to noted lethargy     At this time, would continue NPO status; consider alternative means of nutrition. If patient receives mouth care prior to intake, okay for ice chips and swabs water for comfort. Will continue to follow. Thank you for this consult.     Treatment Plan  Requires SLP Intervention: Yes     Recommended Diet and  mildly decreased laryngeal elevation for swallow airway protection.)  Pharyngeal Phase - Comment: No outward S/S penetration/aspiration was noted with any ice chip trial presented during evaluation this date. Did not observe swallow function with any additional consistency secondary to noted lethargy     At this time, would continue NPO status; consider alternative means of nutrition. If patient receives mouth care prior to intake, okay for ice chips and swabs water for comfort. Will continue to follow.    Electronically signed by ANDREAS De Santiago on 4/29/2024 at 12:29 PM

## 2024-04-30 ENCOUNTER — APPOINTMENT (OUTPATIENT)
Dept: GENERAL RADIOLOGY | Age: 44
End: 2024-04-30
Payer: MEDICAID

## 2024-04-30 LAB
ANION GAP SERPL CALCULATED.3IONS-SCNC: 17 MMOL/L (ref 7–19)
BUN SERPL-MCNC: 17 MG/DL (ref 6–20)
CALCIUM SERPL-MCNC: 9.1 MG/DL (ref 8.6–10)
CHLORIDE SERPL-SCNC: 100 MMOL/L (ref 98–111)
CO2 SERPL-SCNC: 28 MMOL/L (ref 22–29)
CREAT SERPL-MCNC: 0.5 MG/DL (ref 0.5–1.2)
ERYTHROCYTE [DISTWIDTH] IN BLOOD BY AUTOMATED COUNT: 12.6 % (ref 11.5–14.5)
GLUCOSE BLD-MCNC: 128 MG/DL (ref 70–99)
GLUCOSE SERPL-MCNC: 100 MG/DL (ref 74–109)
HCT VFR BLD AUTO: 40 % (ref 42–52)
HGB BLD-MCNC: 12.8 G/DL (ref 14–18)
MAGNESIUM SERPL-MCNC: 2.3 MG/DL (ref 1.6–2.6)
MCH RBC QN AUTO: 32.2 PG (ref 27–31)
MCHC RBC AUTO-ENTMCNC: 32 G/DL (ref 33–37)
MCV RBC AUTO: 100.5 FL (ref 80–94)
PERFORMED ON: ABNORMAL
PLATELET # BLD AUTO: 697 K/UL (ref 130–400)
PMV BLD AUTO: 9.5 FL (ref 9.4–12.4)
POTASSIUM SERPL-SCNC: 3.2 MMOL/L (ref 3.5–5)
RBC # BLD AUTO: 3.98 M/UL (ref 4.7–6.1)
SODIUM SERPL-SCNC: 145 MMOL/L (ref 136–145)
WBC # BLD AUTO: 13.5 K/UL (ref 4.8–10.8)

## 2024-04-30 PROCEDURE — 6360000002 HC RX W HCPCS: Performed by: INTERNAL MEDICINE

## 2024-04-30 PROCEDURE — 36415 COLL VENOUS BLD VENIPUNCTURE: CPT

## 2024-04-30 PROCEDURE — 92526 ORAL FUNCTION THERAPY: CPT

## 2024-04-30 PROCEDURE — 1200000000 HC SEMI PRIVATE

## 2024-04-30 PROCEDURE — 99231 SBSQ HOSP IP/OBS SF/LOW 25: CPT | Performed by: SURGERY

## 2024-04-30 PROCEDURE — 6370000000 HC RX 637 (ALT 250 FOR IP): Performed by: INTERNAL MEDICINE

## 2024-04-30 PROCEDURE — 94760 N-INVAS EAR/PLS OXIMETRY 1: CPT

## 2024-04-30 PROCEDURE — 94640 AIRWAY INHALATION TREATMENT: CPT

## 2024-04-30 PROCEDURE — 83735 ASSAY OF MAGNESIUM: CPT

## 2024-04-30 PROCEDURE — 92507 TX SP LANG VOICE COMM INDIV: CPT

## 2024-04-30 PROCEDURE — C9113 INJ PANTOPRAZOLE SODIUM, VIA: HCPCS | Performed by: INTERNAL MEDICINE

## 2024-04-30 PROCEDURE — 85027 COMPLETE CBC AUTOMATED: CPT

## 2024-04-30 PROCEDURE — 6370000000 HC RX 637 (ALT 250 FOR IP)

## 2024-04-30 PROCEDURE — 2580000003 HC RX 258: Performed by: INTERNAL MEDICINE

## 2024-04-30 PROCEDURE — 74018 RADEX ABDOMEN 1 VIEW: CPT

## 2024-04-30 PROCEDURE — 82962 GLUCOSE BLOOD TEST: CPT

## 2024-04-30 PROCEDURE — 2580000003 HC RX 258

## 2024-04-30 PROCEDURE — 51798 US URINE CAPACITY MEASURE: CPT

## 2024-04-30 PROCEDURE — 2700000000 HC OXYGEN THERAPY PER DAY

## 2024-04-30 PROCEDURE — 80048 BASIC METABOLIC PNL TOTAL CA: CPT

## 2024-04-30 RX ORDER — CARVEDILOL 12.5 MG/1
12.5 TABLET ORAL 2 TIMES DAILY WITH MEALS
Status: DISCONTINUED | OUTPATIENT
Start: 2024-04-30 | End: 2024-05-03 | Stop reason: HOSPADM

## 2024-04-30 RX ORDER — GAUZE BANDAGE 2" X 2"
100 BANDAGE TOPICAL DAILY
Status: DISCONTINUED | OUTPATIENT
Start: 2024-05-01 | End: 2024-05-03 | Stop reason: HOSPADM

## 2024-04-30 RX ORDER — LOPERAMIDE HYDROCHLORIDE 2 MG/1
2 CAPSULE ORAL 4 TIMES DAILY PRN
Status: DISCONTINUED | OUTPATIENT
Start: 2024-04-30 | End: 2024-05-03 | Stop reason: HOSPADM

## 2024-04-30 RX ADMIN — LORAZEPAM 1 MG: 2 INJECTION INTRAMUSCULAR; INTRAVENOUS at 20:38

## 2024-04-30 RX ADMIN — VANCOMYCIN HYDROCHLORIDE 1500 MG: 10 INJECTION, POWDER, LYOPHILIZED, FOR SOLUTION INTRAVENOUS at 07:36

## 2024-04-30 RX ADMIN — LORAZEPAM 1 MG: 2 INJECTION INTRAMUSCULAR; INTRAVENOUS at 16:19

## 2024-04-30 RX ADMIN — SODIUM CHLORIDE, PRESERVATIVE FREE 40 MG: 5 INJECTION INTRAVENOUS at 07:35

## 2024-04-30 RX ADMIN — IPRATROPIUM BROMIDE AND ALBUTEROL SULFATE 1 DOSE: 2.5; .5 SOLUTION RESPIRATORY (INHALATION) at 19:41

## 2024-04-30 RX ADMIN — HALOPERIDOL 5 MG: 5 TABLET ORAL at 14:27

## 2024-04-30 RX ADMIN — OLANZAPINE 20 MG: 10 TABLET, ORALLY DISINTEGRATING ORAL at 20:38

## 2024-04-30 RX ADMIN — IPRATROPIUM BROMIDE AND ALBUTEROL SULFATE 1 DOSE: 2.5; .5 SOLUTION RESPIRATORY (INHALATION) at 06:25

## 2024-04-30 RX ADMIN — CARVEDILOL 12.5 MG: 12.5 TABLET, FILM COATED ORAL at 11:26

## 2024-04-30 RX ADMIN — HALOPERIDOL LACTATE 5 MG: 5 INJECTION, SOLUTION INTRAMUSCULAR at 00:28

## 2024-04-30 RX ADMIN — CEFAZOLIN 2000 MG: 2 INJECTION, POWDER, FOR SOLUTION INTRAMUSCULAR; INTRAVENOUS at 06:01

## 2024-04-30 RX ADMIN — CARVEDILOL 12.5 MG: 12.5 TABLET, FILM COATED ORAL at 15:26

## 2024-04-30 RX ADMIN — HALOPERIDOL 5 MG: 5 TABLET ORAL at 20:37

## 2024-04-30 RX ADMIN — SODIUM CHLORIDE, PRESERVATIVE FREE 10 ML: 5 INJECTION INTRAVENOUS at 20:38

## 2024-04-30 RX ADMIN — THIAMINE HYDROCHLORIDE 100 MG: 100 INJECTION, SOLUTION INTRAMUSCULAR; INTRAVENOUS at 07:35

## 2024-04-30 RX ADMIN — ACETYLCYSTEINE 600 MG: 200 SOLUTION ORAL; RESPIRATORY (INHALATION) at 06:25

## 2024-04-30 RX ADMIN — ENOXAPARIN SODIUM 40 MG: 100 INJECTION SUBCUTANEOUS at 07:35

## 2024-04-30 RX ADMIN — IPRATROPIUM BROMIDE AND ALBUTEROL SULFATE 1 DOSE: 2.5; .5 SOLUTION RESPIRATORY (INHALATION) at 10:54

## 2024-04-30 RX ADMIN — POTASSIUM CHLORIDE 40 MEQ: 1500 TABLET, EXTENDED RELEASE ORAL at 12:16

## 2024-04-30 RX ADMIN — LOPERAMIDE HYDROCHLORIDE 2 MG: 2 CAPSULE ORAL at 20:38

## 2024-04-30 RX ADMIN — LOPERAMIDE HYDROCHLORIDE 2 MG: 2 CAPSULE ORAL at 15:26

## 2024-04-30 ASSESSMENT — PAIN SCALES - GENERAL
PAINLEVEL_OUTOF10: 0

## 2024-04-30 NOTE — PROGRESS NOTES
[facility] General Surgery Progress Note    Chief Complaint:   Chief Complaint   Patient presents with    Altered Mental Status     Pt arrives via EMS from [facility]. Pt is there for alcohol detox. They initially called for combative behavior. Pt was given by [employer], 6mg ativan po and 2.5haldol po. In hopes of pt \"sleeping it off\". Pt hallucinations increased and is currently altered. EMS denies any combative behavior with them.       SUBJECTIVE:  Mr. De Leon is a 43 y.o. male is seen and examined at bedside. He has started diet recommend by SLP. Denies abdominal pain Passed foreign body in his stool.     OBJECTIVE:  BP (!) 165/73   Pulse (!) 114   Temp 98.2 °F (36.8 °C) (Temporal)   Resp 25   Ht 1.753 m (5' 9.02\")   Wt 78 kg (171 lb 14.4 oz)   SpO2 100%   BMI 25.37 kg/m²   CONSTITUTIONAL: Alert, confused, no acute distress  SKIN: warm, dry with no rashes or lesions  ABDOMEN: soft, ND, non TTP, +BS    Labs:  CBC:   Recent Labs     04/28/24  1305 04/29/24  0023 04/30/24  0121   WBC 13.2* 16.7* 13.5*   HGB 12.4* 13.6* 12.8*   * 588* 697*     BMP:    Recent Labs     04/28/24  1305 04/28/24  1730 04/29/24  0023 04/30/24  0121     --  143 145   K 3.1*   < > 3.7  3.7 3.2*   CL 93*  --  95* 100   CO2 34*  --  33* 28   BUN 13  --  13 17   CREATININE 0.5  --  0.5 0.5   GLUCOSE 129*  --  106 100    < > = values in this interval not displayed.     AXR   IMPRESSION:     1.  Stable findings of small bowel obstruction versus ileus.  ______________________________________   Electronically signed by: CAMMY GUERRERO M.D.  Date:     04/30/2024  Time:    07:34     ASSESSMENT:  Principal Problem:    Alcohol withdrawal delirium (HCC)  Active Problems:    Methamphetamine abuse (HCC)    Fever    Palliative care patient    Altered mental status  Resolved Problems:    * No resolved hospital problems. *      PLAN:  AXR reviewed.  Likely ileus, as patient is not clinically obstructed.    Initiate and advance diet as

## 2024-04-30 NOTE — PROGRESS NOTES
Facility/Department: [facility] ONCOLOGY UNIT  SWALLOW THERAPY  SPEECH THERAPY     NAME: Evens De Leon  : 1980  MRN: [id number]    ADMISSION DATE: 2024  ADMITTING DIAGNOSIS: has Psychosis (HCC); Methamphetamine abuse (HCC); Suicidal ideation; Macrocytic anemia; Alcohol abuse with physiological dependence (HCC); Alcohol intoxication, episodic, uncomplicated (HCC); Alcohol intoxication delirium (HCC); Alcohol dependence with withdrawal delirium (HCC); Alcohol abuse with intoxication (HCC); Alcohol withdrawal syndrome, uncomplicated (HCC); Oropharyngeal dysphagia; Cancer of tonsil (HCC); MRSA nasal colonization; Nasal obstruction; Alcohol withdrawal delirium (HCC); Fever; Palliative care patient; and Altered mental status on their problem list.    Date of Treat: 2024  Evaluating Therapist: ANDREAS De Santiago    Current Diet level:  NPO    Pain:  Pain Assessment  Pain Assessment: 0-10  Pain Level: 0    Reason for Referral  Evens De Leon was referred for a bedside swallow evaluation to assess the efficiency of his swallow function, identify signs and symptoms of aspiration and make recommendations regarding safe dietary consistencies, effective compensatory strategies, and safe eating environment.    Impression  Re-assessed patient's swallowing function. Patient exhibited decreased oral prep of more solid consistencies, fast oral transit and suspected swallow delay with even thickened liquid consistencies, and sluggish, mild-moderately decreased laryngeal elevation for swallow airway protection. No outward S/S penetration/aspiration was noted with any ice chip trial, puree consistency trial, honey thick liquid trial, or nectar thick liquid trial presented during treatment session this date. Did not observe swallow function with any additional consistency secondary to noted lethargy.    At this time, would trial full liquid diet with moderately thick/honey thick liquids. Recommend meds crushed in  Charity Mcnally, SLP on 4/30/2024 at 1:28 PM

## 2024-04-30 NOTE — PROGRESS NOTES
Comprehensive Nutrition Assessment    Type and Reason for Visit:  Reassess    Nutrition Recommendations/Plan:   SLP evaluation     Malnutrition Assessment:  Malnutrition Status:  No malnutrition (04/30/24 1010)    Context:  Acute Illness     Findings of the 6 clinical characteristics of malnutrition:  Energy Intake:  No significant decrease in energy intake  Weight Loss:  No significant weight loss     Body Fat Loss:  No significant body fat loss     Muscle Mass Loss:  No significant muscle mass loss    Fluid Accumulation:  No significant fluid accumulation Generalized   Strength:  Not Performed    Nutrition Assessment:    Pt is s/p extubation. Tube feeding orders have been stopped. Pt is NPO. Recommend SLP to evaluate and treat as needed. Last BM noted 4/29. Will monitor for diet advancement and implement nutrition intervention as needed.    Current Nutrition Intake & Therapies:    Average Meal Intake: NPO    Anthropometric Measures:  Height: 175.3 cm (5' 9.02\")  Ideal Body Weight (IBW): 160 lbs (73 kg)    Admission Body Weight: 81.6 kg (179 lb 14.3 oz) (stated)  Current Body Weight: 78 kg (171 lb 15.3 oz), 120.6 % IBW. Weight Source: Bed Scale  Current BMI (kg/m2): 25.4  Usual Body Weight: 73.5 kg (162 lb 0.6 oz) (12/6/2024)  % Weight Change (Calculated): 7.2  BMI Categories: Overweight (BMI 25.0-29.9)    Estimated Daily Nutrient Needs:  Energy Requirements Based On: Kcal/kg  Weight Used for Energy Requirements: Current  Energy (kcal/day): 7569-4572 kcals/day  Weight Used for Protein Requirements: Ideal  Protein (g/day):  g/protein/day  Method Used for Fluid Requirements: 1 ml/kcal  Fluid (ml/day): 6657-1624 mL/day    Nutrition Diagnosis:   Inadequate oral intake related to acute injury/trauma as evidenced by NPO or clear liquid status due to medical condition    Nutrition Interventions:   Food and/or Nutrient Delivery: Continue NPO  Nutrition Education/Counseling: No recommendation at this  time  Coordination of Nutrition Care: Swallow Evaluation  Plan of Care discussed with: nursing    Goals:  Previous Goal Met: No Progress toward Goal(s)  Goals: Initiate PO diet    Nutrition Monitoring and Evaluation:   Behavioral-Environmental Outcomes: None Identified  Food/Nutrient Intake Outcomes: Diet Advancement/Tolerance  Physical Signs/Symptoms Outcomes: Biochemical Data, Nutrition Focused Physical Findings, Weight    Nicole Castillo MS, RD, LD  Contact: [phone]

## 2024-04-30 NOTE — PROGRESS NOTES
Hospitalist Progress Note    Patient:  Evens De Leon  YOB: 1980  Date of Service: 5/1/2024  MRN: [id number]   Acct: [id number]   Primary Care Physician: Rosangela Mtz APRN - CNP  Advance Directive: Full Code  Admit Date: 4/19/2024       Hospital Day: 12  Referring Provider: Narinder Munoz DO    Patient Seen, Chart, Consults, Notes, Labs, Radiology studies reviewed.    Subjective:  Evens De Leon is a 43 y.o. male  whom we are following for alcohol intoxication, agitation, probable aspiration.  He did well overnight.  Hemodynamics are stable.  He is stable for transfer out of ICU.    Allergies:  Patient has no known allergies.    Medicines:  Current Facility-Administered Medications   Medication Dose Route Frequency Provider Last Rate Last Admin    OLANZapine zydis (ZYPREXA) disintegrating tablet 10 mg  10 mg Oral Nightly Narinder Munoz DO        thiamine mononitrate tablet 100 mg  100 mg Oral Daily Narinder Munoz DO   100 mg at 05/01/24 0811    carvedilol (COREG) tablet 12.5 mg  12.5 mg Oral BID WC Narinder Munoz DO   12.5 mg at 05/01/24 0812    loperamide (IMODIUM) capsule 2 mg  2 mg Oral 4x Daily PRN Narinder Munoz DO   2 mg at 04/30/24 2038    metoprolol (LOPRESSOR) injection 2.5 mg  2.5 mg IntraVENous Q6H PRN Narinder Munoz DO   2.5 mg at 04/29/24 1311    haloperidol lactate (HALDOL) injection 5 mg  5 mg IntraMUSCular Q6H PRN Narinder Munoz DO   5 mg at 04/30/24 0028    LORazepam (ATIVAN) injection 1 mg  1 mg IntraVENous Q2H PRN Gillian Dean MD   1 mg at 04/30/24 2038    lactulose (CHRONULAC) 10 GM/15ML solution 20 g  20 g Oral Daily PRN Narinder Munoz DO   20 g at 04/27/24 1557    bisacodyl (DULCOLAX) suppository 10 mg  10 mg Rectal Daily PRN Narinder Munoz DO        ipratropium 0.5 mg-albuterol 2.5 mg (DUONEB) nebulizer solution 1 Dose  1 Dose Inhalation Q4H WA RT Brandon Holloway MD   1 Dose at 05/01/24 1521

## 2024-04-30 NOTE — PLAN OF CARE
Problem: Discharge Planning  Goal: Discharge to home or other facility with appropriate resources  Outcome: Progressing  Flowsheets  Taken 4/30/2024 0745 by Saira Asencio RN  Discharge to home or other facility with appropriate resources: Identify barriers to discharge with patient and caregiver  Taken 4/29/2024 2000 by Yvonne Melendez RN  Discharge to home or other facility with appropriate resources: Identify barriers to discharge with patient and caregiver     Problem: Pain  Goal: Verbalizes/displays adequate comfort level or baseline comfort level  Outcome: Progressing  Flowsheets  Taken 4/30/2024 0400 by Yvonne Melendez RN  Verbalizes/displays adequate comfort level or baseline comfort level: Encourage patient to monitor pain and request assistance  Taken 4/30/2024 0000 by Yvonne Melendez RN  Verbalizes/displays adequate comfort level or baseline comfort level:   Encourage patient to monitor pain and request assistance   Assess pain using appropriate pain scale  Taken 4/29/2024 2000 by Yvonne Melendez RN  Verbalizes/displays adequate comfort level or baseline comfort level:   Encourage patient to monitor pain and request assistance   Assess pain using appropriate pain scale     Problem: Safety - Adult  Goal: Free from fall injury  Outcome: Progressing  Flowsheets (Taken 4/30/2024 0745)  Free From Fall Injury: Instruct family/caregiver on patient safety     Problem: Skin/Tissue Integrity  Goal: Absence of new skin breakdown  Description: 1.  Monitor for areas of redness and/or skin breakdown  2.  Assess vascular access sites hourly  3.  Every 4-6 hours minimum:  Change oxygen saturation probe site  4.  Every 4-6 hours:  If on nasal continuous positive airway pressure, respiratory therapy assess nares and determine need for appliance change or resting period.  Outcome: Progressing     Problem: ABCDS Injury Assessment  Goal: Absence of physical injury  Outcome: Progressing     Problem: Nutrition

## 2024-04-30 NOTE — PROGRESS NOTES
Evens De Leon received from ICU to room # 427 .  Mental Status: Patient is disoriented, alert, and able to concentrate and follow conversation.   Vitals:    04/30/24 1100   BP: (!) 165/73   Pulse: (!) 114   Resp: 25   Temp:    SpO2: 100%     Placed on cardiac monitor: No.  Belongings: None with public safety .  Family at bedside No.  Oriented Patient and  to room.  Call light within reach. Yes.  Transfer was: Well tolerated by patient..    Electronically signed by Lashaun Molina RN on 4/30/2024 at 12:56 PM

## 2024-04-30 NOTE — PROGRESS NOTES
4 Eyes Skin Assessment     NAME:  Evens De Leon  YOB: 1980  MEDICAL RECORD NUMBER:  [id number]    The patient is being assessed for  Transfer to New Unit    I agree that at least one RN has performed a thorough Head to Toe Skin Assessment on the patient. ALL assessment sites listed below have been assessed.      Areas assessed by both nurses:    Head, Face, Ears, Shoulders, Back, Chest, Arms, Elbows, Hands, Sacrum. Buttock, Coccyx, Ischium, Legs. Feet and Heels, and Under Medical Devices         Does the Patient have a Wound? No noted wound(s)       Peter Prevention initiated by RN: Yes  Wound Care Orders initiated by RN: No    Pressure Injury (Stage 3,4, Unstageable, DTI, NWPT, and Complex wounds) if present, place Wound referral order by RN under : No    New Ostomies, if present place, Ostomy referral order under : No     Nurse 1 eSignature: Electronically signed by Altagracia Campos RN on 4/30/24 at 2:46 PM CDT    **SHARE this note so that the co-signing nurse can place an eSignature**    Nurse 2 eSignature: {Esignature:[id number]}

## 2024-05-01 PROCEDURE — 94640 AIRWAY INHALATION TREATMENT: CPT

## 2024-05-01 PROCEDURE — 92526 ORAL FUNCTION THERAPY: CPT

## 2024-05-01 PROCEDURE — 6370000000 HC RX 637 (ALT 250 FOR IP)

## 2024-05-01 PROCEDURE — 6360000002 HC RX W HCPCS: Performed by: INTERNAL MEDICINE

## 2024-05-01 PROCEDURE — 6370000000 HC RX 637 (ALT 250 FOR IP): Performed by: INTERNAL MEDICINE

## 2024-05-01 PROCEDURE — 2700000000 HC OXYGEN THERAPY PER DAY

## 2024-05-01 PROCEDURE — 2580000003 HC RX 258

## 2024-05-01 PROCEDURE — 97129 THER IVNTJ 1ST 15 MIN: CPT

## 2024-05-01 PROCEDURE — 1200000000 HC SEMI PRIVATE

## 2024-05-01 PROCEDURE — 94760 N-INVAS EAR/PLS OXIMETRY 1: CPT

## 2024-05-01 RX ORDER — OLANZAPINE 10 MG/1
10 TABLET, ORALLY DISINTEGRATING ORAL NIGHTLY
Status: DISCONTINUED | OUTPATIENT
Start: 2024-05-01 | End: 2024-05-03 | Stop reason: HOSPADM

## 2024-05-01 RX ADMIN — ENOXAPARIN SODIUM 40 MG: 100 INJECTION SUBCUTANEOUS at 08:10

## 2024-05-01 RX ADMIN — SODIUM CHLORIDE, PRESERVATIVE FREE 10 ML: 5 INJECTION INTRAVENOUS at 20:25

## 2024-05-01 RX ADMIN — PAROXETINE 75 MG: 30 TABLET, FILM COATED ORAL at 08:10

## 2024-05-01 RX ADMIN — IPRATROPIUM BROMIDE AND ALBUTEROL SULFATE 1 DOSE: 2.5; .5 SOLUTION RESPIRATORY (INHALATION) at 11:13

## 2024-05-01 RX ADMIN — IPRATROPIUM BROMIDE AND ALBUTEROL SULFATE 1 DOSE: 2.5; .5 SOLUTION RESPIRATORY (INHALATION) at 18:26

## 2024-05-01 RX ADMIN — IPRATROPIUM BROMIDE AND ALBUTEROL SULFATE 1 DOSE: 2.5; .5 SOLUTION RESPIRATORY (INHALATION) at 07:13

## 2024-05-01 RX ADMIN — CARVEDILOL 12.5 MG: 12.5 TABLET, FILM COATED ORAL at 17:31

## 2024-05-01 RX ADMIN — Medication 100 MG: at 08:11

## 2024-05-01 RX ADMIN — CARVEDILOL 12.5 MG: 12.5 TABLET, FILM COATED ORAL at 08:12

## 2024-05-01 RX ADMIN — THERA TABS 1 TABLET: TAB at 08:11

## 2024-05-01 RX ADMIN — FOLIC ACID 1 MG: 1 TABLET ORAL at 08:11

## 2024-05-01 RX ADMIN — OLANZAPINE 10 MG: 10 TABLET, ORALLY DISINTEGRATING ORAL at 20:25

## 2024-05-01 RX ADMIN — IPRATROPIUM BROMIDE AND ALBUTEROL SULFATE 1 DOSE: 2.5; .5 SOLUTION RESPIRATORY (INHALATION) at 15:24

## 2024-05-01 RX ADMIN — HALOPERIDOL 5 MG: 5 TABLET ORAL at 08:11

## 2024-05-01 ASSESSMENT — ENCOUNTER SYMPTOMS
COLOR CHANGE: 0
DIARRHEA: 0
CONSTIPATION: 0
SHORTNESS OF BREATH: 0
VOMITING: 0
VOICE CHANGE: 0
NAUSEA: 0
BACK PAIN: 0

## 2024-05-01 NOTE — PLAN OF CARE
Problem: Discharge Planning  Goal: Discharge to home or other facility with appropriate resources  5/1/2024 0249 by Michelle Mittal RN  Outcome: Progressing  5/1/2024 0247 by Michelle Mittal RN  Outcome: Progressing     Problem: Pain  Goal: Verbalizes/displays adequate comfort level or baseline comfort level  5/1/2024 0249 by Michelle Mittal RN  Outcome: Progressing  5/1/2024 0247 by Michelle Mittal RN  Outcome: Progressing     Problem: Safety - Adult  Goal: Free from fall injury  5/1/2024 0249 by Michelle Mittal RN  Outcome: Progressing  5/1/2024 0247 by Michelle Mittal RN  Outcome: Progressing     Problem: Skin/Tissue Integrity  Goal: Absence of new skin breakdown  5/1/2024 0249 by Michelle Mittal RN  Outcome: Progressing  5/1/2024 0247 by Michelle Mittal RN  Outcome: Progressing     Problem: ABCDS Injury Assessment  Goal: Absence of physical injury  5/1/2024 0249 by Michelle Mittal RN  Outcome: Progressing  5/1/2024 0247 by Michelle Mittal RN  Outcome: Progressing     Problem: Nutrition Deficit:  Goal: Optimize nutritional status  5/1/2024 0249 by Michelle Mittal RN  Outcome: Progressing  5/1/2024 0247 by Michelle Mittal RN  Outcome: Progressing

## 2024-05-01 NOTE — PROGRESS NOTES
MG/5ML suspension 30 mL  30 mL Oral Daily PRN Narinder Munoz, DO   30 mL at 04/28/24 0843    albuterol (PROVENTIL) (2.5 MG/3ML) 0.083% nebulizer solution 2.5 mg  2.5 mg Nebulization Q4H PRN Alphonse Ronquillo MD        glucose chewable tablet 16 g  4 tablet Oral PRN Alphonse Ronquillo MD        dextrose bolus 10% 125 mL  125 mL IntraVENous PRN Alphonse Ronquillo MD   Stopped at 04/21/24 2312    Or    dextrose bolus 10% 250 mL  250 mL IntraVENous PRN Alphonse Ronquillo MD        glucagon injection 1 mg  1 mg SubCUTAneous PRN Alphonse Ronquillo MD        dextrose 10 % infusion   IntraVENous Continuous PRN Alphonse Ronquillo MD        multivitamin 1 tablet  1 tablet Oral Daily Maria Victoria Grace APRN - CNP   1 tablet at 05/01/24 0811    PARoxetine (PAXIL) tablet 75 mg  75 mg Oral QAM Maria Victoria Grace APRN - CNP   75 mg at 05/01/24 0810    sodium chloride flush 0.9 % injection 5-40 mL  5-40 mL IntraVENous PRN Maria Victoria Grace APRN - CNP   10 mL at 04/30/24 2038    0.9 % sodium chloride infusion   IntraVENous PRN Maria Victoria Grace APRN - CNP        potassium chloride (KLOR-CON M) extended release tablet 40 mEq  40 mEq Oral PRN Maria Victoria Grace APRN - CNP   40 mEq at 04/30/24 1216    Or    potassium bicarb-citric acid (EFFER-K) effervescent tablet 40 mEq  40 mEq Oral PRN Maria Victoria Grace APRN - CNP   40 mEq at 04/28/24 1401    Or    potassium chloride 10 mEq/100 mL IVPB (Peripheral Line)  10 mEq IntraVENous PRN Maria Victoria Grace APRN - CNP        magnesium sulfate 2000 mg in 50 mL IVPB premix  2,000 mg IntraVENous PRN Maria Victoria Grace APRN - CNP        ondansetron (ZOFRAN) injection 4 mg  4 mg IntraVENous Q6H PRN Maria Victoria Grace APRN - CNP        polyethylene glycol (GLYCOLAX) packet 17 g  17 g Oral Daily PRN Maria Victoria Grace APRN - CNP   17 g at 04/28/24 0843    potassium chloride 20 mEq/50 mL IVPB (Central Line)  20 mEq IntraVENous PRN Brandon Holloway MD        Or    potassium chloride 10 mEq/100 mL IVPB  (Peripheral Line)  10 mEq IntraVENous PRN Brandon Holloway MD   Stopped at 04/21/24 0840    acetaminophen (TYLENOL) tablet 650 mg  650 mg Oral Q6H PRN Brandon Holloway MD   650 mg at 04/25/24 1726    Or    acetaminophen (TYLENOL) suppository 650 mg  650 mg Rectal Q6H PRN Brandon Holloway MD   650 mg at 04/29/24 0019    enoxaparin (LOVENOX) injection 40 mg  40 mg SubCUTAneous Daily Brandon Holloway MD   40 mg at 05/01/24 0810    folic acid (FOLVITE) tablet 1 mg  1 mg Oral Daily Brandon Holloway MD   1 mg at 05/01/24 0811       Past Medical History:  Past Medical History:   Diagnosis Date    Alcohol abuse     Cancer of tonsil (HCC) 05/09/2023    Dog bite     Oropharyngeal dysphagia 05/09/2023    Psychiatric problem     Suicidal ideation 04/21/2019       Past Surgical History:  Past Surgical History:   Procedure Laterality Date    ADENOIDECTOMY      COLONOSCOPY      TONSILLECTOMY Right     TYMPANOSTOMY TUBE PLACEMENT      US LYMPH NODE BIOPSY  10/21/2022    US LYMPH NODE BIOPSY 10/21/2022 L ULTRASOUND       Family History  Family History   Problem Relation Age of Onset    High Blood Pressure Mother        Social History  Social History     Socioeconomic History    Marital status:      Spouse name: Not on file    Number of children: Not on file    Years of education: Not on file    Highest education level: Not on file   Occupational History    Not on file   Tobacco Use    Smoking status: Every Day     Current packs/day: 1.00     Average packs/day: 1 pack/day for 20.0 years (20.0 ttl pk-yrs)     Types: Cigarettes    Smokeless tobacco: Never   Vaping Use    Vaping Use: Never used   Substance and Sexual Activity    Alcohol use: Not Currently     Comment: \"every chance I get\"    Drug use: No    Sexual activity: Not on file   Other Topics Concern    Not on file   Social History Narrative    Not on file     Social Determinants of Health     Financial Resource Strain: Medium Risk (4/22/2024)    Overall Financial Resource

## 2024-05-01 NOTE — PROGRESS NOTES
Facility/Department: [facility] 4 ONCOLOGY UNIT  Speech/Language/Cognitive/Swallow Therapy     NAME: Evens De Leon  : 1980   MRN: [id number]  ADMISSION DATE: 2024  ADMITTING DIAGNOSIS: has Psychosis (HCC); Methamphetamine abuse (HCC); Suicidal ideation; Macrocytic anemia; Alcohol abuse with physiological dependence (HCC); Alcohol intoxication, episodic, uncomplicated (HCC); Alcohol intoxication delirium (HCC); Alcohol dependence with withdrawal delirium (HCC); Alcohol abuse with intoxication (HCC); Alcohol withdrawal syndrome, uncomplicated (HCC); Oropharyngeal dysphagia; Cancer of tonsil (HCC); MRSA nasal colonization; Nasal obstruction; Alcohol withdrawal delirium (HCC); Fever; and Altered mental status on their problem list.    Date of Treat: 2024   Evaluating Therapist: ANDREAS De Santiago    Pain:  Pain Assessment  Pain Assessment: 0-10  Pain Level: 0    Assessment:  Completed MINI MENTAL STATE EXAMINATION and patient obtained 8 out of 30 possible points, indicative of severe cognitive-linguistic impairment. Subsequently transitioned to full re-assessment and patient exhibited decreased select and sustained attention, slow processing, delayed and decreased auditory comprehension (particularly as length and complexity of information increases), impaired verbalizations with moderate delays noted, mild fragmentations observed, and mild-moderate instances where utterances did not pertain to topics and questions at hand, decreased immediate/short-term memory, and decreased reasoning/problem solving.    It is noted that during re-evaluation, patient demonstrated ability to verbalize current PCP and pharmacy at independent level. Patient demonstrated significant difficulty verbalizing appropriate simple solutions to situations that could occur during activities of daily living independently.    Also re-assessed patient's swallowing function. Patient exhibited decreased oral prep and decreased oral transit of

## 2024-05-01 NOTE — PLAN OF CARE
Problem: Discharge Planning  Goal: Discharge to home or other facility with appropriate resources  Outcome: Progressing     Problem: Pain  Goal: Verbalizes/displays adequate comfort level or baseline comfort level  Outcome: Progressing     Problem: Safety - Adult  Goal: Free from fall injury  Outcome: Progressing     Problem: Skin/Tissue Integrity  Goal: Absence of new skin breakdown  Outcome: Progressing     Problem: ABCDS Injury Assessment  Goal: Absence of physical injury  Outcome: Progressing     Problem: Nutrition Deficit:  Goal: Optimize nutritional status  Outcome: Progressing

## 2024-05-02 LAB
ALBUMIN SERPL-MCNC: 3.6 G/DL (ref 3.5–5.2)
ALP SERPL-CCNC: 147 U/L (ref 40–130)
ALT SERPL-CCNC: 62 U/L (ref 5–41)
ANION GAP SERPL CALCULATED.3IONS-SCNC: 18 MMOL/L (ref 7–19)
AST SERPL-CCNC: 67 U/L (ref 5–40)
BASOPHILS # BLD: 0.2 K/UL (ref 0–0.2)
BASOPHILS NFR BLD: 1.9 % (ref 0–1)
BILIRUB SERPL-MCNC: 0.3 MG/DL (ref 0.2–1.2)
BUN SERPL-MCNC: 19 MG/DL (ref 6–20)
CALCIUM SERPL-MCNC: 9.4 MG/DL (ref 8.6–10)
CHLORIDE SERPL-SCNC: 102 MMOL/L (ref 98–111)
CO2 SERPL-SCNC: 26 MMOL/L (ref 22–29)
CREAT SERPL-MCNC: 0.4 MG/DL (ref 0.5–1.2)
EOSINOPHIL # BLD: 0.4 K/UL (ref 0–0.6)
EOSINOPHIL NFR BLD: 3.8 % (ref 0–5)
ERYTHROCYTE [DISTWIDTH] IN BLOOD BY AUTOMATED COUNT: 12.4 % (ref 11.5–14.5)
GLUCOSE SERPL-MCNC: 125 MG/DL (ref 74–109)
HCT VFR BLD AUTO: 38.6 % (ref 42–52)
HGB BLD-MCNC: 12.7 G/DL (ref 14–18)
IMM GRANULOCYTES # BLD: 0.1 K/UL
LYMPHOCYTES # BLD: 2.7 K/UL (ref 1.1–4.5)
LYMPHOCYTES NFR BLD: 28.3 % (ref 20–40)
MAGNESIUM SERPL-MCNC: 2.2 MG/DL (ref 1.6–2.6)
MCH RBC QN AUTO: 32.6 PG (ref 27–31)
MCHC RBC AUTO-ENTMCNC: 32.9 G/DL (ref 33–37)
MCV RBC AUTO: 99.2 FL (ref 80–94)
MONOCYTES # BLD: 0.8 K/UL (ref 0–0.9)
MONOCYTES NFR BLD: 8 % (ref 0–10)
NEUTROPHILS # BLD: 5.5 K/UL (ref 1.5–7.5)
NEUTS SEG NFR BLD: 57.4 % (ref 50–65)
PHOSPHATE SERPL-MCNC: 3.7 MG/DL (ref 2.5–4.5)
PLATELET # BLD AUTO: 898 K/UL (ref 130–400)
PMV BLD AUTO: 9.6 FL (ref 9.4–12.4)
POTASSIUM SERPL-SCNC: 3.5 MMOL/L (ref 3.5–5)
PROT SERPL-MCNC: 7 G/DL (ref 6.6–8.7)
RBC # BLD AUTO: 3.89 M/UL (ref 4.7–6.1)
SODIUM SERPL-SCNC: 146 MMOL/L (ref 136–145)
TSH SERPL DL<=0.005 MIU/L-ACNC: 3.23 UIU/ML (ref 0.27–4.2)
WBC # BLD AUTO: 9.6 K/UL (ref 4.8–10.8)

## 2024-05-02 PROCEDURE — 97165 OT EVAL LOW COMPLEX 30 MIN: CPT

## 2024-05-02 PROCEDURE — 92526 ORAL FUNCTION THERAPY: CPT

## 2024-05-02 PROCEDURE — 97116 GAIT TRAINING THERAPY: CPT

## 2024-05-02 PROCEDURE — 80053 COMPREHEN METABOLIC PANEL: CPT

## 2024-05-02 PROCEDURE — 85025 COMPLETE CBC W/AUTO DIFF WBC: CPT

## 2024-05-02 PROCEDURE — 6370000000 HC RX 637 (ALT 250 FOR IP): Performed by: INTERNAL MEDICINE

## 2024-05-02 PROCEDURE — 6360000002 HC RX W HCPCS: Performed by: INTERNAL MEDICINE

## 2024-05-02 PROCEDURE — 84100 ASSAY OF PHOSPHORUS: CPT

## 2024-05-02 PROCEDURE — 1200000000 HC SEMI PRIVATE

## 2024-05-02 PROCEDURE — 84443 ASSAY THYROID STIM HORMONE: CPT

## 2024-05-02 PROCEDURE — 83735 ASSAY OF MAGNESIUM: CPT

## 2024-05-02 PROCEDURE — 6370000000 HC RX 637 (ALT 250 FOR IP)

## 2024-05-02 PROCEDURE — 99254 IP/OBS CNSLTJ NEW/EST MOD 60: CPT | Performed by: PSYCHIATRY & NEUROLOGY

## 2024-05-02 PROCEDURE — 97161 PT EVAL LOW COMPLEX 20 MIN: CPT

## 2024-05-02 PROCEDURE — 6370000000 HC RX 637 (ALT 250 FOR IP): Performed by: PSYCHIATRY & NEUROLOGY

## 2024-05-02 PROCEDURE — 97535 SELF CARE MNGMENT TRAINING: CPT

## 2024-05-02 PROCEDURE — 36415 COLL VENOUS BLD VENIPUNCTURE: CPT

## 2024-05-02 PROCEDURE — 94640 AIRWAY INHALATION TREATMENT: CPT

## 2024-05-02 PROCEDURE — 94760 N-INVAS EAR/PLS OXIMETRY 1: CPT

## 2024-05-02 PROCEDURE — 97129 THER IVNTJ 1ST 15 MIN: CPT

## 2024-05-02 RX ORDER — HYDROXYZINE HYDROCHLORIDE 25 MG/1
25 TABLET, FILM COATED ORAL EVERY 6 HOURS PRN
Status: DISCONTINUED | OUTPATIENT
Start: 2024-05-02 | End: 2024-05-03 | Stop reason: HOSPADM

## 2024-05-02 RX ORDER — PAROXETINE HYDROCHLORIDE 20 MG/1
20 TABLET, FILM COATED ORAL EVERY MORNING
Status: DISCONTINUED | OUTPATIENT
Start: 2024-05-03 | End: 2024-05-03 | Stop reason: HOSPADM

## 2024-05-02 RX ORDER — NICOTINE 21 MG/24HR
1 PATCH, TRANSDERMAL 24 HOURS TRANSDERMAL DAILY
Status: DISCONTINUED | OUTPATIENT
Start: 2024-05-02 | End: 2024-05-03 | Stop reason: HOSPADM

## 2024-05-02 RX ADMIN — FOLIC ACID 1 MG: 1 TABLET ORAL at 08:47

## 2024-05-02 RX ADMIN — CARVEDILOL 12.5 MG: 12.5 TABLET, FILM COATED ORAL at 17:30

## 2024-05-02 RX ADMIN — IPRATROPIUM BROMIDE AND ALBUTEROL SULFATE 1 DOSE: 2.5; .5 SOLUTION RESPIRATORY (INHALATION) at 10:57

## 2024-05-02 RX ADMIN — IPRATROPIUM BROMIDE AND ALBUTEROL SULFATE 1 DOSE: 2.5; .5 SOLUTION RESPIRATORY (INHALATION) at 18:29

## 2024-05-02 RX ADMIN — IPRATROPIUM BROMIDE AND ALBUTEROL SULFATE 1 DOSE: 2.5; .5 SOLUTION RESPIRATORY (INHALATION) at 14:11

## 2024-05-02 RX ADMIN — Medication 100 MG: at 08:47

## 2024-05-02 RX ADMIN — HYDROXYZINE HYDROCHLORIDE 25 MG: 25 TABLET, FILM COATED ORAL at 18:33

## 2024-05-02 RX ADMIN — ACETAMINOPHEN 650 MG: 325 TABLET ORAL at 09:36

## 2024-05-02 RX ADMIN — IPRATROPIUM BROMIDE AND ALBUTEROL SULFATE 1 DOSE: 2.5; .5 SOLUTION RESPIRATORY (INHALATION) at 06:32

## 2024-05-02 RX ADMIN — ENOXAPARIN SODIUM 40 MG: 100 INJECTION SUBCUTANEOUS at 08:50

## 2024-05-02 RX ADMIN — CARVEDILOL 12.5 MG: 12.5 TABLET, FILM COATED ORAL at 08:47

## 2024-05-02 RX ADMIN — OLANZAPINE 10 MG: 10 TABLET, ORALLY DISINTEGRATING ORAL at 22:54

## 2024-05-02 RX ADMIN — THERA TABS 1 TABLET: TAB at 08:47

## 2024-05-02 RX ADMIN — PAROXETINE 75 MG: 30 TABLET, FILM COATED ORAL at 08:47

## 2024-05-02 ASSESSMENT — ENCOUNTER SYMPTOMS
DIARRHEA: 0
NAUSEA: 0
SHORTNESS OF BREATH: 0
CONSTIPATION: 0
VOMITING: 0
VOICE CHANGE: 0
COLOR CHANGE: 0
BACK PAIN: 0

## 2024-05-02 ASSESSMENT — PAIN DESCRIPTION - LOCATION: LOCATION: HEAD

## 2024-05-02 ASSESSMENT — PAIN SCALES - GENERAL: PAINLEVEL_OUTOF10: 6

## 2024-05-02 NOTE — CONSULTS
[facility]  Psychiatry Consult    Reason for Consult: Concern   Suicidal ideations    The primary source(s) of information include(s):  patient    The patient is a 43 y.o. male with previous psychiatric history of alcohol use disorder, stimulant use disorder, substance-induced mood disorder, who has been admitted to medical services 2 weeks ago, secondary to altered mental status.  Patient's blood alcohol level was less than 10.  His UDS was positive for methamphetamine and benzodiazepines.  Patient's QTc was prolonged at 520.  Due to patient's combativeness, he was admitted to ICU, was sedated and intubated.    Patient is well-known to psychiatry due to previous consults, when patient was admitted to medical services secondary to alcohol intoxication.    Patient has been seen in his room with presence of one-to-one sitter.  He reported that he started using methamphetamine 10 years ago and was using it sporadically, however, during the last 6 months he was using methamphetamine \"every other day, 2-3 times a day\".  Patient reported that he is chronic alcoholic and he drinks vodka daily \"7-8 shots, approximately pint a day, every day\".  Patient denies any withdrawal symptoms from used substances today, with exception of night sweats.    In regards of affective symptomatology he endorses mild feeling of depression, anxiety, low energy level, general muscle weakness, feeling of fatigue and tiredness.  Patient endorses fair appetite and fair quality of sleep.  He denies any mood swings or racing thoughts.  Patient denies feeling of hopelessness, helplessness and worthlessness.    He denies current active suicidal and homicidal ideations, denies any plans.  Patient denies auditory and visual hallucination.  He did not endorse any delusions or paranoid thoughts.    Patient reported history of being admitted to rehab facilities in the past.  At the end of the interview patient requested for  assistance with placement to rehab facility for alcohol and drug use disorder.      PSYCHIATRIC HISTORY:  Diagnoses: Alcohol use disorder, stimulant use disorder, substance-induced mood disorder  Suicide attempts/gestures: Denies   Prior hospitalizations: Denies   Medication trials: Paxil, Zyprexa, Atarax, trazodone   Head trauma: Denies    Allergies:  Patient has no known allergies.    Mental Status  Appearance: Appropriately groomed and in hospital attire. Made good eye contact.   Behavior: Calm, cooperative, friendly, and socially appropriate. No psychomotor retardation/agitation appreciated.  Gait was not evaluated, as patient was sitting on the armchair.   Speech: Normal in tone, volume, and quality.  No pressured speech noted  Mood: \"Much better\"   Affect: Mood congruent. Range is slightly restricted  Thought Process: Appears linear and goal oriented.   Thought Content: Patient does not have any current active suicidal and homicidal ideations. No overt delusions or paranoia appreciated.   Perceptions: Seems patient does not have any auditory or visual hallucinations at present time. Patient did not appear to be responding to internal stimuli. No overt psychosis.   Executive Functions: Appear mildly impaired.   Concentration: Seems fair.   Orientation: to person, place and situation.   Alert.   Language: Intact.   Fund of information: Intact.   Memory: recent and remote appear intact.   Impulsivity: Seems improved  Neurovegitative: Fair appetite, fair sleep  Insight: Limited  Judgment: Limited    Assessment    DSM 5 DIAGNOSIS:  Mood disorder, unspecified  Alcohol use disorder, severe  Stimulant use disorder, severe  Rule out substance-induced mood disorder    Recommendations  1. Currently patient is not suicidal, homicidal or psychotic.  Patient denies significant withdrawal symptoms from used substances with an exception of night sweats.  2.  From psychiatric standpoint one-to-one sitter can be

## 2024-05-02 NOTE — PROGRESS NOTES
Facility/Department: [facility] 4 ONCOLOGY UNIT  SPEECH/LANGUAGE/COGNITIVE/SWALLOW THERAPY      NAME: Evens De Leon  : 1980  MRN: [id number]    ADMISSION DATE: 2024  ADMITTING DIAGNOSIS: has Psychosis (HCC); Methamphetamine abuse (HCC); Suicidal ideation; Macrocytic anemia; Alcohol abuse with physiological dependence (HCC); Alcohol intoxication, episodic, uncomplicated (HCC); Alcohol intoxication delirium (HCC); Alcohol dependence with withdrawal delirium (HCC); Alcohol abuse with intoxication (HCC); Alcohol withdrawal syndrome, uncomplicated (HCC); Oropharyngeal dysphagia; Cancer of tonsil (HCC); MRSA nasal colonization; Nasal obstruction; Alcohol withdrawal delirium (HCC); Fever; and Altered mental status on their problem list.    Date of Treat: 2024  Evaluating Therapist: ANDREAS De Santiago    Current Diet level:  Puree consistency with moderately thick/honey thick liquids    Pain:  Pain Assessment  Pain Assessment: 0-10  Pain Level: 6  Patient's Stated Pain Goal: 2  Pain Location: Head    Reason for Referral  Evens De Leon was referred for a bedside swallow evaluation to assess the efficiency of his swallow function, identify signs and symptoms of aspiration and make recommendations regarding safe dietary consistencies, effective compensatory strategies, and safe eating environment.    Impression  Completed MINI MENTAL STATE EXAMINATION and patient obtained 22 out of 30 possible points, indicative of mild cognitive-linguistic impairment. Subsequently transitioned to full re-assessment and patient exhibited decreased select and sustained attention, slow processing, delayed and mildly decreased auditory comprehension (particularly as length and complexity of information increases), impaired verbalizations with moderate delays noted and mild semantic paraphasias observed, decreased immediate/short-term memory, and decreased reasoning/problem solving.     It is noted that during re-evaluation, patient  independently.)     Additional Assessment:   Re-assessed patient's swallowing function. Oral transit of puree consistency presentations, administered independently, varied from 1-3 seconds in length and no oral cavity residue was noted post swallows. With regular solid consistency trials presented independently, patient exhibited primarily  vertical jaw movement at the front of the mouth during oral prep. Oral transit of regular solid consistency varied from 1-3 seconds in length and min oral cavity residue was observed post swallows; residue cleared from the mouth additional dry swallows. Oral transit of nectar thick liquid trials, presented independently via cup, primarily measured 1 second in length. Patient exhibited inconsistently fast oral transit of thin H2O trials presented independently via cup. Laryngeal elevation during swallow initiation was considered to be sluggish and inconsistently mildly decreased for swallow airway protection. No outward S/S penetration/aspiration was noted with any puree consistency presentation, regular solid consistency trial, or nectar thick liquid trial presented during treatment session this date. Mild wet vocal quality and mild delayed throat clears were observed with thin H2O trials.     At this time, would trial soft and bite sized consistency with mildly thick/nectar thick liquids. Trial meds whole in pudding/applesauce. If patient receives mouth care prior to intake, okay for ice chips and sips regular water IN BETWEEN MEALS for comfort. Will continue to follow.    Electronically signed by ANDREAS De Santiago on 5/2/2024 at 1:13 PM

## 2024-05-02 NOTE — CARE COORDINATION
Matthias De Leon \"Indra\" #[id number] (CSN:[id number]) (:1980 43 y.o. M) (Adm: 24)  [facility] [id number]  PCP    ASHWIN HILL  Demographics  CommentAddress   [address] [place] [address]    Home Phone   [phone]    Work Phone       Mobile Phone   [phone]             Social Security Number       Insurance Information   [employer]    Marital Status       Muslim   None                Status   No [002]     Insurance Payors as of 2024    [employer]    [facility] Member: [id number] Effective from: 10/1/2023   Subscriber: MATTHIAS DE LEON Subscriber ID: [id number] Guarantor: MATTHIAS DE LEON     Documents Filed to Patient    Power of  Living Will Clinical Unknown Study Attachment Consent Form ABN Waiver After Visit Summary Lab Result Scan Code Status MyChart Status Advance Care Planning    Not on File  Not on File  Not on File  Not on File  Not on File  Filed  Filed  Not on File  FULL [Updated on 24 1806]  Active Jump to the Activity      Auth/Cert Information    Open auth/cert linked to hospital account [id number]      Patient Contacts    Name Relation Home Work Mobile   Buck De Leon Brother/Sister [phone]   FlorindaCasi  [phone]     Cynthia De Leon Parent [phone]     ÁngelaParth kwok 20 yo Child [phone]       Admission Information    Current Information    Attending Provider Admitting Provider Admission Type Admission Status   Narinder Munoz DO  Emergency Confirmed Admission          Admission Date/Time Discharge Date Hospital Service Auth/Cert Status   24  1155  Medical Incomplete          Hospital Area Unit Room/Bed    [facility] 4 ONCOLOGY UNIT [id number]/[id number]              Hospital Account    Name Acct ID Class Status Primary Coverage   Matthias De Leon [id number] [facility]            Guarantor Account (for    omeprazole (PRILOSEC) 40 MG delayed release capsule Take 1 capsule by mouth daily 4/24/23     Omkar Quick MD   OLANZapine (ZYPREXA) 15 MG tablet TAKE 1 TABLET BY MOUTH ONCE DAILY AT BEDTIME 8/22/22     Omkar Quick MD   vitamin D (ERGOCALCIFEROL) 1.25 MG (17704 UT) CAPS capsule TAKE 1 CAPSULE BY MOUTH ONCE A WEEK 8/25/22     Omkar Quick MD   Diclofenac Potassium,Migraine, 50 MG PACK Take 75 mg by mouth 2 times daily       Omkar Quick MD   Multiple Vitamin (MULTIVITAMIN) TABS tablet Take 1 tablet by mouth daily 12/28/20 1/3/23   Manish, Jonatan THAYER MD   PARoxetine (PAXIL) 40 MG tablet Take 75 mg by mouth every morning       Omkar Quick MD         Allergies:    Patient has no known allergies.     Social History:    Tobacco:   reports that he has been smoking cigarettes. He has a 20.0 pack-year smoking history. He has never used smokeless tobacco.  Alcohol:   reports that he does not currently use alcohol.  Illicit Drugs: Current methamphetamines     Family History:  Family History             Problem Relation Age of Onset    High Blood Pressure Mother           Review of Systems   Unable to perform ROS: Mental status change      Physical Examination:  BP (!) 144/115   Pulse (!) 114   Temp 98.4 °F (36.9 °C) (Oral)   Resp 19   Wt 81.6 kg (180 lb)   SpO2 97%   BMI 26.58 kg/m²      Physical Exam  Constitutional:       Comments: Disheveled appearing   Eyes:      Pupils: Pupils are equal, round, and reactive to light.   Cardiovascular:      Rate and Rhythm: Regular rhythm. Tachycardia present.      Pulses: Normal pulses.      Heart sounds: Normal heart sounds.   Pulmonary:      Effort: Pulmonary effort is normal. No respiratory distress.      Breath sounds: Normal breath sounds. No wheezing.   Abdominal:      General: Abdomen is flat.      Palpations: Abdomen is soft.   Skin:     General: Skin is warm and dry.      Capillary Refill: Capillary refill takes less than 2

## 2024-05-02 NOTE — CARE COORDINATION
Randi spoke with pt about discharge plan and shared some resources in consideration of rehab. Pt states that he has been to [facility] before and wants to return at discharge. Sw called [facility] to see if pt can come back. Ciro pedro wants a refer all sent first and will contact Sw once they receive it.    [phone]; option 1  Ask for Access Center  [email or web address]  [phone] fax number for clinical updates  [email or web address]    Ciro Pedro  [address] [address]   Electronically signed by Nighat Barragan on 5/2/2024 at 2:34 PM

## 2024-05-02 NOTE — PLAN OF CARE
Problem: Discharge Planning  Goal: Discharge to home or other facility with appropriate resources  5/2/2024 1148 by Cinthia Wahl RN  Outcome: Progressing  5/2/2024 0337 by Michelle Mittal RN  Outcome: Progressing     Problem: Pain  Goal: Verbalizes/displays adequate comfort level or baseline comfort level  5/2/2024 1148 by Cinthia Wahl RN  Outcome: Progressing  5/2/2024 0337 by Michelle Mittal RN  Outcome: Progressing     Problem: Safety - Adult  Goal: Free from fall injury  5/2/2024 1148 by Cinthia Wahl RN  Outcome: Progressing  5/2/2024 0337 by Michelle Mittal RN  Outcome: Progressing     Problem: Skin/Tissue Integrity  Goal: Absence of new skin breakdown  Description: 1.  Monitor for areas of redness and/or skin breakdown  2.  Assess vascular access sites hourly  3.  Every 4-6 hours minimum:  Change oxygen saturation probe site  4.  Every 4-6 hours:  If on nasal continuous positive airway pressure, respiratory therapy assess nares and determine need for appliance change or resting period.  5/2/2024 1148 by Cinthia Wahl RN  Outcome: Progressing  5/2/2024 0337 by Michelle Mittal RN  Outcome: Progressing     Problem: ABCDS Injury Assessment  Goal: Absence of physical injury  5/2/2024 1148 by Cinthia Wahl RN  Outcome: Progressing  5/2/2024 0337 by Michelle Mittal RN  Outcome: Progressing     Problem: Nutrition Deficit:  Goal: Optimize nutritional status  5/2/2024 1148 by Cinthia Wahl RN  Outcome: Progressing  5/2/2024 0337 by Michelle Mittal RN  Outcome: Progressing

## 2024-05-02 NOTE — PROGRESS NOTES
Physical Therapy  Facility/Department: [facility] ONCOLOGY UNIT  Physical Therapy Initial Assessment    Name: Evens De Leon  : 1980  MRN: [id number]  Date of Service: 2024    Discharge Recommendations:  Continue to assess pending progress   PT Equipment Recommendations  Other: MAY NEED A RW      Patient Diagnosis(es): The primary encounter diagnosis was Altered mental status, unspecified altered mental status type. 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  Past Medical History:  has a past medical history of Alcohol abuse, Cancer of tonsil (HCC), Dog bite, Oropharyngeal dysphagia, Psychiatric problem, and Suicidal ideation.  Past Surgical History:  has a past surgical history that includes Tympanostomy tube placement; Adenoidectomy; Colonoscopy; US BIOPSY LYMPH NODE (10/21/2022); and Tonsillectomy (Right).    Assessment   Body Structures, Functions, Activity Limitations Requiring Skilled Therapeutic Intervention: Decreased functional mobility ;Decreased endurance  Assessment: pt WOULD BENEFIT FROM SKILLED PT IN THIS SETTING TO ADDRESS HIS MOBILITY/ENDURANCE DEFICITS  Therapy Prognosis: Good  Decision Making: Low Complexity  Requires PT Follow-Up: Yes  Activity Tolerance  Activity Tolerance: Patient tolerated treatment well     Plan   Physical Therapy Plan  General Plan: 5-7 times per week  Therapy Duration: 2 Weeks  Current Treatment Recommendations: Strengthening, Balance training, Functional mobility training, Transfer training, Gait training, Safety education & training, Patient/Caregiver education & training  Safety Devices  Type of Devices: Call light within reach, Heels elevated for pressure relief, Sitter present, Gait belt, Left in chair     Restrictions  Restrictions/Precautions  Restrictions/Precautions: Fall Risk, Contact Precautions     Subjective   General  Patient assessed for rehabilitation services?:      Rosa Schroeder, PT     Electronically signed by Rosa Schroeder PT on 5/2/2024 at 11:25 AM

## 2024-05-02 NOTE — PROGRESS NOTES
Comprehensive Nutrition Assessment    Type and Reason for Visit:  Reassess    Nutrition Recommendations/Plan:   Add Gelatein ONS with dinner.     Malnutrition Assessment:  Malnutrition Status:  At risk for malnutrition (Comment) (05/02/24 1624)    Context:  Acute Illness     Findings of the 6 clinical characteristics of malnutrition:  Energy Intake:  Mild decrease in energy intake (Comment)  Weight Loss:  No significant weight loss     Body Fat Loss:  No significant body fat loss     Muscle Mass Loss:  No significant muscle mass loss    Fluid Accumulation:  No significant fluid accumulation Generalized   Strength:  Not Performed    Nutrition Assessment:    Pt transferred to medical floor. SLP following, advanced today from puree with moderately thick liquids to Soft and Bite Sized with mildly thick liquids. Documented intake: 0-25%, 51-75%. Noted orders for Zyprexa. Pt requested to have chicken soup with dinner tonight. Informed Nutrition Services Ambassador of pt's request. Pt also agreeable to try Gelatein ONS with dinner.    Nutrition Related Findings:    BM 5/1. No edema. K+ 146, glu 125. Wound Type: None       Current Nutrition Intake & Therapies:    Average Meal Intake: 0%, 1-25%, 51-75%  Average Supplements Intake: None Ordered  ADULT DIET; Dysphagia - Soft and Bite Sized; Mildly Thick ([place])  ADULT ORAL NUTRITION SUPPLEMENT; Dinner; Fortified Gelatin Oral Supplement    Anthropometric Measures:  Height: 175.3 cm (5' 9.02\")  Ideal Body Weight (IBW): 160 lbs (73 kg)    Admission Body Weight: 81.6 kg (179 lb 14.3 oz) (stated)  Current Body Weight: 78 kg (171 lb 15.3 oz), 120.6 % IBW. Weight Source: Bed Scale  Current BMI (kg/m2): 25.4  Usual Body Weight: 73.5 kg (162 lb 0.6 oz) (12/6/2024)  % Weight Change (Calculated): 7.2  BMI Categories: Overweight (BMI 25.0-29.9)    Estimated Daily Nutrient Needs:  Energy Requirements Based On: Kcal/kg  Weight Used for Energy Requirements: Current  Energy (kcal/day):

## 2024-05-02 NOTE — PLAN OF CARE
Problem: Discharge Planning  Goal: Discharge to home or other facility with appropriate resources  5/2/2024 0337 by Michelle Mittal RN  Outcome: Progressing  5/1/2024 1808 by Cinthia Wahl RN  Outcome: Progressing     Problem: Pain  Goal: Verbalizes/displays adequate comfort level or baseline comfort level  5/2/2024 0337 by Michelle Mittal RN  Outcome: Progressing  5/1/2024 1808 by Cinthia Wahl RN  Outcome: Progressing     Problem: Safety - Adult  Goal: Free from fall injury  5/2/2024 0337 by Michelle Mittal RN  Outcome: Progressing  5/1/2024 1808 by Cinthia Wahl RN  Outcome: Progressing     Problem: Skin/Tissue Integrity  Goal: Absence of new skin breakdown  5/2/2024 0337 by Michelle Mittal RN  Outcome: Progressing  5/1/2024 1808 by Cinthia Wahl RN  Outcome: Progressing     Problem: ABCDS Injury Assessment  Goal: Absence of physical injury  5/2/2024 0337 by Michelle Mittal RN  Outcome: Progressing  5/1/2024 1808 by Cinthia Wahl RN  Outcome: Progressing     Problem: Nutrition Deficit:  Goal: Optimize nutritional status  5/2/2024 0337 by Michelle Mittal RN  Outcome: Progressing  5/1/2024 1808 by Cinthia Wahl RN  Outcome: Progressing

## 2024-05-02 NOTE — PROGRESS NOTES
Occupational Therapy Initial Assessment  Date: 2024   Patient Name: Evens De Leon  MRN: [id number]     : 1980    Date of Service: 2024    Discharge Recommendations:  Home with assist PRN  OT Equipment Recommendations  Other: May want RW initially    Assessment   Assessment: Evaluation completed and tx initiated. Losing balance initially, but upgraded to CGA after mobilizing.  Treatment Diagnosis: Alcohol withdrawal delerium, possible aspiration, s/p vent  REQUIRES OT FOLLOW-UP: Yes  Activity Tolerance  Activity Tolerance: Patient Tolerated treatment well           Patient Diagnosis(es): The primary encounter diagnosis was Altered mental status, unspecified altered mental status type. 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   has a past medical history of Alcohol abuse, Cancer of tonsil (HCC), Dog bite, Oropharyngeal dysphagia, Psychiatric problem, and Suicidal ideation.   has a past surgical history that includes Tympanostomy tube placement; Adenoidectomy; Colonoscopy; US BIOPSY LYMPH NODE (10/21/2022); and Tonsillectomy (Right).    Treatment Diagnosis: Alcohol withdrawal delerium, possible aspiration, s/p vent      Restrictions  Restrictions/Precautions  Restrictions/Precautions: Fall Risk, Contact Precautions    Subjective   General  Chart Reviewed: Yes  Patient assessed for rehabilitation services?: Yes  Family / Caregiver Present: No  Pain Assessment  Pain Level: 6  Patient's Stated Pain Goal: 2  Pain Location: Head  Pain Screening  Pain at present: 4 (headache)  Scale Used: Numeric Score  Intervention List: Patient able to continue with treatment  Comments / Details: 6/10 headache with activity--nurse notified    Social/Functional History  Social/Functional History  Lives With: Family  ADL Assistance: Independent  Ambulation Assistance: Independent  Transfer Assistance: Independent  Additional

## 2024-05-02 NOTE — PROGRESS NOTES
MD   1 mg at 04/30/24 2038    lactulose (CHRONULAC) 10 GM/15ML solution 20 g  20 g Oral Daily PRN Narinder Munoz DO   20 g at 04/27/24 1557    bisacodyl (DULCOLAX) suppository 10 mg  10 mg Rectal Daily PRN Narinder Munoz DO        ipratropium 0.5 mg-albuterol 2.5 mg (DUONEB) nebulizer solution 1 Dose  1 Dose Inhalation Q4H WA RT Brandon Holloway MD   1 Dose at 05/02/24 1411    magnesium hydroxide (MILK OF MAGNESIA) 400 MG/5ML suspension 30 mL  30 mL Oral Daily PRN Narinder Munoz DO   30 mL at 04/28/24 0843    albuterol (PROVENTIL) (2.5 MG/3ML) 0.083% nebulizer solution 2.5 mg  2.5 mg Nebulization Q4H PRN Alphonse Ronquillo MD        glucose chewable tablet 16 g  4 tablet Oral PRN Alphonse Ronquillo MD        dextrose bolus 10% 125 mL  125 mL IntraVENous PRN Alphonse Ronquillo MD   Stopped at 04/21/24 2312    Or    dextrose bolus 10% 250 mL  250 mL IntraVENous PRN Alphonse Ronquillo MD        glucagon injection 1 mg  1 mg SubCUTAneous PRN Alphonse Ronquillo MD        dextrose 10 % infusion   IntraVENous Continuous PRN Alphonse Ronquillo MD        multivitamin 1 tablet  1 tablet Oral Daily Maria Victoria Grace APRN - CNP   1 tablet at 05/02/24 0847    sodium chloride flush 0.9 % injection 5-40 mL  5-40 mL IntraVENous PRN Maria Victoria Grace APRN - CNP   10 mL at 05/01/24 2025    0.9 % sodium chloride infusion   IntraVENous PRN Maria Victoria Grace APRN - CNP        potassium chloride (KLOR-CON M) extended release tablet 40 mEq  40 mEq Oral PRN Maria Victoria Grace APRN - CNP   40 mEq at 04/30/24 1216    Or    potassium bicarb-citric acid (EFFER-K) effervescent tablet 40 mEq  40 mEq Oral PRN Maria Victoria Grace APRN - CNP   40 mEq at 04/28/24 1401    Or    potassium chloride 10 mEq/100 mL IVPB (Peripheral Line)  10 mEq IntraVENous PRN Flamm, Maria Victoria J, APRN - CNP        magnesium sulfate 2000 mg in 50 mL IVPB premix  2,000 mg IntraVENous PRN Maria Victoria Grace, APRN - CNP        ondansetron (ZOFRAN) injection 4 mg  4 mg    Recent Labs     05/02/24  0303   AST 67*   ALT 62*   BILITOT 0.3   ALKPHOS 147*     PT/INR: No results for input(s): \"PROTIME\", \"INR\" in the last 72 hours.  APTT: No results for input(s): \"APTT\" in the last 72 hours.  BNP:  No results for input(s): \"BNP\" in the last 72 hours.  Ionized Calcium:No results for input(s): \"IONCA\" in the last 72 hours.  Magnesium:  Recent Labs     04/30/24  0121 05/02/24  0303   MG 2.3 2.2     Phosphorus:  Recent Labs     05/02/24  0303   PHOS 3.7     HgbA1C: No results for input(s): \"LABA1C\" in the last 72 hours.  Hepatic:   Recent Labs     05/02/24  0303   ALKPHOS 147*   ALT 62*   AST 67*   BILITOT 0.3     Lactic Acid: No results for input(s): \"LACTA\" in the last 72 hours.  Troponin: No results for input(s): \"CKTOTAL\", \"CKMB\", \"TROPONINT\" in the last 72 hours.  ABGs: No results for input(s): \"PH\", \"PCO2\", \"PO2\", \"HCO3\", \"O2SAT\" in the last 72 hours.  CRP:  No results for input(s): \"CRP\" in the last 72 hours.  Sed Rate:  No results for input(s): \"SEDRATE\" in the last 72 hours.    Cultures:   No results for input(s): \"CULTURE\" in the last 72 hours.  No results for input(s): \"BC\", \"BLOODCULT2\" in the last 72 hours.  No results for input(s): \"CXSURG\" in the last 72 hours.    Radiology reports as per the Radiologist  Radiology: XR CHEST PORTABLE    Result Date: 4/20/2024  EXAM: CHEST, ONE-VIEW    HISTORY:  Respiratory insufficiency    FINDINGS:  Compared with 04/19/2024.  Endotracheal tube at the clavicles level, stable.  Endogastric tube well into the stomach, stable.  Cardiac and mediastinal contours are normal.  Pulmonary vasculature is normal.  Lungs are clear.  Bony thorax is unremarkable.      No developing opacities.  No acute cardiopulmonary disease.    ______________________________________ Electronically signed by: BEVERLY LONGORIA M.D. Date:     04/20/2024 Time:    06:44     CT HEAD WO CONTRAST    Result Date: 4/19/2024  EXAMINATION: HEAD CT WITHOUT CONTRAST  HISTORY: Altered

## 2024-05-03 VITALS
HEIGHT: 69 IN | SYSTOLIC BLOOD PRESSURE: 123 MMHG | HEART RATE: 90 BPM | DIASTOLIC BLOOD PRESSURE: 82 MMHG | OXYGEN SATURATION: 95 % | TEMPERATURE: 98.1 F | BODY MASS INDEX: 25.46 KG/M2 | RESPIRATION RATE: 20 BRPM | WEIGHT: 171.9 LBS

## 2024-05-03 PROBLEM — R41.82 ALTERED MENTAL STATUS: Status: RESOLVED | Noted: 2024-04-25 | Resolved: 2024-05-03

## 2024-05-03 PROBLEM — R50.9 FEVER: Status: RESOLVED | Noted: 2024-04-24 | Resolved: 2024-05-03

## 2024-05-03 PROBLEM — F10.931 ALCOHOL WITHDRAWAL DELIRIUM (HCC): Status: RESOLVED | Noted: 2024-04-19 | Resolved: 2024-05-03

## 2024-05-03 PROCEDURE — 94760 N-INVAS EAR/PLS OXIMETRY 1: CPT

## 2024-05-03 PROCEDURE — 6370000000 HC RX 637 (ALT 250 FOR IP): Performed by: PSYCHIATRY & NEUROLOGY

## 2024-05-03 PROCEDURE — 94640 AIRWAY INHALATION TREATMENT: CPT

## 2024-05-03 PROCEDURE — 2580000003 HC RX 258

## 2024-05-03 PROCEDURE — 6360000002 HC RX W HCPCS: Performed by: INTERNAL MEDICINE

## 2024-05-03 PROCEDURE — 6370000000 HC RX 637 (ALT 250 FOR IP): Performed by: INTERNAL MEDICINE

## 2024-05-03 PROCEDURE — 6370000000 HC RX 637 (ALT 250 FOR IP)

## 2024-05-03 RX ORDER — THIAMINE MONONITRATE (VIT B1) 100 MG
100 TABLET ORAL DAILY
Qty: 30 TABLET | Refills: 0 | Status: SHIPPED | OUTPATIENT
Start: 2024-05-04

## 2024-05-03 RX ORDER — PAROXETINE HYDROCHLORIDE 20 MG/1
20 TABLET, FILM COATED ORAL EVERY MORNING
Qty: 30 TABLET | Refills: 3 | Status: SHIPPED | OUTPATIENT
Start: 2024-05-04

## 2024-05-03 RX ORDER — CARVEDILOL 12.5 MG/1
12.5 TABLET ORAL 2 TIMES DAILY WITH MEALS
Qty: 60 TABLET | Refills: 3 | Status: SHIPPED | OUTPATIENT
Start: 2024-05-03

## 2024-05-03 RX ORDER — FOLIC ACID 1 MG/1
1 TABLET ORAL DAILY
Qty: 30 TABLET | Refills: 3 | Status: SHIPPED | OUTPATIENT
Start: 2024-05-04

## 2024-05-03 RX ADMIN — PAROXETINE HYDROCHLORIDE 20 MG: 20 TABLET, FILM COATED ORAL at 09:10

## 2024-05-03 RX ADMIN — THERA TABS 1 TABLET: TAB at 09:10

## 2024-05-03 RX ADMIN — IPRATROPIUM BROMIDE AND ALBUTEROL SULFATE 1 DOSE: 2.5; .5 SOLUTION RESPIRATORY (INHALATION) at 10:45

## 2024-05-03 RX ADMIN — Medication 100 MG: at 09:10

## 2024-05-03 RX ADMIN — ENOXAPARIN SODIUM 40 MG: 100 INJECTION SUBCUTANEOUS at 09:10

## 2024-05-03 RX ADMIN — FOLIC ACID 1 MG: 1 TABLET ORAL at 09:10

## 2024-05-03 RX ADMIN — CARVEDILOL 12.5 MG: 12.5 TABLET, FILM COATED ORAL at 09:10

## 2024-05-03 RX ADMIN — SODIUM CHLORIDE, PRESERVATIVE FREE 10 ML: 5 INJECTION INTRAVENOUS at 09:10

## 2024-05-03 RX ADMIN — IPRATROPIUM BROMIDE AND ALBUTEROL SULFATE 1 DOSE: 2.5; .5 SOLUTION RESPIRATORY (INHALATION) at 06:48

## 2024-05-03 NOTE — PLAN OF CARE
Problem: Discharge Planning  Goal: Discharge to home or other facility with appropriate resources  Outcome: Progressing     Problem: Pain  Goal: Verbalizes/displays adequate comfort level or baseline comfort level  Outcome: Progressing     Problem: Safety - Adult  Goal: Free from fall injury  Outcome: Progressing     Problem: Skin/Tissue Integrity  Goal: Absence of new skin breakdown  Outcome: Progressing     Problem: ABCDS Injury Assessment  Goal: Absence of physical injury  Outcome: Progressing     Problem: Nutrition Deficit:  Goal: Optimize nutritional status  Outcome: Progressing  Flowsheets (Taken 5/2/2024 1619 by Mónica Delcid, MS, RD, LD)  Nutrient intake appropriate for improving, restoring, or maintaining nutritional needs:   Monitor oral intake, labs, and treatment plans   Recommend appropriate diets, oral nutritional supplements, and vitamin/mineral supplements

## 2024-05-03 NOTE — CARE COORDINATION
[facility] can accept pt today, his mother is will be taking him, intake is scheduled at 3pm  Electronically signed by BRENNAN Bo on 5/3/2024 at 1:22 PM       SW spoke to [facility], they will need to reassess pt, but more than likely will take him back. Waiting for a return call for update, inquire about transportation,  and weekend admission    [phone]; option 1  Ask for Access Center  [email or web address]  [phone] fax number for clinical updates  [email or web address]     [facility]  [address] [place] [id number]   Electronically signed by BRENNAN Bo on 5/3/2024 at 9:47 AM

## 2024-05-03 NOTE — PLAN OF CARE
Problem: Discharge Planning  Goal: Discharge to home or other facility with appropriate resources  5/3/2024 0642 by Michelle Mittal RN  Outcome: Progressing  5/3/2024 0431 by Michelle Mittal RN  Outcome: Progressing     Problem: Pain  Goal: Verbalizes/displays adequate comfort level or baseline comfort level  5/3/2024 0642 by Michelle Mittal RN  Outcome: Progressing  5/3/2024 0431 by Michelle Mittal RN  Outcome: Progressing     Problem: Safety - Adult  Goal: Free from fall injury  5/3/2024 0642 by Michelle Mittal RN  Outcome: Progressing  5/3/2024 0431 by Michelle Mittal RN  Outcome: Progressing     Problem: Skin/Tissue Integrity  Goal: Absence of new skin breakdown  5/3/2024 0642 by Michelle Mittal RN  Outcome: Progressing  5/3/2024 0431 by Michelle Mittal RN  Outcome: Progressing     Problem: ABCDS Injury Assessment  Goal: Absence of physical injury  5/3/2024 0642 by Michelle Mittal RN  Outcome: Progressing  5/3/2024 0431 by Michelle Mittal RN  Outcome: Progressing     Problem: Nutrition Deficit:  Goal: Optimize nutritional status  5/3/2024 0642 by Michelle Mittal RN  Outcome: Progressing  5/3/2024 0431 by Michelle Mittal RN  Outcome: Progressing  Flowsheets (Taken 5/2/2024 1619 by Mónica Delcid, MS, RD, LD)  Nutrient intake appropriate for improving, restoring, or maintaining nutritional needs:   Monitor oral intake, labs, and treatment plans   Recommend appropriate diets, oral nutritional supplements, and vitamin/mineral supplements

## 2024-05-03 NOTE — PROGRESS NOTES
Physical Therapy  Name: Evens De Leon  MRN:  [id number]  Date of service:  5/3/2024    Attempt this am.  Patient in bed and appears to fall asleep while being asked questions.  He aroused to say that he had not slept in a few days.  He declined ambulation, transfer to chair and bed exercises at this time.  Physical Therapy will continue to follow and progress as able.    Electronically signed by Casi Marie PTA on 5/3/2024 at 9:14 AM

## 2024-05-03 NOTE — DISCHARGE SUMMARY
Discharge Summary    Evens De Leon  :  1980  MRN:  [id number]    Admit date:  2024  Discharge date: 5/3/2024     Admitting Physician:  No admitting provider for patient encounter.    Advance Directive: Full Code    Consults: pulmonary/intensive care, psychiatry, general surgery, and palliative care    Primary Care Physician:  Rosangela Mtz APRN - CNP    Discharge Diagnoses:  Principal Problem (Resolved):    Alcohol withdrawal delirium (HCC)  Active Problems:    * No active hospital problems. *  Resolved Problems:    Methamphetamine abuse (HCC)    Fever    Altered mental status      Significant Diagnostic Studies:   XR CHEST PORTABLE    Result Date: 2024  EXAM: CHEST, ONE-VIEW    HISTORY:  Respiratory insufficiency    FINDINGS:  Compared with 2024.  Endotracheal tube at the clavicles level, stable.  Endogastric tube well into the stomach, stable.  Cardiac and mediastinal contours are normal.  Pulmonary vasculature is normal.  Lungs are clear.  Bony thorax is unremarkable.      No developing opacities.  No acute cardiopulmonary disease.    ______________________________________ Electronically signed by: BEVERLY LONGORIA M.D. Date:     2024 Time:    06:44     CT HEAD WO CONTRAST    Result Date: 2024  EXAMINATION: HEAD CT WITHOUT CONTRAST  HISTORY: Altered mental status.  TECHNIQUE: Noncontrast CT of the brain was performed with images acquired from skull base to vertex.  2-D coronal and sagittal reformatted images were obtained from the axial source images. Contrast Dose: None. CT Dose Reduction Techniques Performed: Yes.  COMPARISON: 2021  FINDINGS: Topogram demonstrates no significant abnormality. Intraparenchymal hemorrhage: None. Parenchyma: Normal gray-white differentiation.  No mass effect or midline shift. Extra-axial spaces and basal cisterns: Normal. Ventricles: Normal size and morphology for age. Paranasal sinuses and mastoid air cells: Visualized portions of  contours appear stable.  Basilar interstitial opacitis may relate to atelectasis or pneumonitis.  There is no focal pulmonary consolidation.  No pleural effusion or pneumothorax.   ______________________________________ Electronically signed by: YUAN MUELLER M.D. Date:     04/19/2024 Time:    14:07       CBC:   Recent Labs     05/02/24  0303   WBC 9.6   HGB 12.7*   *     BMP:    Recent Labs     05/02/24  0303   *   K 3.5      CO2 26   BUN 19   CREATININE 0.4*   GLUCOSE 125*     INR: No results for input(s): \"INR\" in the last 72 hours.  Lipids: No results for input(s): \"CHOL\", \"HDL\" in the last 72 hours.    Invalid input(s): \"LDLCALCU\"  ABGs:No results for input(s): \"PHART\", \"LLQ1STP\", \"PO2ART\", \"HPT5PVM\", \"BEART\", \"HGBAE\", \"W0IXHJTC\", \"CARBOXHGBART\", \"02THERAPY\" in the last 72 hours.  HgBA1c:  No results for input(s): \"LABA1C\" in the last 72 hours.    Procedures: Endotracheal intubation and mechanical ventilation  Hospital Course: Mr. De Leon was admitted on 4/19 for alcohol withdrawal and methamphetamine intoxication.  He required endotracheal intubation in the emergency department.  He was admitted to ICU.  At some point prior to coming into the hospital he had aspirated.  He was on the ventilator for greater than a weeks duration.  Consultation was obtained with pulmonology for assistance in vent management and weaning.  He was eventually stabilized and improved and was successfully liberated from the ventilator.  He was transferred to the floor.  He had developed an ileus.  Consult was obtained with general surgery who recommended conservative management.  His bowel function eventually normalized.  Final consultation was obtained with psychiatry who recommended that he go back to inpatient rehab.  On the afternoon of 5/3 he will be discharged with his mother.  He will return to inpatient rehab on Monday 5/6.    Physical Exam:  Vital Signs: /82   Pulse 90   Temp 98.1 °F (36.7 °C)

## 2024-05-04 LAB
BACTERIA BLD CULT ORG #2: NORMAL
BACTERIA BLD CULT: NORMAL

## 2024-09-19 NOTE — ED NOTES
[facility]   DISCHARGE SUMMARY        MRN: [id number]  Name: Carlos Alcala    Admission information  - Admission Date/Time: 9/15/2024  1:30 PM  - Discharge Date:  09/18/24  - PCP: Grace Bermeo MD  notified via Perfect Serve/Epic if available    Hospital Course:  Carlos is a 48 year old male past medical history significant for substance abuse, type 2 diabetes mellitus, hypertension, hyperlipidemia, neuropathy presenting to the ED on 9/15/2024 for evaluation of dizziness.     #Vertigo -> BPPV   #Headache  -CTA head/neck: No acute intracranial abnormality, no large vessel occlusion.  -MRI brain: No acute intracranial abnormality  -Vestibular PT to evaluate and recommended outpatient vestibular therapy which was ordered for discharge. Patient also set up with PCP appointment for ongoing management of his peripheral vertigo.   -Continue meclizine as needed 12.5.   -Neurology consulted                 - Noted normal neurological imaging and likely peripheral vertigo        - Signed off and cleared for discharge.   - Patient unfortunately has not had significant symptomatic improvement at discharge.  He was evaluated by therapy who noted outpatient therapy.   Patient provided work note for until after he follows up with PCP for further management.     Discharge Diagnoses:  Vertigo     Patient Active Problem List   Diagnosis    Vertigo    Intractable acute post-traumatic headache    Type 2 diabetes mellitus with diabetic polyneuropathy, with long-term current use of insulin  (CMD)    Primary hypertension    HLD (hyperlipidemia)    Hyponatremia    Hypomagnesemia       Discharge Summary Plan   Discharge Status: Improved. Medically stable.  Discharge instructions given: to patient   Discharge activity: as tolerated   Discharge diet: as tolerated   Prescriptions: New scripts as per EMR         Education and Follow-up   Counseled: patient, family, regarding diagnosis.         Follow up: With PCP in 1 week  No  Pt c/o h/a     Milena Morel RN  12/10/20 2328 future appointments.      DISCHARGE INSTRUCTIONS  I have  reviewed all medications with patient/family and reviewed potential side effects. Patient/family  is to seek medical attention immediately should symptoms recurr, worsen, or if any other problems/abnormalities arise. Patient/family understands these instructions. Is to follow up with PCP as noted above. All specialists and consultants as noted. Patient/family educated about compliance with medications and expectations for the course of treatment.   Patient was thoroughly informed regarding new medications and possible interactions/adverse effects, recommendations from consultants and specialists. Pt was also instructed to call 911 and/or report to the emergency room should symptoms recurr or if any other problems arise.     Physical Exam:  Visit Vitals  /84 (BP Location: RUE - Right upper extremity, Patient Position: Supine)   Pulse 61   Temp 97.7 °F (36.5 °C) (Oral)   Resp 18   Ht 5' 8\" (1.727 m)   Wt 99.7 kg (219 lb 12.8 oz)   SpO2 97%   BMI 33.42 kg/m²       General: NAD   HENT: EOMI, PERRL, MMM  Neck; Supple, no lymphadenopathy  CV: RRR, no murmurs appreciated   Pulm: CTA b/l, no wheezes or crackles   GI: Soft, non-tender, non-distended  Ext: No LE edema noted b/l  Skin: No rashes appreciated   Psych: A&O x3, appears non-agitated   Neuro: CN's grossly intact       Lab Results:  Recent Labs   Lab 09/18/24  0642 09/17/24  0632 09/16/24  0706 09/15/24  1355   SODIUM 141 136 134* 134*   POTASSIUM 3.8 3.9 3.8 4.2   CHLORIDE 106 103 100 97   CO2 26 23 25 23   BUN 13 15 17 18   CREATININE 0.69 0.72 0.81 0.83   GLUCOSE 183* 346* 266* 422*   ALBUMIN  --   --  3.1* 3.5*   AST  --   --  17 8   BILIRUBIN  --   --  0.3 0.3         Imaging:    MRI BRAIN W WO CONTRAST   Final Result   No acute intracranial abnormality.            Electronically Signed by: JACQUI SUAZO M.D.    Signed on: 9/16/2024 12:56 PM    Workstation ID: [id number]      CTA HEAD AND NECK   Final  Result      There is no evidence for acute intracranial abnormality. No large vessel   occlusion. No acute arterial abnormality extracranially or intracranially.                     Electronically Signed by: CORY TEMPLE MD    Signed on: 9/15/2024 3:25 PM    Workstation ID: [id number]      XR CHEST PA AND LATERAL 2 VIEWS   Final Result      There is no evidence for acute cardiopulmonary abnormality.       Electronically Signed by: CORY TEMPLE MD    Signed on: 9/15/2024 2:00 PM    Workstation ID: [id number]             Pathology/CX results:  * Cannot find OR log *    Microbiology Results       None               Discharge Medications:       Summary of your Discharge Medications        Take these Medications        Details   albuterol 108 (90 Base) MCG/ACT inhaler   Inhale 2 puffs into the lungs every 4 hours as needed for Shortness of Breath or Wheezing.     atorvastatin 10 MG tablet  Commonly known as: LIPITOR   Take 20 mg by mouth daily.     gabapentin 600 MG tablet  Commonly known as: NEURONTIN   Take 600 mg by mouth at bedtime.     insulin detemir 100 UNIT/ML injectable solution  Commonly known as: LEVEMIR   Inject 30 Units into the skin nightly.     Invokana 100 MG tablet   Generic drug: canagliflozin  Take 100 mg by mouth daily (before breakfast).     lisinopril 10 MG tablet  Commonly known as: ZESTRIL   Take 10 mg by mouth daily.     meclizine 12.5 MG tablet  Commonly known as: ANTIVERT   Take 1 tablet by mouth 3 times daily as needed for Dizziness.     metformin 1000 MG tablet  Commonly known as: GLUCOPHAGE   Take 1,000 mg by mouth in the morning and 1,000 mg in the evening. Take after meals.                Primary Care Physician:  Grace Bermeo MD      I spent 35 minutes on the discharge.  This includes the following: Reviewed all vitals, medications, new orders, I/O, labs, micro, radiology, nurses notes, pertinent consultant notes, as well as discussing patient's diagnosis and plan of care. This  excludes any additional time noted for additional services such as advanced care planning, smoking cessation, substance abuse counseling which are separate from the 35 min time spent on discharge.     Primary care physician notified.     Discharge location: Discharge home     Lee Argueta M.D.  Hospitalist, [facility]   [facility]

## 2024-10-09 ENCOUNTER — TRANSCRIBE ORDERS (OUTPATIENT)
Dept: ADMINISTRATIVE | Facility: HOSPITAL | Age: 44
End: 2024-10-09
Payer: COMMERCIAL

## 2024-10-09 ENCOUNTER — LAB (OUTPATIENT)
Dept: LAB | Facility: HOSPITAL | Age: 44
End: 2024-10-09
Payer: COMMERCIAL

## 2024-10-09 DIAGNOSIS — E87.6 HYPOKALEMIA: ICD-10-CM

## 2024-10-09 DIAGNOSIS — E87.6 HYPOKALEMIA: Primary | ICD-10-CM

## 2024-10-09 LAB
ALBUMIN SERPL-MCNC: 4.1 G/DL (ref 3.5–5)
ALBUMIN/GLOB SERPL: 1.3 G/DL (ref 1.1–2.5)
ALP SERPL-CCNC: 158 U/L (ref 24–120)
ALT SERPL W P-5'-P-CCNC: 52 U/L (ref 0–50)
ANION GAP SERPL CALCULATED.3IONS-SCNC: 16 MMOL/L (ref 4–13)
AST SERPL-CCNC: 120 U/L (ref 7–45)
BILIRUB SERPL-MCNC: 0.9 MG/DL (ref 0.1–1)
BUN SERPL-MCNC: 18 MG/DL (ref 5–21)
BUN/CREAT SERPL: 18.8
CALCIUM SPEC-SCNC: 8.3 MG/DL (ref 8.6–10.5)
CHLORIDE SERPL-SCNC: 102 MMOL/L (ref 98–110)
CO2 SERPL-SCNC: 24 MMOL/L (ref 24–31)
CREAT SERPL-MCNC: 0.96 MG/DL (ref 0.5–1.4)
EGFRCR SERPLBLD CKD-EPI 2021: 98.7 ML/MIN/1.73
GLOBULIN UR ELPH-MCNC: 3.2 GM/DL
GLUCOSE SERPL-MCNC: 156 MG/DL (ref 70–100)
POTASSIUM SERPL-SCNC: 3.2 MMOL/L (ref 3.5–5.3)
PROT SERPL-MCNC: 7.3 G/DL (ref 6.3–8.7)
SODIUM SERPL-SCNC: 142 MMOL/L (ref 135–145)

## 2024-10-09 PROCEDURE — 36415 COLL VENOUS BLD VENIPUNCTURE: CPT

## 2024-10-09 PROCEDURE — 80053 COMPREHEN METABOLIC PANEL: CPT

## 2025-04-29 ENCOUNTER — TELEPHONE (OUTPATIENT)
Dept: GASTROENTEROLOGY | Facility: CLINIC | Age: 45
End: 2025-04-29
Payer: COMMERCIAL

## 2025-04-29 ENCOUNTER — APPOINTMENT (OUTPATIENT)
Dept: CT IMAGING | Age: 45
DRG: 438 | End: 2025-04-29
Payer: MEDICAID

## 2025-04-29 ENCOUNTER — HOSPITAL ENCOUNTER (INPATIENT)
Age: 45
LOS: 7 days | Discharge: HOME | DRG: 438 | End: 2025-05-06
Attending: EMERGENCY MEDICINE | Admitting: HOSPITALIST
Payer: MEDICAID

## 2025-04-29 DIAGNOSIS — E87.1 HYPONATREMIA: ICD-10-CM

## 2025-04-29 DIAGNOSIS — K85.20 ALCOHOL-INDUCED ACUTE PANCREATITIS, UNSPECIFIED COMPLICATION STATUS: Primary | ICD-10-CM

## 2025-04-29 DIAGNOSIS — F10.20 CHRONIC ALCOHOLISM (HCC): ICD-10-CM

## 2025-04-29 DIAGNOSIS — K76.82 HEPATIC ENCEPHALOPATHY (HCC): ICD-10-CM

## 2025-04-29 DIAGNOSIS — K70.31 ALCOHOLIC CIRRHOSIS OF LIVER WITH ASCITES (HCC): ICD-10-CM

## 2025-04-29 DIAGNOSIS — E87.6 HYPOKALEMIA: ICD-10-CM

## 2025-04-29 DIAGNOSIS — R17 ELEVATED BILIRUBIN: ICD-10-CM

## 2025-04-29 PROBLEM — E72.20 HYPERAMMONEMIA: Status: ACTIVE | Noted: 2025-04-29

## 2025-04-29 PROBLEM — K70.10 ALCOHOLIC HEPATITIS (HCC): Status: ACTIVE | Noted: 2025-04-29

## 2025-04-29 PROBLEM — F32.A DEPRESSION: Status: ACTIVE | Noted: 2025-04-29

## 2025-04-29 PROBLEM — I10 HYPERTENSION: Status: ACTIVE | Noted: 2025-04-29

## 2025-04-29 PROBLEM — K21.9 GERD (GASTROESOPHAGEAL REFLUX DISEASE): Status: ACTIVE | Noted: 2025-04-29

## 2025-04-29 PROBLEM — E88.09 HYPOALBUMINEMIA: Status: ACTIVE | Noted: 2025-04-29

## 2025-04-29 PROBLEM — F10.90 ALCOHOL USE DISORDER: Status: ACTIVE | Noted: 2019-04-22

## 2025-04-29 LAB
ALBUMIN SERPL-MCNC: 2.6 G/DL (ref 3.5–5.2)
ALBUMIN SERPL-MCNC: 2.8 G/DL (ref 3.5–5.2)
ALP SERPL-CCNC: 750 U/L (ref 40–129)
ALP SERPL-CCNC: 843 U/L (ref 40–129)
ALT SERPL-CCNC: 142 U/L (ref 10–50)
ALT SERPL-CCNC: 153 U/L (ref 10–50)
AMMONIA PLAS-SCNC: 111 UMOL/L (ref 16–60)
ANION GAP SERPL CALCULATED.3IONS-SCNC: 19 MMOL/L (ref 8–16)
ANION GAP SERPL CALCULATED.3IONS-SCNC: 24 MMOL/L (ref 8–16)
ANISOCYTOSIS BLD QL SMEAR: ABNORMAL
APTT PPP: 38.4 SEC (ref 26–36.2)
AST SERPL-CCNC: 493 U/L (ref 10–50)
AST SERPL-CCNC: 541 U/L (ref 10–50)
BASOPHILS # BLD: 0 K/UL (ref 0–0.2)
BASOPHILS NFR BLD: 0 % (ref 0–1)
BILIRUB SERPL-MCNC: 13.6 MG/DL (ref 0.2–1.2)
BILIRUB SERPL-MCNC: 13.6 MG/DL (ref 0.2–1.2)
BUN SERPL-MCNC: 11 MG/DL (ref 6–20)
BUN SERPL-MCNC: 12 MG/DL (ref 6–20)
CALCIUM SERPL-MCNC: 6.9 MG/DL (ref 8.6–10)
CALCIUM SERPL-MCNC: 7.3 MG/DL (ref 8.6–10)
CHLORIDE SERPL-SCNC: 78 MMOL/L (ref 98–107)
CHLORIDE SERPL-SCNC: 81 MMOL/L (ref 98–107)
CO2 SERPL-SCNC: 21 MMOL/L (ref 22–29)
CO2 SERPL-SCNC: 25 MMOL/L (ref 22–29)
CREAT SERPL-MCNC: 0.7 MG/DL (ref 0.7–1.2)
CREAT SERPL-MCNC: 0.8 MG/DL (ref 0.7–1.2)
EOSINOPHIL # BLD: 0 K/UL (ref 0–0.6)
EOSINOPHIL NFR BLD: 0 % (ref 0–5)
ETHANOLAMINE SERPL-MCNC: 242 MG/DL (ref 0–11)
GLUCOSE SERPL-MCNC: 130 MG/DL (ref 70–99)
GLUCOSE SERPL-MCNC: 95 MG/DL (ref 70–99)
IMM GRANULOCYTES # BLD: 0.3 K/UL
INR PPP: 1.24 (ref 0.88–1.18)
LIPASE SERPL-CCNC: 297 U/L (ref 13–60)
LYMPHOCYTES # BLD: 1.9 K/UL (ref 1.1–4.5)
LYMPHOCYTES NFR BLD: 8 % (ref 20–40)
MAGNESIUM SERPL-MCNC: 1.9 MG/DL (ref 1.6–2.6)
MAGNESIUM SERPL-MCNC: 1.9 MG/DL (ref 1.6–2.6)
MONOCYTES # BLD: 1.2 K/UL (ref 0–0.9)
MONOCYTES NFR BLD: 5 % (ref 0–10)
NEUTROPHILS # BLD: 21.1 K/UL (ref 1.5–7.5)
NEUTS SEG NFR BLD: 87 % (ref 50–65)
PLATELET # BLD AUTO: 172 K/UL (ref 130–400)
PLATELET SLIDE REVIEW: ADEQUATE
POTASSIUM SERPL-SCNC: 2.5 MMOL/L (ref 3.5–5)
POTASSIUM SERPL-SCNC: 2.7 MMOL/L (ref 3.5–5.1)
PROT SERPL-MCNC: 5.8 G/DL (ref 6.4–8.3)
PROT SERPL-MCNC: 6.4 G/DL (ref 6.4–8.3)
PROTHROMBIN TIME: 15.4 SEC (ref 12–14.6)
RBC # BLD AUTO: 3.38 M/UL (ref 4.7–6.1)
REASON FOR REJECTION: NORMAL
REJECTED TEST: NORMAL
SODIUM SERPL-SCNC: 123 MMOL/L (ref 136–145)
SODIUM SERPL-SCNC: 125 MMOL/L (ref 136–145)
TARGETS BLD QL SMEAR: ABNORMAL
WBC # BLD AUTO: 24.3 K/UL (ref 4.8–10.8)

## 2025-04-29 PROCEDURE — 74177 CT ABD & PELVIS W/CONTRAST: CPT

## 2025-04-29 PROCEDURE — 6360000002 HC RX W HCPCS: Performed by: EMERGENCY MEDICINE

## 2025-04-29 PROCEDURE — 85027 COMPLETE CBC AUTOMATED: CPT

## 2025-04-29 PROCEDURE — 2500000003 HC RX 250 WO HCPCS

## 2025-04-29 PROCEDURE — 36415 COLL VENOUS BLD VENIPUNCTURE: CPT

## 2025-04-29 PROCEDURE — 85025 COMPLETE CBC W/AUTO DIFF WBC: CPT

## 2025-04-29 PROCEDURE — P9047 ALBUMIN (HUMAN), 25%, 50ML: HCPCS

## 2025-04-29 PROCEDURE — 94760 N-INVAS EAR/PLS OXIMETRY 1: CPT

## 2025-04-29 PROCEDURE — 85730 THROMBOPLASTIN TIME PARTIAL: CPT

## 2025-04-29 PROCEDURE — 6360000004 HC RX CONTRAST MEDICATION: Performed by: EMERGENCY MEDICINE

## 2025-04-29 PROCEDURE — 1200000000 HC SEMI PRIVATE

## 2025-04-29 PROCEDURE — 82077 ASSAY SPEC XCP UR&BREATH IA: CPT

## 2025-04-29 PROCEDURE — 82140 ASSAY OF AMMONIA: CPT

## 2025-04-29 PROCEDURE — 6370000000 HC RX 637 (ALT 250 FOR IP)

## 2025-04-29 PROCEDURE — 96375 TX/PRO/DX INJ NEW DRUG ADDON: CPT

## 2025-04-29 PROCEDURE — 2580000003 HC RX 258

## 2025-04-29 PROCEDURE — 6360000002 HC RX W HCPCS

## 2025-04-29 PROCEDURE — 83690 ASSAY OF LIPASE: CPT

## 2025-04-29 PROCEDURE — 99285 EMERGENCY DEPT VISIT HI MDM: CPT

## 2025-04-29 PROCEDURE — 6370000000 HC RX 637 (ALT 250 FOR IP): Performed by: EMERGENCY MEDICINE

## 2025-04-29 PROCEDURE — 96365 THER/PROPH/DIAG IV INF INIT: CPT

## 2025-04-29 PROCEDURE — 83735 ASSAY OF MAGNESIUM: CPT

## 2025-04-29 PROCEDURE — 93005 ELECTROCARDIOGRAM TRACING: CPT | Performed by: EMERGENCY MEDICINE

## 2025-04-29 PROCEDURE — 80053 COMPREHEN METABOLIC PANEL: CPT

## 2025-04-29 PROCEDURE — 85610 PROTHROMBIN TIME: CPT

## 2025-04-29 RX ORDER — QUETIAPINE FUMARATE 50 MG/1
50 TABLET, EXTENDED RELEASE ORAL NIGHTLY
Status: ON HOLD | COMMUNITY

## 2025-04-29 RX ORDER — PAROXETINE 20 MG/1
20 TABLET, FILM COATED ORAL EVERY MORNING
Status: DISCONTINUED | OUTPATIENT
Start: 2025-04-30 | End: 2025-05-06 | Stop reason: HOSPADM

## 2025-04-29 RX ORDER — LACTULOSE 10 G/15ML
30 SOLUTION ORAL ONCE
Status: COMPLETED | OUTPATIENT
Start: 2025-04-29 | End: 2025-04-29

## 2025-04-29 RX ORDER — HYDROMORPHONE HYDROCHLORIDE 1 MG/ML
0.5 INJECTION, SOLUTION INTRAMUSCULAR; INTRAVENOUS; SUBCUTANEOUS EVERY 4 HOURS PRN
Status: DISCONTINUED | OUTPATIENT
Start: 2025-04-29 | End: 2025-05-05

## 2025-04-29 RX ORDER — GAUZE BANDAGE 2" X 2"
100 BANDAGE TOPICAL DAILY
Status: DISCONTINUED | OUTPATIENT
Start: 2025-04-29 | End: 2025-05-06 | Stop reason: HOSPADM

## 2025-04-29 RX ORDER — POTASSIUM CHLORIDE 7.45 MG/ML
10 INJECTION INTRAVENOUS PRN
Status: DISCONTINUED | OUTPATIENT
Start: 2025-04-29 | End: 2025-05-06 | Stop reason: HOSPADM

## 2025-04-29 RX ORDER — LACTULOSE 10 G/15ML
30 SOLUTION ORAL 3 TIMES DAILY
Status: DISCONTINUED | OUTPATIENT
Start: 2025-04-29 | End: 2025-05-03

## 2025-04-29 RX ORDER — ACETAMINOPHEN 650 MG/1
650 SUPPOSITORY RECTAL EVERY 6 HOURS PRN
Status: DISCONTINUED | OUTPATIENT
Start: 2025-04-29 | End: 2025-05-06 | Stop reason: HOSPADM

## 2025-04-29 RX ORDER — ONDANSETRON 2 MG/ML
4 INJECTION INTRAMUSCULAR; INTRAVENOUS EVERY 6 HOURS PRN
Status: DISCONTINUED | OUTPATIENT
Start: 2025-04-29 | End: 2025-05-06 | Stop reason: HOSPADM

## 2025-04-29 RX ORDER — POTASSIUM CHLORIDE 1500 MG/1
20 TABLET, EXTENDED RELEASE ORAL ONCE
Status: DISCONTINUED | OUTPATIENT
Start: 2025-04-29 | End: 2025-04-29

## 2025-04-29 RX ORDER — POTASSIUM CHLORIDE 1500 MG/1
40 TABLET, EXTENDED RELEASE ORAL ONCE
Status: COMPLETED | OUTPATIENT
Start: 2025-04-29 | End: 2025-04-29

## 2025-04-29 RX ORDER — SODIUM CHLORIDE 0.9 % (FLUSH) 0.9 %
5-40 SYRINGE (ML) INJECTION EVERY 12 HOURS SCHEDULED
Status: DISCONTINUED | OUTPATIENT
Start: 2025-04-29 | End: 2025-05-03 | Stop reason: SDUPTHER

## 2025-04-29 RX ORDER — POLYETHYLENE GLYCOL 3350 17 G/17G
17 POWDER, FOR SOLUTION ORAL DAILY PRN
Status: DISCONTINUED | OUTPATIENT
Start: 2025-04-29 | End: 2025-05-06 | Stop reason: HOSPADM

## 2025-04-29 RX ORDER — FOLIC ACID 1 MG/1
1 TABLET ORAL DAILY
Status: DISCONTINUED | OUTPATIENT
Start: 2025-04-29 | End: 2025-05-06 | Stop reason: HOSPADM

## 2025-04-29 RX ORDER — LORAZEPAM 2 MG/ML
2 INJECTION INTRAMUSCULAR ONCE
Status: COMPLETED | OUTPATIENT
Start: 2025-04-29 | End: 2025-04-29

## 2025-04-29 RX ORDER — ALBUMIN (HUMAN) 12.5 G/50ML
50 SOLUTION INTRAVENOUS ONCE
Status: COMPLETED | OUTPATIENT
Start: 2025-04-29 | End: 2025-04-29

## 2025-04-29 RX ORDER — POTASSIUM CHLORIDE 1500 MG/1
40 TABLET, EXTENDED RELEASE ORAL PRN
Status: DISCONTINUED | OUTPATIENT
Start: 2025-04-29 | End: 2025-05-06 | Stop reason: HOSPADM

## 2025-04-29 RX ORDER — SODIUM CHLORIDE 9 MG/ML
INJECTION, SOLUTION INTRAVENOUS PRN
Status: DISCONTINUED | OUTPATIENT
Start: 2025-04-29 | End: 2025-05-02 | Stop reason: SDUPTHER

## 2025-04-29 RX ORDER — MAGNESIUM SULFATE IN WATER 40 MG/ML
2000 INJECTION, SOLUTION INTRAVENOUS PRN
Status: DISCONTINUED | OUTPATIENT
Start: 2025-04-29 | End: 2025-05-06 | Stop reason: HOSPADM

## 2025-04-29 RX ORDER — CARVEDILOL 12.5 MG/1
12.5 TABLET ORAL 2 TIMES DAILY WITH MEALS
Status: DISCONTINUED | OUTPATIENT
Start: 2025-04-29 | End: 2025-05-06 | Stop reason: HOSPADM

## 2025-04-29 RX ORDER — PROPRANOLOL HYDROCHLORIDE 10 MG/1
10 TABLET ORAL 3 TIMES DAILY
Status: ON HOLD | COMMUNITY
End: 2025-05-06 | Stop reason: HOSPADM

## 2025-04-29 RX ORDER — HYDROCHLOROTHIAZIDE 12.5 MG/1
12.5 CAPSULE ORAL DAILY
Status: ON HOLD | COMMUNITY

## 2025-04-29 RX ORDER — ACETAMINOPHEN 325 MG/1
650 TABLET ORAL EVERY 6 HOURS PRN
Status: CANCELLED | OUTPATIENT
Start: 2025-04-29

## 2025-04-29 RX ORDER — SODIUM CHLORIDE 0.9 % (FLUSH) 0.9 %
5-40 SYRINGE (ML) INJECTION PRN
Status: DISCONTINUED | OUTPATIENT
Start: 2025-04-29 | End: 2025-05-02 | Stop reason: SDUPTHER

## 2025-04-29 RX ORDER — ONDANSETRON 4 MG/1
4 TABLET, ORALLY DISINTEGRATING ORAL EVERY 8 HOURS PRN
Status: DISCONTINUED | OUTPATIENT
Start: 2025-04-29 | End: 2025-05-06 | Stop reason: HOSPADM

## 2025-04-29 RX ORDER — PANTOPRAZOLE SODIUM 40 MG/1
40 TABLET, DELAYED RELEASE ORAL
Status: DISCONTINUED | OUTPATIENT
Start: 2025-04-30 | End: 2025-05-06 | Stop reason: HOSPADM

## 2025-04-29 RX ORDER — IOPAMIDOL 755 MG/ML
75 INJECTION, SOLUTION INTRAVASCULAR
Status: COMPLETED | OUTPATIENT
Start: 2025-04-29 | End: 2025-04-29

## 2025-04-29 RX ORDER — POTASSIUM CHLORIDE 7.45 MG/ML
10 INJECTION INTRAVENOUS
Status: DISPENSED | OUTPATIENT
Start: 2025-04-29 | End: 2025-04-29

## 2025-04-29 RX ORDER — MULTIVITAMIN WITH IRON
1 TABLET ORAL DAILY
Status: DISCONTINUED | OUTPATIENT
Start: 2025-04-29 | End: 2025-05-06 | Stop reason: HOSPADM

## 2025-04-29 RX ADMIN — POTASSIUM CHLORIDE 10 MEQ: 7.46 INJECTION, SOLUTION INTRAVENOUS at 13:38

## 2025-04-29 RX ADMIN — POTASSIUM CHLORIDE: 2 INJECTION, SOLUTION, CONCENTRATE INTRAVENOUS at 17:37

## 2025-04-29 RX ADMIN — FOLIC ACID 1 MG: 1 TABLET ORAL at 21:06

## 2025-04-29 RX ADMIN — POTASSIUM CHLORIDE 10 MEQ: 7.46 INJECTION, SOLUTION INTRAVENOUS at 15:34

## 2025-04-29 RX ADMIN — Medication 2 MG: at 12:54

## 2025-04-29 RX ADMIN — POTASSIUM CHLORIDE 40 MEQ: 1500 TABLET, EXTENDED RELEASE ORAL at 21:06

## 2025-04-29 RX ADMIN — CARVEDILOL 12.5 MG: 12.5 TABLET, FILM COATED ORAL at 21:06

## 2025-04-29 RX ADMIN — ALBUMIN (HUMAN) 50 G: 0.25 INJECTION, SOLUTION INTRAVENOUS at 20:40

## 2025-04-29 RX ADMIN — IOPAMIDOL 75 ML: 755 INJECTION, SOLUTION INTRAVENOUS at 13:19

## 2025-04-29 RX ADMIN — ONDANSETRON 4 MG: 2 INJECTION, SOLUTION INTRAMUSCULAR; INTRAVENOUS at 21:17

## 2025-04-29 RX ADMIN — LACTULOSE 30 G: 20 SOLUTION ORAL at 21:05

## 2025-04-29 RX ADMIN — SODIUM CHLORIDE, PRESERVATIVE FREE 10 ML: 5 INJECTION INTRAVENOUS at 21:10

## 2025-04-29 RX ADMIN — THERA TABS 1 TABLET: TAB at 21:06

## 2025-04-29 RX ADMIN — LACTULOSE 30 G: 20 SOLUTION ORAL at 14:48

## 2025-04-29 RX ADMIN — Medication 100 MG: at 21:06

## 2025-04-29 NOTE — ED PROVIDER NOTES
[facility] EMERGENCY DEPARTMENT  eMERGENCY dEPARTMENT eNCOUnter      Pt Name: Evens De Leon  MRN: [id number]  Birthdate 1980  Date of evaluation: 4/29/2025  Provider: Tej Ivey MD    CHIEF COMPLAINT       Chief Complaint   Patient presents with    Jaundice     Pt is yellow scheduled to see [facility] today for liver workup     Abdominal Pain     Pain x1 week and ascites          HISTORY OF PRESENT ILLNESS   (Location/Symptom, Timing/Onset,Context/Setting, Quality, Duration, Modifying Factors, Severity)  Note limiting factors.   Evens De Leon is a 44 y.o. male who presents to the emergency department for evaluation regarding abdominal pain with associated color changes.  Patient states that he has a longstanding history of alcohol use.  His family has been concerned has about 1 week ago they noticed that his eyes appeared to be a different color.  States that he had an outpatient referral to see GI as scheduled earlier today but due to worsening symptoms he has presented here to our emergency department for evaluation.  He has never previously been told that he has a history of cirrhosis.  He has not been experiencing any vomiting of blood or black or bloody stools.  Describes increasing abdominal distention with associated pain.  States that he drinks on a daily basis.  He has had some alcohol today although less than usual by this time of the day.  Denies fevers or chills.    HPI    NursingNotes were reviewed.    REVIEW OF SYSTEMS    (2-9 systems for level 4, 10 or more for level 5)     Review of Systems   Constitutional:  Positive for fatigue. Negative for chills and fever.   Respiratory:  Negative for cough and shortness of breath.    Cardiovascular:  Positive for leg swelling. Negative for chest pain and palpitations.   Gastrointestinal:  Positive for abdominal distention and abdominal pain. Negative for blood in stool, diarrhea and vomiting.   Neurological:  Negative for dizziness and syncope.

## 2025-04-29 NOTE — ED NOTES
ED TO INPATIENT SBAR HANDOFF    Patient Name: vEens De Leon   : 1980  44 y.o.   Family/Caregiver Present: Yes  Code Status Order: Prior    C-SSRS: Risk of Suicide: No Risk  Sitter No  Restraints:       Situation  Chief Complaint:   Chief Complaint   Patient presents with    Jaundice     Pt is yellow scheduled to see [facility] today for liver workup     Abdominal Pain     Pain x1 week and ascites      Patient Diagnosis: Alcohol-induced acute pancreatitis, unspecified complication status [K85.20]     Brief Description of Patient's Condition: Evens De Leon is a 44 year-old male who presents to the emergency department for evaluation regarding abdominal pain with associated color changes. Patient states that he has a longstanding history of alcohol use. Patient states he drinks approximately 8 shots of 45 proof daily. His family has been concerned has about x1 week ago they noticed that his eyes appeared to be a different color. Patient states that he had an outpatient referral to see GI as scheduled earlier today but due to worsening symptoms he has presented here to our emergency department for evaluation. He has never previously been told that he has a history of cirrhosis. He has not been experiencing any vomiting of blood or black or bloody stools. Describes increasing abdominal distention with associated pain. States that he drinks on a daily basis. He has had some alcohol today although less than usual by this time of the day. Denies fevers or chills. Patient has received K+ and lactulose (30g).   Mental Status: lethargic; confused   Arrived from: Home     Imaging:   CT ABDOMEN PELVIS W IV CONTRAST Additional Contrast? None   Final Result   Diffuse fatty infiltration of the liver.       There is a small to moderate amount of ascites.       Mild inflammatory changes are seen along the pancreas and the findings may represent acute pancreatitis.       The common bile duct does not appear to be

## 2025-04-29 NOTE — TELEPHONE ENCOUNTER
Called Hammad Moctezuma  to reschedule missed appointment. No answer, unable to leave a message due to calling restrictions

## 2025-04-29 NOTE — H&P
[facility]      Hospitalist - History & Physical      PCP: Rosangela Mtz APRN - CNP    Date of Admission: 4/29/2025    Date of Service: 4/29/2025    Chief Complaint:  Jaundice    History Of Present Illness:   The patient is a 44 y.o. male with alcohol abuse comes to ED for evaluation of jaundice and abdominal pain. Patient is currently lethargic and unable to provide much history. He does complain of some abdominal pain and can tell me he drinks daily however that is about the extent of the information I can get from him. His friend is at bedside who reports that he drinks around 8 shots of 45 proof liquor daily. He has also noticed a color change and patient complaining of abdominal pain with nausea and decreased appetite which significantly worsened over the past week. Patient did receive 2 mg of ativan in ED for shakiness.  Patient was able to tell the ED provider he does not have a known history of cirrhosis but had an appointment with GI at [facility] today.      In ED: CT abdomen and pelvis with diffuse fatty infiltration of the liver, small amount to minus ascites, mild inflammatory changes seen along the pancreas and the findings may represent acute pancreatitis, common bile duct does not appear to be dilated, no bowel obstruction; EtOH 242, lipase 297, total bilirubin 13.6, , , ammonia 111, alkaline phosphatase 843, albumin 2.8, creatinine 0.8, BUN 11, GFR> 90, potassium 2.7, sodium 123.  Patient will be admitted inpatient to hospitalist with consult GI.    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    Dog bite     Oropharyngeal dysphagia 05/09/2023    Psychiatric problem     Suicidal ideation 04/21/2019       Past Surgical History:        Procedure Laterality Date    ADENOIDECTOMY      COLONOSCOPY      TONSILLECTOMY Right     TYMPANOSTOMY TUBE PLACEMENT      US BIOPSY LYMPH NODE  10/21/2022    US LYMPH NODE BIOPSY 10/21/2022 [facility]  05:30 PM     Urinalysis:  Lab Results   Component Value Date/Time    NITRU Negative 04/19/2024 01:20 PM    WBCUA 0-1 03/29/2021 07:13 PM    BACTERIA 1+ 03/29/2021 07:13 PM    RBCUA 0-1 02/28/2021 05:55 PM    BLOODU Negative 04/19/2024 01:20 PM    GLUCOSEU Negative 04/19/2024 01:20 PM     CT ABDOMEN PELVIS W IV CONTRAST Additional Contrast? None  Result Date: 4/29/2025  EXAM:  CT SCAN OF THE ABDOMEN AND PELVIS WITH CONTRAST  HISTORY:  Jaundice  TECHNIQUE:  Imaging of the abdomen pelvis was performed following the intravenous administration of contrast.  5 mm thin axial images and coronal and sagittal reconstructions were obtained.  Comparison 04/28/2024  FINDINGS:  Diffuse low density changes are seen throughout the parenchyma of the liver.  Mild inflammatory changes are seen along the pancreas.  There is a small amount of free fluid seen along the right upper quadrant of the abdomen and along the pericolic gutters. The common bile duct is not appear to be dilated.  There is no hydronephrosis seen within the kidneys.  No acute abnormalities are seen within the adrenal glands.  No lytic or blastic lesions are seen within the osseous structures. There is a small amount of free fluid seen within the pelvis. The lung bases are clear.      Diffuse fatty infiltration of the liver.  There is a small to moderate amount of ascites.  Mild inflammatory changes are seen along the pancreas and the findings may represent acute pancreatitis.  The common bile duct does not appear to be dilated.  There is no bowel obstruction.  All CT scans are performed using dose optimization techniques as appropriate to the performed exam and include at least one of the following: Automated exposure control, adjustment of the mA and/or kV according to size, and the use of iterative reconstruction technique.  ______________________________________ Electronically signed by: TORRIE CRAWFORD M.D. Date:     04/29/2025 Time:    13:29

## 2025-04-30 LAB
ALBUMIN SERPL-MCNC: 3 G/DL (ref 3.5–5.2)
ALP SERPL-CCNC: 613 U/L (ref 40–129)
ALT SERPL-CCNC: 112 U/L (ref 10–50)
ANION GAP SERPL CALCULATED.3IONS-SCNC: 13 MMOL/L (ref 8–16)
AST SERPL-CCNC: 402 U/L (ref 10–50)
BASOPHILS # BLD: 0 K/UL (ref 0–0.2)
BASOPHILS NFR BLD: 0.2 % (ref 0–1)
BILIRUB SERPL-MCNC: 14.2 MG/DL (ref 0.2–1.2)
BUN SERPL-MCNC: 12 MG/DL (ref 6–20)
CALCIUM SERPL-MCNC: 7.2 MG/DL (ref 8.6–10)
CHLORIDE SERPL-SCNC: 85 MMOL/L (ref 98–107)
CO2 SERPL-SCNC: 27 MMOL/L (ref 22–29)
CREAT SERPL-MCNC: 0.7 MG/DL (ref 0.7–1.2)
EKG P AXIS: 58 DEGREES
EKG P-R INTERVAL: 160 MS
EKG Q-T INTERVAL: 366 MS
EKG QRS DURATION: 102 MS
EKG QTC CALCULATION (BAZETT): 437 MS
EKG T AXIS: 116 DEGREES
EOSINOPHIL # BLD: 0.1 K/UL (ref 0–0.6)
EOSINOPHIL NFR BLD: 0.5 % (ref 0–5)
GLUCOSE SERPL-MCNC: 82 MG/DL (ref 70–99)
IMM GRANULOCYTES # BLD: 0.1 K/UL
LYMPHOCYTES # BLD: 1.4 K/UL (ref 1.1–4.5)
LYMPHOCYTES NFR BLD: 6.5 % (ref 20–40)
MAGNESIUM SERPL-MCNC: 1.8 MG/DL (ref 1.6–2.6)
MONOCYTES # BLD: 1.1 K/UL (ref 0–0.9)
MONOCYTES NFR BLD: 5.1 % (ref 0–10)
NEUTROPHILS # BLD: 18.4 K/UL (ref 1.5–7.5)
NEUTS SEG NFR BLD: 87 % (ref 50–65)
PLATELET # BLD AUTO: 129 K/UL (ref 130–400)
PLATELET # BLD AUTO: 149 K/UL (ref 130–400)
PMV BLD AUTO: 11.4 FL (ref 9.4–12.4)
POTASSIUM SERPL-SCNC: 3 MMOL/L (ref 3.5–5)
PROT SERPL-MCNC: 5.5 G/DL (ref 6.4–8.3)
RBC # BLD AUTO: 3.28 M/UL (ref 4.7–6.1)
SODIUM SERPL-SCNC: 125 MMOL/L (ref 136–145)
TRIGL SERPL-MCNC: 242 MG/DL (ref 0–149)
WBC # BLD AUTO: 21.2 K/UL (ref 4.8–10.8)
WBC # BLD AUTO: 25.4 K/UL (ref 4.8–10.8)

## 2025-04-30 PROCEDURE — 93010 ELECTROCARDIOGRAM REPORT: CPT | Performed by: INTERNAL MEDICINE

## 2025-04-30 PROCEDURE — 6360000002 HC RX W HCPCS

## 2025-04-30 PROCEDURE — 85025 COMPLETE CBC W/AUTO DIFF WBC: CPT

## 2025-04-30 PROCEDURE — 80053 COMPREHEN METABOLIC PANEL: CPT

## 2025-04-30 PROCEDURE — 6360000002 HC RX W HCPCS: Performed by: HOSPITALIST

## 2025-04-30 PROCEDURE — 99222 1ST HOSP IP/OBS MODERATE 55: CPT | Performed by: INTERNAL MEDICINE

## 2025-04-30 PROCEDURE — 83735 ASSAY OF MAGNESIUM: CPT

## 2025-04-30 PROCEDURE — 1200000000 HC SEMI PRIVATE

## 2025-04-30 PROCEDURE — 6370000000 HC RX 637 (ALT 250 FOR IP): Performed by: HOSPITALIST

## 2025-04-30 PROCEDURE — 84478 ASSAY OF TRIGLYCERIDES: CPT

## 2025-04-30 PROCEDURE — 94760 N-INVAS EAR/PLS OXIMETRY 1: CPT

## 2025-04-30 PROCEDURE — 36415 COLL VENOUS BLD VENIPUNCTURE: CPT

## 2025-04-30 PROCEDURE — 6370000000 HC RX 637 (ALT 250 FOR IP)

## 2025-04-30 RX ORDER — LORAZEPAM 2 MG/1
2 TABLET ORAL
Status: DISCONTINUED | OUTPATIENT
Start: 2025-04-30 | End: 2025-04-30

## 2025-04-30 RX ORDER — SODIUM CHLORIDE 0.9 % (FLUSH) 0.9 %
5-40 SYRINGE (ML) INJECTION PRN
Status: DISCONTINUED | OUTPATIENT
Start: 2025-04-30 | End: 2025-05-02 | Stop reason: SDUPTHER

## 2025-04-30 RX ORDER — LORAZEPAM 1 MG/1
1 TABLET ORAL
Status: DISCONTINUED | OUTPATIENT
Start: 2025-04-30 | End: 2025-05-05

## 2025-04-30 RX ORDER — LORAZEPAM 2 MG/ML
4 INJECTION INTRAMUSCULAR
Status: DISCONTINUED | OUTPATIENT
Start: 2025-04-30 | End: 2025-04-30

## 2025-04-30 RX ORDER — LORAZEPAM 2 MG/ML
2 INJECTION INTRAMUSCULAR
Status: DISCONTINUED | OUTPATIENT
Start: 2025-04-30 | End: 2025-04-30

## 2025-04-30 RX ORDER — LORAZEPAM 2 MG/ML
1 INJECTION INTRAMUSCULAR
Status: DISCONTINUED | OUTPATIENT
Start: 2025-04-30 | End: 2025-05-05

## 2025-04-30 RX ORDER — SODIUM CHLORIDE 0.9 % (FLUSH) 0.9 %
5-40 SYRINGE (ML) INJECTION EVERY 12 HOURS SCHEDULED
Status: DISCONTINUED | OUTPATIENT
Start: 2025-04-30 | End: 2025-05-02 | Stop reason: SDUPTHER

## 2025-04-30 RX ORDER — LORAZEPAM 2 MG/1
4 TABLET ORAL
Status: DISCONTINUED | OUTPATIENT
Start: 2025-04-30 | End: 2025-04-30

## 2025-04-30 RX ORDER — LORAZEPAM 2 MG/ML
3 INJECTION INTRAMUSCULAR
Status: DISCONTINUED | OUTPATIENT
Start: 2025-04-30 | End: 2025-04-30

## 2025-04-30 RX ORDER — GAUZE BANDAGE 2" X 2"
100 BANDAGE TOPICAL DAILY
Status: DISCONTINUED | OUTPATIENT
Start: 2025-04-30 | End: 2025-04-30 | Stop reason: SDUPTHER

## 2025-04-30 RX ORDER — SODIUM CHLORIDE 9 MG/ML
INJECTION, SOLUTION INTRAVENOUS PRN
Status: DISCONTINUED | OUTPATIENT
Start: 2025-04-30 | End: 2025-05-02 | Stop reason: SDUPTHER

## 2025-04-30 RX ADMIN — LORAZEPAM 1 MG: 2 INJECTION INTRAMUSCULAR; INTRAVENOUS at 12:16

## 2025-04-30 RX ADMIN — POTASSIUM CHLORIDE 10 MEQ: 7.46 INJECTION, SOLUTION INTRAVENOUS at 06:37

## 2025-04-30 RX ADMIN — CARVEDILOL 12.5 MG: 12.5 TABLET, FILM COATED ORAL at 07:52

## 2025-04-30 RX ADMIN — THERA TABS 1 TABLET: TAB at 07:53

## 2025-04-30 RX ADMIN — LORAZEPAM 1 MG: 1 TABLET ORAL at 07:53

## 2025-04-30 RX ADMIN — POTASSIUM CHLORIDE 10 MEQ: 7.46 INJECTION, SOLUTION INTRAVENOUS at 09:08

## 2025-04-30 RX ADMIN — FOLIC ACID 1 MG: 1 TABLET ORAL at 07:52

## 2025-04-30 RX ADMIN — LACTULOSE 30 G: 20 SOLUTION ORAL at 07:52

## 2025-04-30 RX ADMIN — PAROXETINE HYDROCHLORIDE 20 MG: 20 TABLET, FILM COATED ORAL at 07:53

## 2025-04-30 RX ADMIN — POTASSIUM CHLORIDE 10 MEQ: 7.46 INJECTION, SOLUTION INTRAVENOUS at 11:22

## 2025-04-30 RX ADMIN — POTASSIUM CHLORIDE 10 MEQ: 7.46 INJECTION, SOLUTION INTRAVENOUS at 07:56

## 2025-04-30 RX ADMIN — Medication 100 MG: at 07:53

## 2025-04-30 RX ADMIN — LORAZEPAM 1 MG: 2 INJECTION INTRAMUSCULAR; INTRAVENOUS at 02:33

## 2025-04-30 RX ADMIN — POTASSIUM CHLORIDE 10 MEQ: 7.46 INJECTION, SOLUTION INTRAVENOUS at 10:21

## 2025-04-30 RX ADMIN — POTASSIUM CHLORIDE 10 MEQ: 7.46 INJECTION, SOLUTION INTRAVENOUS at 01:05

## 2025-04-30 RX ADMIN — POTASSIUM CHLORIDE 10 MEQ: 7.46 INJECTION, SOLUTION INTRAVENOUS at 05:36

## 2025-04-30 RX ADMIN — POTASSIUM CHLORIDE 10 MEQ: 7.46 INJECTION, SOLUTION INTRAVENOUS at 02:47

## 2025-04-30 RX ADMIN — LACTULOSE 30 G: 20 SOLUTION ORAL at 21:23

## 2025-04-30 RX ADMIN — POTASSIUM CHLORIDE 10 MEQ: 7.46 INJECTION, SOLUTION INTRAVENOUS at 01:52

## 2025-04-30 NOTE — CONSULTS
Pt Name: Evens De Leon  MRN: [id number]  [id number]  YOB: 1980  Admit Date: 4/29/2025 10:32 AM  Date of evaluation: 4/30/2025  Primary Care Physician: Rosangela Mtz APRN - CNP   [id number]       Requesting Provider: Dr. Poonam Gabriel    GI Consult    Indication: Abnormal liver function test.    History:  The patient is a 44 y.o. male admitted to the hospital for multiple somatic symptoms and jaundice.  Patient is currently sleeping lethargic.  He just received Ativan for his alcohol withdrawal.  He is arousable but he is a poor historian.  Apparently has been drinking alcohol heavily for quite some time on a daily basis.  He was having some abdominal pain and discomfort.  He noted to have yellow discoloration of the skin.  He was complaining of fatigue, tiredness and weakness and decided to come to the hospital.  He denies any previous to any liver disease.  No nausea and vomiting.  No change stool color, consistency or caliber.  No hematochezia or myotic stool.  Again no good history is available from the patient as he is under the influence of sedatives for his alcohol withdrawal.      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    Dog bite     Oropharyngeal dysphagia 05/09/2023    Psychiatric problem     Suicidal ideation 04/21/2019     Past Surgical History:        Procedure Laterality Date    ADENOIDECTOMY      COLONOSCOPY      TONSILLECTOMY Right     TYMPANOSTOMY TUBE PLACEMENT      US BIOPSY LYMPH NODE  10/21/2022    US LYMPH NODE BIOPSY 10/21/2022 [facility]     Allergies:  Patient has no known allergies.  Home Meds:  Prior to Admission medications    Medication Sig Start Date End Date Taking? Authorizing Provider   hydroCHLOROthiazide 12.5 MG capsule Take 1 capsule by mouth daily   Yes Omkar Quick MD   Cariprazine HCl (VRAYLAR) 6 MG CAPS capsule Take 1 capsule by mouth daily   Yes Omkar Quick MD

## 2025-04-30 NOTE — PLAN OF CARE
Problem: Discharge Planning  Goal: Discharge to home or other facility with appropriate resources  4/30/2025 0913 by Coco Barrios RN  Outcome: Progressing  4/30/2025 0028 by Diane Alvares RN  Outcome: Progressing  Flowsheets (Taken 4/29/2025 2106)  Discharge to home or other facility with appropriate resources: Identify barriers to discharge with patient and caregiver     Problem: Safety - Adult  Goal: Free from fall injury  4/30/2025 0913 by Coco Barrios RN  Outcome: Progressing  4/30/2025 0028 by Diane Alvares RN  Outcome: Progressing  Flowsheets (Taken 4/30/2025 0026)  Free From Fall Injury: Instruct family/caregiver on patient safety     Problem: ABCDS Injury Assessment  Goal: Absence of physical injury  4/30/2025 0913 by Coco Barrios RN  Outcome: Progressing  4/30/2025 0028 by Diane Alvares RN  Outcome: Progressing

## 2025-04-30 NOTE — PROGRESS NOTES
[facility]      Progress Note    Patient:  Evens De Leon  YOB: 1980  Date of Service: 4/30/2025  MRN: [id number]   Acct: [id number]   Primary Care Physician: Rosangela Mtz APRN - CNP  Advance Directive: Full Code  Admit Date: 4/29/2025       Hospital Day: 1    Portions of this note have been copied forward, however, updated to reflect the most current clinical status of this patient.     CHIEF COMPLAINT Jaundice     SUBJECTIVE:  Mr. De Leon was resting in bed this morning. Somnolent appearing. Oriented x 3. Reports abdominal cramping. Denies nausea or vomiting. Denies SOB or chest pain.       CUMULATIVE HOSPITAL COURSE:   The patient is a 44 y.o. male with PMH of alcohol abuse who presented to [facility] ED for evaluation of jaundice and abdominal pain. Mr. De Leon is a poor historian and was lethargic on admission and unable to provide complete HPI.  Reportedly friend had reported that patient had complained of abdominal pain with nausea and decreased appetite and appeared to be jaundiced. Reportedly was scheduled for an outpatient GI evaluation on day of admission. Workup in ED revealed  CT abdomen and pelvis with diffuse fatty infiltration of the liver, small amount to minus ascites, mild inflammatory changes seen along the pancreas and the findings may represent acute pancreatitis, common bile duct does not appear to be dilated, no bowel obstruction; EtOH 242, lipase 297, total bilirubin 13.6, , , ammonia 111, alkaline phosphatase 843, albumin 2.8, creatinine 0.8, BUN 11, GFR> 90, potassium 2.7, sodium 123. Patient was admitted to hospital medicine for further evaluation with GI consultation. Seen by GI with recommendations of supportive treatment for alcohol withdrawal.       Review of Systems   Unable to perform ROS: Mental status change        Objective:   VITALS:  /87   Pulse 76   Temp 97.5 °F (36.4 °C) (Temporal)   Resp 18   Wt 81.6 kg (180 lb)   SpO2 95%   BMI  \"PO2ART\", \"VOJ6DOK\", \"BEART\", \"HGBAE\", \"Y1EUWZTF\", \"CARBOXHGBART\" in the last 72 hours.    RAD:   CT ABDOMEN PELVIS W IV CONTRAST Additional Contrast? None  Result Date: 4/29/2025  EXAM:  CT SCAN OF THE ABDOMEN AND PELVIS WITH CONTRAST  HISTORY:  Jaundice  TECHNIQUE:  Imaging of the abdomen pelvis was performed following the intravenous administration of contrast.  5 mm thin axial images and coronal and sagittal reconstructions were obtained.  Comparison 04/28/2024  FINDINGS:  Diffuse low density changes are seen throughout the parenchyma of the liver.  Mild inflammatory changes are seen along the pancreas.  There is a small amount of free fluid seen along the right upper quadrant of the abdomen and along the pericolic gutters. The common bile duct is not appear to be dilated.  There is no hydronephrosis seen within the kidneys.  No acute abnormalities are seen within the adrenal glands.  No lytic or blastic lesions are seen within the osseous structures. There is a small amount of free fluid seen within the pelvis. The lung bases are clear.      Diffuse fatty infiltration of the liver.  There is a small to moderate amount of ascites.  Mild inflammatory changes are seen along the pancreas and the findings may represent acute pancreatitis.  The common bile duct does not appear to be dilated.  There is no bowel obstruction.  All CT scans are performed using dose optimization techniques as appropriate to the performed exam and include at least one of the following: Automated exposure control, adjustment of the mA and/or kV according to size, and the use of iterative reconstruction technique.  ______________________________________ Electronically signed by: TORRIE CRAWFORD M.D. Date:     04/29/2025 Time:    13:29             Assessment/Plan   Principal Problem:    Alcohol-induced acute pancreatitis, unspecified complication status  Active Problems:    Alcohol use disorder    Hypoalbuminemia    Alcoholic hepatitis (HCC)

## 2025-04-30 NOTE — PROGRESS NOTES
Patient constantly trying to get out of bed. Completely confused and not following any directions. Patient has had multiple loose stools on the floor while trying to ambulate himself to the bathroom. Expressed concern for patient falling to both the charge nurse, director and provider. Patients bed alarm is on but not connected to the call light system and is far from the nurses station. Moving patient to a room across from nurses station for safety.

## 2025-04-30 NOTE — PROGRESS NOTES
Patients mother Cynthia called to ask questions about patients care. She was very upset that the patient was not attached to the IV fluid in his room. I explained to her that the doctor has not ordered any fluid. I attempted to answer all of her questions in regards to his care including information on the Nephrologist consult note. Patients mother seemed to understand everything by the end of our conversation and asked to speak to the . I let them know they are gone for the day and she said she'll call back in the morning.

## 2025-04-30 NOTE — CARE COORDINATION
SW attempted to speak with Pt but he is not alert at this time. JOSE LUIS will continue to follow.    Electronically signed by Rudi Garcia on 4/30/2025 at 1:55 PM

## 2025-04-30 NOTE — PLAN OF CARE
Problem: Discharge Planning  Goal: Discharge to home or other facility with appropriate resources  Outcome: Progressing  Flowsheets (Taken 4/29/2025 2106)  Discharge to home or other facility with appropriate resources: Identify barriers to discharge with patient and caregiver     Problem: Safety - Adult  Goal: Free from fall injury  Outcome: Progressing  Flowsheets (Taken 4/30/2025 0026)  Free From Fall Injury: Instruct family/caregiver on patient safety     Problem: ABCDS Injury Assessment  Goal: Absence of physical injury  Outcome: Progressing

## 2025-05-01 ENCOUNTER — APPOINTMENT (OUTPATIENT)
Dept: ULTRASOUND IMAGING | Age: 45
DRG: 438 | End: 2025-05-01
Payer: MEDICAID

## 2025-05-01 LAB
ALBUMIN FLD-MCNC: 1.1 G/DL
ALBUMIN SERPL-MCNC: 2.7 G/DL (ref 3.5–5.2)
ALBUMIN SERPL-MCNC: 2.8 G/DL (ref 3.5–5.2)
ALP SERPL-CCNC: 604 U/L (ref 40–129)
ALP SERPL-CCNC: 617 U/L (ref 40–129)
ALT SERPL-CCNC: 112 U/L (ref 10–50)
ALT SERPL-CCNC: 115 U/L (ref 10–50)
AMMONIA PLAS-SCNC: 127 UMOL/L (ref 16–60)
ANION GAP SERPL CALCULATED.3IONS-SCNC: 12 MMOL/L (ref 8–16)
ANION GAP SERPL CALCULATED.3IONS-SCNC: 12 MMOL/L (ref 8–16)
ANISOCYTOSIS BLD QL SMEAR: ABNORMAL
APPEARANCE FLD: CLEAR
AST SERPL-CCNC: 410 U/L (ref 10–50)
AST SERPL-CCNC: 413 U/L (ref 10–50)
BACTERIA #/AREA URNS HPF: NORMAL /HPF
BASOPHILS # BLD: 0 K/UL (ref 0–0.2)
BASOPHILS NFR BLD: 0 % (ref 0–1)
BILIRUB SERPL-MCNC: 17.6 MG/DL (ref 0.2–1.2)
BILIRUB SERPL-MCNC: 19.4 MG/DL (ref 0.2–1.2)
BILIRUB UR QL STRIP: ABNORMAL
BODY FLD TYPE: NORMAL
BODY FLD TYPE: NORMAL
BUN SERPL-MCNC: 16 MG/DL (ref 6–20)
BUN SERPL-MCNC: 16 MG/DL (ref 6–20)
CALCIUM SERPL-MCNC: 6.9 MG/DL (ref 8.6–10)
CALCIUM SERPL-MCNC: 7.5 MG/DL (ref 8.6–10)
CHLORIDE SERPL-SCNC: 88 MMOL/L (ref 98–107)
CHLORIDE SERPL-SCNC: 88 MMOL/L (ref 98–107)
CLARITY UR: ABNORMAL
CLOT EVALUATION: NORMAL
CO2 SERPL-SCNC: 27 MMOL/L (ref 22–29)
CO2 SERPL-SCNC: 27 MMOL/L (ref 22–29)
COLOR FLD: YELLOW
COLOR UR: ABNORMAL
CREAT SERPL-MCNC: 0.6 MG/DL (ref 0.7–1.2)
CREAT SERPL-MCNC: 0.7 MG/DL (ref 0.7–1.2)
CRYSTALS URNS MICRO: NORMAL /HPF
EOSINOPHIL # BLD: 0 K/UL (ref 0–0.6)
EOSINOPHIL NFR BLD: 0.4 % (ref 0–5)
ERYTHROCYTE [DISTWIDTH] IN BLOOD BY AUTOMATED COUNT: 23.8 % (ref 11.5–14.5)
GLUCOSE BLD-MCNC: 78 MG/DL (ref 70–99)
GLUCOSE BLD-MCNC: 79 MG/DL (ref 70–99)
GLUCOSE BLD-MCNC: 83 MG/DL (ref 70–99)
GLUCOSE BLD-MCNC: 85 MG/DL (ref 70–99)
GLUCOSE FLD-MCNC: 64 MG/DL
GLUCOSE SERPL-MCNC: 67 MG/DL (ref 70–99)
GLUCOSE SERPL-MCNC: 68 MG/DL (ref 70–99)
GLUCOSE UR STRIP.AUTO-MCNC: NEGATIVE MG/DL
HCT VFR BLD AUTO: 29 % (ref 42–52)
HGB BLD-MCNC: 10.5 G/DL (ref 14–18)
HGB UR STRIP.AUTO-MCNC: NEGATIVE MG/L
IMM GRANULOCYTES # BLD: 0.1 K/UL
KETONES UR STRIP.AUTO-MCNC: NEGATIVE MG/DL
LACTATE BLDV-SCNC: 1.1 MMOL/L (ref 0.5–1.9)
LDH FLD L TO P-CCNC: 334 U/L
LEUKOCYTE ESTERASE UR QL STRIP.AUTO: ABNORMAL
LYMPHOCYTES # BLD: 0.3 K/UL (ref 1.1–4.5)
LYMPHOCYTES NFR BLD: 6.7 % (ref 20–40)
LYMPHOCYTES NFR FLD: 7 %
MACROPHAGES NFR FLD MANUAL: 5 %
MAGNESIUM SERPL-MCNC: 1.7 MG/DL (ref 1.6–2.6)
MCH RBC QN AUTO: 35 PG (ref 27–31)
MCHC RBC AUTO-ENTMCNC: 36.2 G/DL (ref 33–37)
MCV RBC AUTO: 96.7 FL (ref 80–94)
MONOCYTES # BLD: 0.3 K/UL (ref 0–0.9)
MONOCYTES NFR BLD: 6.3 % (ref 0–10)
MONOCYTES NFR FLD: 6 %
NEUTROPHILS # BLD: 3.9 K/UL (ref 1.5–7.5)
NEUTROPHILS NFR FLD: 82 %
NEUTS SEG NFR BLD: 85.1 % (ref 50–65)
NITRITE UR QL STRIP.AUTO: POSITIVE
NUCLEATED CELLS FLUID: 671 /CUMM
PERFORMED ON: NORMAL
PH UR STRIP.AUTO: 6 [PH] (ref 5–8)
PLATELET # BLD AUTO: 105 K/UL (ref 130–400)
PLATELET SLIDE REVIEW: ABNORMAL
PMV BLD AUTO: 11.6 FL (ref 9.4–12.4)
POTASSIUM SERPL-SCNC: 3.4 MMOL/L (ref 3.5–5)
POTASSIUM SERPL-SCNC: 3.4 MMOL/L (ref 3.5–5.1)
PROCALCITONIN: 0.73 NG/ML (ref 0–0.09)
PROT FLD-MCNC: 1.7 G/DL
PROT SERPL-MCNC: 5.3 G/DL (ref 6.4–8.3)
PROT SERPL-MCNC: 5.3 G/DL (ref 6.4–8.3)
PROT UR STRIP.AUTO-MCNC: 30 MG/DL
RBC # BLD AUTO: 3 M/UL (ref 4.7–6.1)
RBC # FLD: <2000 /CUMM
RBC #/AREA URNS HPF: NORMAL /HPF (ref 0–2)
SODIUM SERPL-SCNC: 127 MMOL/L (ref 136–145)
SODIUM SERPL-SCNC: 127 MMOL/L (ref 136–145)
SP GR UR STRIP.AUTO: 1.02 (ref 1–1.03)
SQUAMOUS #/AREA URNS HPF: NORMAL /HPF
TARGETS BLD QL SMEAR: ABNORMAL
TOTAL CELLS COUNTED FLD: 100
UROBILINOGEN UR STRIP.AUTO-MCNC: 2 E.U./DL
WBC # BLD AUTO: 23.2 K/UL (ref 4.8–10.8)
WBC #/AREA URNS HPF: NORMAL /HPF (ref 0–5)

## 2025-05-01 PROCEDURE — 81001 URINALYSIS AUTO W/SCOPE: CPT

## 2025-05-01 PROCEDURE — 83735 ASSAY OF MAGNESIUM: CPT

## 2025-05-01 PROCEDURE — 99232 SBSQ HOSP IP/OBS MODERATE 35: CPT | Performed by: INTERNAL MEDICINE

## 2025-05-01 PROCEDURE — 6370000000 HC RX 637 (ALT 250 FOR IP): Performed by: HOSPITALIST

## 2025-05-01 PROCEDURE — 87015 SPECIMEN INFECT AGNT CONCNTJ: CPT

## 2025-05-01 PROCEDURE — 10160 PNXR ASPIR ABSC HMTMA BULLA: CPT

## 2025-05-01 PROCEDURE — 94760 N-INVAS EAR/PLS OXIMETRY 1: CPT

## 2025-05-01 PROCEDURE — 89051 BODY FLUID CELL COUNT: CPT

## 2025-05-01 PROCEDURE — 87070 CULTURE OTHR SPECIMN AEROBIC: CPT

## 2025-05-01 PROCEDURE — 6370000000 HC RX 637 (ALT 250 FOR IP)

## 2025-05-01 PROCEDURE — 83615 LACTATE (LD) (LDH) ENZYME: CPT

## 2025-05-01 PROCEDURE — 83605 ASSAY OF LACTIC ACID: CPT

## 2025-05-01 PROCEDURE — 36415 COLL VENOUS BLD VENIPUNCTURE: CPT

## 2025-05-01 PROCEDURE — 6370000000 HC RX 637 (ALT 250 FOR IP): Performed by: NURSE PRACTITIONER

## 2025-05-01 PROCEDURE — 82040 ASSAY OF SERUM ALBUMIN: CPT

## 2025-05-01 PROCEDURE — 1200000000 HC SEMI PRIVATE

## 2025-05-01 PROCEDURE — 84157 ASSAY OF PROTEIN OTHER: CPT

## 2025-05-01 PROCEDURE — 88112 CYTOPATH CELL ENHANCE TECH: CPT

## 2025-05-01 PROCEDURE — 87075 CULTR BACTERIA EXCEPT BLOOD: CPT

## 2025-05-01 PROCEDURE — 6360000002 HC RX W HCPCS

## 2025-05-01 PROCEDURE — 82140 ASSAY OF AMMONIA: CPT

## 2025-05-01 PROCEDURE — 2500000003 HC RX 250 WO HCPCS: Performed by: NURSE PRACTITIONER

## 2025-05-01 PROCEDURE — 6360000002 HC RX W HCPCS: Performed by: STUDENT IN AN ORGANIZED HEALTH CARE EDUCATION/TRAINING PROGRAM

## 2025-05-01 PROCEDURE — 82962 GLUCOSE BLOOD TEST: CPT

## 2025-05-01 PROCEDURE — 88112 CYTOPATH CELL ENHANCE TECH: CPT | Performed by: PATHOLOGY

## 2025-05-01 PROCEDURE — 82945 GLUCOSE OTHER FLUID: CPT

## 2025-05-01 PROCEDURE — 85025 COMPLETE CBC W/AUTO DIFF WBC: CPT

## 2025-05-01 PROCEDURE — 84145 PROCALCITONIN (PCT): CPT

## 2025-05-01 PROCEDURE — 87077 CULTURE AEROBIC IDENTIFY: CPT

## 2025-05-01 PROCEDURE — 87205 SMEAR GRAM STAIN: CPT

## 2025-05-01 PROCEDURE — 87086 URINE CULTURE/COLONY COUNT: CPT

## 2025-05-01 PROCEDURE — 88305 TISSUE EXAM BY PATHOLOGIST: CPT

## 2025-05-01 PROCEDURE — 6360000002 HC RX W HCPCS: Performed by: NURSE PRACTITIONER

## 2025-05-01 PROCEDURE — 88305 TISSUE EXAM BY PATHOLOGIST: CPT | Performed by: PATHOLOGY

## 2025-05-01 PROCEDURE — 80053 COMPREHEN METABOLIC PANEL: CPT

## 2025-05-01 PROCEDURE — 0W9G3ZZ DRAINAGE OF PERITONEAL CAVITY, PERCUTANEOUS APPROACH: ICD-10-PCS | Performed by: RADIOLOGY

## 2025-05-01 RX ORDER — CALCIUM GLUCONATE 20 MG/ML
1000 INJECTION, SOLUTION INTRAVENOUS ONCE
Status: COMPLETED | OUTPATIENT
Start: 2025-05-01 | End: 2025-05-01

## 2025-05-01 RX ORDER — SODIUM CHLORIDE 0.9 % (FLUSH) 0.9 %
5-40 SYRINGE (ML) INJECTION EVERY 12 HOURS SCHEDULED
Status: DISCONTINUED | OUTPATIENT
Start: 2025-05-01 | End: 2025-05-06 | Stop reason: HOSPADM

## 2025-05-01 RX ORDER — SODIUM CHLORIDE 9 MG/ML
INJECTION, SOLUTION INTRAVENOUS PRN
Status: DISCONTINUED | OUTPATIENT
Start: 2025-05-01 | End: 2025-05-06 | Stop reason: HOSPADM

## 2025-05-01 RX ORDER — SODIUM CHLORIDE 0.9 % (FLUSH) 0.9 %
5-40 SYRINGE (ML) INJECTION PRN
Status: DISCONTINUED | OUTPATIENT
Start: 2025-05-01 | End: 2025-05-06 | Stop reason: HOSPADM

## 2025-05-01 RX ADMIN — ONDANSETRON 4 MG: 4 TABLET, ORALLY DISINTEGRATING ORAL at 07:26

## 2025-05-01 RX ADMIN — POTASSIUM BICARBONATE 40 MEQ: 782 TABLET, EFFERVESCENT ORAL at 05:25

## 2025-05-01 RX ADMIN — LACTULOSE 30 G: 20 SOLUTION ORAL at 07:22

## 2025-05-01 RX ADMIN — LACTULOSE 30 G: 20 SOLUTION ORAL at 14:21

## 2025-05-01 RX ADMIN — Medication 100 MG: at 07:38

## 2025-05-01 RX ADMIN — CALCIUM GLUCONATE 1000 MG: 20 INJECTION, SOLUTION INTRAVENOUS at 05:19

## 2025-05-01 RX ADMIN — LACTULOSE 30 G: 20 SOLUTION ORAL at 20:47

## 2025-05-01 RX ADMIN — THERA TABS 1 TABLET: TAB at 07:22

## 2025-05-01 RX ADMIN — HYDROMORPHONE HYDROCHLORIDE 0.5 MG: 1 INJECTION, SOLUTION INTRAMUSCULAR; INTRAVENOUS; SUBCUTANEOUS at 11:39

## 2025-05-01 RX ADMIN — WATER 2000 MG: 1 INJECTION INTRAMUSCULAR; INTRAVENOUS; SUBCUTANEOUS at 16:47

## 2025-05-01 RX ADMIN — LACTULOSE: 10 SOLUTION ORAL at 13:45

## 2025-05-01 RX ADMIN — LORAZEPAM 1 MG: 1 TABLET ORAL at 01:52

## 2025-05-01 RX ADMIN — FOLIC ACID 1 MG: 1 TABLET ORAL at 07:22

## 2025-05-01 RX ADMIN — PANTOPRAZOLE SODIUM 40 MG: 40 TABLET, DELAYED RELEASE ORAL at 05:25

## 2025-05-01 ASSESSMENT — PAIN SCALES - GENERAL: PAINLEVEL_OUTOF10: 7

## 2025-05-01 NOTE — PROGRESS NOTES
GI  - PROGRESS NOTE    Subjective:   Admit Date: 4/29/2025  PCP: Rosangela Mtz APRN - CNP    Patient being seen for: Elevated liver function test/jaundice.  Abdominal pain.    HPI: Patient is confused and disoriented, arousable.  Noted to be in hepatic encephalopathy with significantly elevated ammonia level.  Small amount of bowel movement despite 3 times a day of lactulose.  No  complaints at this time.      Medications:  Scheduled Meds:   sodium chloride flush  5-40 mL IntraVENous 2 times per day    carvedilol  12.5 mg Oral BID WC    folic acid  1 mg Oral Daily    multivitamin  1 tablet Oral Daily    pantoprazole  40 mg Oral QAM AC    [Held by provider] PARoxetine  20 mg Oral QAM    vitamin B-1  100 mg Oral Daily    sodium chloride flush  5-40 mL IntraVENous 2 times per day    lactulose  30 g Oral TID       Continuous Infusions:   sodium chloride      sodium chloride         PRN Meds:.sodium chloride flush, sodium chloride, LORazepam **OR** LORazepam **OR** [DISCONTINUED] LORazepam **OR** [DISCONTINUED] LORazepam **OR** [DISCONTINUED] LORazepam **OR** [DISCONTINUED] LORazepam **OR** [DISCONTINUED] LORazepam **OR** [DISCONTINUED] LORazepam, sodium chloride flush, sodium chloride, potassium chloride **OR** potassium alternative oral replacement **OR** potassium chloride, magnesium sulfate, ondansetron **OR** ondansetron, polyethylene glycol, acetaminophen, HYDROmorphone      Labs:     Recent Labs     04/29/25  1535 04/29/25  2050 04/30/25 0431 05/01/25  0353   WBC 24.3* 25.4* 21.2* 23.2*   RBC 3.38* 3.28*  --  3.00*   HGB  --   --   --  10.5*   HCT  --   --   --  29.0*   MCV  --   --   --  96.7*   MCH  --   --   --  35.0*   MCHC  --   --   --  36.2    149 129* 105*     Recent Labs     04/30/25  0431 05/01/25  0353 05/01/25  0744   * 127* 127*   K 3.0* 3.4* 3.4*   ANIONGAP 13 12 12   CL 85* 88* 88*   CO2 27 27 27   BUN 12 16 16   CREATININE 0.7 0.7 0.6*

## 2025-05-01 NOTE — PROGRESS NOTES
DIS Nurse Note  SUBJECTIVE: Patient assessed secondary to elevated Deterioration Index Score.      Deterioration Index Score:  Predictive Model Details          50  Factor Value    Calculated 5/1/2025 06:24 24% [place] coma scale 13    Deterioration Index Model 18% Neurological exam X     14% Age 44 years old     11% Systolic 94     9% Sodium abnormal (127 mmol/L)     8% Hematocrit abnormal (29.0 %)     8% WBC count abnormal (23.2 K/uL)     5% Respiratory rate 18     2% Potassium abnormal (3.4 mmol/L)     1% Platelet count abnormal (105 K/uL)     1% Pulse 87     0% Pulse oximetry 98 %     0% Temperature 97 °F (36.1 °C)        Vital Signs:  Vitals:    04/30/25 1545 04/30/25 1916 05/01/25 0009 05/01/25 0426   BP: 113/73 (!) 94/58 111/79 94/70   Pulse: 80 84 89 87   Resp: 18 18 20 18   Temp: 98.2 °F (36.8 °C) 97.5 °F (36.4 °C) 97.3 °F (36.3 °C) 97 °F (36.1 °C)   TempSrc: Temporal Temporal Temporal Temporal   SpO2: 95% 99% 100% 98%   Weight:    83.5 kg (184 lb 1.6 oz)        Provider Notified:   Dr Livingston   ASSESSMENT:   electrolytes critical    Plan of Care: replacing critical electrolytes    Electronically signed by Shala Perez RN

## 2025-05-01 NOTE — PROGRESS NOTES
[facility]      Progress Note    Patient:  Moreno De Leon  YOB: 1980  Date of Service: 5/1/2025  MRN: [id number]   Acct: [id number]   Primary Care Physician: Rosangela Mtz APRN - CNP  Advance Directive: Full Code  Admit Date: 4/29/2025       Hospital Day: 2    Portions of this note have been copied forward, however, updated to reflect the most current clinical status of this patient.     CHIEF COMPLAINT Jaundice     SUBJECTIVE:  Mr. D eLeon was resting in bed this morning. Lethargic appearing. Oriented to self only. Delayed responses at times.       CUMULATIVE HOSPITAL COURSE:   The patient is a 44 y.o. male with PMH of alcohol abuse who presented to [facility] ED for evaluation of jaundice and abdominal pain. Mr. De Leon is a poor historian and was lethargic on admission and unable to provide complete HPI.  Reportedly friend had reported that patient had complained of abdominal pain with nausea and decreased appetite and appeared to be jaundiced. Reportedly was scheduled for an outpatient GI evaluation on day of admission. Workup in ED revealed  CT abdomen and pelvis with diffuse fatty infiltration of the liver, small amount to minus ascites, mild inflammatory changes seen along the pancreas and the findings may represent acute pancreatitis, common bile duct does not appear to be dilated, no bowel obstruction; EtOH 242, lipase 297, total bilirubin 13.6, , , ammonia 111, alkaline phosphatase 843, albumin 2.8, creatinine 0.8, BUN 11, GFR> 90, potassium 2.7, sodium 123. Patient was admitted to hospital medicine for further evaluation with GI consultation. Seen by GI with recommendations of supportive treatment for alcohol withdrawal. Further suggested supportive treatment, consider adding Xifaxan 550 mg BID orally, monitor LFTs. Lactic acid and Procalcitonin added due to persistent leukocytosis. Lactic acid 1.1. Procalcitonin 0.73. Paracentesis ordered. Empiric antibiotics for probable

## 2025-05-02 LAB
ALBUMIN SERPL-MCNC: 2.6 G/DL (ref 3.5–5.2)
ALP SERPL-CCNC: 585 U/L (ref 40–129)
ALT SERPL-CCNC: 117 U/L (ref 10–50)
ANION GAP SERPL CALCULATED.3IONS-SCNC: 12 MMOL/L (ref 8–16)
ANISOCYTOSIS BLD QL SMEAR: ABNORMAL
AST SERPL-CCNC: 414 U/L (ref 10–50)
BASOPHILS # BLD: 0.1 K/UL (ref 0–0.2)
BASOPHILS NFR BLD: 0.3 % (ref 0–1)
BILIRUB SERPL-MCNC: 20.5 MG/DL (ref 0.2–1.2)
BUN SERPL-MCNC: 16 MG/DL (ref 6–20)
CALCIUM SERPL-MCNC: 7.4 MG/DL (ref 8.6–10)
CHLORIDE SERPL-SCNC: 90 MMOL/L (ref 98–107)
CO2 SERPL-SCNC: 28 MMOL/L (ref 22–29)
CREAT SERPL-MCNC: 0.6 MG/DL (ref 0.7–1.2)
EOSINOPHIL # BLD: 0.1 K/UL (ref 0–0.6)
EOSINOPHIL NFR BLD: 0.5 % (ref 0–5)
ERYTHROCYTE [DISTWIDTH] IN BLOOD BY AUTOMATED COUNT: 25.1 % (ref 11.5–14.5)
GLUCOSE BLD-MCNC: 114 MG/DL (ref 70–99)
GLUCOSE BLD-MCNC: 120 MG/DL (ref 70–99)
GLUCOSE BLD-MCNC: 88 MG/DL (ref 70–99)
GLUCOSE BLD-MCNC: 95 MG/DL (ref 70–99)
GLUCOSE BLD-MCNC: 99 MG/DL (ref 70–99)
GLUCOSE SERPL-MCNC: 60 MG/DL (ref 70–99)
HCT VFR BLD AUTO: 32 % (ref 42–52)
HGB BLD-MCNC: 11.9 G/DL (ref 14–18)
IMM GRANULOCYTES # BLD: 0.6 K/UL
LYMPHOCYTES # BLD: 2 K/UL (ref 1.1–4.5)
LYMPHOCYTES NFR BLD: 9.6 % (ref 20–40)
MACROCYTES BLD QL SMEAR: ABNORMAL
MAGNESIUM SERPL-MCNC: 1.9 MG/DL (ref 1.6–2.6)
MCH RBC QN AUTO: 37.4 PG (ref 27–31)
MCHC RBC AUTO-ENTMCNC: 37.2 G/DL (ref 33–37)
MCV RBC AUTO: 100.6 FL (ref 80–94)
MONOCYTES # BLD: 1.5 K/UL (ref 0–0.9)
MONOCYTES NFR BLD: 7.1 % (ref 0–10)
NEUTROPHILS # BLD: 16.8 K/UL (ref 1.5–7.5)
NEUTS SEG NFR BLD: 79.8 % (ref 50–65)
PERFORMED ON: ABNORMAL
PERFORMED ON: ABNORMAL
PERFORMED ON: NORMAL
PLATELET # BLD AUTO: 93 K/UL (ref 130–400)
PLATELET SLIDE REVIEW: ABNORMAL
PMV BLD AUTO: 12.3 FL (ref 9.4–12.4)
POLYCHROMASIA BLD QL SMEAR: ABNORMAL
POTASSIUM SERPL-SCNC: 3.1 MMOL/L (ref 3.5–5)
PROT SERPL-MCNC: 5 G/DL (ref 6.4–8.3)
RBC # BLD AUTO: 3.18 M/UL (ref 4.7–6.1)
SODIUM SERPL-SCNC: 130 MMOL/L (ref 136–145)
TARGETS BLD QL SMEAR: ABNORMAL
WBC # BLD AUTO: 21.1 K/UL (ref 4.8–10.8)

## 2025-05-02 PROCEDURE — 2500000003 HC RX 250 WO HCPCS: Performed by: NURSE PRACTITIONER

## 2025-05-02 PROCEDURE — 94760 N-INVAS EAR/PLS OXIMETRY 1: CPT

## 2025-05-02 PROCEDURE — 36415 COLL VENOUS BLD VENIPUNCTURE: CPT

## 2025-05-02 PROCEDURE — 83735 ASSAY OF MAGNESIUM: CPT

## 2025-05-02 PROCEDURE — 80053 COMPREHEN METABOLIC PANEL: CPT

## 2025-05-02 PROCEDURE — 99232 SBSQ HOSP IP/OBS MODERATE 35: CPT | Performed by: INTERNAL MEDICINE

## 2025-05-02 PROCEDURE — 6360000002 HC RX W HCPCS: Performed by: NURSE PRACTITIONER

## 2025-05-02 PROCEDURE — 1200000000 HC SEMI PRIVATE

## 2025-05-02 PROCEDURE — 6370000000 HC RX 637 (ALT 250 FOR IP)

## 2025-05-02 PROCEDURE — 82962 GLUCOSE BLOOD TEST: CPT

## 2025-05-02 PROCEDURE — 85025 COMPLETE CBC W/AUTO DIFF WBC: CPT

## 2025-05-02 RX ADMIN — FOLIC ACID 1 MG: 1 TABLET ORAL at 07:10

## 2025-05-02 RX ADMIN — LACTULOSE 30 G: 20 SOLUTION ORAL at 07:10

## 2025-05-02 RX ADMIN — LACTULOSE 30 G: 20 SOLUTION ORAL at 14:51

## 2025-05-02 RX ADMIN — WATER 30 MG: 1 INJECTION INTRAMUSCULAR; INTRAVENOUS; SUBCUTANEOUS at 09:47

## 2025-05-02 RX ADMIN — PANTOPRAZOLE SODIUM 40 MG: 40 TABLET, DELAYED RELEASE ORAL at 05:30

## 2025-05-02 RX ADMIN — Medication 100 MG: at 07:10

## 2025-05-02 RX ADMIN — POTASSIUM BICARBONATE 40 MEQ: 782 TABLET, EFFERVESCENT ORAL at 06:41

## 2025-05-02 RX ADMIN — SODIUM CHLORIDE, PRESERVATIVE FREE 10 ML: 5 INJECTION INTRAVENOUS at 07:10

## 2025-05-02 RX ADMIN — LACTULOSE 30 G: 20 SOLUTION ORAL at 21:28

## 2025-05-02 RX ADMIN — WATER 2000 MG: 1 INJECTION INTRAMUSCULAR; INTRAVENOUS; SUBCUTANEOUS at 14:51

## 2025-05-02 RX ADMIN — THERA TABS 1 TABLET: TAB at 07:10

## 2025-05-02 RX ADMIN — SODIUM CHLORIDE, PRESERVATIVE FREE 10 ML: 5 INJECTION INTRAVENOUS at 23:03

## 2025-05-02 NOTE — ACP (ADVANCE CARE PLANNING)
Advance Care Planning     Advance Care Planning Inpatient Note  Spiritual Care Department    Today's Date: 5/2/2025  Unit: [facility] 5 SURG SERVICES    Received request from rounding.  Upon review of chart and communication with care team, Spiritual Care will defer advance care planning with patient at this time.. Patient was/were present in the room during visit.    Patient is copying with Alcohol-induced symptoms.    Electronically signed by Chaplain Fried Mai on 5/2/2025 at 4:19 PM

## 2025-05-02 NOTE — PROGRESS NOTES
Spiritual Health History and Assessment/Progress Note  [facility]    Initial Encounter,  ,  ,      Name: Moreno De Leon MRN: [id number]    Age: 44 y.o.     Sex: male   Language: English   Nondenominational: None   Alcohol-induced acute pancreatitis, unspecified complication status     Date: 5/2/2025            Total Time Calculated: (P) 30 min              Spiritual Assessment began in [facility] 5 SURG SERVICES        Referral/Consult From: (P) Rounding   Encounter Overview/Reason: Initial Encounter  Service Provided For: Patient    Michaelle, Belief, Meaning:   Patient identifies as spiritual  Family/Friends No family/friends present      Importance and Influence:  Patient has spiritual/personal beliefs that influence decisions regarding their health  Family/Friends No family/friends present    Community:  Patient is connected with a spiritual community and feels well-supported. Support system includes: Parent/s and Extended family  Family/Friends No family/friends present    Assessment and Plan of Care:     Patient Interventions include: Facilitated expression of thoughts and feelings and Explored spiritual coping/struggle/distress  Family/Friends Interventions include: No family/friends present    Patient Plan of Care: Spiritual Care available upon further referral  Family/Friends Plan of Care: No family/friends present    Electronically signed by Chaplain Fried Mai on 5/2/2025 at 4:18 PM

## 2025-05-02 NOTE — CARE COORDINATION
Case Management Assessment  Initial Evaluation    Date/Time of Evaluation: 5/2/2025 10:41 AM  Assessment Completed by: Nighat Barragan    If patient is discharged prior to next notation, then this note serves as note for discharge by case management.    Patient Name: Moreno De Leon                   YOB: 1980  Diagnosis: Hepatic encephalopathy (HCC) [K76.82]  Hypokalemia [E87.6]  Chronic alcoholism (HCC) [F10.20]  Hyponatremia [E87.1]  Alcoholic cirrhosis of liver with ascites (HCC) [K70.31]  Elevated bilirubin [R17]  Alcohol-induced acute pancreatitis, unspecified complication status [K85.20]                   Date / Time: 4/29/2025 10:32 AM    Patient Admission Status: Inpatient   Readmission Risk (Low < 19, Mod (19-27), High > 27): Readmission Risk Score: 20.1    Current PCP: Rosangela Mtz APRN - CNP  PCP verified by CM? (P) Yes    Chart Reviewed: Yes      History Provided by: (P) Child/Family  Patient Orientation: (P) Other (see comment) (confused)    Patient Cognition: (P) Other (see comment) (confused)    Hospitalization in the last 30 days (Readmission):  No    If yes, Readmission Assessment in CM Navigator will be completed.    Advance Directives:      Code Status: Full Code   Patient's Primary Decision Maker is: (P) Legal Next of Kin    Primary Decision Maker: Cynthia De Leon - Parent - [phone]    Secondary Decision Maker: Buck De Leon - Brother/Sister - [phone]    Discharge Planning:    Patient lives with: (P) Parent Type of Home: (P) House  Primary Care Giver: (P) Family  Patient Support Systems include: (P) Parent, Family Members   Current Financial resources: (P) Medicaid  Current community resources: (P) None  Current services prior to admission: (P) None            Current DME:              Type of Home Care services:  (P) None    ADLS  Prior functional level: (P) Assistance with the following:, Bathing, Dressing, Mobility, Cooking, Housework  Current functional level: (P)

## 2025-05-02 NOTE — PROGRESS NOTES
[facility]      Progress Note    Patient:  Moreno De Leon  YOB: 1980  Date of Service: 5/2/2025  MRN: [id number]   Acct: [id number]   Primary Care Physician: Rosangela Mtz APRN - CNP  Advance Directive: Full Code  Admit Date: 4/29/2025       Hospital Day: 3    Portions of this note have been copied forward, however, updated to reflect the most current clinical status of this patient.     CHIEF COMPLAINT Jaundice     SUBJECTIVE:  Mr. De Leon was resting in bed this morning. Somnolent, but answering questions appropriately. A&O x 3. Reports abdominal pain and nausea. Reports SOB at times. Denies chest pain.       CUMULATIVE HOSPITAL COURSE:   The patient is a 44 y.o. male with PMH of alcohol abuse who presented to [facility] ED for evaluation of jaundice and abdominal pain. Mr. De Leon is a poor historian and was lethargic on admission and unable to provide complete HPI.  Reportedly friend had reported that patient had complained of abdominal pain with nausea and decreased appetite and appeared to be jaundiced. Reportedly was scheduled for an outpatient GI evaluation on day of admission. Workup in ED revealed  CT abdomen and pelvis with diffuse fatty infiltration of the liver, small amount to minus ascites, mild inflammatory changes seen along the pancreas and the findings may represent acute pancreatitis, common bile duct does not appear to be dilated, no bowel obstruction; EtOH 242, lipase 297, total bilirubin 13.6, , , ammonia 111, alkaline phosphatase 843, albumin 2.8, creatinine 0.8, BUN 11, GFR> 90, potassium 2.7, sodium 123. Patient was admitted to hospital medicine for further evaluation with GI consultation. Seen by GI with recommendations of supportive treatment for alcohol withdrawal. Further suggested supportive treatment, consider adding Xifaxan 550 mg BID orally, monitor LFTs. Lactic acid and Procalcitonin added due to persistent leukocytosis. Lactic acid 1.1.  Procalcitonin 0.73. Paracentesis ordered. Empiric antibiotics for probable SBP initiated. Underwent paracentesis on 5/1/25 with 5L of martha tea color fluid removed. Fluid analysis consistent with spontaneous bacterial peritonitis with SAAG of 1.7 and . IV solumedrol 30 mg daily ordered for alcoholic hepatitis as well with plans to transition to prednisolone 40 mg daily on discharge for total of 28 days.        Review of Systems   Unable to perform ROS: Mental status change        Objective:   VITALS:  BP (!) 118/92   Pulse 78   Temp 97.5 °F (36.4 °C) (Temporal)   Resp 18   Wt 83.5 kg (184 lb 1.6 oz)   SpO2 96%   BMI 27.17 kg/m²   24HR INTAKE/OUTPUT:    Intake/Output Summary (Last 24 hours) at 5/2/2025 1433  Last data filed at 5/2/2025 0531  Gross per 24 hour   Intake --   Output 400 ml   Net -400 ml       Physical Exam  Constitutional:       General: He is not in acute distress.     Appearance: Normal appearance. He is ill-appearing.   HENT:      Head: Normocephalic and atraumatic.      Right Ear: External ear normal.      Left Ear: External ear normal.      Nose: Nose normal.      Mouth/Throat:      Mouth: Mucous membranes are moist.   Eyes:      Extraocular Movements: Extraocular movements intact.      Conjunctiva/sclera: Conjunctivae normal.      Pupils: Pupils are equal, round, and reactive to light.   Cardiovascular:      Rate and Rhythm: Normal rate and regular rhythm.      Pulses: Normal pulses.      Heart sounds: Normal heart sounds.   Pulmonary:      Effort: Pulmonary effort is normal. No respiratory distress.      Breath sounds: Normal breath sounds. No wheezing, rhonchi or rales.   Abdominal:      General: Bowel sounds are normal. There is distension.      Palpations: Abdomen is soft.      Tenderness: There is no abdominal tenderness.   Musculoskeletal:         General: No swelling, tenderness or deformity. Normal range of motion.      Cervical back: Normal range of motion and neck supple. No

## 2025-05-02 NOTE — PROGRESS NOTES
GI  - PROGRESS NOTE    Subjective:   Admit Date: 4/29/2025  PCP: Rosangela Mtz APRN - CNP    Patient being seen for: Abdominal pain.  Elevated liver function test.  Cirrhosis of liver.    HPI: Patient is much more awake.  He is oriented.  Denies any significant abdominal pain.  No nausea or vomiting.  He is tolerating his diet.      Medications:  Scheduled Meds:   methylPREDNISolone  30 mg IntraVENous Daily    cefTRIAXone (ROCEPHIN) IV  2,000 mg IntraVENous Q24H    sodium chloride flush  5-40 mL IntraVENous 2 times per day    carvedilol  12.5 mg Oral BID WC    folic acid  1 mg Oral Daily    multivitamin  1 tablet Oral Daily    pantoprazole  40 mg Oral QAM AC    [Held by provider] PARoxetine  20 mg Oral QAM    vitamin B-1  100 mg Oral Daily    sodium chloride flush  5-40 mL IntraVENous 2 times per day    lactulose  30 g Oral TID       Continuous Infusions:   sodium chloride         PRN Meds:.sodium chloride flush, sodium chloride, LORazepam **OR** LORazepam **OR** [DISCONTINUED] LORazepam **OR** [DISCONTINUED] LORazepam **OR** [DISCONTINUED] LORazepam **OR** [DISCONTINUED] LORazepam **OR** [DISCONTINUED] LORazepam **OR** [DISCONTINUED] LORazepam, potassium chloride **OR** potassium alternative oral replacement **OR** potassium chloride, magnesium sulfate, ondansetron **OR** ondansetron, polyethylene glycol, acetaminophen, [Held by provider] HYDROmorphone      Labs:     Recent Labs     04/29/25 2050 04/30/25  0431 05/01/25 0353 05/02/25  0517   WBC 25.4* 21.2* 23.2* 21.1*   RBC 3.28*  --  3.00* 3.18*   HGB  --   --  10.5* 11.9*   HCT  --   --  29.0* 32.0*   MCV  --   --  96.7* 100.6*   MCH  --   --  35.0* 37.4*   MCHC  --   --  36.2 37.2*    129* 105* 93*     Recent Labs     05/01/25  0353 05/01/25  0744 05/02/25  0517   * 127* 130*   K 3.4* 3.4* 3.1*   ANIONGAP 12 12 12   CL 88* 88* 90*   CO2 27 27 28   BUN 16 16 16   CREATININE 0.7 0.6* 0.6*   GLUCOSE  68* 67* 60*   CALCIUM 6.9* 7.5* 7.4*     Recent Labs     04/30/25  0431 05/01/25  0353 05/02/25  0517   MG 1.8 1.7 1.9     Recent Labs     05/01/25  0353 05/01/25  0744 05/02/25  0517   * 413* 414*   * 115* 117*   BILITOT 17.6* 19.4* 20.5*   ALKPHOS 604* 617* 585*     HgBA1c:  No components found for: \"HGBA1C\"  FLP:    Lab Results   Component Value Date/Time    TRIG 242 04/30/2025 04:31 AM    HDL 50 04/25/2020 10:19 PM     TSH:    Lab Results   Component Value Date/Time    TSH 3.230 05/02/2024 03:03 AM     Troponin T: No results for input(s): \"TROPONINI\" in the last 72 hours.  INR: No results for input(s): \"INR\" in the last 72 hours.    No results for input(s): \"LIPASE\" in the last 72 hours.  -----------------------------------------------------------------  RAD:     US GUIDED PARACENTESIS  Result Date: 5/1/2025  EXAM: ULTRASOUND-GUIDED ABDOMINAL PARACENTESIS FOR DIAGNOSTIC AND THERAPEUTIC PURPOSES.  HISTORY: Cirrhosis, ascites  COMPARISON: None.  FINDINGS: Written and verbal informed consent was obtained. Complications of infection and bleeding were discussed.  The patient was placed supine on the ultrasound table.  Ultrasound of the abdomen demonstrated the optimal site for percutaneous approach to be in the left lower quadrant. The skin was marked.  The skin is prepped and draped in A sterile manner. 8 ml of 1% lidocaine was injected for local anesthesia. A small skin incision was made with a scalpel.  The [employer] catheter was advanced into the abdominal peritoneal fluid collection using real-time ultrasound guidance.  5 liters of dark martha tea color fluid was removed from the abdomen.  The catheter was removed.  There are no immediate complaints or complications.  The patient will be transferred to observation.      Satisfactory ultrasound-guided abdominal paracentesis for diagnostic and therapeutic purposes.   ______________________________________ Electronically signed by: YANIV KENNEDY

## 2025-05-03 LAB
ALBUMIN SERPL-MCNC: 2.5 G/DL (ref 3.5–5.2)
ALP SERPL-CCNC: 613 U/L (ref 40–129)
ALT SERPL-CCNC: 142 U/L (ref 10–50)
ANION GAP SERPL CALCULATED.3IONS-SCNC: 12 MMOL/L (ref 8–16)
ANISOCYTOSIS BLD QL SMEAR: ABNORMAL
AST SERPL-CCNC: 460 U/L (ref 10–50)
BACTERIA UR CULT: ABNORMAL
BASOPHILS # BLD: 0.2 K/UL (ref 0–0.2)
BASOPHILS NFR BLD: 0.8 % (ref 0–1)
BILIRUB SERPL-MCNC: 21.7 MG/DL (ref 0.2–1.2)
BUN SERPL-MCNC: 16 MG/DL (ref 6–20)
CALCIUM SERPL-MCNC: 7.3 MG/DL (ref 8.6–10)
CHLORIDE SERPL-SCNC: 90 MMOL/L (ref 98–107)
CO2 SERPL-SCNC: 29 MMOL/L (ref 22–29)
CREAT SERPL-MCNC: 0.6 MG/DL (ref 0.7–1.2)
EOSINOPHIL # BLD: 0 K/UL (ref 0–0.6)
EOSINOPHIL NFR BLD: 0.2 % (ref 0–5)
ERYTHROCYTE [DISTWIDTH] IN BLOOD BY AUTOMATED COUNT: 26 % (ref 11.5–14.5)
GLUCOSE SERPL-MCNC: 92 MG/DL (ref 70–99)
HCT VFR BLD AUTO: 34.5 % (ref 42–52)
HGB BLD-MCNC: 12.7 G/DL (ref 14–18)
IMM GRANULOCYTES # BLD: 0.8 K/UL
LYMPHOCYTES # BLD: 1.9 K/UL (ref 1.1–4.5)
LYMPHOCYTES NFR BLD: 9.7 % (ref 20–40)
MACROCYTES BLD QL SMEAR: ABNORMAL
MAGNESIUM SERPL-MCNC: 2 MG/DL (ref 1.6–2.6)
MCH RBC QN AUTO: 36.9 PG (ref 27–31)
MCHC RBC AUTO-ENTMCNC: 36.8 G/DL (ref 33–37)
MCV RBC AUTO: 100.3 FL (ref 80–94)
MONOCYTES # BLD: 1.8 K/UL (ref 0–0.9)
MONOCYTES NFR BLD: 8.9 % (ref 0–10)
NEUTROPHILS # BLD: 15.1 K/UL (ref 1.5–7.5)
NEUTS SEG NFR BLD: 76.3 % (ref 50–65)
ORGANISM: ABNORMAL
ORGANISM: ABNORMAL
PLATELET # BLD AUTO: 92 K/UL (ref 130–400)
PLATELET SLIDE REVIEW: ABNORMAL
PMV BLD AUTO: 11.6 FL (ref 9.4–12.4)
POLYCHROMASIA BLD QL SMEAR: ABNORMAL
POTASSIUM SERPL-SCNC: 3.3 MMOL/L (ref 3.5–5)
PROT SERPL-MCNC: 5.1 G/DL (ref 6.4–8.3)
RBC # BLD AUTO: 3.44 M/UL (ref 4.7–6.1)
SODIUM SERPL-SCNC: 131 MMOL/L (ref 136–145)
TARGETS BLD QL SMEAR: ABNORMAL
WBC # BLD AUTO: 19.8 K/UL (ref 4.8–10.8)

## 2025-05-03 PROCEDURE — 80053 COMPREHEN METABOLIC PANEL: CPT

## 2025-05-03 PROCEDURE — 94760 N-INVAS EAR/PLS OXIMETRY 1: CPT

## 2025-05-03 PROCEDURE — 36415 COLL VENOUS BLD VENIPUNCTURE: CPT

## 2025-05-03 PROCEDURE — 6370000000 HC RX 637 (ALT 250 FOR IP): Performed by: NURSE PRACTITIONER

## 2025-05-03 PROCEDURE — 6370000000 HC RX 637 (ALT 250 FOR IP)

## 2025-05-03 PROCEDURE — 1200000000 HC SEMI PRIVATE

## 2025-05-03 PROCEDURE — 6360000002 HC RX W HCPCS: Performed by: NURSE PRACTITIONER

## 2025-05-03 PROCEDURE — 85025 COMPLETE CBC W/AUTO DIFF WBC: CPT

## 2025-05-03 PROCEDURE — 2500000003 HC RX 250 WO HCPCS: Performed by: NURSE PRACTITIONER

## 2025-05-03 PROCEDURE — 83735 ASSAY OF MAGNESIUM: CPT

## 2025-05-03 RX ORDER — LACTULOSE 10 G/15ML
30 SOLUTION ORAL 2 TIMES DAILY
Status: DISCONTINUED | OUTPATIENT
Start: 2025-05-03 | End: 2025-05-06 | Stop reason: HOSPADM

## 2025-05-03 RX ADMIN — PANTOPRAZOLE SODIUM 40 MG: 40 TABLET, DELAYED RELEASE ORAL at 09:37

## 2025-05-03 RX ADMIN — CARVEDILOL 12.5 MG: 12.5 TABLET, FILM COATED ORAL at 09:30

## 2025-05-03 RX ADMIN — POTASSIUM BICARBONATE 40 MEQ: 782 TABLET, EFFERVESCENT ORAL at 09:30

## 2025-05-03 RX ADMIN — FOLIC ACID 1 MG: 1 TABLET ORAL at 09:30

## 2025-05-03 RX ADMIN — WATER 2000 MG: 1 INJECTION INTRAMUSCULAR; INTRAVENOUS; SUBCUTANEOUS at 14:52

## 2025-05-03 RX ADMIN — CARVEDILOL 12.5 MG: 12.5 TABLET, FILM COATED ORAL at 17:19

## 2025-05-03 RX ADMIN — WATER 30 MG: 1 INJECTION INTRAMUSCULAR; INTRAVENOUS; SUBCUTANEOUS at 09:30

## 2025-05-03 RX ADMIN — SODIUM CHLORIDE, PRESERVATIVE FREE 10 ML: 5 INJECTION INTRAVENOUS at 09:32

## 2025-05-03 RX ADMIN — THERA TABS 1 TABLET: TAB at 09:30

## 2025-05-03 RX ADMIN — Medication 100 MG: at 09:30

## 2025-05-03 ASSESSMENT — PAIN SCALES - GENERAL: PAINLEVEL_OUTOF10: 1

## 2025-05-03 NOTE — PROGRESS NOTES
[facility]      Progress Note    Patient:  Moreno De Leon  YOB: 1980  Date of Service: 5/3/2025  MRN: [id number]   Acct: [id number]   Primary Care Physician: Rosangela Mtz APRN - CNP  Advance Directive: Full Code  Admit Date: 4/29/2025       Hospital Day: 4    Portions of this note have been copied forward, however, updated to reflect the most current clinical status of this patient.     CHIEF COMPLAINT Jaundice     SUBJECTIVE:  Mr. De Leon was resting in bed this morning. Much more alert this morning. Reports being tired from having multiple loose bowel movements. A&O x 3. Reports SOB with exertion at times. Denies abdominal pain.        CUMULATIVE HOSPITAL COURSE:   The patient is a 44 y.o. male with PMH of alcohol abuse who presented to [facility] ED for evaluation of jaundice and abdominal pain. Mr. De Leon is a poor historian and was lethargic on admission and unable to provide complete HPI.  Reportedly friend had reported that patient had complained of abdominal pain with nausea and decreased appetite and appeared to be jaundiced. Reportedly was scheduled for an outpatient GI evaluation on day of admission. Workup in ED revealed  CT abdomen and pelvis with diffuse fatty infiltration of the liver, small amount to minus ascites, mild inflammatory changes seen along the pancreas and the findings may represent acute pancreatitis, common bile duct does not appear to be dilated, no bowel obstruction; EtOH 242, lipase 297, total bilirubin 13.6, , , ammonia 111, alkaline phosphatase 843, albumin 2.8, creatinine 0.8, BUN 11, GFR> 90, potassium 2.7, sodium 123. Patient was admitted to hospital medicine for further evaluation with GI consultation. Seen by GI with recommendations of supportive treatment for alcohol withdrawal. Further suggested supportive treatment, consider adding Xifaxan 550 mg BID orally, monitor LFTs. Lactic acid and Procalcitonin added due to persistent leukocytosis.

## 2025-05-04 LAB
ALBUMIN SERPL-MCNC: 2.3 G/DL (ref 3.5–5.2)
ALP SERPL-CCNC: 558 U/L (ref 40–129)
ALT SERPL-CCNC: 154 U/L (ref 10–50)
ANION GAP SERPL CALCULATED.3IONS-SCNC: 12 MMOL/L (ref 8–16)
AST SERPL-CCNC: 431 U/L (ref 10–50)
BASOPHILS # BLD: 0.2 K/UL (ref 0–0.2)
BASOPHILS NFR BLD: 0.9 % (ref 0–1)
BILIRUB SERPL-MCNC: 19.9 MG/DL (ref 0.2–1.2)
BUN SERPL-MCNC: 16 MG/DL (ref 6–20)
CALCIUM SERPL-MCNC: 7.1 MG/DL (ref 8.6–10)
CHLORIDE SERPL-SCNC: 91 MMOL/L (ref 98–107)
CO2 SERPL-SCNC: 26 MMOL/L (ref 22–29)
CREAT SERPL-MCNC: 0.6 MG/DL (ref 0.7–1.2)
EOSINOPHIL # BLD: 0.1 K/UL (ref 0–0.6)
EOSINOPHIL NFR BLD: 0.4 % (ref 0–5)
ERYTHROCYTE [DISTWIDTH] IN BLOOD BY AUTOMATED COUNT: 26.5 % (ref 11.5–14.5)
GLUCOSE SERPL-MCNC: 100 MG/DL (ref 70–99)
HCT VFR BLD AUTO: 33.8 % (ref 42–52)
HGB BLD-MCNC: 12.3 G/DL (ref 14–18)
IMM GRANULOCYTES # BLD: 0.8 K/UL
LYMPHOCYTES # BLD: 2.1 K/UL (ref 1.1–4.5)
LYMPHOCYTES NFR BLD: 12.2 % (ref 20–40)
MACROCYTES BLD QL SMEAR: ABNORMAL
MAGNESIUM SERPL-MCNC: 1.9 MG/DL (ref 1.6–2.6)
MCH RBC QN AUTO: 37.5 PG (ref 27–31)
MCHC RBC AUTO-ENTMCNC: 36.4 G/DL (ref 33–37)
MCV RBC AUTO: 103 FL (ref 80–94)
MONOCYTES # BLD: 1.7 K/UL (ref 0–0.9)
MONOCYTES NFR BLD: 9.8 % (ref 0–10)
NEUTROPHILS # BLD: 12.2 K/UL (ref 1.5–7.5)
NEUTS SEG NFR BLD: 71.8 % (ref 50–65)
PLATELET # BLD AUTO: 86 K/UL (ref 130–400)
PLATELET SLIDE REVIEW: ABNORMAL
PMV BLD AUTO: 11.7 FL (ref 9.4–12.4)
POLYCHROMASIA BLD QL SMEAR: ABNORMAL
POTASSIUM SERPL-SCNC: 3.2 MMOL/L (ref 3.5–5)
PROT SERPL-MCNC: 4.8 G/DL (ref 6.4–8.3)
RBC # BLD AUTO: 3.28 M/UL (ref 4.7–6.1)
SODIUM SERPL-SCNC: 129 MMOL/L (ref 136–145)
TARGETS BLD QL SMEAR: ABNORMAL
WBC # BLD AUTO: 16.9 K/UL (ref 4.8–10.8)

## 2025-05-04 PROCEDURE — 6370000000 HC RX 637 (ALT 250 FOR IP)

## 2025-05-04 PROCEDURE — 36415 COLL VENOUS BLD VENIPUNCTURE: CPT

## 2025-05-04 PROCEDURE — 80053 COMPREHEN METABOLIC PANEL: CPT

## 2025-05-04 PROCEDURE — 6360000002 HC RX W HCPCS: Performed by: NURSE PRACTITIONER

## 2025-05-04 PROCEDURE — 1200000000 HC SEMI PRIVATE

## 2025-05-04 PROCEDURE — 6370000000 HC RX 637 (ALT 250 FOR IP): Performed by: NURSE PRACTITIONER

## 2025-05-04 PROCEDURE — 85025 COMPLETE CBC W/AUTO DIFF WBC: CPT

## 2025-05-04 PROCEDURE — 83735 ASSAY OF MAGNESIUM: CPT

## 2025-05-04 PROCEDURE — 2500000003 HC RX 250 WO HCPCS: Performed by: NURSE PRACTITIONER

## 2025-05-04 RX ORDER — POTASSIUM CHLORIDE 1500 MG/1
40 TABLET, EXTENDED RELEASE ORAL ONCE
Status: COMPLETED | OUTPATIENT
Start: 2025-05-04 | End: 2025-05-04

## 2025-05-04 RX ADMIN — CARVEDILOL 12.5 MG: 12.5 TABLET, FILM COATED ORAL at 07:43

## 2025-05-04 RX ADMIN — PANTOPRAZOLE SODIUM 40 MG: 40 TABLET, DELAYED RELEASE ORAL at 07:43

## 2025-05-04 RX ADMIN — FOLIC ACID 1 MG: 1 TABLET ORAL at 07:43

## 2025-05-04 RX ADMIN — THERA TABS 1 TABLET: TAB at 07:43

## 2025-05-04 RX ADMIN — SODIUM CHLORIDE, PRESERVATIVE FREE 10 ML: 5 INJECTION INTRAVENOUS at 09:15

## 2025-05-04 RX ADMIN — Medication 100 MG: at 07:43

## 2025-05-04 RX ADMIN — WATER 30 MG: 1 INJECTION INTRAMUSCULAR; INTRAVENOUS; SUBCUTANEOUS at 07:43

## 2025-05-04 RX ADMIN — POTASSIUM CHLORIDE 40 MEQ: 1500 TABLET, EXTENDED RELEASE ORAL at 09:14

## 2025-05-04 RX ADMIN — CARVEDILOL 12.5 MG: 12.5 TABLET, FILM COATED ORAL at 17:10

## 2025-05-04 RX ADMIN — WATER 2000 MG: 1 INJECTION INTRAMUSCULAR; INTRAVENOUS; SUBCUTANEOUS at 15:08

## 2025-05-04 RX ADMIN — LACTULOSE 30 G: 20 SOLUTION ORAL at 09:14

## 2025-05-04 RX ADMIN — LACTULOSE 30 G: 20 SOLUTION ORAL at 19:57

## 2025-05-04 ASSESSMENT — PAIN SCALES - GENERAL: PAINLEVEL_OUTOF10: 0

## 2025-05-04 NOTE — PROGRESS NOTES
[facility]      Progress Note    Patient:  Moreno De Leon  YOB: 1980  Date of Service: 5/4/2025  MRN: [id number]   Acct: [id number]   Primary Care Physician: Rosangela Mtz APRN - CNP  Advance Directive: Full Code  Admit Date: 4/29/2025       Hospital Day: 5    Portions of this note have been copied forward, however, updated to reflect the most current clinical status of this patient.     CHIEF COMPLAINT Jaundice     SUBJECTIVE:  Mr. De Leon was resting in bed this morning.  He was having couple loose bowel movements this morning thus far. Denies abdominal pain. Denies nausea or vomiting. Denies fever or chills. A&O x3.         CUMULATIVE HOSPITAL COURSE:   The patient is a 44 y.o. male with PMH of alcohol abuse who presented to [facility] ED for evaluation of jaundice and abdominal pain. Mr. De Leon is a poor historian and was lethargic on admission and unable to provide complete HPI.  Reportedly friend had reported that patient had complained of abdominal pain with nausea and decreased appetite and appeared to be jaundiced. Reportedly was scheduled for an outpatient GI evaluation on day of admission. Workup in ED revealed  CT abdomen and pelvis with diffuse fatty infiltration of the liver, small amount to minus ascites, mild inflammatory changes seen along the pancreas and the findings may represent acute pancreatitis, common bile duct does not appear to be dilated, no bowel obstruction; EtOH 242, lipase 297, total bilirubin 13.6, , , ammonia 111, alkaline phosphatase 843, albumin 2.8, creatinine 0.8, BUN 11, GFR> 90, potassium 2.7, sodium 123. Patient was admitted to hospital medicine for further evaluation with GI consultation. Seen by GI with recommendations of supportive treatment for alcohol withdrawal. Further suggested supportive treatment, consider adding Xifaxan 550 mg BID orally, monitor LFTs. Lactic acid and Procalcitonin added due to persistent leukocytosis. Lactic acid  ill-appearing.   HENT:      Head: Normocephalic and atraumatic.      Right Ear: External ear normal.      Left Ear: External ear normal.      Nose: Nose normal.      Mouth/Throat:      Mouth: Mucous membranes are moist.   Eyes:      Extraocular Movements: Extraocular movements intact.      Conjunctiva/sclera: Conjunctivae normal.      Pupils: Pupils are equal, round, and reactive to light.   Cardiovascular:      Rate and Rhythm: Normal rate and regular rhythm.      Pulses: Normal pulses.      Heart sounds: Normal heart sounds.   Pulmonary:      Effort: Pulmonary effort is normal. No respiratory distress.      Breath sounds: Normal breath sounds. No wheezing, rhonchi or rales.   Abdominal:      General: Bowel sounds are normal. There is distension.      Palpations: Abdomen is soft.      Tenderness: There is no abdominal tenderness.   Musculoskeletal:         General: No swelling, tenderness or deformity. Normal range of motion.      Cervical back: Normal range of motion and neck supple. No muscular tenderness.      Right lower leg: Edema present.      Left lower leg: Edema present.      Comments: +2 pitting edema bilaterally    Skin:     General: Skin is warm and dry.      Coloration: Skin is jaundiced.      Findings: No bruising or lesion.   Neurological:      Mental Status: He is alert and oriented to person, place, and time.      Comments: Much more alert this morning   A&O x 3    Psychiatric:         Mood and Affect: Mood normal.         Behavior: Behavior normal.         Thought Content: Thought content normal.            Medications:      sodium chloride        lactulose  30 g Oral BID    methylPREDNISolone  30 mg IntraVENous Daily    cefTRIAXone (ROCEPHIN) IV  2,000 mg IntraVENous Q24H    sodium chloride flush  5-40 mL IntraVENous 2 times per day    carvedilol  12.5 mg Oral BID WC    folic acid  1 mg Oral Daily    multivitamin  1 tablet Oral Daily    pantoprazole  40 mg Oral QAM AC    [Held by provider]

## 2025-05-05 LAB
ALBUMIN SERPL-MCNC: 2.2 G/DL (ref 3.5–5.2)
ALP SERPL-CCNC: 578 U/L (ref 40–129)
ALT SERPL-CCNC: 192 U/L (ref 10–50)
ANION GAP SERPL CALCULATED.3IONS-SCNC: 13 MMOL/L (ref 8–16)
ANISOCYTOSIS BLD QL SMEAR: ABNORMAL
AST SERPL-CCNC: 473 U/L (ref 10–50)
BACTERIA FLD AEROBE CULT: NORMAL
BACTERIA SPEC ANAEROBE CULT: NORMAL
BASOPHILS # BLD: 0 K/UL (ref 0–0.2)
BASOPHILS NFR BLD: 0 % (ref 0–1)
BILIRUB SERPL-MCNC: 19.2 MG/DL (ref 0.2–1.2)
BUN SERPL-MCNC: 16 MG/DL (ref 6–20)
CALCIUM SERPL-MCNC: 7.2 MG/DL (ref 8.6–10)
CHLORIDE SERPL-SCNC: 92 MMOL/L (ref 98–107)
CO2 SERPL-SCNC: 24 MMOL/L (ref 22–29)
CREAT SERPL-MCNC: 0.6 MG/DL (ref 0.7–1.2)
EOSINOPHIL # BLD: 0.19 K/UL (ref 0–0.6)
EOSINOPHIL NFR BLD: 1 % (ref 0–5)
GLUCOSE SERPL-MCNC: 110 MG/DL (ref 70–99)
GRAM STN SPEC: NORMAL
HCT VFR BLD AUTO: 36.2 % (ref 42–52)
HGB BLD-MCNC: 13.1 G/DL (ref 14–18)
IMM GRANULOCYTES # BLD: 1.4 K/UL
LYMPHOCYTES # BLD: 2.1 K/UL (ref 1.1–4.5)
LYMPHOCYTES NFR BLD: 10 % (ref 20–40)
MACROCYTES BLD QL SMEAR: ABNORMAL
MAGNESIUM SERPL-MCNC: 2 MG/DL (ref 1.6–2.6)
MCH RBC QN AUTO: 37.3 PG (ref 27–31)
MCHC RBC AUTO-ENTMCNC: 36.2 G/DL (ref 33–37)
MCV RBC AUTO: 103.1 FL (ref 80–94)
METAMYELOCYTES NFR BLD MANUAL: 2 %
MONOCYTES # BLD: 2.1 K/UL (ref 0–0.9)
MONOCYTES NFR BLD: 11 % (ref 0–10)
NEUTROPHILS # BLD: 14.5 K/UL (ref 1.5–7.5)
NEUTS BAND NFR BLD MANUAL: 7 % (ref 0–5)
NEUTS SEG NFR BLD: 68 % (ref 50–65)
PLATELET # BLD AUTO: 113 K/UL (ref 130–400)
PLATELET SLIDE REVIEW: ABNORMAL
PMV BLD AUTO: 11.3 FL (ref 9.4–12.4)
POLYCHROMASIA BLD QL SMEAR: ABNORMAL
POTASSIUM SERPL-SCNC: 3.5 MMOL/L (ref 3.5–5)
PROT SERPL-MCNC: 4.9 G/DL (ref 6.4–8.3)
RBC # BLD AUTO: 3.51 M/UL (ref 4.7–6.1)
SODIUM SERPL-SCNC: 129 MMOL/L (ref 136–145)
STOMATOCYTES BLD QL SMEAR: ABNORMAL
TARGETS BLD QL SMEAR: ABNORMAL
VARIANT LYMPHS NFR BLD: 1 % (ref 0–8)
WBC # BLD AUTO: 18.8 K/UL (ref 4.8–10.8)

## 2025-05-05 PROCEDURE — 2500000003 HC RX 250 WO HCPCS: Performed by: NURSE PRACTITIONER

## 2025-05-05 PROCEDURE — 83735 ASSAY OF MAGNESIUM: CPT

## 2025-05-05 PROCEDURE — 94760 N-INVAS EAR/PLS OXIMETRY 1: CPT

## 2025-05-05 PROCEDURE — 80053 COMPREHEN METABOLIC PANEL: CPT

## 2025-05-05 PROCEDURE — 6370000000 HC RX 637 (ALT 250 FOR IP): Performed by: HOSPITALIST

## 2025-05-05 PROCEDURE — 6370000000 HC RX 637 (ALT 250 FOR IP): Performed by: NURSE PRACTITIONER

## 2025-05-05 PROCEDURE — 97110 THERAPEUTIC EXERCISES: CPT

## 2025-05-05 PROCEDURE — 6370000000 HC RX 637 (ALT 250 FOR IP)

## 2025-05-05 PROCEDURE — 85025 COMPLETE CBC W/AUTO DIFF WBC: CPT

## 2025-05-05 PROCEDURE — 97162 PT EVAL MOD COMPLEX 30 MIN: CPT

## 2025-05-05 PROCEDURE — 36415 COLL VENOUS BLD VENIPUNCTURE: CPT

## 2025-05-05 PROCEDURE — 1200000000 HC SEMI PRIVATE

## 2025-05-05 PROCEDURE — 6360000002 HC RX W HCPCS: Performed by: NURSE PRACTITIONER

## 2025-05-05 PROCEDURE — 99232 SBSQ HOSP IP/OBS MODERATE 35: CPT | Performed by: INTERNAL MEDICINE

## 2025-05-05 RX ORDER — POTASSIUM CHLORIDE 1500 MG/1
40 TABLET, EXTENDED RELEASE ORAL ONCE
Status: COMPLETED | OUTPATIENT
Start: 2025-05-05 | End: 2025-05-05

## 2025-05-05 RX ORDER — NICOTINE 21 MG/24HR
1 PATCH, TRANSDERMAL 24 HOURS TRANSDERMAL DAILY
Status: DISCONTINUED | OUTPATIENT
Start: 2025-05-05 | End: 2025-05-06 | Stop reason: HOSPADM

## 2025-05-05 RX ADMIN — POTASSIUM CHLORIDE 40 MEQ: 1500 TABLET, EXTENDED RELEASE ORAL at 09:44

## 2025-05-05 RX ADMIN — Medication 100 MG: at 09:45

## 2025-05-05 RX ADMIN — LACTULOSE 30 G: 20 SOLUTION ORAL at 09:45

## 2025-05-05 RX ADMIN — FOLIC ACID 1 MG: 1 TABLET ORAL at 09:44

## 2025-05-05 RX ADMIN — SODIUM CHLORIDE, PRESERVATIVE FREE 10 ML: 5 INJECTION INTRAVENOUS at 09:45

## 2025-05-05 RX ADMIN — THERA TABS 1 TABLET: TAB at 09:45

## 2025-05-05 RX ADMIN — CARVEDILOL 12.5 MG: 12.5 TABLET, FILM COATED ORAL at 09:44

## 2025-05-05 RX ADMIN — WATER 2000 MG: 1 INJECTION INTRAMUSCULAR; INTRAVENOUS; SUBCUTANEOUS at 15:39

## 2025-05-05 RX ADMIN — SODIUM CHLORIDE, PRESERVATIVE FREE 10 ML: 5 INJECTION INTRAVENOUS at 21:08

## 2025-05-05 RX ADMIN — PANTOPRAZOLE SODIUM 40 MG: 40 TABLET, DELAYED RELEASE ORAL at 09:49

## 2025-05-05 RX ADMIN — WATER 30 MG: 1 INJECTION INTRAMUSCULAR; INTRAVENOUS; SUBCUTANEOUS at 09:45

## 2025-05-05 RX ADMIN — LACTULOSE 30 G: 20 SOLUTION ORAL at 19:54

## 2025-05-05 RX ADMIN — CARVEDILOL 12.5 MG: 12.5 TABLET, FILM COATED ORAL at 17:14

## 2025-05-05 NOTE — PROGRESS NOTES
Physical Therapy  Facility/Department: [facility] SURG SERVICES  Physical Therapy Initial Assessment    Name: Moreno De Leon  : 1980  MRN: [id number]  Date of Service: 2025    Discharge Recommendations:  Continue to assess pending progress, 24 hour supervision or assist, Patient would benefit from continued therapy after discharge          Patient Diagnosis(es): The primary encounter diagnosis was Alcohol-induced acute pancreatitis, unspecified complication status. Diagnoses of Alcoholic cirrhosis of liver with ascites (HCC), Elevated bilirubin, Hypokalemia, Hyponatremia, Chronic alcoholism (HCC), and Hepatic encephalopathy (HCC) were also pertinent to this visit.  Past Medical History:  has a past medical history of Alcohol abuse, Cancer of tonsil (HCC), Dog bite, Oropharyngeal dysphagia, Psychiatric problem, and Suicidal ideation.  Past Surgical History:  has a past surgical history that includes Tympanostomy tube placement; Adenoidectomy; Colonoscopy; US BIOPSY LYMPH NODE (10/21/2022); and Tonsillectomy (Right).    Assessment  Body Structures, Functions, Activity Limitations Requiring Skilled Therapeutic Intervention: Decreased functional mobility ;Decreased strength;Decreased safe awareness;Decreased cognition;Decreased endurance;Decreased sensation;Decreased balance;Decreased coordination  Assessment: Pt. will benefit from cont. PT to decrease impairments. Pt. a fall risk and should not attempt mobility on his own. Pt. needs v. cues to pick feet up. Encouraged to sit up in chair, but pt declined.  Treatment Diagnosis: deconditioning  Therapy Prognosis: Good  Decision Making: Medium Complexity  Barriers to Learning: decreased awareness of needs  Requires PT Follow-Up: Yes  Activity Tolerance  Activity Tolerance: Patient limited by endurance;Patient limited by fatigue    Plan  Physical Therapy Plan  General Plan: 5-7 times per week  Therapy Duration: 2 Weeks  Current Treatment Recommendations:    Orientation  Overall Orientation Status: Within Functional Limits  Cognition  Overall Cognitive Status: Exceptions  Arousal/Alertness: Appears intact  Following Commands: Follows one step commands with increased time;Follows one step commands with repetition  Attention Span: Impaired  Memory: Decreased recall of precautions  Safety Judgement: Decreased awareness of need for assistance;Decreased awareness of need for safety  Problem Solving: Assistance required to implement solutions;Assistance required to generate solutions  Insights: Impaired  Initiation: Impaired  Sequencing: Impaired  Cognition Comment: asked if he would like to go to the bathroom but states he has a diaper on    Objective  Temp: (!) 96.6 °F (35.9 °C)  Pulse: 78  Heart Rate Source: Monitor  Respirations: 20  SpO2: 96 %  O2 Device: None (Room air)  BP: 96/73  MAP (Calculated): 81  BP Location: Left upper arm  Patient Position: Lying right side     Observation/Palpation  Posture: Good  Observation: telemetry  Edema: BLEs edematous  Gross Assessment  Sensation: Intact  Pain  Pre-Pain: 0  Post-Pain: 0    AROM RLE (degrees)  RLE AROM: WFL  AROM LLE (degrees)  LLE AROM : WFL  Strength RLE  Strength RLE: Exception  Comment: 3+/5  Strength LLE  Strength LLE: Exception  Comment: 3+/5           Bed mobility  Supine to Sit: Stand by assistance  Sit to Supine: Stand by assistance  Scooting: Stand by assistance  Transfers  Sit to Stand: Contact guard assistance  Stand to Sit: Contact guard assistance  Ambulation  Surface: Level tile  Device: No Device  Other Apparatus: Wheelchair follow (as a safety precaution due to BP 96/73)  Assistance: Minimal assistance  Quality of Gait: unsteady, decreased toe clearance  Gait Deviations: Slow Moon;Decreased step length;Decreased step height  Distance: 200'     Balance  Posture: Good  Sitting - Static: Good;-  Sitting - Dynamic: Fair;+  Standing - Static: Fair;-  Standing - Dynamic: Poor;+          OutComes Score

## 2025-05-05 NOTE — PROGRESS NOTES
[facility]      Patient:  Moreno De Leon  YOB: 1980  Date of Service: 5/5/2025  MRN: [id number]   Acct: [id number]   Primary Care Physician: Rosangela Mtz APRN - CNP  Advance Directive: Full Code  Admit Date: 4/29/2025       Hospital Day: 6  Portions of this note have been copied forward, however, changed to reflect the most current clinical status of this patient.  CHIEF COMPLAINT jaundice     SUBJECTIVE: No issues overnight, currently alert and oriented able to answer all questions appropriately    Cumulative Hospital course  44-year-old male with alcoholism with complaints of jaundice and abdominal pain.  Patient was lethargic on admission unable to complete HPI.  Friend reported that patient had had abdominal pain, decreased appetite and appeared jaundiced.  Workup in ER CT abdomen pelvis diffuse fatty liver, small ascites, inflammatory change along the pancreas may represent acute pancreatitis, CBD nondilated, no small bowel obstruction, EtOH 242, lipase 297, total bilirubin 13.6, , , ammonia 111.  Patient admitted to hospitalist service with consultation to gastroenterology.  Initiated on CIWA protocol.  Xifaxan twice daily.  Persistent leukocytosis, procalcitonin 0.73, lactic acid 1.1.  Underwent paracentesis on 5/12 with 5 L particular fluid removed probable SBP initiated on Rocephin.  Fluid analysis consistent with SBP with SAAG of 1.7 and .  IV Solu-Medrol daily added for alcoholic hepatitis will plan to transition to prednisone daily for total of 28 days.  Remains afebrile, monitoring leukocytosis on steroids, encephalopathy continues to improve.  Likely home in next 1 to 2 days.  Case discussed with GI.  Strongly encouraged patient alcohol cessation.      Objective:   VITALS:  BP 96/73   Pulse 78   Temp (!) 96.6 °F (35.9 °C) (Temporal)   Resp 20   Wt 83.5 kg (184 lb 1.6 oz)   SpO2 96%   BMI 27.17 kg/m²   24HR INTAKE/OUTPUT:    Intake/Output Summary  methylPREDNISolone  30 mg IntraVENous Daily    cefTRIAXone (ROCEPHIN) IV  2,000 mg IntraVENous Q24H    sodium chloride flush  5-40 mL IntraVENous 2 times per day    carvedilol  12.5 mg Oral BID WC    folic acid  1 mg Oral Daily    multivitamin  1 tablet Oral Daily    pantoprazole  40 mg Oral QAM AC    [Held by provider] PARoxetine  20 mg Oral QAM    vitamin B-1  100 mg Oral Daily     sodium chloride flush, sodium chloride, potassium chloride **OR** potassium alternative oral replacement **OR** potassium chloride, magnesium sulfate, ondansetron **OR** ondansetron, polyethylene glycol, acetaminophen  ADULT DIET; Regular; No Added Salt (3-4 gm); GI [place] (GERD/Peptic Ulcer)     Lab and other Data:     Recent Labs     05/03/25 0406 05/04/25 0314 05/05/25  0351   WBC 19.8* 16.9* 18.8*   HGB 12.7* 12.3* 13.1*   PLT 92* 86* 113*     Recent Labs     05/03/25 0406 05/04/25 0314 05/05/25  0351   * 129* 129*   K 3.3* 3.2* 3.5   CL 90* 91* 92*   CO2 29 26 24   BUN 16 16 16   CREATININE 0.6* 0.6* 0.6*   GLUCOSE 92 100* 110*     Recent Labs     05/03/25 0406 05/04/25 0314 05/05/25  0351   * 431* 473*   * 154* 192*   BILITOT 21.7* 19.9* 19.2*   ALKPHOS 613* 558* 578*     Troponin T: No results for input(s): \"TROPONINI\" in the last 72 hours.  Pro-BNP: No results for input(s): \"BNP\" in the last 72 hours.  INR: No results for input(s): \"INR\" in the last 72 hours.  UA:No results for input(s): \"NITRITE\", \"COLORU\", \"PHUR\", \"LABCAST\", \"WBCUA\", \"RBCUA\", \"MUCUS\", \"TRICHOMONAS\", \"YEAST\", \"BACTERIA\", \"CLARITYU\", \"SPECGRAV\", \"LEUKOCYTESUR\", \"UROBILINOGEN\", \"BILIRUBINUR\", \"BLOODU\", \"GLUCOSEU\", \"AMORPHOUS\" in the last 72 hours.    Invalid input(s): \"KETONESU\"  A1C: No results for input(s): \"LABA1C\" in the last 72 hours.  ABG:No results for input(s): \"PHART\", \"AIF1BRR\", \"PO2ART\", \"AHY9MAV\", \"BEART\", \"HGBAE\", \"L1RTXMCV\", \"CARBOXHGBART\" in the last 72 hours.    RAD:   US GUIDED PARACENTESIS  Result Date:

## 2025-05-05 NOTE — PROGRESS NOTES
GI  - PROGRESS NOTE    Subjective:   Admit Date: 4/29/2025  PCP: Rosangela Mtz APRN - CNP    Patient being seen for: Cirrhosis of liver.  Hepatic encephalopathy.    HPI: Patient is much more awake and oriented.  Complaining of fatigue and tiredness.  No significant abdominal pain.  He is tolerating his diet.  No other new GI symptoms.      Medications:  Scheduled Meds:   lactulose  30 g Oral BID    methylPREDNISolone  30 mg IntraVENous Daily    cefTRIAXone (ROCEPHIN) IV  2,000 mg IntraVENous Q24H    sodium chloride flush  5-40 mL IntraVENous 2 times per day    carvedilol  12.5 mg Oral BID WC    folic acid  1 mg Oral Daily    multivitamin  1 tablet Oral Daily    pantoprazole  40 mg Oral QAM AC    [Held by provider] PARoxetine  20 mg Oral QAM    vitamin B-1  100 mg Oral Daily       Continuous Infusions:   sodium chloride         PRN Meds:.sodium chloride flush, sodium chloride, potassium chloride **OR** potassium alternative oral replacement **OR** potassium chloride, magnesium sulfate, ondansetron **OR** ondansetron, polyethylene glycol, acetaminophen      Labs:     Recent Labs     05/03/25 0406 05/04/25 0314 05/05/25  0351   WBC 19.8* 16.9* 18.8*   RBC 3.44* 3.28* 3.51*   HGB 12.7* 12.3* 13.1*   HCT 34.5* 33.8* 36.2*   .3* 103.0* 103.1*   MCH 36.9* 37.5* 37.3*   MCHC 36.8 36.4 36.2   PLT 92* 86* 113*     Recent Labs     05/03/25 0406 05/04/25 0314 05/05/25  0351   * 129* 129*   K 3.3* 3.2* 3.5   ANIONGAP 12 12 13   CL 90* 91* 92*   CO2 29 26 24   BUN 16 16 16   CREATININE 0.6* 0.6* 0.6*   GLUCOSE 92 100* 110*   CALCIUM 7.3* 7.1* 7.2*     Recent Labs     05/03/25 0406 05/04/25 0314 05/05/25  0351   MG 2.0 1.9 2.0     Recent Labs     05/03/25 0406 05/04/25 0314 05/05/25  0351   * 431* 473*   * 154* 192*   BILITOT 21.7* 19.9* 19.2*   ALKPHOS 613* 558* 578*     HgBA1c:  No components found for: \"HGBA1C\"  FLP:    Lab Results   Component

## 2025-05-06 VITALS
HEART RATE: 67 BPM | RESPIRATION RATE: 18 BRPM | SYSTOLIC BLOOD PRESSURE: 124 MMHG | BODY MASS INDEX: 27 KG/M2 | TEMPERATURE: 96.4 F | OXYGEN SATURATION: 97 % | DIASTOLIC BLOOD PRESSURE: 80 MMHG | WEIGHT: 182.9 LBS

## 2025-05-06 LAB
ALBUMIN SERPL-MCNC: 2.3 G/DL (ref 3.5–5.2)
ALP SERPL-CCNC: 572 U/L (ref 40–129)
ALT SERPL-CCNC: 229 U/L (ref 10–50)
ANION GAP SERPL CALCULATED.3IONS-SCNC: 11 MMOL/L (ref 8–16)
ANISOCYTOSIS BLD QL SMEAR: ABNORMAL
AST SERPL-CCNC: 426 U/L (ref 10–50)
BASOPHILS # BLD: 0 K/UL (ref 0–0.2)
BASOPHILS NFR BLD: 0 % (ref 0–1)
BILIRUB SERPL-MCNC: 18.5 MG/DL (ref 0.2–1.2)
BUN SERPL-MCNC: 15 MG/DL (ref 6–20)
CALCIUM SERPL-MCNC: 7.5 MG/DL (ref 8.6–10)
CHLORIDE SERPL-SCNC: 96 MMOL/L (ref 98–107)
CO2 SERPL-SCNC: 26 MMOL/L (ref 22–29)
CREAT SERPL-MCNC: 0.6 MG/DL (ref 0.7–1.2)
EOSINOPHIL # BLD: 0.22 K/UL (ref 0–0.6)
EOSINOPHIL NFR BLD: 1 % (ref 0–5)
GLUCOSE SERPL-MCNC: 136 MG/DL (ref 70–99)
HCT VFR BLD AUTO: 35.1 % (ref 42–52)
HGB BLD-MCNC: 12.7 G/DL (ref 14–18)
IMM GRANULOCYTES # BLD: 1.5 K/UL
LYMPHOCYTES # BLD: 2.6 K/UL (ref 1.1–4.5)
LYMPHOCYTES NFR BLD: 12 % (ref 20–40)
MACROCYTES BLD QL SMEAR: ABNORMAL
MAGNESIUM SERPL-MCNC: 2.1 MG/DL (ref 1.6–2.6)
MCH RBC QN AUTO: 37.2 PG (ref 27–31)
MCHC RBC AUTO-ENTMCNC: 36.2 G/DL (ref 33–37)
MCV RBC AUTO: 102.9 FL (ref 80–94)
MONOCYTES # BLD: 1.1 K/UL (ref 0–0.9)
MONOCYTES NFR BLD: 5 % (ref 0–10)
MYELOCYTES NFR BLD MANUAL: 2 %
NEUTROPHILS # BLD: 17.6 K/UL (ref 1.5–7.5)
NEUTS BAND NFR BLD MANUAL: 8 % (ref 0–5)
NEUTS SEG NFR BLD: 72 % (ref 50–65)
PLATELET # BLD AUTO: 155 K/UL (ref 130–400)
PLATELET SLIDE REVIEW: ABNORMAL
PMV BLD AUTO: 11 FL (ref 9.4–12.4)
POLYCHROMASIA BLD QL SMEAR: ABNORMAL
POTASSIUM SERPL-SCNC: 3 MMOL/L (ref 3.5–5)
PROT SERPL-MCNC: 4.8 G/DL (ref 6.4–8.3)
RBC # BLD AUTO: 3.41 M/UL (ref 4.7–6.1)
SODIUM SERPL-SCNC: 133 MMOL/L (ref 136–145)
STOMATOCYTES BLD QL SMEAR: ABNORMAL
TARGETS BLD QL SMEAR: ABNORMAL
TOXIC GRANULATION: ABNORMAL
WBC # BLD AUTO: 21.5 K/UL (ref 4.8–10.8)

## 2025-05-06 PROCEDURE — 83735 ASSAY OF MAGNESIUM: CPT

## 2025-05-06 PROCEDURE — 6370000000 HC RX 637 (ALT 250 FOR IP)

## 2025-05-06 PROCEDURE — 2500000003 HC RX 250 WO HCPCS: Performed by: NURSE PRACTITIONER

## 2025-05-06 PROCEDURE — 36415 COLL VENOUS BLD VENIPUNCTURE: CPT

## 2025-05-06 PROCEDURE — 80053 COMPREHEN METABOLIC PANEL: CPT

## 2025-05-06 PROCEDURE — 85025 COMPLETE CBC W/AUTO DIFF WBC: CPT

## 2025-05-06 PROCEDURE — 6370000000 HC RX 637 (ALT 250 FOR IP): Performed by: NURSE PRACTITIONER

## 2025-05-06 PROCEDURE — 6360000002 HC RX W HCPCS: Performed by: NURSE PRACTITIONER

## 2025-05-06 RX ORDER — CIPROFLOXACIN 500 MG/1
500 TABLET, FILM COATED ORAL 2 TIMES DAILY
Qty: 10 TABLET | Refills: 0 | Status: SHIPPED | OUTPATIENT
Start: 2025-05-06 | End: 2025-05-11

## 2025-05-06 RX ORDER — LACTULOSE 10 G/15ML
30 SOLUTION ORAL 2 TIMES DAILY
Qty: 2700 ML | Refills: 1 | Status: ON HOLD | OUTPATIENT
Start: 2025-05-06

## 2025-05-06 RX ORDER — PREDNISOLONE 15 MG/5ML
30 SOLUTION ORAL DAILY
Qty: 280 ML | Refills: 0 | Status: ON HOLD | OUTPATIENT
Start: 2025-05-06 | End: 2025-06-03

## 2025-05-06 RX ORDER — POTASSIUM CHLORIDE 1500 MG/1
40 TABLET, EXTENDED RELEASE ORAL ONCE
Status: COMPLETED | OUTPATIENT
Start: 2025-05-06 | End: 2025-05-06

## 2025-05-06 RX ORDER — POTASSIUM CHLORIDE 1500 MG/1
40 TABLET, EXTENDED RELEASE ORAL ONCE
Status: DISCONTINUED | OUTPATIENT
Start: 2025-05-06 | End: 2025-05-06 | Stop reason: HOSPADM

## 2025-05-06 RX ORDER — POTASSIUM CHLORIDE 750 MG/1
10 TABLET, EXTENDED RELEASE ORAL DAILY
Qty: 90 TABLET | Refills: 1 | Status: CANCELLED | OUTPATIENT
Start: 2025-05-06

## 2025-05-06 RX ORDER — PREDNISOLONE 5 MG/1
TABLET ORAL
Qty: 90 TABLET | Refills: 0 | Status: CANCELLED | OUTPATIENT
Start: 2025-05-06

## 2025-05-06 RX ADMIN — LACTULOSE 30 G: 20 SOLUTION ORAL at 08:47

## 2025-05-06 RX ADMIN — POTASSIUM CHLORIDE 40 MEQ: 1500 TABLET, EXTENDED RELEASE ORAL at 08:47

## 2025-05-06 RX ADMIN — SODIUM CHLORIDE, PRESERVATIVE FREE 10 ML: 5 INJECTION INTRAVENOUS at 08:48

## 2025-05-06 RX ADMIN — WATER 30 MG: 1 INJECTION INTRAMUSCULAR; INTRAVENOUS; SUBCUTANEOUS at 08:47

## 2025-05-06 RX ADMIN — PANTOPRAZOLE SODIUM 40 MG: 40 TABLET, DELAYED RELEASE ORAL at 06:25

## 2025-05-06 RX ADMIN — CARVEDILOL 12.5 MG: 12.5 TABLET, FILM COATED ORAL at 08:47

## 2025-05-06 RX ADMIN — Medication 100 MG: at 08:47

## 2025-05-06 RX ADMIN — THERA TABS 1 TABLET: TAB at 08:47

## 2025-05-06 RX ADMIN — FOLIC ACID 1 MG: 1 TABLET ORAL at 08:47

## 2025-05-06 NOTE — PLAN OF CARE
Problem: Discharge Planning  Goal: Discharge to home or other facility with appropriate resources  5/6/2025 1020 by Mayra Marrero LPN  Outcome: Adequate for Discharge  5/5/2025 2256 by Nicole Pastrana RN  Outcome: Progressing  Flowsheets (Taken 5/5/2025 1955)  Discharge to home or other facility with appropriate resources:   Identify barriers to discharge with patient and caregiver   Arrange for needed discharge resources and transportation as appropriate     Problem: Safety - Adult  Goal: Free from fall injury  5/6/2025 1020 by Mayra Marrero LPN  Outcome: Adequate for Discharge  5/5/2025 2256 by Nicole Pastrana RN  Outcome: Progressing     Problem: ABCDS Injury Assessment  Goal: Absence of physical injury  5/6/2025 1020 by Mayra Marrero LPN  Outcome: Adequate for Discharge  5/5/2025 2256 by Nicole Pastrana RN  Outcome: Progressing     Problem: Neurosensory - Adult  Goal: Achieves stable or improved neurological status  5/6/2025 1020 by Mayra Marrero LPN  Outcome: Adequate for Discharge  5/5/2025 2256 by Nicole Pastrana RN  Outcome: Progressing  Flowsheets (Taken 5/5/2025 1955)  Achieves stable or improved neurological status: Assess for and report changes in neurological status  Goal: Absence of seizures  5/6/2025 1020 by Mayra Marrero LPN  Outcome: Adequate for Discharge  5/5/2025 2256 by Nicole Pastrana RN  Outcome: Progressing  Flowsheets (Taken 5/5/2025 1955)  Absence of seizures: Monitor for seizure activity.  If seizure occurs, document type and location of movements and any associated apnea  Goal: Remains free of injury related to seizures activity  5/6/2025 1020 by Mayra Marrero LPN  Outcome: Adequate for Discharge  5/5/2025 2256 by Nicole Pastrana RN  Outcome: Progressing  Flowsheets (Taken 5/5/2025 1955)  Remains free of injury related to seizure activity: Monitor patient for seizure activity, document and report duration and description

## 2025-05-06 NOTE — PROGRESS NOTES
CLINICAL PHARMACY NOTE: MEDS TO BEDS    Total # of Prescriptions Filled: 3   The following medications were delivered to the patient:  Current Discharge Medication List        START taking these medications    Details   lactulose (CHRONULAC) 10 GM/15ML solution Take 45 mLs by mouth 2 times daily  Qty: 2700 mL, Refills: 1      ciprofloxacin (CIPRO) 500 MG tablet Take 1 tablet by mouth 2 times daily for 5 days  Qty: 10 tablet, Refills: 0      prednisoLONE 15 MG/5ML solution Take 10 mLs by mouth daily for 28 days  Qty: 280 mL, Refills: 0               Additional Documentation:  Delivered to patient bedside. No copay.

## 2025-05-06 NOTE — DISCHARGE INSTR - DIET
Good nutrition is important when healing from an illness, injury, or surgery.  Follow any nutrition recommendations given to you during your hospital stay.   If you were given an oral nutrition supplement while in the hospital, continue to take this supplement at home.  You can take it with meals, in-between meals, and/or before bedtime. These supplements can be purchased at most local grocery stores, pharmacies, and chain [employer]-stores.   If you have any questions about your diet or nutrition, call the hospital and ask for the dietitian.  ADULT DIET; Regular; No Added Salt (3-4 gm); GI [place] (GERD/Peptic Ulcer)

## 2025-05-06 NOTE — DISCHARGE SUMMARY
Moreno De Leon  :  1980  MRN:  [id number]    Admit date:  2025  Discharge date:  25    Discharging Physician:  DR Gabriel     Advance Directive: Full Code    Consults: IP CONSULT TO GI  IP CONSULT TO SOCIAL WORK     Primary Care Physician:  Rosangela Mtz, APRN - CNP    Discharge Diagnoses:  Principal Problem:    Alcohol-induced acute pancreatitis, unspecified complication status  Active Problems:    Alcohol use disorder    Hypoalbuminemia    Alcoholic hepatitis (HCC)    Hypokalemia    Hyponatremia    Depression    GERD (gastroesophageal reflux disease)    Hypertension    Hyperammonemia  Resolved Problems:    * No resolved hospital problems. *      Portions of this note have been copied forward, however, changed to reflect the most current clinical status of this patient.  Hospital Course:   44-year-old male with alcoholism with complaints of jaundice and abdominal pain.  Patient was lethargic on admission unable to complete HPI.  Friend reported that patient had had abdominal pain, decreased appetite and appeared jaundiced.  Workup in ER CT abdomen pelvis diffuse fatty liver, small ascites, inflammatory change along the pancreas may represent acute pancreatitis, CBD nondilated, no small bowel obstruction, EtOH 242, lipase 297, total bilirubin 13.6, , , ammonia 111.  Patient admitted to hospitalist service with consultation to gastroenterology.  Initiated on CIWA protocol.  Xifaxan twice daily.  Persistent leukocytosis, procalcitonin 0.73, lactic acid 1.1.  Underwent paracentesis on  with 5 L particular fluid removed probable SBP initiated on Rocephin with plan to transition to oral Cipro to complete therapy.  Fluid analysis consistent with SBP with SAAG of 1.7 and .  IV Solu-Medrol daily added for alcoholic hepatitis will plan to transition to prednisolone daily for total of 28 days.  Remains afebrile, monitoring leukocytosis on steroids, encephalopathy continues to improve.       Cranial Nerves: No cranial nerve deficit.      Motor: Weakness present.   Psychiatric:         Mood and Affect: Mood normal.         Behavior: Behavior normal.         Discharge Medications:         Medication List        START taking these medications      ciprofloxacin 500 MG tablet  Commonly known as: CIPRO  Take 1 tablet by mouth 2 times daily for 5 days     lactulose 10 GM/15ML solution  Commonly known as: CHRONULAC  Take 45 mLs by mouth 2 times daily     prednisoLONE 15 MG/5ML solution  Take 10 mLs by mouth daily for 28 days            CHANGE how you take these medications      PARoxetine 20 MG tablet  Commonly known as: PAXIL  Take 1 tablet by mouth every morning            CONTINUE taking these medications      carvedilol 12.5 MG tablet  Commonly known as: COREG  Take 1 tablet by mouth 2 times daily (with meals)     folic acid 1 MG tablet  Commonly known as: FOLVITE  Take 1 tablet by mouth daily     haloperidol 5 MG tablet  Commonly known as: HALDOL     hydroCHLOROthiazide 12.5 MG capsule     multivitamin Tabs tablet  Take 1 tablet by mouth daily     OLANZapine 15 MG tablet  Commonly known as: ZYPREXA     omeprazole 40 MG delayed release capsule  Commonly known as: PRILOSEC     potassium chloride 20 MEQ extended release tablet  Commonly known as: KLOR-CON M     QUEtiapine 50 MG extended release tablet  Commonly known as: SEROQUEL XR     traZODone 50 MG tablet  Commonly known as: DESYREL     vitamin B-1 100 MG tablet  Commonly known as: THIAMINE  Take 1 tablet by mouth daily     vitamin D 1.25 MG (79233 UT) Caps capsule  Commonly known as: ERGOCALCIFEROL     Vraylar 6 MG Caps capsule  Generic drug: Cariprazine HCl            STOP taking these medications      Diclofenac Potassium(Migraine) 50 MG Pack     propranolol 10 MG tablet  Commonly known as: INDERAL               Where to Get Your Medications        These medications were sent to [id number] - [place], [place] - [address] [place] [address]

## 2025-05-11 NOTE — PROGRESS NOTES
Physician Progress Note      PATIENT:               BEVERLY GARCIA  CSN #:                  [id number]  :                       1980  ADMIT DATE:       2025 11:32 AM  DISCH DATE:        2025 1:51 PM  RESPONDING  PROVIDER #:        SETH FAUSTIN          QUERY TEXT:    Spontaneous bacterial peritonitis is documented in the Discharge Summary by   Seth Faustin, APRN - CNP at 2025.  Please clarify any associated   diagnoses:    The clinical indicators include:  This is a 44 y.o. male who presents with abdominal pain    -\" Spontaneous Bacterial Peritonitis, Ascites Fluid analysis consistent with   spontaneous bacterial peritonitis with SAAG of 1.7  and \"-IM Progress Notes by Mary Holloway APRN - CNP at 2025  -\"Empiric antibiotics for probable SBP initiated. He is suffering from   cirrhosis liver secondary to alcohol abuse.\"-IM Progress Notes by Mary Holloway APRN - CNP at 2025  -\" Underwent paracentesis on  with 5 L particular fluid removed probable   SBP initiated on Rocephin with plan to transition to  oral Cipro to complete therapy. Fluid analysis consistent with SBP with SAAG   of 1.7 and .\"-Discharge Summary by Seth Faustin, APRN - CNP  at 2025  -WBC-24.3,25.4,21.2,23.2,19.8,16.9,18.8,21.5(-)-EPIC  -Procalcitonin-.73 on   -RR 21-23-  -HR-117,102,101,103,115,105,103,108,113,130,117,93(-)  -Paracentesis, IV ceftriaxone (ROCEPHIN) 2,000 mg, Fluid analysis, Serial   labs-EPIC      Thank you,  Angelo Dunn [facility] CDS  Options provided:  -- Sepsis due to Spontaneous Bacterial Peritonitis, present on admission  -- Spontaneous Bacterial Peritonitis without sepsis  -- Other - I will add my own diagnosis  -- Disagree - Not applicable / Not valid  -- Disagree - Clinically unable to determine / Unknown  -- Refer to Clinical Documentation Reviewer    PROVIDER RESPONSE TEXT:    This patient has Sepsis due to Spontaneous Bacterial

## 2025-05-13 ENCOUNTER — APPOINTMENT (OUTPATIENT)
Dept: CT IMAGING | Age: 45
DRG: 871 | End: 2025-05-13
Payer: MEDICAID

## 2025-05-13 ENCOUNTER — HOSPITAL ENCOUNTER (INPATIENT)
Age: 45
LOS: 10 days | Discharge: HOME | DRG: 871 | End: 2025-05-23
Attending: INTERNAL MEDICINE | Admitting: INTERNAL MEDICINE
Payer: MEDICAID

## 2025-05-13 ENCOUNTER — APPOINTMENT (OUTPATIENT)
Dept: GENERAL RADIOLOGY | Age: 45
DRG: 871 | End: 2025-05-13
Payer: MEDICAID

## 2025-05-13 DIAGNOSIS — K85.20 ALCOHOL-INDUCED ACUTE PANCREATITIS, UNSPECIFIED COMPLICATION STATUS: ICD-10-CM

## 2025-05-13 DIAGNOSIS — R06.89 DYSPNEA AND RESPIRATORY ABNORMALITIES: Primary | ICD-10-CM

## 2025-05-13 DIAGNOSIS — I50.9 ACUTE CONGESTIVE HEART FAILURE, UNSPECIFIED HEART FAILURE TYPE (HCC): ICD-10-CM

## 2025-05-13 DIAGNOSIS — R09.02 HYPOXEMIA: ICD-10-CM

## 2025-05-13 DIAGNOSIS — J98.4 PNEUMONITIS: ICD-10-CM

## 2025-05-13 DIAGNOSIS — R06.00 DYSPNEA AND RESPIRATORY ABNORMALITIES: Primary | ICD-10-CM

## 2025-05-13 PROBLEM — A41.9 SEPSIS (HCC): Status: ACTIVE | Noted: 2025-05-13

## 2025-05-13 LAB
AFP SERPL-MCNC: 6.8 NG/ML (ref 0–8.3)
ALBUMIN FLD-MCNC: 0.7 G/DL
ALBUMIN SERPL-MCNC: 3 G/DL (ref 3.5–5.2)
ALP SERPL-CCNC: 643 U/L (ref 40–129)
ALT SERPL-CCNC: 231 U/L (ref 10–50)
AMMONIA PLAS-SCNC: 32 UMOL/L (ref 16–60)
ANION GAP SERPL CALCULATED.3IONS-SCNC: 14 MMOL/L (ref 8–16)
ANISOCYTOSIS BLD QL SMEAR: ABNORMAL
APPEARANCE FLD: CLEAR
AST SERPL-CCNC: 273 U/L (ref 10–50)
B PARAP IS1001 DNA NPH QL NAA+NON-PROBE: NOT DETECTED
B PERT.PT PRMT NPH QL NAA+NON-PROBE: NOT DETECTED
BASE EXCESS ARTERIAL: -1 MMOL/L (ref -2–2)
BASOPHILS # BLD: 0.1 K/UL (ref 0–0.2)
BASOPHILS NFR BLD: 0.4 % (ref 0–1)
BASOPHILS NFR FLD MANUAL: 2 %
BILIRUB DIRECT SERPL-MCNC: 9.3 MG/DL (ref 0–0.3)
BILIRUB INDIRECT SERPL-MCNC: 3.5 MG/DL (ref 0–1)
BILIRUB SERPL-MCNC: 12.8 MG/DL (ref 0.2–1.2)
BNP BLD-MCNC: 189 PG/ML (ref 0–124)
BODY FLD TYPE: NORMAL
BODY FLD TYPE: NORMAL
BUN SERPL-MCNC: 19 MG/DL (ref 6–20)
C PNEUM DNA NPH QL NAA+NON-PROBE: NOT DETECTED
CALCIUM SERPL-MCNC: 9.1 MG/DL (ref 8.6–10)
CANCER AG19-9 SERPL-ACNC: 512 U/ML (ref 0–35)
CARBOXYHEMOGLOBIN ARTERIAL: 3.6 % (ref 0–5)
CEA SERPL-MCNC: 8.4 NG/ML (ref 0–4.7)
CHLORIDE SERPL-SCNC: 100 MMOL/L (ref 98–107)
CLOT EVALUATION: NORMAL
CO2 SERPL-SCNC: 23 MMOL/L (ref 22–29)
COLOR FLD: YELLOW
CREAT SERPL-MCNC: 0.6 MG/DL (ref 0.7–1.2)
CRP SERPL-MCNC: 108 MG/L (ref 0–5)
EOSINOPHIL # BLD: 0 K/UL (ref 0–0.6)
EOSINOPHIL NFR BLD: 0.1 % (ref 0–5)
ERYTHROCYTE [DISTWIDTH] IN BLOOD BY AUTOMATED COUNT: 20.6 % (ref 11.5–14.5)
ERYTHROCYTE [SEDIMENTATION RATE] IN BLOOD BY WESTERGREN METHOD: 18 MM/HR (ref 0–10)
ETHANOLAMINE SERPL-MCNC: <11 MG/DL (ref 0–11)
FLUAV RNA NPH QL NAA+NON-PROBE: NOT DETECTED
FLUBV RNA NPH QL NAA+NON-PROBE: NOT DETECTED
GLUCOSE SERPL-MCNC: 100 MG/DL (ref 70–99)
HADV DNA NPH QL NAA+NON-PROBE: NOT DETECTED
HAV IGM SERPL QL IA: NONREACTIVE
HBV CORE IGM SERPL QL IA: NONREACTIVE
HBV SURFACE AG SERPL QL IA: NORMAL
HCO3 ARTERIAL: 22 MMOL/L (ref 22–26)
HCOV 229E RNA NPH QL NAA+NON-PROBE: NOT DETECTED
HCOV HKU1 RNA NPH QL NAA+NON-PROBE: NOT DETECTED
HCOV NL63 RNA NPH QL NAA+NON-PROBE: NOT DETECTED
HCOV OC43 RNA NPH QL NAA+NON-PROBE: NOT DETECTED
HCT VFR BLD AUTO: 41.1 % (ref 42–52)
HCV AB SERPL QL IA: NORMAL
HEMOGLOBIN, ART, EXTENDED: 13.4 G/DL (ref 14–18)
HGB BLD-MCNC: 14.6 G/DL (ref 14–18)
HMPV RNA NPH QL NAA+NON-PROBE: NOT DETECTED
HPIV1 RNA NPH QL NAA+NON-PROBE: NOT DETECTED
HPIV2 RNA NPH QL NAA+NON-PROBE: NOT DETECTED
HPIV3 RNA NPH QL NAA+NON-PROBE: NOT DETECTED
HPIV4 RNA NPH QL NAA+NON-PROBE: NOT DETECTED
IMM GRANULOCYTES # BLD: 1 K/UL
INR PPP: 1.1 (ref 0.88–1.18)
LACTATE BLDV-SCNC: 1.8 MMOL/L (ref 0.5–1.9)
LACTATE BLDV-SCNC: 2.5 MMOL/L (ref 0.5–1.9)
LIPASE SERPL-CCNC: 30 U/L (ref 13–60)
LYMPHOCYTES # BLD: 1.1 K/UL (ref 1.1–4.5)
LYMPHOCYTES NFR BLD: 3.4 % (ref 20–40)
LYMPHOCYTES NFR FLD: 38 %
M PNEUMO DNA NPH QL NAA+NON-PROBE: NOT DETECTED
MACROCYTES BLD QL SMEAR: ABNORMAL
MAGNESIUM SERPL-MCNC: 2 MG/DL (ref 1.6–2.6)
MCH RBC QN AUTO: 39 PG (ref 27–31)
MCHC RBC AUTO-ENTMCNC: 35.5 G/DL (ref 33–37)
MCV RBC AUTO: 109.9 FL (ref 80–94)
MESOTHL CELL NFR FLD MANUAL: 2 %
METHEMOGLOBIN ARTERIAL: 1 %
MONOCYTES # BLD: 1.2 K/UL (ref 0–0.9)
MONOCYTES NFR BLD: 3.7 % (ref 0–10)
MONOCYTES NFR FLD: 4 %
MRSA DNA SPEC QL NAA+PROBE: NOT DETECTED
NEUTROPHILS # BLD: 29.1 K/UL (ref 1.5–7.5)
NEUTROPHILS NFR FLD: 54 %
NEUTS SEG NFR BLD: 89.5 % (ref 50–65)
NUCLEATED CELLS FLUID: 77 /CUMM
O2 CONTENT ARTERIAL: 17.6 ML/DL
O2 DELIVERY DEVICE: ABNORMAL
O2 SAT, ARTERIAL: 93.2 %
O2 THERAPY: ABNORMAL
OXYGEN FLOW: 3
PCO2 ARTERIAL: 31 MMHG (ref 35–45)
PH ARTERIAL: 7.46 (ref 7.35–7.45)
PLATELET # BLD AUTO: 179 K/UL (ref 130–400)
PLATELET SLIDE REVIEW: ADEQUATE
PMV BLD AUTO: 11.6 FL (ref 9.4–12.4)
PO2 ARTERIAL: 72 MMHG (ref 80–100)
POLYCHROMASIA BLD QL SMEAR: ABNORMAL
POTASSIUM BLD-SCNC: 3.2 MMOL/L
POTASSIUM SERPL-SCNC: 3.2 MMOL/L (ref 3.5–5.1)
PROCALCITONIN: 0.68 NG/ML (ref 0–0.09)
PROT FLD-MCNC: 1.3 G/DL
PROT SERPL-MCNC: 6.6 G/DL (ref 6.4–8.3)
PROTHROMBIN TIME: 14.2 SEC (ref 12–14.6)
RBC # BLD AUTO: 3.74 M/UL (ref 4.7–6.1)
RBC # FLD: <2000 /CUMM
RSV RNA NPH QL NAA+NON-PROBE: NOT DETECTED
RV+EV RNA NPH QL NAA+NON-PROBE: NOT DETECTED
SAMPLE SOURCE: ABNORMAL
SARS-COV-2 RNA NPH QL NAA+NON-PROBE: NOT DETECTED
SODIUM SERPL-SCNC: 137 MMOL/L (ref 136–145)
STOMATOCYTES BLD QL SMEAR: ABNORMAL
TARGETS BLD QL SMEAR: ABNORMAL
TOTAL CELLS COUNTED FLD: 100
WBC # BLD AUTO: 32.6 K/UL (ref 4.8–10.8)

## 2025-05-13 PROCEDURE — 86140 C-REACTIVE PROTEIN: CPT

## 2025-05-13 PROCEDURE — 96367 TX/PROPH/DG ADDL SEQ IV INF: CPT

## 2025-05-13 PROCEDURE — 85025 COMPLETE CBC W/AUTO DIFF WBC: CPT

## 2025-05-13 PROCEDURE — 99285 EMERGENCY DEPT VISIT HI MDM: CPT

## 2025-05-13 PROCEDURE — 82105 ALPHA-FETOPROTEIN SERUM: CPT

## 2025-05-13 PROCEDURE — 82077 ASSAY SPEC XCP UR&BREATH IA: CPT

## 2025-05-13 PROCEDURE — 94760 N-INVAS EAR/PLS OXIMETRY 1: CPT

## 2025-05-13 PROCEDURE — 71045 X-RAY EXAM CHEST 1 VIEW: CPT

## 2025-05-13 PROCEDURE — 94150 VITAL CAPACITY TEST: CPT

## 2025-05-13 PROCEDURE — 82803 BLOOD GASES ANY COMBINATION: CPT

## 2025-05-13 PROCEDURE — 80053 COMPREHEN METABOLIC PANEL: CPT

## 2025-05-13 PROCEDURE — 2580000003 HC RX 258

## 2025-05-13 PROCEDURE — 96366 THER/PROPH/DIAG IV INF ADDON: CPT

## 2025-05-13 PROCEDURE — 84145 PROCALCITONIN (PCT): CPT

## 2025-05-13 PROCEDURE — 6360000004 HC RX CONTRAST MEDICATION: Performed by: PHYSICIAN ASSISTANT

## 2025-05-13 PROCEDURE — 87641 MR-STAPH DNA AMP PROBE: CPT

## 2025-05-13 PROCEDURE — 6360000002 HC RX W HCPCS: Performed by: PHYSICIAN ASSISTANT

## 2025-05-13 PROCEDURE — 99223 1ST HOSP IP/OBS HIGH 75: CPT | Performed by: INTERNAL MEDICINE

## 2025-05-13 PROCEDURE — P9047 ALBUMIN (HUMAN), 25%, 50ML: HCPCS

## 2025-05-13 PROCEDURE — 83880 ASSAY OF NATRIURETIC PEPTIDE: CPT

## 2025-05-13 PROCEDURE — 82140 ASSAY OF AMMONIA: CPT

## 2025-05-13 PROCEDURE — 6360000002 HC RX W HCPCS

## 2025-05-13 PROCEDURE — 94669 MECHANICAL CHEST WALL OSCILL: CPT

## 2025-05-13 PROCEDURE — 86301 IMMUNOASSAY TUMOR CA 19-9: CPT

## 2025-05-13 PROCEDURE — 83605 ASSAY OF LACTIC ACID: CPT

## 2025-05-13 PROCEDURE — 36600 WITHDRAWAL OF ARTERIAL BLOOD: CPT

## 2025-05-13 PROCEDURE — 85610 PROTHROMBIN TIME: CPT

## 2025-05-13 PROCEDURE — 80074 ACUTE HEPATITIS PANEL: CPT

## 2025-05-13 PROCEDURE — 85652 RBC SED RATE AUTOMATED: CPT

## 2025-05-13 PROCEDURE — 84157 ASSAY OF PROTEIN OTHER: CPT

## 2025-05-13 PROCEDURE — 0202U NFCT DS 22 TRGT SARS-COV-2: CPT

## 2025-05-13 PROCEDURE — 89051 BODY FLUID CELL COUNT: CPT

## 2025-05-13 PROCEDURE — 2500000003 HC RX 250 WO HCPCS

## 2025-05-13 PROCEDURE — 71260 CT THORAX DX C+: CPT

## 2025-05-13 PROCEDURE — 82040 ASSAY OF SERUM ALBUMIN: CPT

## 2025-05-13 PROCEDURE — 83735 ASSAY OF MAGNESIUM: CPT

## 2025-05-13 PROCEDURE — 2580000003 HC RX 258: Performed by: PHYSICIAN ASSISTANT

## 2025-05-13 PROCEDURE — 1200000000 HC SEMI PRIVATE

## 2025-05-13 PROCEDURE — 96365 THER/PROPH/DIAG IV INF INIT: CPT

## 2025-05-13 PROCEDURE — 82378 CARCINOEMBRYONIC ANTIGEN: CPT

## 2025-05-13 PROCEDURE — 87075 CULTR BACTERIA EXCEPT BLOOD: CPT

## 2025-05-13 PROCEDURE — 2700000000 HC OXYGEN THERAPY PER DAY

## 2025-05-13 PROCEDURE — 88112 CYTOPATH CELL ENHANCE TECH: CPT

## 2025-05-13 PROCEDURE — 6370000000 HC RX 637 (ALT 250 FOR IP)

## 2025-05-13 PROCEDURE — 83690 ASSAY OF LIPASE: CPT

## 2025-05-13 PROCEDURE — 82248 BILIRUBIN DIRECT: CPT

## 2025-05-13 PROCEDURE — 74177 CT ABD & PELVIS W/CONTRAST: CPT

## 2025-05-13 PROCEDURE — 87040 BLOOD CULTURE FOR BACTERIA: CPT

## 2025-05-13 PROCEDURE — 87070 CULTURE OTHR SPECIMN AEROBIC: CPT

## 2025-05-13 PROCEDURE — 0W9G3ZZ DRAINAGE OF PERITONEAL CAVITY, PERCUTANEOUS APPROACH: ICD-10-PCS | Performed by: INTERNAL MEDICINE

## 2025-05-13 PROCEDURE — 88112 CYTOPATH CELL ENHANCE TECH: CPT | Performed by: PATHOLOGY

## 2025-05-13 PROCEDURE — 87015 SPECIMEN INFECT AGNT CONCNTJ: CPT

## 2025-05-13 PROCEDURE — 87205 SMEAR GRAM STAIN: CPT

## 2025-05-13 PROCEDURE — 36415 COLL VENOUS BLD VENIPUNCTURE: CPT

## 2025-05-13 RX ORDER — MULTIVITAMIN WITH IRON
1 TABLET ORAL DAILY
Status: DISCONTINUED | OUTPATIENT
Start: 2025-05-13 | End: 2025-05-23 | Stop reason: HOSPADM

## 2025-05-13 RX ORDER — OLANZAPINE 10 MG/1
20 TABLET, FILM COATED ORAL NIGHTLY
Status: DISCONTINUED | OUTPATIENT
Start: 2025-05-13 | End: 2025-05-14

## 2025-05-13 RX ORDER — CARVEDILOL 12.5 MG/1
12.5 TABLET ORAL 2 TIMES DAILY WITH MEALS
Status: DISCONTINUED | OUTPATIENT
Start: 2025-05-13 | End: 2025-05-14

## 2025-05-13 RX ORDER — HYDROCHLOROTHIAZIDE 12.5 MG/1
12.5 CAPSULE ORAL DAILY
Status: DISCONTINUED | OUTPATIENT
Start: 2025-05-13 | End: 2025-05-14

## 2025-05-13 RX ORDER — PREDNISOLONE 15 MG/5ML
20 SOLUTION ORAL DAILY
Status: DISCONTINUED | OUTPATIENT
Start: 2025-05-14 | End: 2025-05-14

## 2025-05-13 RX ORDER — FOLIC ACID 1 MG/1
1 TABLET ORAL DAILY
Status: DISCONTINUED | OUTPATIENT
Start: 2025-05-13 | End: 2025-05-23 | Stop reason: HOSPADM

## 2025-05-13 RX ORDER — 0.9 % SODIUM CHLORIDE 0.9 %
10 INTRAVENOUS SOLUTION INTRAVENOUS ONCE
Status: COMPLETED | OUTPATIENT
Start: 2025-05-13 | End: 2025-05-13

## 2025-05-13 RX ORDER — LIDOCAINE HYDROCHLORIDE 10 MG/ML
5 INJECTION, SOLUTION EPIDURAL; INFILTRATION; INTRACAUDAL; PERINEURAL ONCE
Status: DISCONTINUED | OUTPATIENT
Start: 2025-05-13 | End: 2025-05-14 | Stop reason: ALTCHOICE

## 2025-05-13 RX ORDER — FUROSEMIDE 10 MG/ML
40 INJECTION INTRAMUSCULAR; INTRAVENOUS 2 TIMES DAILY
Status: DISCONTINUED | OUTPATIENT
Start: 2025-05-13 | End: 2025-05-14

## 2025-05-13 RX ORDER — BUMETANIDE 0.25 MG/ML
1 INJECTION, SOLUTION INTRAMUSCULAR; INTRAVENOUS 2 TIMES DAILY
Status: DISCONTINUED | OUTPATIENT
Start: 2025-05-13 | End: 2025-05-13

## 2025-05-13 RX ORDER — ALBUMIN (HUMAN) 12.5 G/50ML
25 SOLUTION INTRAVENOUS ONCE
Status: COMPLETED | OUTPATIENT
Start: 2025-05-13 | End: 2025-05-13

## 2025-05-13 RX ORDER — PAROXETINE 20 MG/1
40 TABLET, FILM COATED ORAL EVERY MORNING
Status: DISCONTINUED | OUTPATIENT
Start: 2025-05-14 | End: 2025-05-14

## 2025-05-13 RX ORDER — HALOPERIDOL 5 MG/1
5 TABLET ORAL EVERY 8 HOURS
Status: DISCONTINUED | OUTPATIENT
Start: 2025-05-13 | End: 2025-05-14

## 2025-05-13 RX ORDER — ACETAMINOPHEN 650 MG/1
650 SUPPOSITORY RECTAL EVERY 6 HOURS PRN
Status: DISCONTINUED | OUTPATIENT
Start: 2025-05-13 | End: 2025-05-23 | Stop reason: HOSPADM

## 2025-05-13 RX ORDER — PANTOPRAZOLE SODIUM 40 MG/1
40 TABLET, DELAYED RELEASE ORAL
Status: DISCONTINUED | OUTPATIENT
Start: 2025-05-14 | End: 2025-05-14

## 2025-05-13 RX ORDER — GUAIFENESIN 600 MG/1
600 TABLET, EXTENDED RELEASE ORAL 2 TIMES DAILY
Status: DISCONTINUED | OUTPATIENT
Start: 2025-05-13 | End: 2025-05-23 | Stop reason: HOSPADM

## 2025-05-13 RX ORDER — QUETIAPINE FUMARATE 50 MG/1
50 TABLET, EXTENDED RELEASE ORAL NIGHTLY
Status: DISCONTINUED | OUTPATIENT
Start: 2025-05-13 | End: 2025-05-15

## 2025-05-13 RX ORDER — SODIUM CHLORIDE 0.9 % (FLUSH) 0.9 %
5-40 SYRINGE (ML) INJECTION EVERY 12 HOURS SCHEDULED
Status: DISCONTINUED | OUTPATIENT
Start: 2025-05-13 | End: 2025-05-23 | Stop reason: HOSPADM

## 2025-05-13 RX ORDER — PROPRANOLOL HYDROCHLORIDE 10 MG/1
10 TABLET ORAL 2 TIMES DAILY
COMMUNITY

## 2025-05-13 RX ORDER — SODIUM CHLORIDE 9 MG/ML
INJECTION, SOLUTION INTRAVENOUS PRN
Status: DISCONTINUED | OUTPATIENT
Start: 2025-05-13 | End: 2025-05-23 | Stop reason: HOSPADM

## 2025-05-13 RX ORDER — FOLIC ACID 1 MG/1
1 TABLET ORAL DAILY
Status: DISCONTINUED | OUTPATIENT
Start: 2025-05-13 | End: 2025-05-13

## 2025-05-13 RX ORDER — MUPIROCIN 20 MG/G
OINTMENT TOPICAL
Status: ON HOLD | COMMUNITY
Start: 2025-05-06 | End: 2025-05-23 | Stop reason: HOSPADM

## 2025-05-13 RX ORDER — SPIRONOLACTONE 25 MG/1
25 TABLET ORAL DAILY
Status: DISCONTINUED | OUTPATIENT
Start: 2025-05-13 | End: 2025-05-21

## 2025-05-13 RX ORDER — IOPAMIDOL 755 MG/ML
75 INJECTION, SOLUTION INTRAVASCULAR
Status: COMPLETED | OUTPATIENT
Start: 2025-05-13 | End: 2025-05-13

## 2025-05-13 RX ORDER — FLUOXETINE 10 MG/1
10 CAPSULE ORAL DAILY
Status: DISCONTINUED | OUTPATIENT
Start: 2025-05-13 | End: 2025-05-23 | Stop reason: HOSPADM

## 2025-05-13 RX ORDER — GAUZE BANDAGE 2" X 2"
50 BANDAGE TOPICAL DAILY
Status: DISCONTINUED | OUTPATIENT
Start: 2025-05-13 | End: 2025-05-13

## 2025-05-13 RX ORDER — FLUOXETINE 10 MG/1
10 CAPSULE ORAL DAILY
COMMUNITY
Start: 2025-04-10

## 2025-05-13 RX ORDER — SODIUM CHLORIDE 0.9 % (FLUSH) 0.9 %
5-40 SYRINGE (ML) INJECTION PRN
Status: DISCONTINUED | OUTPATIENT
Start: 2025-05-13 | End: 2025-05-23 | Stop reason: HOSPADM

## 2025-05-13 RX ORDER — LACTULOSE 10 G/15ML
30 SOLUTION ORAL 2 TIMES DAILY
Status: DISCONTINUED | OUTPATIENT
Start: 2025-05-13 | End: 2025-05-15

## 2025-05-13 RX ORDER — ACETAMINOPHEN 325 MG/1
650 TABLET ORAL EVERY 6 HOURS PRN
Status: DISCONTINUED | OUTPATIENT
Start: 2025-05-13 | End: 2025-05-23 | Stop reason: HOSPADM

## 2025-05-13 RX ADMIN — VANCOMYCIN HYDROCHLORIDE 1000 MG: 1 INJECTION, POWDER, LYOPHILIZED, FOR SOLUTION INTRAVENOUS at 21:44

## 2025-05-13 RX ADMIN — OLANZAPINE 20 MG: 10 TABLET, FILM COATED ORAL at 21:49

## 2025-05-13 RX ADMIN — VANCOMYCIN HYDROCHLORIDE 2000 MG: 10 INJECTION, POWDER, LYOPHILIZED, FOR SOLUTION INTRAVENOUS at 10:31

## 2025-05-13 RX ADMIN — GUAIFENESIN 600 MG: 600 TABLET ORAL at 21:49

## 2025-05-13 RX ADMIN — HALOPERIDOL 5 MG: 2 TABLET ORAL at 21:48

## 2025-05-13 RX ADMIN — SPIRONOLACTONE 25 MG: 25 TABLET ORAL at 18:09

## 2025-05-13 RX ADMIN — CARVEDILOL 12.5 MG: 12.5 TABLET, FILM COATED ORAL at 18:09

## 2025-05-13 RX ADMIN — FUROSEMIDE 40 MG: 10 INJECTION, SOLUTION INTRAMUSCULAR; INTRAVENOUS at 17:12

## 2025-05-13 RX ADMIN — PIPERACILLIN AND TAZOBACTAM 3375 MG: 3; .375 INJECTION, POWDER, LYOPHILIZED, FOR SOLUTION INTRAVENOUS at 17:47

## 2025-05-13 RX ADMIN — PIPERACILLIN AND TAZOBACTAM 3375 MG: 3; .375 INJECTION, POWDER, LYOPHILIZED, FOR SOLUTION INTRAVENOUS at 23:08

## 2025-05-13 RX ADMIN — LACTULOSE 30 G: 20 SOLUTION ORAL at 21:49

## 2025-05-13 RX ADMIN — SODIUM CHLORIDE 816 ML: 0.9 INJECTION, SOLUTION INTRAVENOUS at 09:59

## 2025-05-13 RX ADMIN — AZITHROMYCIN MONOHYDRATE 500 MG: 500 INJECTION, POWDER, LYOPHILIZED, FOR SOLUTION INTRAVENOUS at 14:36

## 2025-05-13 RX ADMIN — ALBUMIN (HUMAN) 25 G: 0.25 INJECTION, SOLUTION INTRAVENOUS at 17:07

## 2025-05-13 RX ADMIN — SODIUM CHLORIDE, PRESERVATIVE FREE 10 ML: 5 INJECTION INTRAVENOUS at 21:48

## 2025-05-13 RX ADMIN — PIPERACILLIN AND TAZOBACTAM 3375 MG: 3; .375 INJECTION, POWDER, LYOPHILIZED, FOR SOLUTION INTRAVENOUS at 09:59

## 2025-05-13 RX ADMIN — IOPAMIDOL 75 ML: 755 INJECTION, SOLUTION INTRAVENOUS at 12:01

## 2025-05-13 RX ADMIN — QUETIAPINE FUMARATE 50 MG: 50 TABLET, EXTENDED RELEASE ORAL at 21:49

## 2025-05-13 SDOH — ECONOMIC STABILITY: INCOME INSECURITY: HOW HARD IS IT FOR YOU TO PAY FOR THE VERY BASICS LIKE FOOD, HOUSING, MEDICAL CARE, AND HEATING?: HARD

## 2025-05-13 SDOH — ECONOMIC STABILITY: INCOME INSECURITY: IN THE PAST 12 MONTHS, HAS THE ELECTRIC, GAS, OIL, OR WATER COMPANY THREATENED TO SHUT OFF SERVICE IN YOUR HOME?: NO

## 2025-05-13 SDOH — ECONOMIC STABILITY: FOOD INSECURITY: WITHIN THE PAST 12 MONTHS, YOU WORRIED THAT YOUR FOOD WOULD RUN OUT BEFORE YOU GOT MONEY TO BUY MORE.: NEVER TRUE

## 2025-05-13 ASSESSMENT — PATIENT HEALTH QUESTIONNAIRE - PHQ9
2. FEELING DOWN, DEPRESSED OR HOPELESS: SEVERAL DAYS
SUM OF ALL RESPONSES TO PHQ QUESTIONS 1-9: 2
1. LITTLE INTEREST OR PLEASURE IN DOING THINGS: SEVERAL DAYS
SUM OF ALL RESPONSES TO PHQ QUESTIONS 1-9: 2

## 2025-05-13 ASSESSMENT — PAIN - FUNCTIONAL ASSESSMENT: PAIN_FUNCTIONAL_ASSESSMENT: 0-10

## 2025-05-13 ASSESSMENT — PAIN SCALES - GENERAL: PAINLEVEL_OUTOF10: 8

## 2025-05-13 NOTE — ED PROVIDER NOTES
reassessed tissue perfusion and hemodynamic status after fluid bolus at this date/time: 1200      Plan for admission broad-spectrum antibiotics and fluids based on pneumonia concern with ascites I discussed the case with on-call gastroenterology Dr. Sprague who is agreeable to consult and recommends this treatment based on the CT scan abdomen possible necrotizing pancreatitis.  Normal lipase today does not appear lethargic or toxic. Hypoxia concern based upon arrival needing 2L. Hospitalist will admit.     PROCEDURES:    Procedures      FINAL IMPRESSION      1. Dyspnea and respiratory abnormalities    2. Hypoxemia    3. Pneumonitis    4. Alcohol-induced acute pancreatitis, unspecified complication status          DISPOSITION/PLAN   DISPOSITION Admitted 05/13/2025 01:45:04 PM               PATIENT REFERRED TO:  No follow-up provider specified.    DISCHARGE MEDICATIONS:  Current Discharge Medication List          (Please note that portions of this note were completed with a voice recognition program.  Efforts were made to edit the dictations but occasionallywords are mis-transcribed.)    GAIL Sanchez Derek, PA  05/13/25 0951       Mati Galvan PA  05/13/25 2570

## 2025-05-13 NOTE — PROCEDURES
PROCEDURE NOTE  Date: 5/13/2025   Name: Moreno De Leon  YOB: 1980    Paracentesis    Date/Time: 5/13/2025 4:37 PM    Performed by: Jordan Tuttle MD  Authorized by: Maria Victoria Grace APRN - CNP  Consent: Verbal consent obtained. Written consent obtained.  Risks and benefits: risks, benefits and alternatives were discussed  Consent given by: patient  Patient understanding: patient states understanding of the procedure being performed  Patient consent: the patient's understanding of the procedure matches consent given  Procedure consent: procedure consent matches procedure scheduled  Relevant documents: relevant documents present and verified  Test results: test results available and properly labeled  Site marked: the operative site was marked  Imaging studies: imaging studies available  Required items: required blood products, implants, devices, and special equipment available  Patient identity confirmed: verbally with patient and hospital-assigned identification number  Time out: Immediately prior to procedure a \"time out\" was called to verify the correct patient, procedure, equipment, support staff and site/side marked as required.  Initial or subsequent exam: initial  Procedure purpose: diagnostic and therapeutic  Indications: abdominal discomfort secondary to ascites, respiratory distress secondary to ascites and suspected peritonitis  Anesthesia: local infiltration    Anesthesia:  Local Anesthetic: lidocaine 1% without epinephrine  Anesthetic total: 3 mL    Sedation:  Patient sedated: no    Preparation: Patient was prepped and draped in the usual sterile fashion.  Needle gauge: 18  Ultrasound guidance: yes  Puncture site: left lower quadrant  Fluid removed: 3700(ml)  Fluid appearance: clear  Dressing: 4x4 sterile gauze and pressure dressing  Patient tolerance: patient tolerated the procedure well with no immediate complications                 Quality 358: Patient-Centered Surgical Risk Assessment And Communication: Documentation of patient-specific risk assessment with a risk calculator based on multi-institutional clinical data, the specific risk calculator used, and communication of risk assessment from risk calculator with the patient or family. Detail Level: Detailed Quality 226: Preventive Care And Screening: Tobacco Use: Screening And Cessation Intervention: Patient screened for tobacco use and is an ex/non-smoker Quality 130: Documentation Of Current Medications In The Medical Record: Current Medications Documented

## 2025-05-13 NOTE — ED NOTES
[address] for Maria Victoria Grace, hospitalist. Mati will speak with her.    
Pt placed on 3L via NC   
REPORT GIVEN TO JADE HERNANDEZ   
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    Dog bite     Oropharyngeal dysphagia 05/09/2023    Psychiatric problem     Suicidal ideation 04/21/2019       Assessment  Vitals: Level of Consciousness: Alert (0)   Vitals:    05/13/25 1330 05/13/25 1345 05/13/25 1400 05/13/25 1415   BP: 107/81  109/85    Pulse: 89 95 79 84   Resp: 23 18 20 16   Temp:       TempSrc:       SpO2:   95%    Weight:       Height:         Predictive Model Details          26 (Normal)  Factor Value    Calculated 5/13/2025 14:22 45% Supplemental oxygen Nasal cannula    Deterioration Index Model 29% Age 44 years old     16% WBC count abnormal (32.6 K/uL)     4% Blood pH abnormal (7.460)     4% Sodium 137 mmol/L     1% Pulse 84     1% Hematocrit abnormal (41.1 %)     1% Systolic 109     0% Temperature 97.7 °F (36.5 °C)     0% Pulse oximetry 95 %     0% Respiratory rate 16       NPO? Yes  O2 Flow Rate: O2 Device: Nasal cannula    Cardiac Rhythm: NSR    NIH Score: NIH     Active LDA's:   Peripheral IV 05/13/25 Proximal;Right Forearm (Active)   Site Assessment Clean, dry & intact 05/13/25 0815   Line Status Normal saline locked;Specimen collected 05/13/25 0815   Phlebitis Assessment No symptoms 05/13/25 0815   Infiltration Assessment 0 05/13/25 0815   Alcohol Cap Used Yes 05/13/25 0815   Dressing Status Clean, dry & intact 05/13/25 0815   Dressing Type Transparent 05/13/25 0815     Pertinent or High Risk Medications/Drips: yes   If Yes, please provide details: ABX  Blood Product

## 2025-05-13 NOTE — H&P
further workup needed.  He is s/p bedside paracentesis per Dr. Tuttle with total 3.5 pulled off. Fluid studies are pending.  Zosyn and vancomycin continue for treatment of pneumonia, added azithromycin for additional atypical coverage.  Diuresis with Lasix 40mg twice daily and spironolactone initiated.    SEP-1 CORE MEASURE DATA      Sepsis Criteria   Severe Sepsis Criteria   Septic Shock Criteria       Must meet 2:    []Temp >100.9 F (38.3 C) or < 96.8 F (36 C)  []HR > 90  [x]RR > 20  [x]WBC > 12 or < 4 or 10% bands    AND:    [x] Infection Confirmed or Suspected.     Must meet 1:    [x]Lactate > 2       or   []Signs of Organ Dysfunction:    - SBP < 90 or MAP < 65  -Creatinine > 2 or increased from baseline  -Urine Output < 0.5 ml/kg/hr  -Bilirubin > 2  -INR > 1.5 (not anticoagulated)  -Platelets < 100,000  -Acute Respiratory Failure as evidenced by new need for NIPPV or mechanical ventilation   Must meet 1:    []Lactate > 4        or   []SBP < 90 or MAP < 65 for at least two readings in the first hour after fluid bolus administration    []Vasopressors initiated (if hypotension persists after fluid resuscitation)   Patient Vitals for the past 6 hrs:   BP Temp Pulse Resp SpO2   05/13/25 0858 109/88 -- 94 22 96 %   05/13/25 0900 -- -- 88 24 --   05/13/25 0902 116/81 -- 90 21 97 %   05/13/25 0915 -- -- 86 22 94 %   05/13/25 0930 -- -- 87 19 96 %   05/13/25 0931 114/87 -- 83 20 96 %   05/13/25 0945 -- -- 85 22 --   05/13/25 1000 -- -- 83 18 --   05/13/25 1001 (!) 113/90 -- 84 20 97 %   05/13/25 1015 -- -- 79 19 --   05/13/25 1030 -- -- 83 23 --   05/13/25 1031 100/88 -- 82 21 97 %   05/13/25 1045 -- -- 82 21 --   05/13/25 1131 111/80 -- 81 23 95 %   05/13/25 1207 112/85 -- 83 26 93 %   05/13/25 1231 (!) 117/96 -- 84 21 92 %   05/13/25 1255 -- -- 82 18 94 %   05/13/25 1300 102/83 -- 84 20 --   05/13/25 1315 -- -- 85 22 93 %   05/13/25 1330 107/81 -- 89 23 --   05/13/25 1345 -- -- 95 18 --   05/13/25 1400 109/85 --

## 2025-05-14 ENCOUNTER — APPOINTMENT (OUTPATIENT)
Dept: GENERAL RADIOLOGY | Age: 45
DRG: 871 | End: 2025-05-14
Payer: MEDICAID

## 2025-05-14 PROBLEM — R06.00 DYSPNEA AND RESPIRATORY ABNORMALITIES: Status: ACTIVE | Noted: 2025-05-14

## 2025-05-14 PROBLEM — Z51.5 PALLIATIVE CARE PATIENT: Status: ACTIVE | Noted: 2025-05-14

## 2025-05-14 PROBLEM — C44.42 SQUAMOUS CELL CARCINOMA OF HEAD AND NECK: Status: ACTIVE | Noted: 2025-05-14

## 2025-05-14 PROBLEM — R06.89 DYSPNEA AND RESPIRATORY ABNORMALITIES: Status: ACTIVE | Noted: 2025-05-14

## 2025-05-14 PROBLEM — J96.01 ACUTE RESPIRATORY FAILURE WITH HYPOXIA (HCC): Status: ACTIVE | Noted: 2025-05-14

## 2025-05-14 LAB
ALBUMIN SERPL-MCNC: 2.8 G/DL (ref 3.5–5.2)
ALLENS TEST: ABNORMAL
ALP SERPL-CCNC: 490 U/L (ref 40–129)
ALT SERPL-CCNC: 179 U/L (ref 10–50)
ANION GAP SERPL CALCULATED.3IONS-SCNC: 13 MMOL/L (ref 8–16)
APTT PPP: 30 SEC (ref 26–36.2)
AST SERPL-CCNC: 252 U/L (ref 10–50)
BASE EXCESS ARTERIAL: 0.3 MMOL/L (ref -2–2)
BASE EXCESS ARTERIAL: 1.7 MMOL/L (ref -2–2)
BASE EXCESS ARTERIAL: 1.7 MMOL/L (ref -2–2)
BASOPHILS # BLD: 0.1 K/UL (ref 0–0.2)
BASOPHILS NFR BLD: 0.3 % (ref 0–1)
BI-LEVEL POS AIRWAY PRESSURE(EXPIRATORY): 6
BI-LEVEL POS AIRWAY PRESSURE(INSPIRATORY): 14
BILIRUB DIRECT SERPL-MCNC: 7.8 MG/DL (ref 0–0.3)
BILIRUB INDIRECT SERPL-MCNC: 3 MG/DL (ref 0–1)
BILIRUB SERPL-MCNC: 10.8 MG/DL (ref 0.2–1.2)
BUN SERPL-MCNC: 19 MG/DL (ref 6–20)
CALCIUM SERPL-MCNC: 8.1 MG/DL (ref 8.6–10)
CARBOXYHEMOGLOBIN ARTERIAL: 1.5 % (ref 0–5)
CARBOXYHEMOGLOBIN ARTERIAL: 3.2 % (ref 0–5)
CARBOXYHEMOGLOBIN ARTERIAL: 3.3 % (ref 0–5)
CHLORIDE SERPL-SCNC: 102 MMOL/L (ref 98–107)
CO2 SERPL-SCNC: 24 MMOL/L (ref 22–29)
CREAT SERPL-MCNC: 0.7 MG/DL (ref 0.7–1.2)
EOSINOPHIL # BLD: 0 K/UL (ref 0–0.6)
EOSINOPHIL NFR BLD: 0.1 % (ref 0–5)
ERYTHROCYTE [DISTWIDTH] IN BLOOD BY AUTOMATED COUNT: 19.9 % (ref 11.5–14.5)
FIO2: 100 %
GLUCOSE BLD-MCNC: 86 MG/DL (ref 70–99)
GLUCOSE SERPL-MCNC: 75 MG/DL (ref 70–99)
HCO3 ARTERIAL: 25.4 MMOL/L (ref 22–26)
HCO3 ARTERIAL: 25.8 MMOL/L (ref 22–26)
HCO3 ARTERIAL: 27.7 MMOL/L (ref 22–26)
HCT VFR BLD AUTO: 38.6 % (ref 42–52)
HEMOGLOBIN, ART, EXTENDED: 11.7 G/DL (ref 14–18)
HEMOGLOBIN, ART, EXTENDED: 12.1 G/DL (ref 14–18)
HEMOGLOBIN, ART, EXTENDED: 13 G/DL (ref 14–18)
HGB BLD-MCNC: 13.2 G/DL (ref 14–18)
IMM GRANULOCYTES # BLD: 0.6 K/UL
INR PPP: 1.32 (ref 0.88–1.18)
LYMPHOCYTES # BLD: 1.7 K/UL (ref 1.1–4.5)
LYMPHOCYTES NFR BLD: 6 % (ref 20–40)
MAGNESIUM SERPL-MCNC: 1.7 MG/DL (ref 1.6–2.6)
MCH RBC QN AUTO: 38.6 PG (ref 27–31)
MCHC RBC AUTO-ENTMCNC: 34.2 G/DL (ref 33–37)
MCV RBC AUTO: 112.9 FL (ref 80–94)
MECHANICAL RATE IN BPM: 18
METHEMOGLOBIN ARTERIAL: 1.3 %
MODE: ABNORMAL
MODE: ABNORMAL
MONOCYTES # BLD: 0.6 K/UL (ref 0–0.9)
MONOCYTES NFR BLD: 2.3 % (ref 0–10)
NEUTROPHILS # BLD: 24.7 K/UL (ref 1.5–7.5)
NEUTS SEG NFR BLD: 89.2 % (ref 50–65)
O2 CONTENT ARTERIAL: 13.2 ML/DL
O2 CONTENT ARTERIAL: 16.3 ML/DL
O2 CONTENT ARTERIAL: 2.6 ML/DL
O2 DELIVERY DEVICE: ABNORMAL
O2 SAT, ARTERIAL: 13.9 %
O2 SAT, ARTERIAL: 80.4 %
O2 SAT, ARTERIAL: 95.2 %
O2 THERAPY: ABNORMAL
OXYGEN FLOW: 4
OXYGEN FLOW: 6
PCO2 ARTERIAL: 38 MMHG (ref 35–45)
PCO2 ARTERIAL: 42 MMHG (ref 35–45)
PCO2 ARTERIAL: 48 MMHG (ref 35–45)
PERFORMED ON: NORMAL
PH ARTERIAL: 7.37 (ref 7.35–7.45)
PH ARTERIAL: 7.39 (ref 7.35–7.45)
PH ARTERIAL: 7.44 (ref 7.35–7.45)
PLATELET # BLD AUTO: 149 K/UL (ref 130–400)
PMV BLD AUTO: 11.6 FL (ref 9.4–12.4)
PO2 ARTERIAL: 15 MMHG (ref 80–100)
PO2 ARTERIAL: 50 MMHG (ref 80–100)
PO2 ARTERIAL: 98 MMHG (ref 80–100)
POSITIVE END EXP PRESS: 5
POTASSIUM BLD-SCNC: 2.9 MMOL/L
POTASSIUM BLD-SCNC: 3.2 MMOL/L
POTASSIUM BLD-SCNC: 3.2 MMOL/L
POTASSIUM SERPL-SCNC: 3.2 MMOL/L (ref 3.5–5)
PROT SERPL-MCNC: 5.6 G/DL (ref 6.4–8.3)
PROTHROMBIN TIME: 16.4 SEC (ref 12–14.6)
RBC # BLD AUTO: 3.42 M/UL (ref 4.7–6.1)
SAMPLE SOURCE: ABNORMAL
SAMPLE SOURCE: ABNORMAL
SODIUM SERPL-SCNC: 139 MMOL/L (ref 136–145)
VANCOMYCIN TROUGH SERPL-MCNC: 16.1 UG/ML (ref 10–20)
VT MECHANICAL: 450 %
WBC # BLD AUTO: 27.7 K/UL (ref 4.8–10.8)

## 2025-05-14 PROCEDURE — 36600 WITHDRAWAL OF ARTERIAL BLOOD: CPT

## 2025-05-14 PROCEDURE — 6360000002 HC RX W HCPCS: Performed by: INTERNAL MEDICINE

## 2025-05-14 PROCEDURE — 85610 PROTHROMBIN TIME: CPT

## 2025-05-14 PROCEDURE — 6370000000 HC RX 637 (ALT 250 FOR IP)

## 2025-05-14 PROCEDURE — 85730 THROMBOPLASTIN TIME PARTIAL: CPT

## 2025-05-14 PROCEDURE — 82962 GLUCOSE BLOOD TEST: CPT

## 2025-05-14 PROCEDURE — 31500 INSERT EMERGENCY AIRWAY: CPT

## 2025-05-14 PROCEDURE — 2000000000 HC ICU R&B

## 2025-05-14 PROCEDURE — 99223 1ST HOSP IP/OBS HIGH 75: CPT | Performed by: PHYSICIAN ASSISTANT

## 2025-05-14 PROCEDURE — 6370000000 HC RX 637 (ALT 250 FOR IP): Performed by: HOSPITALIST

## 2025-05-14 PROCEDURE — 2580000003 HC RX 258: Performed by: HOSPITALIST

## 2025-05-14 PROCEDURE — 31500 INSERT EMERGENCY AIRWAY: CPT | Performed by: HOSPITALIST

## 2025-05-14 PROCEDURE — 2500000003 HC RX 250 WO HCPCS

## 2025-05-14 PROCEDURE — 36415 COLL VENOUS BLD VENIPUNCTURE: CPT

## 2025-05-14 PROCEDURE — 2700000000 HC OXYGEN THERAPY PER DAY

## 2025-05-14 PROCEDURE — 71045 X-RAY EXAM CHEST 1 VIEW: CPT

## 2025-05-14 PROCEDURE — 82803 BLOOD GASES ANY COMBINATION: CPT

## 2025-05-14 PROCEDURE — 2500000003 HC RX 250 WO HCPCS: Performed by: INTERNAL MEDICINE

## 2025-05-14 PROCEDURE — 83735 ASSAY OF MAGNESIUM: CPT

## 2025-05-14 PROCEDURE — P9047 ALBUMIN (HUMAN), 25%, 50ML: HCPCS | Performed by: HOSPITALIST

## 2025-05-14 PROCEDURE — 5A1945Z RESPIRATORY VENTILATION, 24-96 CONSECUTIVE HOURS: ICD-10-PCS | Performed by: INTERNAL MEDICINE

## 2025-05-14 PROCEDURE — 99232 SBSQ HOSP IP/OBS MODERATE 35: CPT | Performed by: INTERNAL MEDICINE

## 2025-05-14 PROCEDURE — 94002 VENT MGMT INPAT INIT DAY: CPT

## 2025-05-14 PROCEDURE — 0BH17EZ INSERTION OF ENDOTRACHEAL AIRWAY INTO TRACHEA, VIA NATURAL OR ARTIFICIAL OPENING: ICD-10-PCS | Performed by: HOSPITALIST

## 2025-05-14 PROCEDURE — 6360000002 HC RX W HCPCS: Performed by: HOSPITALIST

## 2025-05-14 PROCEDURE — 6360000002 HC RX W HCPCS

## 2025-05-14 PROCEDURE — 85025 COMPLETE CBC W/AUTO DIFF WBC: CPT

## 2025-05-14 PROCEDURE — 6370000000 HC RX 637 (ALT 250 FOR IP): Performed by: INTERNAL MEDICINE

## 2025-05-14 PROCEDURE — 94640 AIRWAY INHALATION TREATMENT: CPT

## 2025-05-14 PROCEDURE — 80053 COMPREHEN METABOLIC PANEL: CPT

## 2025-05-14 PROCEDURE — 94002 VENT MGMT INPAT INIT DAY: CPT | Performed by: HOSPITALIST

## 2025-05-14 PROCEDURE — 80202 ASSAY OF VANCOMYCIN: CPT

## 2025-05-14 PROCEDURE — P9047 ALBUMIN (HUMAN), 25%, 50ML: HCPCS | Performed by: INTERNAL MEDICINE

## 2025-05-14 PROCEDURE — 2580000003 HC RX 258

## 2025-05-14 RX ORDER — FENTANYL CITRATE 50 UG/ML
25 INJECTION, SOLUTION INTRAMUSCULAR; INTRAVENOUS
Status: DISCONTINUED | OUTPATIENT
Start: 2025-05-14 | End: 2025-05-16

## 2025-05-14 RX ORDER — ACETYLCYSTEINE 200 MG/ML
600 SOLUTION ORAL; RESPIRATORY (INHALATION) EVERY 4 HOURS PRN
Status: DISCONTINUED | OUTPATIENT
Start: 2025-05-14 | End: 2025-05-23 | Stop reason: HOSPADM

## 2025-05-14 RX ORDER — FENTANYL CITRATE-0.9 % NACL/PF 10 MCG/ML
25-200 PLASTIC BAG, INJECTION (ML) INTRAVENOUS CONTINUOUS
Refills: 0 | Status: DISCONTINUED | OUTPATIENT
Start: 2025-05-14 | End: 2025-05-14

## 2025-05-14 RX ORDER — ALBUMIN (HUMAN) 12.5 G/50ML
25 SOLUTION INTRAVENOUS ONCE
Status: COMPLETED | OUTPATIENT
Start: 2025-05-14 | End: 2025-05-14

## 2025-05-14 RX ORDER — ENOXAPARIN SODIUM 100 MG/ML
40 INJECTION SUBCUTANEOUS DAILY
Status: DISCONTINUED | OUTPATIENT
Start: 2025-05-14 | End: 2025-05-23 | Stop reason: HOSPADM

## 2025-05-14 RX ORDER — POLYVINYL ALCOHOL 14 MG/ML
1 SOLUTION/ DROPS OPHTHALMIC EVERY 4 HOURS
Status: DISCONTINUED | OUTPATIENT
Start: 2025-05-14 | End: 2025-05-23 | Stop reason: HOSPADM

## 2025-05-14 RX ORDER — POTASSIUM CHLORIDE 7.45 MG/ML
10 INJECTION INTRAVENOUS
Status: ACTIVE | OUTPATIENT
Start: 2025-05-14 | End: 2025-05-14

## 2025-05-14 RX ORDER — BUMETANIDE 0.25 MG/ML
1 INJECTION, SOLUTION INTRAMUSCULAR; INTRAVENOUS 2 TIMES DAILY
Status: DISCONTINUED | OUTPATIENT
Start: 2025-05-14 | End: 2025-05-15

## 2025-05-14 RX ORDER — ALBUTEROL SULFATE 0.83 MG/ML
2.5 SOLUTION RESPIRATORY (INHALATION) EVERY 4 HOURS PRN
Status: DISCONTINUED | OUTPATIENT
Start: 2025-05-14 | End: 2025-05-23 | Stop reason: HOSPADM

## 2025-05-14 RX ORDER — PANTOPRAZOLE SODIUM 40 MG/10ML
40 INJECTION, POWDER, LYOPHILIZED, FOR SOLUTION INTRAVENOUS 2 TIMES DAILY
Status: DISCONTINUED | OUTPATIENT
Start: 2025-05-14 | End: 2025-05-19

## 2025-05-14 RX ORDER — LORAZEPAM 2 MG/ML
1 INJECTION INTRAMUSCULAR ONCE
Status: COMPLETED | OUTPATIENT
Start: 2025-05-14 | End: 2025-05-14

## 2025-05-14 RX ORDER — CHLORHEXIDINE GLUCONATE ORAL RINSE 1.2 MG/ML
15 SOLUTION DENTAL 2 TIMES DAILY
Status: DISCONTINUED | OUTPATIENT
Start: 2025-05-14 | End: 2025-05-22

## 2025-05-14 RX ORDER — LORAZEPAM 2 MG/ML
INJECTION INTRAMUSCULAR
Status: COMPLETED
Start: 2025-05-14 | End: 2025-05-14

## 2025-05-14 RX ORDER — FENTANYL CITRATE-0.9 % NACL/PF 10 MCG/ML
PLASTIC BAG, INJECTION (ML) INTRAVENOUS
Status: DISCONTINUED
Start: 2025-05-14 | End: 2025-05-14

## 2025-05-14 RX ORDER — MINERAL OIL AND WHITE PETROLATUM 150; 830 MG/G; MG/G
OINTMENT OPHTHALMIC EVERY 4 HOURS
Status: DISCONTINUED | OUTPATIENT
Start: 2025-05-14 | End: 2025-05-23 | Stop reason: HOSPADM

## 2025-05-14 RX ORDER — PANTOPRAZOLE SODIUM 40 MG/10ML
40 INJECTION, POWDER, LYOPHILIZED, FOR SOLUTION INTRAVENOUS DAILY
Status: DISCONTINUED | OUTPATIENT
Start: 2025-05-14 | End: 2025-05-14

## 2025-05-14 RX ORDER — MAGNESIUM SULFATE IN WATER 40 MG/ML
4000 INJECTION, SOLUTION INTRAVENOUS ONCE
Status: COMPLETED | OUTPATIENT
Start: 2025-05-14 | End: 2025-05-14

## 2025-05-14 RX ORDER — ALBUMIN (HUMAN) 12.5 G/50ML
25 SOLUTION INTRAVENOUS EVERY 6 HOURS
Status: COMPLETED | OUTPATIENT
Start: 2025-05-14 | End: 2025-05-14

## 2025-05-14 RX ORDER — MAGNESIUM SULFATE IN WATER 40 MG/ML
2000 INJECTION, SOLUTION INTRAVENOUS PRN
Status: DISCONTINUED | OUTPATIENT
Start: 2025-05-14 | End: 2025-05-14

## 2025-05-14 RX ORDER — LORAZEPAM 2 MG/ML
1 INJECTION INTRAMUSCULAR EVERY 5 MIN PRN
Status: DISCONTINUED | OUTPATIENT
Start: 2025-05-14 | End: 2025-05-14

## 2025-05-14 RX ORDER — PROMETHAZINE HYDROCHLORIDE 25 MG/ML
25 INJECTION, SOLUTION INTRAMUSCULAR; INTRAVENOUS ONCE
Status: DISCONTINUED | OUTPATIENT
Start: 2025-05-14 | End: 2025-05-15 | Stop reason: ALTCHOICE

## 2025-05-14 RX ORDER — POTASSIUM CHLORIDE 29.8 MG/ML
20 INJECTION INTRAVENOUS PRN
Status: DISCONTINUED | OUTPATIENT
Start: 2025-05-14 | End: 2025-05-14

## 2025-05-14 RX ORDER — DEXMEDETOMIDINE HYDROCHLORIDE 4 UG/ML
INJECTION, SOLUTION INTRAVENOUS
Status: DISCONTINUED
Start: 2025-05-14 | End: 2025-05-14

## 2025-05-14 RX ORDER — POTASSIUM CHLORIDE 7.45 MG/ML
10 INJECTION INTRAVENOUS PRN
Status: DISCONTINUED | OUTPATIENT
Start: 2025-05-14 | End: 2025-05-14

## 2025-05-14 RX ORDER — DEXMEDETOMIDINE HYDROCHLORIDE 4 UG/ML
.1-1.5 INJECTION, SOLUTION INTRAVENOUS CONTINUOUS
Status: DISCONTINUED | OUTPATIENT
Start: 2025-05-14 | End: 2025-05-17

## 2025-05-14 RX ORDER — DEXMEDETOMIDINE HYDROCHLORIDE 4 UG/ML
.1-1.5 INJECTION, SOLUTION INTRAVENOUS CONTINUOUS
Status: DISCONTINUED | OUTPATIENT
Start: 2025-05-14 | End: 2025-05-14

## 2025-05-14 RX ADMIN — SODIUM CHLORIDE 100 ML: 0.9 INJECTION, SOLUTION INTRAVENOUS at 10:27

## 2025-05-14 RX ADMIN — POTASSIUM CHLORIDE 10 MEQ: 7.46 INJECTION, SOLUTION INTRAVENOUS at 06:44

## 2025-05-14 RX ADMIN — POLYVINYL ALCOHOL 1 DROP: 1.4 SOLUTION/ DROPS OPHTHALMIC at 16:57

## 2025-05-14 RX ADMIN — BUMETANIDE 1 MG: 0.25 INJECTION INTRAMUSCULAR; INTRAVENOUS at 18:14

## 2025-05-14 RX ADMIN — DEXMEDETOMIDINE HYDROCHLORIDE 0.2 MCG/KG/HR: 400 INJECTION, SOLUTION INTRAVENOUS at 11:06

## 2025-05-14 RX ADMIN — LACTULOSE 30 G: 20 SOLUTION ORAL at 10:33

## 2025-05-14 RX ADMIN — POTASSIUM CHLORIDE 10 MEQ: 7.46 INJECTION, SOLUTION INTRAVENOUS at 09:24

## 2025-05-14 RX ADMIN — BUMETANIDE 1 MG: 0.25 INJECTION INTRAMUSCULAR; INTRAVENOUS at 09:57

## 2025-05-14 RX ADMIN — THERA TABS 1 TABLET: TAB at 10:36

## 2025-05-14 RX ADMIN — QUETIAPINE FUMARATE 50 MG: 50 TABLET, EXTENDED RELEASE ORAL at 20:49

## 2025-05-14 RX ADMIN — CHLORHEXIDINE GLUCONATE 15 ML: 1.2 RINSE ORAL at 20:49

## 2025-05-14 RX ADMIN — DEXMEDETOMIDINE HYDROCHLORIDE 1 MCG/KG/HR: 400 INJECTION, SOLUTION INTRAVENOUS at 21:19

## 2025-05-14 RX ADMIN — GUAIFENESIN 600 MG: 600 TABLET ORAL at 20:49

## 2025-05-14 RX ADMIN — POLYVINYL ALCOHOL 1 DROP: 1.4 SOLUTION/ DROPS OPHTHALMIC at 05:46

## 2025-05-14 RX ADMIN — VANCOMYCIN HYDROCHLORIDE 1000 MG: 1 INJECTION, POWDER, LYOPHILIZED, FOR SOLUTION INTRAVENOUS at 05:08

## 2025-05-14 RX ADMIN — POLYVINYL ALCOHOL 1 DROP: 1.4 SOLUTION/ DROPS OPHTHALMIC at 15:03

## 2025-05-14 RX ADMIN — ALBUMIN (HUMAN) 25 G: 0.25 INJECTION, SOLUTION INTRAVENOUS at 14:58

## 2025-05-14 RX ADMIN — PIPERACILLIN AND TAZOBACTAM 3375 MG: 3; .375 INJECTION, POWDER, LYOPHILIZED, FOR SOLUTION INTRAVENOUS at 16:56

## 2025-05-14 RX ADMIN — POTASSIUM BICARBONATE 20 MEQ: 782 TABLET, EFFERVESCENT ORAL at 20:49

## 2025-05-14 RX ADMIN — WATER 40 MG: 1 INJECTION INTRAMUSCULAR; INTRAVENOUS; SUBCUTANEOUS at 09:56

## 2025-05-14 RX ADMIN — LORAZEPAM 2 MG/HR: 2 INJECTION INTRAMUSCULAR; INTRAVENOUS at 03:48

## 2025-05-14 RX ADMIN — POLYVINYL ALCOHOL 1 DROP: 1.4 SOLUTION/ DROPS OPHTHALMIC at 10:53

## 2025-05-14 RX ADMIN — Medication: at 20:49

## 2025-05-14 RX ADMIN — LORAZEPAM 1 MG: 2 INJECTION INTRAMUSCULAR; INTRAVENOUS at 02:16

## 2025-05-14 RX ADMIN — ALBUMIN (HUMAN) 25 G: 0.25 INJECTION, SOLUTION INTRAVENOUS at 02:13

## 2025-05-14 RX ADMIN — POTASSIUM CHLORIDE 10 MEQ: 7.46 INJECTION, SOLUTION INTRAVENOUS at 11:34

## 2025-05-14 RX ADMIN — FLUOXETINE HYDROCHLORIDE 10 MG: 10 CAPSULE ORAL at 10:36

## 2025-05-14 RX ADMIN — POTASSIUM BICARBONATE 20 MEQ: 782 TABLET, EFFERVESCENT ORAL at 11:45

## 2025-05-14 RX ADMIN — PIPERACILLIN AND TAZOBACTAM 3375 MG: 3; .375 INJECTION, POWDER, LYOPHILIZED, FOR SOLUTION INTRAVENOUS at 10:29

## 2025-05-14 RX ADMIN — ALBUMIN (HUMAN) 25 G: 0.25 INJECTION, SOLUTION INTRAVENOUS at 20:49

## 2025-05-14 RX ADMIN — PANTOPRAZOLE SODIUM 40 MG: 40 INJECTION, POWDER, FOR SOLUTION INTRAVENOUS at 20:49

## 2025-05-14 RX ADMIN — AZITHROMYCIN MONOHYDRATE 500 MG: 500 INJECTION, POWDER, LYOPHILIZED, FOR SOLUTION INTRAVENOUS at 14:55

## 2025-05-14 RX ADMIN — VANCOMYCIN HYDROCHLORIDE 1250 MG: 10 INJECTION, POWDER, LYOPHILIZED, FOR SOLUTION INTRAVENOUS at 16:42

## 2025-05-14 RX ADMIN — ALBUMIN (HUMAN) 25 G: 0.25 INJECTION, SOLUTION INTRAVENOUS at 10:13

## 2025-05-14 RX ADMIN — POTASSIUM CHLORIDE 10 MEQ: 7.46 INJECTION, SOLUTION INTRAVENOUS at 10:31

## 2025-05-14 RX ADMIN — SODIUM CHLORIDE, PRESERVATIVE FREE 10 ML: 5 INJECTION INTRAVENOUS at 09:57

## 2025-05-14 RX ADMIN — ENOXAPARIN SODIUM 40 MG: 100 INJECTION SUBCUTANEOUS at 16:51

## 2025-05-14 RX ADMIN — PANTOPRAZOLE SODIUM 40 MG: 40 INJECTION, POWDER, FOR SOLUTION INTRAVENOUS at 09:57

## 2025-05-14 RX ADMIN — CHLORHEXIDINE GLUCONATE 15 ML: 1.2 RINSE ORAL at 11:07

## 2025-05-14 RX ADMIN — FOLIC ACID 1 MG: 1 TABLET ORAL at 10:36

## 2025-05-14 RX ADMIN — LACTULOSE 30 G: 20 SOLUTION ORAL at 20:49

## 2025-05-14 RX ADMIN — LORAZEPAM 1 MG: 2 INJECTION INTRAMUSCULAR; INTRAVENOUS at 03:07

## 2025-05-14 RX ADMIN — GUAIFENESIN 600 MG: 600 TABLET ORAL at 10:36

## 2025-05-14 RX ADMIN — MAGNESIUM SULFATE HEPTAHYDRATE 4000 MG: 40 INJECTION, SOLUTION INTRAVENOUS at 10:18

## 2025-05-14 RX ADMIN — LORAZEPAM 1 MG: 2 INJECTION INTRAMUSCULAR at 03:07

## 2025-05-14 ASSESSMENT — PULMONARY FUNCTION TESTS
PIF_VALUE: 34
PIF_VALUE: 24
PIF_VALUE: 22
PIF_VALUE: 19
PIF_VALUE: 20
PIF_VALUE: 22
PIF_VALUE: 17
PIF_VALUE: 18
PIF_VALUE: 15
PIF_VALUE: 19
PIF_VALUE: 23
PIF_VALUE: 25
PIF_VALUE: 21
PIF_VALUE: 22
PIF_VALUE: 20
PIF_VALUE: 21
PIF_VALUE: 20
PIF_VALUE: 20
PIF_VALUE: 17
PIF_VALUE: 23
PIF_VALUE: 20
PIF_VALUE: 19
PIF_VALUE: 20
PIF_VALUE: 19
PIF_VALUE: 29
PIF_VALUE: 28
PIF_VALUE: 17
PIF_VALUE: 24
PIF_VALUE: 22
PIF_VALUE: 16
PIF_VALUE: 22
PIF_VALUE: 20
PIF_VALUE: 21
PIF_VALUE: 18
PIF_VALUE: 21
PIF_VALUE: 25
PIF_VALUE: 24
PIF_VALUE: 13
PIF_VALUE: 17
PIF_VALUE: 24
PIF_VALUE: 21
PIF_VALUE: 20
PIF_VALUE: 21
PIF_VALUE: 16
PIF_VALUE: 22
PIF_VALUE: 22
PIF_VALUE: 18
PIF_VALUE: 19
PIF_VALUE: 23
PIF_VALUE: 21
PIF_VALUE: 13
PIF_VALUE: 17
PIF_VALUE: 20
PIF_VALUE: 25
PIF_VALUE: 22
PIF_VALUE: 23

## 2025-05-14 ASSESSMENT — PAIN SCALES - GENERAL
PAINLEVEL_OUTOF10: 0

## 2025-05-14 NOTE — PLAN OF CARE
SUBJECTIVE:    RN: increased work of breathing, grunting      OBJECTIVE:    BP (!) 108/56   Pulse 80   Temp 98.1 °F (36.7 °C) (Temporal)   Resp 28   Ht 1.778 m (5' 10\")   Wt 81.6 kg (180 lb)   SpO2 94%   BMI 25.83 kg/m²       ASSESSMENTS & PLANS:    Increased work of breathing  worsening hypoxemic RF  CPAP PRN for increased work of breathing  RT to titrate as needed please     End Stage Liver Disease  Palliative care eval

## 2025-05-14 NOTE — DISCHARGE INSTRUCTIONS
[facility]  Disability Resources*      Disability Services  [facility]   [address] [place] [address] [id number] [phone]  Aging and Disability Resource Market, Elder Abuse Prevention Awareness, Family Caregiver Support Program, [place] Caregiver Support Program, Long-Term Care OmbudsSuperior Services, [facility] Community Services Employment Program, State Health Insurance Program, [place] Homecare Program, Consumer-Directed Options    [facility]  [address] [place] [address], [place] [address]  [phone] -or- [phone]  Advocacy, education, and resources for individuals with intellectual and developmental disabilities    Behavioral Support Service [employer]  [address] [place] [id number] [phone]  Support for Development Disabled    [facility]  [address] [place] [address]  [phone] -or- [phone]  [facility] provides care for seniors with health issues, the frail elderly, and the disabled of all ages in and around [place], [place]. SITTERS: who provide supervision and limited assistance in hospitals and nursing homes. This service is also available in assisted living facilities and private homes-but only at night in these settings. HOMEMAKERS: who provide basic housekeeping services, cooking, shopping, and errands. PERSONAL HELPERS: who assist with bathing, dressing, transfers, mobility, and incontinence care.    [facility]   [address] [place] [address]  [phone]  [employer] is a 501(c)(3) nonprofit organization whose mission is to help individuals grow and develop through recreational activities with horses. It is our mission to provide individuals with disabilities the opportunity to grow and develop through therapeutic, educational and recreational activities, while on a horse. Our state of the art facility sits on 22 acres of beautifully groomed pastures. The rolling

## 2025-05-14 NOTE — CONSULTS
Comprehensive Nutrition Assessment    Type and Reason for Visit:  Initial, Consult    Nutrition Recommendations/Plan:   Start EN and work toward goal rate of 51ml/hr     Malnutrition Assessment:  Malnutrition Status:  At risk for malnutrition (05/14/25 0934)    Context:  Acute Illness     Findings of the 6 clinical characteristics of malnutrition:  Energy Intake:  Mild decrease in energy intake  Weight Loss:  No weight loss     Body Fat Loss:  No body fat loss     Muscle Mass Loss:  No muscle mass loss    Fluid Accumulation:  Moderate to Severe Extremities, Ascites   Strength:  Not Performed    Nutrition Assessment:    Received consult for vent status and tf orders and management.  Aware Jevity 1.5 has been started.  Will modify this order with a final rate of 51ml/hr, 1 Proteinex and 35ml free water flush hourly.    Nutrition Related Findings:    BM 5/14.  moderate Ascites, +4 pitting BLE edema Wound Type: None       Current Nutrition Intake & Therapies:    Average Meal Intake: NPO  Average Supplements Intake: NPO  Current Tube Feeding (TF) Orders:  Feeding Route: Orogastric  Formula: Standard with Fiber  Schedule: Continuous  Feeding Regimen: Jevity 1.5 goal rate 51ml/hr with 1 Proteinex as bolus tf.  Additives/Modulars: Protein  Water Flushes: 35ml hourly = 8/40 ml  Current TF Provides: Jevity 1.5 @ 51ml/hr with 1 Proteinex and 35ml free water flush = 1940 kcals with 104g protein, 264g CHO and 930ml free water from formula and 840ml free water from flush    Anthropometric Measures:  Height: 177.8 cm (5' 10\")  Ideal Body Weight (IBW): 166 lbs (75 kg)    Admission Body Weight: 81.6 kg (180 lb) (stated)  Current Body Weight: 81.2 kg (179 lb 0.2 oz), 107.8 % IBW. Weight Source: Bed scale  Current BMI (kg/m2): 25.7  Usual Body Weight: 78 kg (171 lb 15.3 oz) (4/19/2025)  % Weight Change (Calculated): 4.1  Weight Adjustment For: No Adjustment  BMI Categories: Overweight (BMI 25.0-29.9)    Estimated Daily Nutrient 
Consult Note            Date:5/13/2025        Patient Name:Moreno De Leon     YOB: 1980     Age:44 y.o.    Reason for consult: Jaundice    History of Present Illness   This is a 44-year-old male who presents with his mother and brother at bedside for dyspnea and jaundice since his last discharge May 6, 2025.  He was treated for alcohol hepatitis at that time with alcohol pancreatitis and was sent home on a prednisolone taper.  Since being sent home, he has developed worsening dyspnea and persistent jaundice.    Liver enzymes are actually somewhat improved since discharge.  Total bilirubin today is 12.8 from 18.5 at discharge.  He denies any alcohol use since his last admission April 29, 2025.  There was also concern for peripancreatic edema on his CT scan but he denies abdominal pain at this time.  The pancreas normally enhances.  There is diffuse fatty liver infiltration.  And moderate amount of ascites.  Chest CT shows bilateral infiltrates which cannot exclude pneumonia.  On presentation white count is 32K.  He is afebrile.  Renal function is normal.    So far infectious workup is unremarkable with blood cultures pending, low suspicion for infection on UA, EtOH is negative and UDS is positive for benzodiazepines.  He states that he has been compliant with prednisolone and has been on 30 mg daily since discharge.    Past Medical History     Past Medical History:   Diagnosis Date    Alcohol abuse     Cancer of tonsil (HCC) 05/09/2023    Dog bite     Oropharyngeal dysphagia 05/09/2023    Psychiatric problem     Suicidal ideation 04/21/2019        Past Surgical History     Past Surgical History:   Procedure Laterality Date    ADENOIDECTOMY      COLONOSCOPY      TONSILLECTOMY Right     TYMPANOSTOMY TUBE PLACEMENT      US BIOPSY LYMPH NODE  10/21/2022    US LYMPH NODE BIOPSY 10/21/2022 [facility] ULTRASOUND        Medications     Prior to Admission medications    Medication Sig Start Date End Date Taking? 
CHEST PORTABLE  Result Date: 5/13/2025  EXAM: CHEST RADIOGRAPH  TECHNIQUE: Single frontal chest radiograph.  HISTORY: Shortness of breath.  COMPARISON: 04/27/2024  FINDINGS: Bilateral patchy interstitial infiltrates without consolidation. No pneumothorax or pleural effusion. Cardiomediastinal silhouette and pulmonary vessels are within normal limits. The upper abdomen is unremarkable. No acute bony abnormality.       1.  Patchy bilateral interstitial infiltrates without consolidation. Edema versus interstitial pneumonitis.    ______________________________________ Electronically signed by: DEMIAN SMYTH M.D. Date:     05/13/2025 Time:    09:03     Palliative Review of Advance Directives:     Surrogate Decision Maker:Cynthia De Leon secondary -Documentation 04/2024 confirms that patient's estranged son has deferred medical decision making to the patient's mother and brother.    Durable Power of :none on file    Advanced Directives/Living [place]: none on file    Out of hospital medical orders in place to reflect resuscitation status (MOLST/POLST): none on file    Information Sharing:  Patient's awareness of illness:  [] Terminal [] Life-Threatening [] Serious [] Non life-threatening [] Not serious   [x] Not discussed    Family awareness of illness:   [] Terminal [x] Life-Threatening [] Serious [] Nonlife-threatening [] Not serious   [] Not discussed    Palliative Performance Scale:  [] 80% Full ambulation  Some disease: Normal activity w/ some effort  Full self-care  Normal/reduced intake  Full LOC  [] 70% Reduced ambulation  Some disease: Can't do normal job or work  Full self-care  Normal/reduced intake  Full LOC  [] 60% Reduced ambulation  Significant disease: Can't do hobbies/housework  Occasional assistance  Normal/reduced intake  LOC full/confusion  [] 50% Mainly sit/lie  Extensive disease: Can't do any work  Considerable assistance  Normal/reduced intake  LOC

## 2025-05-14 NOTE — ACP (ADVANCE CARE PLANNING)
Advance Care Planning      Palliative Medicine Provider   Advance Care Planning (ACP) Conversation      Date of Conversation: 05/14/25  The patient and/or authorized decision maker consented to a voluntary Advance Care Planning conversation.   Individuals present for the conversation:   Legal healthcare agent named below    Legal Healthcare Agent(s):    Primary Decision Maker: Cynthia De Leon - Parent - [phone]    Secondary Decision Maker: Buck De Leon - Brother/Sister - [phone]    ACP documents available in EMR prior to discussion:  -None    Primary Palliative Diagnosis(es):  Alcohol hepatitis, concern for ESLD  SCC head/neck    Conversation Summary:  I met w/ the patient's mother, Cynthia, at his bedside. At the time of our visit, Indra was intubated, sedated and unable to participate in conversation. We reviewed current clinical concerns, work up and treatment provided thus far. We reviewed history of SCC head/neck. He underwent radical resection of this but had a difficult recovery per his mother and never completed radiation. His imaging did mention concern for RUL nodule as well as heterogenous enhancement of the liver. She confirms goals of care are life prolonging at this time. His code status is correct as full code and they would be receptive to additional work up, intervention if indicated. His mother does state that she is his nearest NOK and is listed as his primary HCS. His brother is listed as his secondary HCS. The patient does have an estranged son, however, his son has verified during previous hospital stays that he defers decision making to the patient's mother. This documentation can be found during his hospital stay 04/05/2024.    Resuscitation Status:    Code Status: Full Code    I spent 25 minutes providing ACP services during consultation with the patient and/or surrogate decision maker in a voluntary, in-person conversation discussing the patient's wishes and goals as detailed in the

## 2025-05-14 NOTE — CARE COORDINATION
Case Management Assessment  Initial Evaluation    Date/Time of Evaluation: 5/14/2025 2:19 PM  Assessment Completed by: DEBBIE NG    If patient is discharged prior to next notation, then this note serves as note for discharge by case management.    Patient Name: Moreno De Leon                   YOB: 1980  Diagnosis: Hypoxemia [R09.02]  Pneumonitis [J98.4]  Dyspnea and respiratory abnormalities [R06.00, R06.89]  Sepsis (HCC) [A41.9]                   Date / Time: 5/13/2025  7:50 AM    Patient Admission Status: Inpatient   Readmission Risk (Low < 19, Mod (19-27), High > 27): Readmission Risk Score: 24    Current PCP: Rosangela Mtz APRN - CNP  PCP verified by CM? (P) Yes    Chart Reviewed: Yes      History Provided by: (P) Child/Family  Patient Orientation: (P) Unable to Assess, Other (see comment) (Pt is currently ETT)    Patient Cognition: (P) Alert    Hospitalization in the last 30 days (Readmission):  Yes    If yes, Readmission Assessment in  Navigator will be completed.    Advance Directives:      Code Status: Full Code   Patient's Primary Decision Maker is: (P) Legal Next of Kin    Primary Decision Maker: Cynthia De Leon - Parent - [phone]    Secondary Decision Maker: Buck De Leon - Brother/Sister - [phone]    Discharge Planning:    Patient lives with: (P) Spouse/Significant Other Type of Home: (P) House  Primary Care Giver: (P) Self  Patient Support Systems include: (P) None   Current Financial resources: (P) Medicaid  Current community resources: (P) None  Current services prior to admission: (P) None            Current DME:              Type of Home Care services:  (P) PT, OT    ADLS  Prior functional level: (P) Assistance with the following:  Current functional level: (P) Assistance with the following:    PT AM-PAC:   /24  OT AM-PAC:   /24    Family can provide assistance at DC: (P) Yes  Would you like Case Management to discuss the discharge plan with any other family

## 2025-05-14 NOTE — SIGNIFICANT EVENT
20 mg etomidate given at 0320  5 mg vecuronium given at 0321  Patient intubated with 7.5 tube at 0322, 23cm at the lip. Good color change and bilateral breath sounds. Portable chestxray and sedation ordered.

## 2025-05-14 NOTE — CARE COORDINATION
05/14/25 0730   Readmission Assessment   Number of Days since last admission? 1-7 days   Previous Disposition Home with Family   Who is being Interviewed   (from chart)   What was the patient's/caregiver's perception as to why they think they needed to return back to the hospital? Other (Comment)  (SOA & jaundice)   Did you visit your Primary Care Physician after you left the hospital, before you returned this time? No   Why weren't you able to visit your PCP? Did not have an appointment   Did you see a specialist, such as Cardiac, Pulmonary, Orthopedic Physician, etc. after you left the hospital? No   Who advised the patient to return to the hospital? Self-referral   Does the patient report anything that got in the way of taking their medications? No   In our efforts to provide the best possible care to you and others like you, can you think of anything that we could have done to help you after you left the hospital the first time, so that you might not have needed to return so soon? Other (Comment)  (not noted in chart)     Electronically signed by Michelle Horta on 5/14/2025 at 7:37 AM

## 2025-05-14 NOTE — PLAN OF CARE
Nutrition Problem #1: Inadequate oral intake  Intervention: Food and/or Nutrient Delivery: Continue NPO, Start Tube Feeding  Nutritional

## 2025-05-14 NOTE — PROCEDURES
Contacted that patient doing very poorly on NIPPV support      FiO2 and source had dominik been uptitrated by me earlier  Pt taken to ICU for intubation      Tube advanced easily, misting in tube, excellent colorimter change, and bilateral breath sounds as per RN auscultation     Patient provided:  20mg etomidate for sedation  5mg vecccuronium for paralysis  Glide Scope used and ET tube size 7.5 placed to 23cm at the gum    Protonix 40mg PO changed to 40mg IV will be provided for GI PPx  Ativan GGT will be provided for general Sedation and Comfort while on the vent  added Eye drops, Peridex, head of bed elevation 30-45 degrees & elevaion of legs orders along with [facility] vent orders    PRVC Mode 450cc (440cc would be 6ml/kg IBW) with RR 18bpm and FiO2 of 60% and a PEEP of 5    CXR was obtained, and ET tube appeared to be in suboptimal position, ET tube will be advanced by 1cm  will be sent to stat rad for radiologist interpretation      Endotracheal Intubation and it's associated time not billed as a portion of any other charge or procedure  Initial Day Ventilator Management and it's associated time not  billed as a portion of any other charge or procedure

## 2025-05-15 ENCOUNTER — APPOINTMENT (OUTPATIENT)
Dept: GENERAL RADIOLOGY | Age: 45
DRG: 871 | End: 2025-05-15
Payer: MEDICAID

## 2025-05-15 ENCOUNTER — APPOINTMENT (OUTPATIENT)
Age: 45
DRG: 871 | End: 2025-05-15
Attending: INTERNAL MEDICINE
Payer: MEDICAID

## 2025-05-15 LAB
ALBUMIN SERPL-MCNC: 3.2 G/DL (ref 3.5–5.2)
ALLENS TEST: ABNORMAL
ALP SERPL-CCNC: 303 U/L (ref 40–129)
ALT SERPL-CCNC: 118 U/L (ref 10–50)
ANION GAP SERPL CALCULATED.3IONS-SCNC: 14 MMOL/L (ref 8–16)
AST SERPL-CCNC: 178 U/L (ref 10–50)
BASE EXCESS ARTERIAL: 1.6 MMOL/L (ref -2–2)
BASOPHILS # BLD: 0.1 K/UL (ref 0–0.2)
BASOPHILS NFR BLD: 0.2 % (ref 0–1)
BILIRUB DIRECT SERPL-MCNC: 6.4 MG/DL (ref 0–0.3)
BILIRUB INDIRECT SERPL-MCNC: 2.6 MG/DL (ref 0–1)
BILIRUB SERPL-MCNC: 9 MG/DL (ref 0.2–1.2)
BUN SERPL-MCNC: 22 MG/DL (ref 6–20)
CALCIUM SERPL-MCNC: 8.4 MG/DL (ref 8.6–10)
CARBOXYHEMOGLOBIN ARTERIAL: 3.3 % (ref 0–5)
CHLORIDE SERPL-SCNC: 106 MMOL/L (ref 98–107)
CO2 SERPL-SCNC: 22 MMOL/L (ref 22–29)
CREAT SERPL-MCNC: 0.7 MG/DL (ref 0.7–1.2)
ECHO AO ASC DIAM: 2.5 CM
ECHO AO ASCENDING AORTA INDEX: 1.24 CM/M2
ECHO AO ROOT DIAM: 2.2 CM
ECHO AO ROOT INDEX: 1.09 CM/M2
ECHO AO SINUS VALSALVA DIAM: 2.9 CM
ECHO AO SINUS VALSALVA INDEX: 1.44 CM/M2
ECHO AO ST JNCT DIAM: 2.6 CM
ECHO AV AREA PEAK VELOCITY: 2.7 CM2
ECHO AV AREA VTI: 2.4 CM2
ECHO AV AREA/BSA PEAK VELOCITY: 1.3 CM2/M2
ECHO AV AREA/BSA VTI: 1.2 CM2/M2
ECHO AV MEAN GRADIENT: 3 MMHG
ECHO AV MEAN VELOCITY: 0.8 M/S
ECHO AV PEAK GRADIENT: 5 MMHG
ECHO AV PEAK VELOCITY: 1.1 M/S
ECHO AV VELOCITY RATIO: 0.82
ECHO AV VTI: 22.7 CM
ECHO BSA: 2.02 M2
ECHO EST RA PRESSURE: 3 MMHG
ECHO IVC PROX: 1.5 CM
ECHO LA AREA 2C: 12 CM2
ECHO LA AREA 4C: 13.3 CM2
ECHO LA DIAMETER INDEX: 1.59 CM/M2
ECHO LA DIAMETER: 3.2 CM
ECHO LA MAJOR AXIS: 4.9 CM
ECHO LA MINOR AXIS: 4.2 CM
ECHO LA TO AORTIC ROOT RATIO: 1.45
ECHO LA VOL BP: 31 ML (ref 18–58)
ECHO LA VOL MOD A2C: 29 ML (ref 18–58)
ECHO LA VOL MOD A4C: 28 ML (ref 18–58)
ECHO LA VOL/BSA BIPLANE: 15 ML/M2 (ref 16–34)
ECHO LA VOLUME INDEX MOD A2C: 14 ML/M2 (ref 16–34)
ECHO LA VOLUME INDEX MOD A4C: 14 ML/M2 (ref 16–34)
ECHO LV E' LATERAL VELOCITY: 9.36 CM/S
ECHO LV E' SEPTAL VELOCITY: 7.29 CM/S
ECHO LV EDV A2C: 98 ML
ECHO LV EDV A4C: 110 ML
ECHO LV EDV INDEX A4C: 55 ML/M2
ECHO LV EDV NDEX A2C: 49 ML/M2
ECHO LV EF PHYSICIAN: 55 %
ECHO LV EJECTION FRACTION A2C: 57 %
ECHO LV EJECTION FRACTION A4C: 55 %
ECHO LV EJECTION FRACTION BIPLANE: 55 % (ref 55–100)
ECHO LV ESV A2C: 42 ML
ECHO LV ESV A4C: 50 ML
ECHO LV ESV INDEX A2C: 21 ML/M2
ECHO LV ESV INDEX A4C: 25 ML/M2
ECHO LV FRACTIONAL SHORTENING: 37 % (ref 28–44)
ECHO LV INTERNAL DIMENSION DIASTOLE INDEX: 2.44 CM/M2
ECHO LV INTERNAL DIMENSION DIASTOLIC: 4.9 CM (ref 4.2–5.9)
ECHO LV INTERNAL DIMENSION SYSTOLIC INDEX: 1.54 CM/M2
ECHO LV INTERNAL DIMENSION SYSTOLIC: 3.1 CM
ECHO LV IVSD: 1 CM (ref 0.6–1)
ECHO LV MASS 2D: 176 G (ref 88–224)
ECHO LV MASS INDEX 2D: 87.6 G/M2 (ref 49–115)
ECHO LV POSTERIOR WALL DIASTOLIC: 1 CM (ref 0.6–1)
ECHO LV RELATIVE WALL THICKNESS RATIO: 0.41
ECHO LVOT AREA: 3.5 CM2
ECHO LVOT AV VTI INDEX: 0.68
ECHO LVOT DIAM: 2.1 CM
ECHO LVOT MEAN GRADIENT: 2 MMHG
ECHO LVOT PEAK GRADIENT: 3 MMHG
ECHO LVOT PEAK VELOCITY: 0.9 M/S
ECHO LVOT STROKE VOLUME INDEX: 26.7 ML/M2
ECHO LVOT SV: 53.7 ML
ECHO LVOT VTI: 15.5 CM
ECHO MV A VELOCITY: 0.55 M/S
ECHO MV E DECELERATION TIME (DT): 206 MS
ECHO MV E VELOCITY: 0.62 M/S
ECHO MV E/A RATIO: 1.13
ECHO MV E/E' LATERAL: 6.62
ECHO MV E/E' RATIO (AVERAGED): 7.56
ECHO MV E/E' SEPTAL: 8.5
ECHO RA AREA 4C: 9.4 CM2
ECHO RA END SYSTOLIC VOLUME APICAL 4 CHAMBER INDEX BSA: 9 ML/M2
ECHO RA VOLUME: 18 ML
ECHO RIGHT VENTRICULAR SYSTOLIC PRESSURE (RVSP): 21 MMHG
ECHO RV BASAL DIMENSION: 3.3 CM
ECHO RV LONGITUDINAL DIMENSION: 5.5 CM
ECHO RV MID DIMENSION: 2 CM
ECHO RV TAPSE: 1.8 CM (ref 1.7–?)
ECHO TV REGURGITANT MAX VELOCITY: 2.15 M/S
ECHO TV REGURGITANT PEAK GRADIENT: 18 MMHG
EOSINOPHIL # BLD: 0 K/UL (ref 0–0.6)
EOSINOPHIL NFR BLD: 0 % (ref 0–5)
ERYTHROCYTE [DISTWIDTH] IN BLOOD BY AUTOMATED COUNT: 19.8 % (ref 11.5–14.5)
FIO2: 40 %
GLUCOSE SERPL-MCNC: 138 MG/DL (ref 70–99)
HCO3 ARTERIAL: 24.4 MMOL/L (ref 22–26)
HCT VFR BLD AUTO: 34.1 % (ref 42–52)
HEMOGLOBIN, ART, EXTENDED: 12.4 G/DL (ref 14–18)
HGB BLD-MCNC: 11.5 G/DL (ref 14–18)
IMM GRANULOCYTES # BLD: 0.4 K/UL
LYMPHOCYTES # BLD: 1.4 K/UL (ref 1.1–4.5)
LYMPHOCYTES NFR BLD: 5.8 % (ref 20–40)
MCH RBC QN AUTO: 38.5 PG (ref 27–31)
MCHC RBC AUTO-ENTMCNC: 33.7 G/DL (ref 33–37)
MCV RBC AUTO: 114 FL (ref 80–94)
MECHANICAL RATE IN BPM: 18
METHEMOGLOBIN ARTERIAL: 1.1 %
MODE: ABNORMAL
MONOCYTES # BLD: 0.7 K/UL (ref 0–0.9)
MONOCYTES NFR BLD: 2.9 % (ref 0–10)
NEUTROPHILS # BLD: 21.9 K/UL (ref 1.5–7.5)
NEUTS SEG NFR BLD: 89.5 % (ref 50–65)
O2 CONTENT ARTERIAL: 15.6 ML/DL
O2 SAT, ARTERIAL: 89.4 %
O2 THERAPY: ABNORMAL
PCO2 ARTERIAL: 32 MMHG (ref 35–45)
PH ARTERIAL: 7.49 (ref 7.35–7.45)
PHOSPHATE SERPL-MCNC: 2.4 MG/DL (ref 2.5–4.5)
PLATELET # BLD AUTO: 135 K/UL (ref 130–400)
PMV BLD AUTO: 11.9 FL (ref 9.4–12.4)
PO2 ARTERIAL: 60 MMHG (ref 80–100)
POSITIVE END EXP PRESS: 5
POTASSIUM BLD-SCNC: 3.3 MMOL/L
POTASSIUM SERPL-SCNC: 3.4 MMOL/L (ref 3.5–5.1)
PROT SERPL-MCNC: 5.1 G/DL (ref 6.4–8.3)
RBC # BLD AUTO: 2.99 M/UL (ref 4.7–6.1)
SAMPLE SOURCE: ABNORMAL
SODIUM SERPL-SCNC: 142 MMOL/L (ref 136–145)
VANCOMYCIN TROUGH SERPL-MCNC: 14.5 UG/ML (ref 10–20)
VT MECHANICAL: 450 %
WBC # BLD AUTO: 24.5 K/UL (ref 4.8–10.8)

## 2025-05-15 PROCEDURE — 93306 TTE W/DOPPLER COMPLETE: CPT | Performed by: INTERNAL MEDICINE

## 2025-05-15 PROCEDURE — 80076 HEPATIC FUNCTION PANEL: CPT

## 2025-05-15 PROCEDURE — 82803 BLOOD GASES ANY COMBINATION: CPT

## 2025-05-15 PROCEDURE — 6370000000 HC RX 637 (ALT 250 FOR IP): Performed by: HOSPITALIST

## 2025-05-15 PROCEDURE — 2500000003 HC RX 250 WO HCPCS

## 2025-05-15 PROCEDURE — 84100 ASSAY OF PHOSPHORUS: CPT

## 2025-05-15 PROCEDURE — 2580000003 HC RX 258

## 2025-05-15 PROCEDURE — 6370000000 HC RX 637 (ALT 250 FOR IP): Performed by: INTERNAL MEDICINE

## 2025-05-15 PROCEDURE — 2580000003 HC RX 258: Performed by: INTERNAL MEDICINE

## 2025-05-15 PROCEDURE — 36600 WITHDRAWAL OF ARTERIAL BLOOD: CPT

## 2025-05-15 PROCEDURE — 80048 BASIC METABOLIC PNL TOTAL CA: CPT

## 2025-05-15 PROCEDURE — 2000000000 HC ICU R&B

## 2025-05-15 PROCEDURE — 94760 N-INVAS EAR/PLS OXIMETRY 1: CPT

## 2025-05-15 PROCEDURE — 6360000002 HC RX W HCPCS

## 2025-05-15 PROCEDURE — 71045 X-RAY EXAM CHEST 1 VIEW: CPT

## 2025-05-15 PROCEDURE — 99232 SBSQ HOSP IP/OBS MODERATE 35: CPT | Performed by: PHYSICIAN ASSISTANT

## 2025-05-15 PROCEDURE — C8929 TTE W OR WO FOL WCON,DOPPLER: HCPCS

## 2025-05-15 PROCEDURE — 6370000000 HC RX 637 (ALT 250 FOR IP)

## 2025-05-15 PROCEDURE — 94003 VENT MGMT INPAT SUBQ DAY: CPT

## 2025-05-15 PROCEDURE — 2700000000 HC OXYGEN THERAPY PER DAY

## 2025-05-15 PROCEDURE — 85025 COMPLETE CBC W/AUTO DIFF WBC: CPT

## 2025-05-15 PROCEDURE — 80202 ASSAY OF VANCOMYCIN: CPT

## 2025-05-15 PROCEDURE — 36415 COLL VENOUS BLD VENIPUNCTURE: CPT

## 2025-05-15 PROCEDURE — 2500000003 HC RX 250 WO HCPCS: Performed by: INTERNAL MEDICINE

## 2025-05-15 PROCEDURE — 6360000004 HC RX CONTRAST MEDICATION: Performed by: INTERNAL MEDICINE

## 2025-05-15 PROCEDURE — 6360000002 HC RX W HCPCS: Performed by: INTERNAL MEDICINE

## 2025-05-15 RX ORDER — MIDODRINE HYDROCHLORIDE 10 MG/1
10 TABLET ORAL
Status: DISCONTINUED | OUTPATIENT
Start: 2025-05-15 | End: 2025-05-17

## 2025-05-15 RX ORDER — LACTULOSE 10 G/15ML
30 SOLUTION ORAL DAILY
Status: DISCONTINUED | OUTPATIENT
Start: 2025-05-15 | End: 2025-05-18

## 2025-05-15 RX ORDER — NICOTINE 21 MG/24HR
1 PATCH, TRANSDERMAL 24 HOURS TRANSDERMAL DAILY
Status: DISCONTINUED | OUTPATIENT
Start: 2025-05-15 | End: 2025-05-23 | Stop reason: HOSPADM

## 2025-05-15 RX ORDER — 0.9 % SODIUM CHLORIDE 0.9 %
500 INTRAVENOUS SOLUTION INTRAVENOUS ONCE
Status: COMPLETED | OUTPATIENT
Start: 2025-05-15 | End: 2025-05-15

## 2025-05-15 RX ORDER — BUMETANIDE 0.25 MG/ML
1 INJECTION, SOLUTION INTRAMUSCULAR; INTRAVENOUS DAILY
Status: DISCONTINUED | OUTPATIENT
Start: 2025-05-15 | End: 2025-05-17

## 2025-05-15 RX ADMIN — PANTOPRAZOLE SODIUM 40 MG: 40 INJECTION, POWDER, FOR SOLUTION INTRAVENOUS at 08:48

## 2025-05-15 RX ADMIN — POLYVINYL ALCOHOL 1 DROP: 1.4 SOLUTION/ DROPS OPHTHALMIC at 20:53

## 2025-05-15 RX ADMIN — WATER 40 MG: 1 INJECTION INTRAMUSCULAR; INTRAVENOUS; SUBCUTANEOUS at 08:48

## 2025-05-15 RX ADMIN — POLYVINYL ALCOHOL 1 DROP: 1.4 SOLUTION/ DROPS OPHTHALMIC at 11:47

## 2025-05-15 RX ADMIN — THERA TABS 1 TABLET: TAB at 08:48

## 2025-05-15 RX ADMIN — MIDODRINE HYDROCHLORIDE 10 MG: 10 TABLET ORAL at 08:48

## 2025-05-15 RX ADMIN — SODIUM CHLORIDE, PRESERVATIVE FREE 10 ML: 5 INJECTION INTRAVENOUS at 08:49

## 2025-05-15 RX ADMIN — PIPERACILLIN AND TAZOBACTAM 3375 MG: 3; .375 INJECTION, POWDER, LYOPHILIZED, FOR SOLUTION INTRAVENOUS at 00:07

## 2025-05-15 RX ADMIN — FLUOXETINE HYDROCHLORIDE 10 MG: 10 CAPSULE ORAL at 08:48

## 2025-05-15 RX ADMIN — PIPERACILLIN AND TAZOBACTAM 3375 MG: 3; .375 INJECTION, POWDER, LYOPHILIZED, FOR SOLUTION INTRAVENOUS at 09:18

## 2025-05-15 RX ADMIN — SODIUM CHLORIDE 500 ML: 0.9 INJECTION, SOLUTION INTRAVENOUS at 08:51

## 2025-05-15 RX ADMIN — DEXMEDETOMIDINE HYDROCHLORIDE 1 MCG/KG/HR: 400 INJECTION, SOLUTION INTRAVENOUS at 01:47

## 2025-05-15 RX ADMIN — VANCOMYCIN HYDROCHLORIDE 1250 MG: 10 INJECTION, POWDER, LYOPHILIZED, FOR SOLUTION INTRAVENOUS at 04:31

## 2025-05-15 RX ADMIN — PIPERACILLIN AND TAZOBACTAM 3375 MG: 3; .375 INJECTION, POWDER, LYOPHILIZED, FOR SOLUTION INTRAVENOUS at 15:58

## 2025-05-15 RX ADMIN — LACTULOSE 30 G: 20 SOLUTION ORAL at 08:48

## 2025-05-15 RX ADMIN — Medication: at 00:07

## 2025-05-15 RX ADMIN — VANCOMYCIN HYDROCHLORIDE 1250 MG: 10 INJECTION, POWDER, LYOPHILIZED, FOR SOLUTION INTRAVENOUS at 15:51

## 2025-05-15 RX ADMIN — POLYVINYL ALCOHOL 1 DROP: 1.4 SOLUTION/ DROPS OPHTHALMIC at 08:48

## 2025-05-15 RX ADMIN — POTASSIUM PHOSPHATE, MONOBASIC AND POTASSIUM PHOSPHATE, DIBASIC 15 MMOL: 224; 236 INJECTION, SOLUTION, CONCENTRATE INTRAVENOUS at 10:22

## 2025-05-15 RX ADMIN — PANTOPRAZOLE SODIUM 40 MG: 40 INJECTION, POWDER, FOR SOLUTION INTRAVENOUS at 20:52

## 2025-05-15 RX ADMIN — ENOXAPARIN SODIUM 40 MG: 100 INJECTION SUBCUTANEOUS at 15:45

## 2025-05-15 RX ADMIN — FOLIC ACID 1 MG: 1 TABLET ORAL at 08:56

## 2025-05-15 RX ADMIN — GUAIFENESIN 600 MG: 600 TABLET ORAL at 08:48

## 2025-05-15 RX ADMIN — CHLORHEXIDINE GLUCONATE 15 ML: 1.2 RINSE ORAL at 08:48

## 2025-05-15 RX ADMIN — AZITHROMYCIN MONOHYDRATE 500 MG: 500 INJECTION, POWDER, LYOPHILIZED, FOR SOLUTION INTRAVENOUS at 14:36

## 2025-05-15 RX ADMIN — Medication: at 04:29

## 2025-05-15 RX ADMIN — POTASSIUM BICARBONATE 20 MEQ: 782 TABLET, EFFERVESCENT ORAL at 08:48

## 2025-05-15 RX ADMIN — SULFUR HEXAFLUORIDE 2 ML: 60.7; .19; .19 INJECTION, POWDER, LYOPHILIZED, FOR SUSPENSION INTRAVENOUS; INTRAVESICAL at 14:03

## 2025-05-15 RX ADMIN — DEXMEDETOMIDINE HYDROCHLORIDE 1 MCG/KG/HR: 400 INJECTION, SOLUTION INTRAVENOUS at 06:29

## 2025-05-15 RX ADMIN — SODIUM CHLORIDE, PRESERVATIVE FREE 10 ML: 5 INJECTION INTRAVENOUS at 20:52

## 2025-05-15 RX ADMIN — MIDODRINE HYDROCHLORIDE 10 MG: 10 TABLET ORAL at 11:46

## 2025-05-15 RX ADMIN — POLYVINYL ALCOHOL 1 DROP: 1.4 SOLUTION/ DROPS OPHTHALMIC at 15:45

## 2025-05-15 ASSESSMENT — PULMONARY FUNCTION TESTS
PIF_VALUE: 12
PIF_VALUE: 21
PIF_VALUE: 20
PIF_VALUE: 20
PIF_VALUE: 21
PIF_VALUE: 20
PIF_VALUE: 19
PIF_VALUE: 19
PIF_VALUE: 18
PIF_VALUE: 22
PIF_VALUE: 21
PIF_VALUE: 23
PIF_VALUE: 22
PIF_VALUE: 23
PIF_VALUE: 20

## 2025-05-15 ASSESSMENT — PAIN SCALES - GENERAL
PAINLEVEL_OUTOF10: 0
PAINLEVEL_OUTOF10: 0

## 2025-05-15 NOTE — PLAN OF CARE
Problem: Nutrition Deficit:  Goal: Optimize nutritional status  Recent Flowsheet Documentation  Taken 5/15/2025 1049 by Rosa Sheridan, MS, RD, LD  Nutrient intake appropriate for improving, restoring, or maintaining nutritional needs: Assess nutritional status and recommend course of action  5/15/2025 0957 by Priti Clark, RN  Outcome: Progressing  5/15/2025 0615 by Yvonne Melendez, RN  Outcome: Progressing

## 2025-05-16 ENCOUNTER — APPOINTMENT (OUTPATIENT)
Dept: MRI IMAGING | Age: 45
DRG: 871 | End: 2025-05-16
Payer: MEDICAID

## 2025-05-16 LAB
ALBUMIN SERPL-MCNC: 3 G/DL (ref 3.5–5.2)
ALP SERPL-CCNC: 302 U/L (ref 40–129)
ALT SERPL-CCNC: 109 U/L (ref 10–50)
ANION GAP SERPL CALCULATED.3IONS-SCNC: 10 MMOL/L (ref 8–16)
AST SERPL-CCNC: 164 U/L (ref 10–50)
BASOPHILS # BLD: 0.1 K/UL (ref 0–0.2)
BASOPHILS NFR BLD: 0.3 % (ref 0–1)
BILIRUB DIRECT SERPL-MCNC: 6 MG/DL (ref 0–0.3)
BILIRUB INDIRECT SERPL-MCNC: 2.5 MG/DL (ref 0–1)
BILIRUB SERPL-MCNC: 8.5 MG/DL (ref 0.2–1.2)
BUN SERPL-MCNC: 30 MG/DL (ref 6–20)
CALCIUM SERPL-MCNC: 8.6 MG/DL (ref 8.6–10)
CHLORIDE SERPL-SCNC: 113 MMOL/L (ref 98–107)
CO2 SERPL-SCNC: 22 MMOL/L (ref 22–29)
CREAT SERPL-MCNC: 0.7 MG/DL (ref 0.7–1.2)
EOSINOPHIL # BLD: 0 K/UL (ref 0–0.6)
EOSINOPHIL NFR BLD: 0.1 % (ref 0–5)
ERYTHROCYTE [DISTWIDTH] IN BLOOD BY AUTOMATED COUNT: 18.7 % (ref 11.5–14.5)
GLUCOSE SERPL-MCNC: 107 MG/DL (ref 70–99)
HCT VFR BLD AUTO: 37.8 % (ref 42–52)
HGB BLD-MCNC: 12.1 G/DL (ref 14–18)
IMM GRANULOCYTES # BLD: 0.6 K/UL
LYMPHOCYTES # BLD: 1.9 K/UL (ref 1.1–4.5)
LYMPHOCYTES NFR BLD: 7.2 % (ref 20–40)
MCH RBC QN AUTO: 39 PG (ref 27–31)
MCHC RBC AUTO-ENTMCNC: 32 G/DL (ref 33–37)
MCV RBC AUTO: 121.9 FL (ref 80–94)
MONOCYTES # BLD: 0.7 K/UL (ref 0–0.9)
MONOCYTES NFR BLD: 2.5 % (ref 0–10)
NEUTROPHILS # BLD: 23 K/UL (ref 1.5–7.5)
NEUTS SEG NFR BLD: 87.5 % (ref 50–65)
PHOSPHATE SERPL-MCNC: 2.5 MG/DL (ref 2.5–4.5)
PLATELET # BLD AUTO: 130 K/UL (ref 130–400)
PMV BLD AUTO: 12.2 FL (ref 9.4–12.4)
POTASSIUM SERPL-SCNC: 3.8 MMOL/L (ref 3.5–5.1)
PROT SERPL-MCNC: 4.8 G/DL (ref 6.4–8.3)
RBC # BLD AUTO: 3.1 M/UL (ref 4.7–6.1)
SODIUM SERPL-SCNC: 145 MMOL/L (ref 136–145)
WBC # BLD AUTO: 26.3 K/UL (ref 4.8–10.8)

## 2025-05-16 PROCEDURE — 6370000000 HC RX 637 (ALT 250 FOR IP): Performed by: INTERNAL MEDICINE

## 2025-05-16 PROCEDURE — 2500000003 HC RX 250 WO HCPCS

## 2025-05-16 PROCEDURE — 2500000003 HC RX 250 WO HCPCS: Performed by: INTERNAL MEDICINE

## 2025-05-16 PROCEDURE — P9047 ALBUMIN (HUMAN), 25%, 50ML: HCPCS | Performed by: HOSPITALIST

## 2025-05-16 PROCEDURE — 6370000000 HC RX 637 (ALT 250 FOR IP)

## 2025-05-16 PROCEDURE — 80048 BASIC METABOLIC PNL TOTAL CA: CPT

## 2025-05-16 PROCEDURE — 2580000003 HC RX 258

## 2025-05-16 PROCEDURE — 99233 SBSQ HOSP IP/OBS HIGH 50: CPT

## 2025-05-16 PROCEDURE — 6370000000 HC RX 637 (ALT 250 FOR IP): Performed by: HOSPITALIST

## 2025-05-16 PROCEDURE — 85025 COMPLETE CBC W/AUTO DIFF WBC: CPT

## 2025-05-16 PROCEDURE — A9577 INJ MULTIHANCE: HCPCS | Performed by: INTERNAL MEDICINE

## 2025-05-16 PROCEDURE — 84100 ASSAY OF PHOSPHORUS: CPT

## 2025-05-16 PROCEDURE — 74183 MRI ABD W/O CNTR FLWD CNTR: CPT

## 2025-05-16 PROCEDURE — 2000000000 HC ICU R&B

## 2025-05-16 PROCEDURE — 6360000002 HC RX W HCPCS: Performed by: INTERNAL MEDICINE

## 2025-05-16 PROCEDURE — 2700000000 HC OXYGEN THERAPY PER DAY

## 2025-05-16 PROCEDURE — 6360000002 HC RX W HCPCS: Performed by: HOSPITALIST

## 2025-05-16 PROCEDURE — 2580000003 HC RX 258: Performed by: INTERNAL MEDICINE

## 2025-05-16 PROCEDURE — 80076 HEPATIC FUNCTION PANEL: CPT

## 2025-05-16 PROCEDURE — 6360000004 HC RX CONTRAST MEDICATION: Performed by: INTERNAL MEDICINE

## 2025-05-16 PROCEDURE — 6360000002 HC RX W HCPCS

## 2025-05-16 PROCEDURE — 36415 COLL VENOUS BLD VENIPUNCTURE: CPT

## 2025-05-16 RX ORDER — ALBUMIN (HUMAN) 12.5 G/50ML
25 SOLUTION INTRAVENOUS ONCE
Status: COMPLETED | OUTPATIENT
Start: 2025-05-16 | End: 2025-05-16

## 2025-05-16 RX ORDER — DEXTROSE MONOHYDRATE AND SODIUM CHLORIDE 5; .9 G/100ML; G/100ML
INJECTION, SOLUTION INTRAVENOUS CONTINUOUS
Status: DISCONTINUED | OUTPATIENT
Start: 2025-05-16 | End: 2025-05-18

## 2025-05-16 RX ORDER — LORAZEPAM 2 MG/ML
2 INJECTION INTRAMUSCULAR ONCE
Status: COMPLETED | OUTPATIENT
Start: 2025-05-16 | End: 2025-05-16

## 2025-05-16 RX ADMIN — PANTOPRAZOLE SODIUM 40 MG: 40 INJECTION, POWDER, FOR SOLUTION INTRAVENOUS at 07:19

## 2025-05-16 RX ADMIN — SODIUM CHLORIDE, PRESERVATIVE FREE 10 ML: 5 INJECTION INTRAVENOUS at 20:12

## 2025-05-16 RX ADMIN — MIDODRINE HYDROCHLORIDE 10 MG: 10 TABLET ORAL at 16:38

## 2025-05-16 RX ADMIN — CHLORHEXIDINE GLUCONATE 15 ML: 1.2 RINSE ORAL at 07:38

## 2025-05-16 RX ADMIN — GUAIFENESIN 600 MG: 600 TABLET ORAL at 20:11

## 2025-05-16 RX ADMIN — DEXTROSE AND SODIUM CHLORIDE: 5; 900 INJECTION, SOLUTION INTRAVENOUS at 08:56

## 2025-05-16 RX ADMIN — DEXMEDETOMIDINE HYDROCHLORIDE 0.2 MCG/KG/HR: 400 INJECTION, SOLUTION INTRAVENOUS at 00:27

## 2025-05-16 RX ADMIN — ALBUMIN (HUMAN) 25 G: 0.25 INJECTION, SOLUTION INTRAVENOUS at 01:59

## 2025-05-16 RX ADMIN — PANTOPRAZOLE SODIUM 40 MG: 40 INJECTION, POWDER, FOR SOLUTION INTRAVENOUS at 20:11

## 2025-05-16 RX ADMIN — POLYVINYL ALCOHOL 1 DROP: 1.4 SOLUTION/ DROPS OPHTHALMIC at 00:26

## 2025-05-16 RX ADMIN — PIPERACILLIN AND TAZOBACTAM 3375 MG: 3; .375 INJECTION, POWDER, LYOPHILIZED, FOR SOLUTION INTRAVENOUS at 07:23

## 2025-05-16 RX ADMIN — PIPERACILLIN AND TAZOBACTAM 3375 MG: 3; .375 INJECTION, POWDER, LYOPHILIZED, FOR SOLUTION INTRAVENOUS at 00:26

## 2025-05-16 RX ADMIN — GADOBENATE DIMEGLUMINE 18 ML: 529 INJECTION, SOLUTION INTRAVENOUS at 14:32

## 2025-05-16 RX ADMIN — AZITHROMYCIN MONOHYDRATE 500 MG: 500 INJECTION, POWDER, LYOPHILIZED, FOR SOLUTION INTRAVENOUS at 14:45

## 2025-05-16 RX ADMIN — PIPERACILLIN AND TAZOBACTAM 3375 MG: 3; .375 INJECTION, POWDER, LYOPHILIZED, FOR SOLUTION INTRAVENOUS at 15:59

## 2025-05-16 RX ADMIN — THIAMINE HYDROCHLORIDE: 100 INJECTION, SOLUTION INTRAMUSCULAR; INTRAVENOUS at 09:35

## 2025-05-16 RX ADMIN — POLYVINYL ALCOHOL 1 DROP: 1.4 SOLUTION/ DROPS OPHTHALMIC at 04:19

## 2025-05-16 RX ADMIN — VANCOMYCIN HYDROCHLORIDE 1250 MG: 10 INJECTION, POWDER, LYOPHILIZED, FOR SOLUTION INTRAVENOUS at 04:19

## 2025-05-16 RX ADMIN — Medication 2 MG: at 00:51

## 2025-05-16 RX ADMIN — ENOXAPARIN SODIUM 40 MG: 100 INJECTION SUBCUTANEOUS at 15:57

## 2025-05-16 RX ADMIN — POLYVINYL ALCOHOL 1 DROP: 1.4 SOLUTION/ DROPS OPHTHALMIC at 20:11

## 2025-05-16 RX ADMIN — WATER 40 MG: 1 INJECTION INTRAMUSCULAR; INTRAVENOUS; SUBCUTANEOUS at 07:21

## 2025-05-16 RX ADMIN — PIPERACILLIN AND TAZOBACTAM 3375 MG: 3; .375 INJECTION, POWDER, LYOPHILIZED, FOR SOLUTION INTRAVENOUS at 23:24

## 2025-05-16 RX ADMIN — VANCOMYCIN HYDROCHLORIDE 1250 MG: 10 INJECTION, POWDER, LYOPHILIZED, FOR SOLUTION INTRAVENOUS at 15:55

## 2025-05-17 LAB
ALBUMIN SERPL-MCNC: 2.8 G/DL (ref 3.5–5.2)
ALP SERPL-CCNC: 356 U/L (ref 40–129)
ALT SERPL-CCNC: 138 U/L (ref 10–50)
ANION GAP SERPL CALCULATED.3IONS-SCNC: 9 MMOL/L (ref 8–16)
AST SERPL-CCNC: 188 U/L (ref 10–50)
BACTERIA FLD AEROBE CULT: NORMAL
BACTERIA SPEC ANAEROBE CULT: NORMAL
BASOPHILS # BLD: 0.1 K/UL (ref 0–0.2)
BASOPHILS NFR BLD: 0.2 % (ref 0–1)
BILIRUB DIRECT SERPL-MCNC: 5.7 MG/DL (ref 0–0.3)
BILIRUB INDIRECT SERPL-MCNC: 2.2 MG/DL (ref 0–1)
BILIRUB SERPL-MCNC: 7.9 MG/DL (ref 0.2–1.2)
BUN SERPL-MCNC: 26 MG/DL (ref 6–20)
CALCIUM SERPL-MCNC: 8.5 MG/DL (ref 8.6–10)
CHLORIDE SERPL-SCNC: 117 MMOL/L (ref 98–107)
CO2 SERPL-SCNC: 21 MMOL/L (ref 22–29)
CREAT SERPL-MCNC: 0.6 MG/DL (ref 0.7–1.2)
EKG P AXIS: 26 DEGREES
EKG P-R INTERVAL: 154 MS
EKG Q-T INTERVAL: 382 MS
EKG QRS DURATION: 92 MS
EKG QTC CALCULATION (BAZETT): 434 MS
EKG T AXIS: 85 DEGREES
EOSINOPHIL # BLD: 0 K/UL (ref 0–0.6)
EOSINOPHIL NFR BLD: 0.1 % (ref 0–5)
ERYTHROCYTE [DISTWIDTH] IN BLOOD BY AUTOMATED COUNT: 17.7 % (ref 11.5–14.5)
GLUCOSE SERPL-MCNC: 120 MG/DL (ref 70–99)
GRAM STN SPEC: NORMAL
HCT VFR BLD AUTO: 39.2 % (ref 42–52)
HGB BLD-MCNC: 12.8 G/DL (ref 14–18)
IMM GRANULOCYTES # BLD: 0.7 K/UL
LYMPHOCYTES # BLD: 1.8 K/UL (ref 1.1–4.5)
LYMPHOCYTES NFR BLD: 5.8 % (ref 20–40)
MAGNESIUM SERPL-MCNC: 1.9 MG/DL (ref 1.6–2.6)
MCH RBC QN AUTO: 39.1 PG (ref 27–31)
MCHC RBC AUTO-ENTMCNC: 32.7 G/DL (ref 33–37)
MCV RBC AUTO: 119.9 FL (ref 80–94)
MONOCYTES # BLD: 1 K/UL (ref 0–0.9)
MONOCYTES NFR BLD: 3.2 % (ref 0–10)
NEUTROPHILS # BLD: 27.2 K/UL (ref 1.5–7.5)
NEUTS SEG NFR BLD: 88.6 % (ref 50–65)
PHOSPHATE SERPL-MCNC: 2.4 MG/DL (ref 2.5–4.5)
PLATELET # BLD AUTO: 154 K/UL (ref 130–400)
PMV BLD AUTO: 11.9 FL (ref 9.4–12.4)
POTASSIUM SERPL-SCNC: 3.9 MMOL/L (ref 3.5–5.1)
PROT SERPL-MCNC: 4.7 G/DL (ref 6.4–8.3)
RBC # BLD AUTO: 3.27 M/UL (ref 4.7–6.1)
SODIUM SERPL-SCNC: 147 MMOL/L (ref 136–145)
WBC # BLD AUTO: 30.8 K/UL (ref 4.8–10.8)

## 2025-05-17 PROCEDURE — 85025 COMPLETE CBC W/AUTO DIFF WBC: CPT

## 2025-05-17 PROCEDURE — 6360000002 HC RX W HCPCS: Performed by: INTERNAL MEDICINE

## 2025-05-17 PROCEDURE — 6370000000 HC RX 637 (ALT 250 FOR IP): Performed by: HOSPITALIST

## 2025-05-17 PROCEDURE — 6370000000 HC RX 637 (ALT 250 FOR IP)

## 2025-05-17 PROCEDURE — 80048 BASIC METABOLIC PNL TOTAL CA: CPT

## 2025-05-17 PROCEDURE — 6370000000 HC RX 637 (ALT 250 FOR IP): Performed by: INTERNAL MEDICINE

## 2025-05-17 PROCEDURE — 36415 COLL VENOUS BLD VENIPUNCTURE: CPT

## 2025-05-17 PROCEDURE — 84100 ASSAY OF PHOSPHORUS: CPT

## 2025-05-17 PROCEDURE — 1200000000 HC SEMI PRIVATE

## 2025-05-17 PROCEDURE — 80076 HEPATIC FUNCTION PANEL: CPT

## 2025-05-17 PROCEDURE — 92610 EVALUATE SWALLOWING FUNCTION: CPT

## 2025-05-17 PROCEDURE — 2580000003 HC RX 258

## 2025-05-17 PROCEDURE — P9047 ALBUMIN (HUMAN), 25%, 50ML: HCPCS | Performed by: INTERNAL MEDICINE

## 2025-05-17 PROCEDURE — 2700000000 HC OXYGEN THERAPY PER DAY

## 2025-05-17 PROCEDURE — 94669 MECHANICAL CHEST WALL OSCILL: CPT

## 2025-05-17 PROCEDURE — 2500000003 HC RX 250 WO HCPCS

## 2025-05-17 PROCEDURE — 6360000002 HC RX W HCPCS

## 2025-05-17 PROCEDURE — 2500000003 HC RX 250 WO HCPCS: Performed by: INTERNAL MEDICINE

## 2025-05-17 PROCEDURE — 2580000003 HC RX 258: Performed by: INTERNAL MEDICINE

## 2025-05-17 PROCEDURE — 94760 N-INVAS EAR/PLS OXIMETRY 1: CPT

## 2025-05-17 PROCEDURE — 94150 VITAL CAPACITY TEST: CPT

## 2025-05-17 PROCEDURE — 99232 SBSQ HOSP IP/OBS MODERATE 35: CPT | Performed by: INTERNAL MEDICINE

## 2025-05-17 PROCEDURE — 83735 ASSAY OF MAGNESIUM: CPT

## 2025-05-17 RX ORDER — HALOPERIDOL 5 MG/ML
1 INJECTION INTRAMUSCULAR EVERY 8 HOURS
Status: DISCONTINUED | OUTPATIENT
Start: 2025-05-17 | End: 2025-05-17

## 2025-05-17 RX ORDER — QUETIAPINE FUMARATE 25 MG/1
25 TABLET, FILM COATED ORAL NIGHTLY
Status: DISCONTINUED | OUTPATIENT
Start: 2025-05-17 | End: 2025-05-19

## 2025-05-17 RX ORDER — MIDODRINE HYDROCHLORIDE 10 MG/1
10 TABLET ORAL
Status: DISCONTINUED | OUTPATIENT
Start: 2025-05-17 | End: 2025-05-23 | Stop reason: HOSPADM

## 2025-05-17 RX ORDER — BUMETANIDE 0.25 MG/ML
2 INJECTION, SOLUTION INTRAMUSCULAR; INTRAVENOUS ONCE
Status: COMPLETED | OUTPATIENT
Start: 2025-05-17 | End: 2025-05-17

## 2025-05-17 RX ORDER — MAGNESIUM SULFATE 1 G/100ML
1000 INJECTION INTRAVENOUS ONCE
Status: COMPLETED | OUTPATIENT
Start: 2025-05-17 | End: 2025-05-17

## 2025-05-17 RX ORDER — ALBUMIN (HUMAN) 12.5 G/50ML
25 SOLUTION INTRAVENOUS EVERY 6 HOURS
Status: COMPLETED | OUTPATIENT
Start: 2025-05-17 | End: 2025-05-17

## 2025-05-17 RX ADMIN — ALBUMIN (HUMAN) 25 G: 0.25 INJECTION, SOLUTION INTRAVENOUS at 11:59

## 2025-05-17 RX ADMIN — THERA TABS 1 TABLET: TAB at 07:34

## 2025-05-17 RX ADMIN — AZITHROMYCIN MONOHYDRATE 500 MG: 500 INJECTION, POWDER, LYOPHILIZED, FOR SOLUTION INTRAVENOUS at 15:52

## 2025-05-17 RX ADMIN — POLYVINYL ALCOHOL 1 DROP: 1.4 SOLUTION/ DROPS OPHTHALMIC at 00:05

## 2025-05-17 RX ADMIN — DEXTROSE AND SODIUM CHLORIDE: 5; 900 INJECTION, SOLUTION INTRAVENOUS at 15:51

## 2025-05-17 RX ADMIN — PIPERACILLIN AND TAZOBACTAM 3375 MG: 3; .375 INJECTION, POWDER, LYOPHILIZED, FOR SOLUTION INTRAVENOUS at 17:41

## 2025-05-17 RX ADMIN — POLYVINYL ALCOHOL 1 DROP: 1.4 SOLUTION/ DROPS OPHTHALMIC at 20:07

## 2025-05-17 RX ADMIN — PANTOPRAZOLE SODIUM 40 MG: 40 INJECTION, POWDER, FOR SOLUTION INTRAVENOUS at 20:06

## 2025-05-17 RX ADMIN — FLUOXETINE HYDROCHLORIDE 10 MG: 10 CAPSULE ORAL at 07:34

## 2025-05-17 RX ADMIN — GUAIFENESIN 600 MG: 600 TABLET ORAL at 07:34

## 2025-05-17 RX ADMIN — CHLORHEXIDINE GLUCONATE 15 ML: 1.2 RINSE ORAL at 20:06

## 2025-05-17 RX ADMIN — SODIUM CHLORIDE, PRESERVATIVE FREE 10 ML: 5 INJECTION INTRAVENOUS at 20:07

## 2025-05-17 RX ADMIN — POLYVINYL ALCOHOL 1 DROP: 1.4 SOLUTION/ DROPS OPHTHALMIC at 03:45

## 2025-05-17 RX ADMIN — PANTOPRAZOLE SODIUM 40 MG: 40 INJECTION, POWDER, FOR SOLUTION INTRAVENOUS at 07:31

## 2025-05-17 RX ADMIN — BUMETANIDE 2 MG: 0.25 INJECTION INTRAMUSCULAR; INTRAVENOUS at 11:58

## 2025-05-17 RX ADMIN — Medication: at 20:06

## 2025-05-17 RX ADMIN — LACTULOSE 30 G: 20 SOLUTION ORAL at 07:29

## 2025-05-17 RX ADMIN — POTASSIUM PHOSPHATE, MONOBASIC AND POTASSIUM PHOSPHATE, DIBASIC 10 MMOL: 224; 236 INJECTION, SOLUTION, CONCENTRATE INTRAVENOUS at 08:32

## 2025-05-17 RX ADMIN — FOLIC ACID 1 MG: 1 TABLET ORAL at 07:34

## 2025-05-17 RX ADMIN — VANCOMYCIN HYDROCHLORIDE 1250 MG: 10 INJECTION, POWDER, LYOPHILIZED, FOR SOLUTION INTRAVENOUS at 03:43

## 2025-05-17 RX ADMIN — ALBUMIN (HUMAN) 25 G: 0.25 INJECTION, SOLUTION INTRAVENOUS at 19:53

## 2025-05-17 RX ADMIN — PIPERACILLIN AND TAZOBACTAM 3375 MG: 3; .375 INJECTION, POWDER, LYOPHILIZED, FOR SOLUTION INTRAVENOUS at 07:38

## 2025-05-17 RX ADMIN — QUETIAPINE FUMARATE 25 MG: 25 TABLET ORAL at 20:06

## 2025-05-17 RX ADMIN — GUAIFENESIN 600 MG: 600 TABLET ORAL at 20:06

## 2025-05-17 RX ADMIN — VANCOMYCIN HYDROCHLORIDE 1250 MG: 10 INJECTION, POWDER, LYOPHILIZED, FOR SOLUTION INTRAVENOUS at 15:59

## 2025-05-17 RX ADMIN — WATER 40 MG: 1 INJECTION INTRAMUSCULAR; INTRAVENOUS; SUBCUTANEOUS at 07:32

## 2025-05-17 RX ADMIN — ENOXAPARIN SODIUM 40 MG: 100 INJECTION SUBCUTANEOUS at 15:59

## 2025-05-17 RX ADMIN — MAGNESIUM SULFATE HEPTAHYDRATE 1000 MG: 10 INJECTION, SOLUTION INTRAVENOUS at 08:31

## 2025-05-18 LAB
ALBUMIN SERPL-MCNC: 3.1 G/DL (ref 3.5–5.2)
ALP SERPL-CCNC: 354 U/L (ref 40–129)
ALT SERPL-CCNC: 136 U/L (ref 10–50)
ANION GAP SERPL CALCULATED.3IONS-SCNC: 14 MMOL/L (ref 8–16)
AST SERPL-CCNC: 155 U/L (ref 10–50)
BACTERIA BLD CULT ORG #2: NORMAL
BACTERIA BLD CULT: NORMAL
BASOPHILS # BLD: 0.1 K/UL (ref 0–0.2)
BASOPHILS NFR BLD: 0.2 % (ref 0–1)
BILIRUB DIRECT SERPL-MCNC: 4.8 MG/DL (ref 0–0.3)
BILIRUB INDIRECT SERPL-MCNC: 1.9 MG/DL (ref 0–1)
BILIRUB SERPL-MCNC: 6.7 MG/DL (ref 0.2–1.2)
BUN SERPL-MCNC: 19 MG/DL (ref 6–20)
C DIFF TOX A+B STL QL IA: NORMAL
CALCIUM SERPL-MCNC: 8.5 MG/DL (ref 8.6–10)
CHLORIDE SERPL-SCNC: 109 MMOL/L (ref 98–107)
CO2 SERPL-SCNC: 18 MMOL/L (ref 22–29)
CREAT SERPL-MCNC: 0.6 MG/DL (ref 0.7–1.2)
EOSINOPHIL # BLD: 0.3 K/UL (ref 0–0.6)
EOSINOPHIL NFR BLD: 1.2 % (ref 0–5)
ERYTHROCYTE [DISTWIDTH] IN BLOOD BY AUTOMATED COUNT: 16.7 % (ref 11.5–14.5)
GLUCOSE SERPL-MCNC: 105 MG/DL (ref 70–99)
HCT VFR BLD AUTO: 28.5 % (ref 42–52)
HGB BLD-MCNC: 8.7 G/DL (ref 14–18)
IMM GRANULOCYTES # BLD: 0.3 K/UL
LYMPHOCYTES # BLD: 1.6 K/UL (ref 1.1–4.5)
LYMPHOCYTES NFR BLD: 7 % (ref 20–40)
MAGNESIUM SERPL-MCNC: 1.7 MG/DL (ref 1.6–2.6)
MCH RBC QN AUTO: 39.9 PG (ref 27–31)
MCHC RBC AUTO-ENTMCNC: 30.5 G/DL (ref 33–37)
MCV RBC AUTO: 130.7 FL (ref 80–94)
MONOCYTES # BLD: 0.7 K/UL (ref 0–0.9)
MONOCYTES NFR BLD: 3 % (ref 0–10)
NEUTROPHILS # BLD: 19.7 K/UL (ref 1.5–7.5)
NEUTS SEG NFR BLD: 87.2 % (ref 50–65)
PHOSPHATE SERPL-MCNC: 2.3 MG/DL (ref 2.5–4.5)
PLATELET # BLD AUTO: 102 K/UL (ref 130–400)
PMV BLD AUTO: 12.3 FL (ref 9.4–12.4)
POTASSIUM SERPL-SCNC: 3.5 MMOL/L (ref 3.5–5.1)
PROT SERPL-MCNC: 5 G/DL (ref 6.4–8.3)
RBC # BLD AUTO: 2.18 M/UL (ref 4.7–6.1)
SODIUM SERPL-SCNC: 141 MMOL/L (ref 136–145)
WBC # BLD AUTO: 22.6 K/UL (ref 4.8–10.8)

## 2025-05-18 PROCEDURE — 2500000003 HC RX 250 WO HCPCS

## 2025-05-18 PROCEDURE — 2580000003 HC RX 258

## 2025-05-18 PROCEDURE — 1200000000 HC SEMI PRIVATE

## 2025-05-18 PROCEDURE — 6360000002 HC RX W HCPCS: Performed by: INTERNAL MEDICINE

## 2025-05-18 PROCEDURE — 80076 HEPATIC FUNCTION PANEL: CPT

## 2025-05-18 PROCEDURE — 6370000000 HC RX 637 (ALT 250 FOR IP): Performed by: INTERNAL MEDICINE

## 2025-05-18 PROCEDURE — 6370000000 HC RX 637 (ALT 250 FOR IP)

## 2025-05-18 PROCEDURE — 2580000003 HC RX 258: Performed by: INTERNAL MEDICINE

## 2025-05-18 PROCEDURE — 6370000000 HC RX 637 (ALT 250 FOR IP): Performed by: HOSPITALIST

## 2025-05-18 PROCEDURE — 6360000002 HC RX W HCPCS

## 2025-05-18 PROCEDURE — 94150 VITAL CAPACITY TEST: CPT

## 2025-05-18 PROCEDURE — 80048 BASIC METABOLIC PNL TOTAL CA: CPT

## 2025-05-18 PROCEDURE — 94760 N-INVAS EAR/PLS OXIMETRY 1: CPT

## 2025-05-18 PROCEDURE — 2700000000 HC OXYGEN THERAPY PER DAY

## 2025-05-18 PROCEDURE — 87449 NOS EACH ORGANISM AG IA: CPT

## 2025-05-18 PROCEDURE — 87324 CLOSTRIDIUM AG IA: CPT

## 2025-05-18 PROCEDURE — 2500000003 HC RX 250 WO HCPCS: Performed by: INTERNAL MEDICINE

## 2025-05-18 PROCEDURE — 94669 MECHANICAL CHEST WALL OSCILL: CPT

## 2025-05-18 PROCEDURE — 83735 ASSAY OF MAGNESIUM: CPT

## 2025-05-18 PROCEDURE — 84100 ASSAY OF PHOSPHORUS: CPT

## 2025-05-18 PROCEDURE — 36415 COLL VENOUS BLD VENIPUNCTURE: CPT

## 2025-05-18 PROCEDURE — 85025 COMPLETE CBC W/AUTO DIFF WBC: CPT

## 2025-05-18 RX ORDER — DICYCLOMINE HCL 20 MG
20 TABLET ORAL ONCE
Status: COMPLETED | OUTPATIENT
Start: 2025-05-18 | End: 2025-05-18

## 2025-05-18 RX ORDER — BUMETANIDE 0.25 MG/ML
2 INJECTION, SOLUTION INTRAMUSCULAR; INTRAVENOUS ONCE
Status: COMPLETED | OUTPATIENT
Start: 2025-05-18 | End: 2025-05-18

## 2025-05-18 RX ORDER — MAGNESIUM SULFATE IN WATER 40 MG/ML
4000 INJECTION, SOLUTION INTRAVENOUS ONCE
Status: COMPLETED | OUTPATIENT
Start: 2025-05-18 | End: 2025-05-18

## 2025-05-18 RX ADMIN — QUETIAPINE FUMARATE 25 MG: 25 TABLET ORAL at 20:58

## 2025-05-18 RX ADMIN — PANTOPRAZOLE SODIUM 40 MG: 40 INJECTION, POWDER, FOR SOLUTION INTRAVENOUS at 10:24

## 2025-05-18 RX ADMIN — MIDODRINE HYDROCHLORIDE 10 MG: 10 TABLET ORAL at 10:38

## 2025-05-18 RX ADMIN — Medication: at 01:28

## 2025-05-18 RX ADMIN — ENOXAPARIN SODIUM 40 MG: 100 INJECTION SUBCUTANEOUS at 17:18

## 2025-05-18 RX ADMIN — MEROPENEM 1000 MG: 1 INJECTION INTRAVENOUS at 17:21

## 2025-05-18 RX ADMIN — MAGNESIUM SULFATE HEPTAHYDRATE 4000 MG: 40 INJECTION, SOLUTION INTRAVENOUS at 09:15

## 2025-05-18 RX ADMIN — GUAIFENESIN 600 MG: 600 TABLET ORAL at 10:38

## 2025-05-18 RX ADMIN — POLYVINYL ALCOHOL 1 DROP: 1.4 SOLUTION/ DROPS OPHTHALMIC at 06:23

## 2025-05-18 RX ADMIN — FOLIC ACID 1 MG: 1 TABLET ORAL at 10:38

## 2025-05-18 RX ADMIN — BUMETANIDE 2 MG: 0.25 INJECTION INTRAMUSCULAR; INTRAVENOUS at 09:08

## 2025-05-18 RX ADMIN — VANCOMYCIN HYDROCHLORIDE 1250 MG: 10 INJECTION, POWDER, LYOPHILIZED, FOR SOLUTION INTRAVENOUS at 04:15

## 2025-05-18 RX ADMIN — THERA TABS 1 TABLET: TAB at 10:38

## 2025-05-18 RX ADMIN — MEROPENEM 1000 MG: 1 INJECTION INTRAVENOUS at 22:41

## 2025-05-18 RX ADMIN — GUAIFENESIN 600 MG: 600 TABLET ORAL at 20:58

## 2025-05-18 RX ADMIN — POLYVINYL ALCOHOL 1 DROP: 1.4 SOLUTION/ DROPS OPHTHALMIC at 20:59

## 2025-05-18 RX ADMIN — MEROPENEM 1000 MG: 1 INJECTION INTRAVENOUS at 09:08

## 2025-05-18 RX ADMIN — WATER 40 MG: 1 INJECTION INTRAMUSCULAR; INTRAVENOUS; SUBCUTANEOUS at 10:24

## 2025-05-18 RX ADMIN — PIPERACILLIN AND TAZOBACTAM 3375 MG: 3; .375 INJECTION, POWDER, LYOPHILIZED, FOR SOLUTION INTRAVENOUS at 00:34

## 2025-05-18 RX ADMIN — SODIUM BICARBONATE: 84 INJECTION, SOLUTION INTRAVENOUS at 10:51

## 2025-05-18 RX ADMIN — PANTOPRAZOLE SODIUM 40 MG: 40 INJECTION, POWDER, FOR SOLUTION INTRAVENOUS at 20:58

## 2025-05-18 RX ADMIN — SODIUM BICARBONATE: 84 INJECTION, SOLUTION INTRAVENOUS at 22:40

## 2025-05-18 RX ADMIN — FLUOXETINE HYDROCHLORIDE 10 MG: 10 CAPSULE ORAL at 10:38

## 2025-05-18 RX ADMIN — SODIUM CHLORIDE, PRESERVATIVE FREE 10 ML: 5 INJECTION INTRAVENOUS at 20:58

## 2025-05-18 RX ADMIN — CHLORHEXIDINE GLUCONATE 15 ML: 1.2 RINSE ORAL at 20:58

## 2025-05-18 RX ADMIN — DICYCLOMINE HYDROCHLORIDE 20 MG: 20 TABLET ORAL at 01:28

## 2025-05-18 ASSESSMENT — PAIN - FUNCTIONAL ASSESSMENT: PAIN_FUNCTIONAL_ASSESSMENT: PREVENTS OR INTERFERES SOME ACTIVE ACTIVITIES AND ADLS

## 2025-05-18 ASSESSMENT — PAIN SCALES - GENERAL: PAINLEVEL_OUTOF10: 7

## 2025-05-18 ASSESSMENT — PAIN DESCRIPTION - DESCRIPTORS: DESCRIPTORS: CRAMPING

## 2025-05-18 ASSESSMENT — PAIN DESCRIPTION - LOCATION: LOCATION: ABDOMEN

## 2025-05-19 LAB
ALBUMIN SERPL-MCNC: 2.8 G/DL (ref 3.5–5.2)
ALP SERPL-CCNC: 319 U/L (ref 40–129)
ALT SERPL-CCNC: 128 U/L (ref 10–50)
ANION GAP SERPL CALCULATED.3IONS-SCNC: 12 MMOL/L (ref 8–16)
AST SERPL-CCNC: 128 U/L (ref 10–50)
BASOPHILS # BLD: 0.1 K/UL (ref 0–0.2)
BASOPHILS NFR BLD: 0.2 % (ref 0–1)
BILIRUB DIRECT SERPL-MCNC: 4 MG/DL (ref 0–0.3)
BILIRUB INDIRECT SERPL-MCNC: 1.6 MG/DL (ref 0–1)
BILIRUB SERPL-MCNC: 5.6 MG/DL (ref 0.2–1.2)
BUN SERPL-MCNC: 17 MG/DL (ref 6–20)
CALCIUM SERPL-MCNC: 8 MG/DL (ref 8.6–10)
CHLORIDE SERPL-SCNC: 103 MMOL/L (ref 98–107)
CO2 SERPL-SCNC: 22 MMOL/L (ref 22–29)
CREAT SERPL-MCNC: 0.6 MG/DL (ref 0.7–1.2)
EOSINOPHIL # BLD: 0 K/UL (ref 0–0.6)
EOSINOPHIL NFR BLD: 0.1 % (ref 0–5)
ERYTHROCYTE [DISTWIDTH] IN BLOOD BY AUTOMATED COUNT: 15.9 % (ref 11.5–14.5)
GLUCOSE SERPL-MCNC: 123 MG/DL (ref 70–99)
HCT VFR BLD AUTO: 35.2 % (ref 42–52)
HGB BLD-MCNC: 11.5 G/DL (ref 14–18)
IMM GRANULOCYTES # BLD: 0.4 K/UL
LYMPHOCYTES # BLD: 1.5 K/UL (ref 1.1–4.5)
LYMPHOCYTES NFR BLD: 4.8 % (ref 20–40)
MAGNESIUM SERPL-MCNC: 1.9 MG/DL (ref 1.6–2.6)
MCH RBC QN AUTO: 38.9 PG (ref 27–31)
MCHC RBC AUTO-ENTMCNC: 32.7 G/DL (ref 33–37)
MCV RBC AUTO: 118.9 FL (ref 80–94)
MONOCYTES # BLD: 0.9 K/UL (ref 0–0.9)
MONOCYTES NFR BLD: 2.9 % (ref 0–10)
NEUTROPHILS # BLD: 27.7 K/UL (ref 1.5–7.5)
NEUTS SEG NFR BLD: 90.7 % (ref 50–65)
PHOSPHATE SERPL-MCNC: 2.3 MG/DL (ref 2.5–4.5)
PLATELET # BLD AUTO: 144 K/UL (ref 130–400)
PMV BLD AUTO: 12.4 FL (ref 9.4–12.4)
POTASSIUM SERPL-SCNC: 3.4 MMOL/L (ref 3.5–5.1)
PROT SERPL-MCNC: 4.8 G/DL (ref 6.4–8.3)
RBC # BLD AUTO: 2.96 M/UL (ref 4.7–6.1)
SODIUM SERPL-SCNC: 137 MMOL/L (ref 136–145)
WBC # BLD AUTO: 30.5 K/UL (ref 4.8–10.8)

## 2025-05-19 PROCEDURE — 94150 VITAL CAPACITY TEST: CPT

## 2025-05-19 PROCEDURE — 80048 BASIC METABOLIC PNL TOTAL CA: CPT

## 2025-05-19 PROCEDURE — 83735 ASSAY OF MAGNESIUM: CPT

## 2025-05-19 PROCEDURE — 6360000002 HC RX W HCPCS: Performed by: INTERNAL MEDICINE

## 2025-05-19 PROCEDURE — 2580000003 HC RX 258: Performed by: INTERNAL MEDICINE

## 2025-05-19 PROCEDURE — 85025 COMPLETE CBC W/AUTO DIFF WBC: CPT

## 2025-05-19 PROCEDURE — 6370000000 HC RX 637 (ALT 250 FOR IP): Performed by: INTERNAL MEDICINE

## 2025-05-19 PROCEDURE — 94669 MECHANICAL CHEST WALL OSCILL: CPT

## 2025-05-19 PROCEDURE — 2500000003 HC RX 250 WO HCPCS: Performed by: INTERNAL MEDICINE

## 2025-05-19 PROCEDURE — 36415 COLL VENOUS BLD VENIPUNCTURE: CPT

## 2025-05-19 PROCEDURE — 2500000003 HC RX 250 WO HCPCS: Performed by: HOSPITALIST

## 2025-05-19 PROCEDURE — 2500000003 HC RX 250 WO HCPCS

## 2025-05-19 PROCEDURE — 80076 HEPATIC FUNCTION PANEL: CPT

## 2025-05-19 PROCEDURE — 1200000000 HC SEMI PRIVATE

## 2025-05-19 PROCEDURE — 2700000000 HC OXYGEN THERAPY PER DAY

## 2025-05-19 PROCEDURE — 6370000000 HC RX 637 (ALT 250 FOR IP)

## 2025-05-19 PROCEDURE — 6370000000 HC RX 637 (ALT 250 FOR IP): Performed by: HOSPITALIST

## 2025-05-19 PROCEDURE — 94760 N-INVAS EAR/PLS OXIMETRY 1: CPT

## 2025-05-19 PROCEDURE — 84100 ASSAY OF PHOSPHORUS: CPT

## 2025-05-19 PROCEDURE — 99232 SBSQ HOSP IP/OBS MODERATE 35: CPT | Performed by: INTERNAL MEDICINE

## 2025-05-19 RX ORDER — MAGNESIUM SULFATE IN WATER 40 MG/ML
2000 INJECTION, SOLUTION INTRAVENOUS ONCE
Status: COMPLETED | OUTPATIENT
Start: 2025-05-19 | End: 2025-05-19

## 2025-05-19 RX ORDER — PANTOPRAZOLE SODIUM 40 MG/1
40 TABLET, DELAYED RELEASE ORAL
Status: DISCONTINUED | OUTPATIENT
Start: 2025-05-19 | End: 2025-05-23 | Stop reason: HOSPADM

## 2025-05-19 RX ORDER — BUMETANIDE 0.25 MG/ML
1 INJECTION, SOLUTION INTRAMUSCULAR; INTRAVENOUS 2 TIMES DAILY
Status: DISCONTINUED | OUTPATIENT
Start: 2025-05-19 | End: 2025-05-20

## 2025-05-19 RX ORDER — METOPROLOL TARTRATE 1 MG/ML
2.5 INJECTION, SOLUTION INTRAVENOUS ONCE
Status: COMPLETED | OUTPATIENT
Start: 2025-05-19 | End: 2025-05-19

## 2025-05-19 RX ORDER — QUETIAPINE FUMARATE 50 MG/1
50 TABLET, FILM COATED ORAL NIGHTLY
Status: DISCONTINUED | OUTPATIENT
Start: 2025-05-19 | End: 2025-05-23 | Stop reason: HOSPADM

## 2025-05-19 RX ORDER — PREDNISOLONE 15 MG/5ML
20 SOLUTION ORAL DAILY
Status: DISCONTINUED | OUTPATIENT
Start: 2025-05-20 | End: 2025-05-23 | Stop reason: HOSPADM

## 2025-05-19 RX ORDER — PREDNISONE 20 MG/1
20 TABLET ORAL DAILY
Status: DISCONTINUED | OUTPATIENT
Start: 2025-05-19 | End: 2025-05-19

## 2025-05-19 RX ORDER — POTASSIUM CHLORIDE 1500 MG/1
40 TABLET, EXTENDED RELEASE ORAL DAILY
Status: DISCONTINUED | OUTPATIENT
Start: 2025-05-19 | End: 2025-05-22

## 2025-05-19 RX ORDER — ALBUMIN (HUMAN) 12.5 G/50ML
25 SOLUTION INTRAVENOUS
Status: DISCONTINUED | OUTPATIENT
Start: 2025-05-19 | End: 2025-05-23 | Stop reason: HOSPADM

## 2025-05-19 RX ADMIN — PREDNISONE 20 MG: 20 TABLET ORAL at 10:03

## 2025-05-19 RX ADMIN — BUMETANIDE 1 MG: 0.25 INJECTION INTRAMUSCULAR; INTRAVENOUS at 10:04

## 2025-05-19 RX ADMIN — POLYVINYL ALCOHOL 1 DROP: 1.4 SOLUTION/ DROPS OPHTHALMIC at 16:33

## 2025-05-19 RX ADMIN — POLYVINYL ALCOHOL 1 DROP: 1.4 SOLUTION/ DROPS OPHTHALMIC at 10:03

## 2025-05-19 RX ADMIN — SODIUM CHLORIDE, PRESERVATIVE FREE 10 ML: 5 INJECTION INTRAVENOUS at 21:33

## 2025-05-19 RX ADMIN — POLYVINYL ALCOHOL 1 DROP: 1.4 SOLUTION/ DROPS OPHTHALMIC at 21:35

## 2025-05-19 RX ADMIN — CHLORHEXIDINE GLUCONATE 15 ML: 1.2 RINSE ORAL at 21:33

## 2025-05-19 RX ADMIN — BUMETANIDE 1 MG: 0.25 INJECTION INTRAMUSCULAR; INTRAVENOUS at 16:58

## 2025-05-19 RX ADMIN — POLYVINYL ALCOHOL 1 DROP: 1.4 SOLUTION/ DROPS OPHTHALMIC at 06:02

## 2025-05-19 RX ADMIN — FLUOXETINE HYDROCHLORIDE 10 MG: 10 CAPSULE ORAL at 10:03

## 2025-05-19 RX ADMIN — FOLIC ACID 1 MG: 1 TABLET ORAL at 10:04

## 2025-05-19 RX ADMIN — MAGNESIUM SULFATE HEPTAHYDRATE 2000 MG: 40 INJECTION, SOLUTION INTRAVENOUS at 10:02

## 2025-05-19 RX ADMIN — GUAIFENESIN 600 MG: 600 TABLET ORAL at 10:03

## 2025-05-19 RX ADMIN — PANTOPRAZOLE SODIUM 40 MG: 40 INJECTION, POWDER, FOR SOLUTION INTRAVENOUS at 10:05

## 2025-05-19 RX ADMIN — QUETIAPINE FUMARATE 50 MG: 50 TABLET ORAL at 21:33

## 2025-05-19 RX ADMIN — POTASSIUM PHOSPHATE, MONOBASIC AND POTASSIUM PHOSPHATE, DIBASIC 15 MMOL: 224; 236 INJECTION, SOLUTION, CONCENTRATE INTRAVENOUS at 10:16

## 2025-05-19 RX ADMIN — MEROPENEM 1000 MG: 1 INJECTION INTRAVENOUS at 10:19

## 2025-05-19 RX ADMIN — POTASSIUM CHLORIDE 40 MEQ: 1500 TABLET, EXTENDED RELEASE ORAL at 10:03

## 2025-05-19 RX ADMIN — GUAIFENESIN 600 MG: 600 TABLET ORAL at 21:32

## 2025-05-19 RX ADMIN — METOPROLOL TARTRATE 2.5 MG: 5 INJECTION INTRAVENOUS at 23:34

## 2025-05-19 RX ADMIN — ENOXAPARIN SODIUM 40 MG: 100 INJECTION SUBCUTANEOUS at 16:32

## 2025-05-19 RX ADMIN — CHLORHEXIDINE GLUCONATE 15 ML: 1.2 RINSE ORAL at 10:03

## 2025-05-19 RX ADMIN — PANTOPRAZOLE SODIUM 40 MG: 40 TABLET, DELAYED RELEASE ORAL at 16:32

## 2025-05-19 RX ADMIN — MEROPENEM 1000 MG: 1 INJECTION INTRAVENOUS at 16:38

## 2025-05-19 RX ADMIN — POLYVINYL ALCOHOL 1 DROP: 1.4 SOLUTION/ DROPS OPHTHALMIC at 13:39

## 2025-05-19 RX ADMIN — THERA TABS 1 TABLET: TAB at 10:03

## 2025-05-20 ENCOUNTER — APPOINTMENT (OUTPATIENT)
Dept: ULTRASOUND IMAGING | Age: 45
DRG: 871 | End: 2025-05-20
Payer: MEDICAID

## 2025-05-20 ENCOUNTER — APPOINTMENT (OUTPATIENT)
Dept: CT IMAGING | Age: 45
DRG: 871 | End: 2025-05-20
Payer: MEDICAID

## 2025-05-20 PROBLEM — I50.9 ACUTE CONGESTIVE HEART FAILURE (HCC): Status: ACTIVE | Noted: 2025-05-20

## 2025-05-20 LAB
ALBUMIN SERPL-MCNC: 2.7 G/DL (ref 3.5–5.2)
ALP SERPL-CCNC: 358 U/L (ref 40–129)
ALT SERPL-CCNC: 117 U/L (ref 10–50)
ANION GAP SERPL CALCULATED.3IONS-SCNC: 10 MMOL/L (ref 8–16)
AST SERPL-CCNC: 117 U/L (ref 10–50)
BASOPHILS # BLD: 0 K/UL (ref 0–0.2)
BASOPHILS NFR BLD: 0.1 % (ref 0–1)
BILIRUB DIRECT SERPL-MCNC: 4.1 MG/DL (ref 0–0.3)
BILIRUB INDIRECT SERPL-MCNC: 1.5 MG/DL (ref 0–1)
BILIRUB SERPL-MCNC: 5.6 MG/DL (ref 0.2–1.2)
BUN SERPL-MCNC: 18 MG/DL (ref 6–20)
CALCIUM SERPL-MCNC: 7.8 MG/DL (ref 8.6–10)
CHLORIDE SERPL-SCNC: 101 MMOL/L (ref 98–107)
CO2 SERPL-SCNC: 23 MMOL/L (ref 22–29)
CREAT SERPL-MCNC: 0.5 MG/DL (ref 0.7–1.2)
EOSINOPHIL # BLD: 0.2 K/UL (ref 0–0.6)
EOSINOPHIL NFR BLD: 0.6 % (ref 0–5)
ERYTHROCYTE [DISTWIDTH] IN BLOOD BY AUTOMATED COUNT: 15.2 % (ref 11.5–14.5)
GLUCOSE SERPL-MCNC: 93 MG/DL (ref 70–99)
HCT VFR BLD AUTO: 36.8 % (ref 42–52)
HGB BLD-MCNC: 12.3 G/DL (ref 14–18)
IMM GRANULOCYTES # BLD: 0.4 K/UL
LYMPHOCYTES # BLD: 1.8 K/UL (ref 1.1–4.5)
LYMPHOCYTES NFR BLD: 5.7 % (ref 20–40)
MCH RBC QN AUTO: 38.6 PG (ref 27–31)
MCHC RBC AUTO-ENTMCNC: 33.4 G/DL (ref 33–37)
MCV RBC AUTO: 115.4 FL (ref 80–94)
MONOCYTES # BLD: 1 K/UL (ref 0–0.9)
MONOCYTES NFR BLD: 3.2 % (ref 0–10)
NEUTROPHILS # BLD: 28.6 K/UL (ref 1.5–7.5)
NEUTS SEG NFR BLD: 89.2 % (ref 50–65)
PHOSPHATE SERPL-MCNC: 2 MG/DL (ref 2.5–4.5)
PLATELET # BLD AUTO: 160 K/UL (ref 130–400)
PMV BLD AUTO: 12.1 FL (ref 9.4–12.4)
POTASSIUM SERPL-SCNC: 3.5 MMOL/L (ref 3.5–5.1)
PROT SERPL-MCNC: 4.8 G/DL (ref 6.4–8.3)
RBC # BLD AUTO: 3.19 M/UL (ref 4.7–6.1)
SODIUM SERPL-SCNC: 134 MMOL/L (ref 136–145)
WBC # BLD AUTO: 32.1 K/UL (ref 4.8–10.8)

## 2025-05-20 PROCEDURE — 36415 COLL VENOUS BLD VENIPUNCTURE: CPT

## 2025-05-20 PROCEDURE — 2500000003 HC RX 250 WO HCPCS: Performed by: INTERNAL MEDICINE

## 2025-05-20 PROCEDURE — 84100 ASSAY OF PHOSPHORUS: CPT

## 2025-05-20 PROCEDURE — C1729 CATH, DRAINAGE: HCPCS

## 2025-05-20 PROCEDURE — P9047 ALBUMIN (HUMAN), 25%, 50ML: HCPCS | Performed by: INTERNAL MEDICINE

## 2025-05-20 PROCEDURE — 74176 CT ABD & PELVIS W/O CONTRAST: CPT

## 2025-05-20 PROCEDURE — 1200000000 HC SEMI PRIVATE

## 2025-05-20 PROCEDURE — 2580000003 HC RX 258: Performed by: INTERNAL MEDICINE

## 2025-05-20 PROCEDURE — 80048 BASIC METABOLIC PNL TOTAL CA: CPT

## 2025-05-20 PROCEDURE — 94150 VITAL CAPACITY TEST: CPT

## 2025-05-20 PROCEDURE — 6370000000 HC RX 637 (ALT 250 FOR IP): Performed by: HOSPITALIST

## 2025-05-20 PROCEDURE — 6360000002 HC RX W HCPCS: Performed by: INTERNAL MEDICINE

## 2025-05-20 PROCEDURE — 71250 CT THORAX DX C-: CPT

## 2025-05-20 PROCEDURE — 85025 COMPLETE CBC W/AUTO DIFF WBC: CPT

## 2025-05-20 PROCEDURE — 6370000000 HC RX 637 (ALT 250 FOR IP)

## 2025-05-20 PROCEDURE — 99233 SBSQ HOSP IP/OBS HIGH 50: CPT

## 2025-05-20 PROCEDURE — 94760 N-INVAS EAR/PLS OXIMETRY 1: CPT

## 2025-05-20 PROCEDURE — 6370000000 HC RX 637 (ALT 250 FOR IP): Performed by: INTERNAL MEDICINE

## 2025-05-20 PROCEDURE — 80076 HEPATIC FUNCTION PANEL: CPT

## 2025-05-20 PROCEDURE — 2500000003 HC RX 250 WO HCPCS

## 2025-05-20 RX ORDER — ALBUMIN (HUMAN) 12.5 G/50ML
25 SOLUTION INTRAVENOUS EVERY 6 HOURS
Status: COMPLETED | OUTPATIENT
Start: 2025-05-20 | End: 2025-05-21

## 2025-05-20 RX ORDER — LACTULOSE 10 G/15ML
20 SOLUTION ORAL DAILY
Status: DISCONTINUED | OUTPATIENT
Start: 2025-05-20 | End: 2025-05-23 | Stop reason: HOSPADM

## 2025-05-20 RX ADMIN — FLUOXETINE HYDROCHLORIDE 10 MG: 10 CAPSULE ORAL at 08:07

## 2025-05-20 RX ADMIN — SODIUM CHLORIDE, PRESERVATIVE FREE 10 ML: 5 INJECTION INTRAVENOUS at 21:15

## 2025-05-20 RX ADMIN — ALBUMIN (HUMAN) 25 G: 0.25 INJECTION, SOLUTION INTRAVENOUS at 21:15

## 2025-05-20 RX ADMIN — MIDODRINE HYDROCHLORIDE 10 MG: 10 TABLET ORAL at 08:07

## 2025-05-20 RX ADMIN — MIDODRINE HYDROCHLORIDE 10 MG: 10 TABLET ORAL at 11:50

## 2025-05-20 RX ADMIN — BUMETANIDE 1 MG/HR: 0.25 INJECTION INTRAMUSCULAR; INTRAVENOUS at 11:01

## 2025-05-20 RX ADMIN — MIDODRINE HYDROCHLORIDE 10 MG: 10 TABLET ORAL at 15:55

## 2025-05-20 RX ADMIN — GUAIFENESIN 600 MG: 600 TABLET ORAL at 08:07

## 2025-05-20 RX ADMIN — THERA TABS 1 TABLET: TAB at 08:07

## 2025-05-20 RX ADMIN — POLYVINYL ALCOHOL 1 DROP: 1.4 SOLUTION/ DROPS OPHTHALMIC at 14:23

## 2025-05-20 RX ADMIN — POTASSIUM CHLORIDE 40 MEQ: 1500 TABLET, EXTENDED RELEASE ORAL at 08:07

## 2025-05-20 RX ADMIN — POLYVINYL ALCOHOL 1 DROP: 1.4 SOLUTION/ DROPS OPHTHALMIC at 05:47

## 2025-05-20 RX ADMIN — POLYVINYL ALCOHOL 1 DROP: 1.4 SOLUTION/ DROPS OPHTHALMIC at 23:46

## 2025-05-20 RX ADMIN — MEROPENEM 1000 MG: 1 INJECTION INTRAVENOUS at 08:06

## 2025-05-20 RX ADMIN — POTASSIUM PHOSPHATE, MONOBASIC AND POTASSIUM PHOSPHATE, DIBASIC 20 MMOL: 224; 236 INJECTION, SOLUTION, CONCENTRATE INTRAVENOUS at 11:50

## 2025-05-20 RX ADMIN — QUETIAPINE FUMARATE 50 MG: 50 TABLET ORAL at 21:15

## 2025-05-20 RX ADMIN — PANTOPRAZOLE SODIUM 40 MG: 40 TABLET, DELAYED RELEASE ORAL at 05:47

## 2025-05-20 RX ADMIN — CHLORHEXIDINE GLUCONATE 15 ML: 1.2 RINSE ORAL at 08:07

## 2025-05-20 RX ADMIN — ALBUMIN (HUMAN) 25 G: 0.25 INJECTION, SOLUTION INTRAVENOUS at 14:45

## 2025-05-20 RX ADMIN — Medication 20 MG: at 08:08

## 2025-05-20 RX ADMIN — MEROPENEM 1000 MG: 1 INJECTION INTRAVENOUS at 15:56

## 2025-05-20 RX ADMIN — FOLIC ACID 1 MG: 1 TABLET ORAL at 08:07

## 2025-05-20 RX ADMIN — POLYVINYL ALCOHOL 1 DROP: 1.4 SOLUTION/ DROPS OPHTHALMIC at 10:59

## 2025-05-20 RX ADMIN — LACTULOSE 20 G: 10 SOLUTION ORAL at 12:15

## 2025-05-20 RX ADMIN — MEROPENEM 1000 MG: 1 INJECTION INTRAVENOUS at 23:48

## 2025-05-20 RX ADMIN — MEROPENEM 1000 MG: 1 INJECTION INTRAVENOUS at 00:35

## 2025-05-20 RX ADMIN — POLYVINYL ALCOHOL 1 DROP: 1.4 SOLUTION/ DROPS OPHTHALMIC at 00:33

## 2025-05-20 RX ADMIN — ENOXAPARIN SODIUM 40 MG: 100 INJECTION SUBCUTANEOUS at 15:55

## 2025-05-20 RX ADMIN — PANTOPRAZOLE SODIUM 40 MG: 40 TABLET, DELAYED RELEASE ORAL at 15:55

## 2025-05-20 RX ADMIN — BUMETANIDE 1 MG: 0.25 INJECTION INTRAMUSCULAR; INTRAVENOUS at 08:06

## 2025-05-20 RX ADMIN — GUAIFENESIN 600 MG: 600 TABLET ORAL at 21:15

## 2025-05-20 RX ADMIN — POLYVINYL ALCOHOL 1 DROP: 1.4 SOLUTION/ DROPS OPHTHALMIC at 21:24

## 2025-05-20 NOTE — PLAN OF CARE
Problem: Discharge Planning  Goal: Discharge to home or other facility with appropriate resources  5/19/2025 2324 by Altagracia Perales RN  Outcome: Progressing  Flowsheets (Taken 5/19/2025 2100)  Discharge to home or other facility with appropriate resources:   Identify barriers to discharge with patient and caregiver   Arrange for needed discharge resources and transportation as appropriate   Identify discharge learning needs (meds, wound care, etc)   Refer to discharge planning if patient needs post-hospital services based on physician order or complex needs related to functional status, cognitive ability or social support system  5/19/2025 1738 by Jody Ruiz RN  Outcome: Progressing  Flowsheets  Taken 5/19/2025 1003 by Jody Ruiz RN  Discharge to home or other facility with appropriate resources: Identify barriers to discharge with patient and caregiver  Taken 5/19/2025 0530 by Poonam Harry RN  Discharge to home or other facility with appropriate resources: Identify barriers to discharge with patient and caregiver     Problem: Pain  Goal: Verbalizes/displays adequate comfort level or baseline comfort level  5/19/2025 2324 by Altagracia Perales RN  Outcome: Progressing  5/19/2025 1738 by Jody Ruiz RN  Outcome: Progressing     Problem: Safety - Adult  Goal: Free from fall injury  5/19/2025 2324 by Altagracia Perales RN  Outcome: Progressing  Flowsheets (Taken 5/19/2025 2323)  Free From Fall Injury: Instruct family/caregiver on patient safety  5/19/2025 1738 by Jody Ruiz RN  Outcome: Progressing     Problem: ABCDS Injury Assessment  Goal: Absence of physical injury  5/19/2025 2324 by Altagracia Perales RN  Outcome: Progressing  Flowsheets (Taken 5/19/2025 2323)  Absence of Physical Injury: Implement safety measures based on patient assessment  5/19/2025 1738 by Jody Ruiz RN  Outcome: Progressing     Problem: Skin/Tissue Integrity  Goal: Skin integrity remains intact  Description: 1.  Monitor for areas of redness and/or

## 2025-05-21 LAB
ALBUMIN SERPL-MCNC: 3.8 G/DL (ref 3.5–5.2)
ALP SERPL-CCNC: 407 U/L (ref 40–129)
ALT SERPL-CCNC: 102 U/L (ref 10–50)
ANION GAP SERPL CALCULATED.3IONS-SCNC: 16 MMOL/L (ref 8–16)
AST SERPL-CCNC: 111 U/L (ref 10–50)
BASOPHILS # BLD: 0.1 K/UL (ref 0–0.2)
BASOPHILS NFR BLD: 0.2 % (ref 0–1)
BILIRUB DIRECT SERPL-MCNC: 4.3 MG/DL (ref 0–0.3)
BILIRUB INDIRECT SERPL-MCNC: 2.1 MG/DL (ref 0–1)
BILIRUB SERPL-MCNC: 6.4 MG/DL (ref 0.2–1.2)
BUN SERPL-MCNC: 13 MG/DL (ref 6–20)
CALCIUM SERPL-MCNC: 8.2 MG/DL (ref 8.6–10)
CHLORIDE SERPL-SCNC: 88 MMOL/L (ref 98–107)
CO2 SERPL-SCNC: 31 MMOL/L (ref 22–29)
CREAT SERPL-MCNC: 0.6 MG/DL (ref 0.7–1.2)
EOSINOPHIL # BLD: 0.1 K/UL (ref 0–0.6)
EOSINOPHIL NFR BLD: 0.2 % (ref 0–5)
ERYTHROCYTE [DISTWIDTH] IN BLOOD BY AUTOMATED COUNT: 14.5 % (ref 11.5–14.5)
GLUCOSE SERPL-MCNC: 95 MG/DL (ref 70–99)
HCT VFR BLD AUTO: 36.5 % (ref 42–52)
HGB BLD-MCNC: 12.8 G/DL (ref 14–18)
IMM GRANULOCYTES # BLD: 0.4 K/UL
LYMPHOCYTES # BLD: 1.8 K/UL (ref 1.1–4.5)
LYMPHOCYTES NFR BLD: 5.4 % (ref 20–40)
MCH RBC QN AUTO: 39.1 PG (ref 27–31)
MCHC RBC AUTO-ENTMCNC: 35.1 G/DL (ref 33–37)
MCV RBC AUTO: 111.6 FL (ref 80–94)
MONOCYTES # BLD: 1.4 K/UL (ref 0–0.9)
MONOCYTES NFR BLD: 4.4 % (ref 0–10)
NEUTROPHILS # BLD: 28.7 K/UL (ref 1.5–7.5)
NEUTS SEG NFR BLD: 88.7 % (ref 50–65)
PHOSPHATE SERPL-MCNC: 1.8 MG/DL (ref 2.5–4.5)
PLATELET # BLD AUTO: 172 K/UL (ref 130–400)
PMV BLD AUTO: 12.7 FL (ref 9.4–12.4)
POTASSIUM SERPL-SCNC: 1.9 MMOL/L (ref 3.5–5.1)
POTASSIUM SERPL-SCNC: 2.9 MMOL/L (ref 3.5–5.1)
PROT SERPL-MCNC: 5.7 G/DL (ref 6.4–8.3)
RBC # BLD AUTO: 3.27 M/UL (ref 4.7–6.1)
SODIUM SERPL-SCNC: 135 MMOL/L (ref 136–145)
WBC # BLD AUTO: 32.4 K/UL (ref 4.8–10.8)

## 2025-05-21 PROCEDURE — 6370000000 HC RX 637 (ALT 250 FOR IP): Performed by: INTERNAL MEDICINE

## 2025-05-21 PROCEDURE — 1200000000 HC SEMI PRIVATE

## 2025-05-21 PROCEDURE — 2500000003 HC RX 250 WO HCPCS: Performed by: HOSPITALIST

## 2025-05-21 PROCEDURE — 84100 ASSAY OF PHOSPHORUS: CPT

## 2025-05-21 PROCEDURE — 80048 BASIC METABOLIC PNL TOTAL CA: CPT

## 2025-05-21 PROCEDURE — 2580000003 HC RX 258: Performed by: INTERNAL MEDICINE

## 2025-05-21 PROCEDURE — 80076 HEPATIC FUNCTION PANEL: CPT

## 2025-05-21 PROCEDURE — 85025 COMPLETE CBC W/AUTO DIFF WBC: CPT

## 2025-05-21 PROCEDURE — 2580000003 HC RX 258: Performed by: HOSPITALIST

## 2025-05-21 PROCEDURE — P9047 ALBUMIN (HUMAN), 25%, 50ML: HCPCS | Performed by: INTERNAL MEDICINE

## 2025-05-21 PROCEDURE — 6370000000 HC RX 637 (ALT 250 FOR IP): Performed by: HOSPITALIST

## 2025-05-21 PROCEDURE — 94760 N-INVAS EAR/PLS OXIMETRY 1: CPT

## 2025-05-21 PROCEDURE — 99232 SBSQ HOSP IP/OBS MODERATE 35: CPT | Performed by: INTERNAL MEDICINE

## 2025-05-21 PROCEDURE — 36415 COLL VENOUS BLD VENIPUNCTURE: CPT

## 2025-05-21 PROCEDURE — 6370000000 HC RX 637 (ALT 250 FOR IP)

## 2025-05-21 PROCEDURE — 2500000003 HC RX 250 WO HCPCS

## 2025-05-21 PROCEDURE — 6360000002 HC RX W HCPCS: Performed by: INTERNAL MEDICINE

## 2025-05-21 PROCEDURE — 6360000002 HC RX W HCPCS: Performed by: HOSPITALIST

## 2025-05-21 RX ORDER — POTASSIUM CHLORIDE 1500 MG/1
40 TABLET, EXTENDED RELEASE ORAL ONCE
Status: DISCONTINUED | OUTPATIENT
Start: 2025-05-21 | End: 2025-05-21

## 2025-05-21 RX ORDER — POTASSIUM CHLORIDE 1500 MG/1
40 TABLET, EXTENDED RELEASE ORAL ONCE
Status: COMPLETED | OUTPATIENT
Start: 2025-05-21 | End: 2025-05-21

## 2025-05-21 RX ORDER — PROMETHAZINE HYDROCHLORIDE 12.5 MG/1
12.5 TABLET ORAL EVERY 6 HOURS PRN
Status: DISCONTINUED | OUTPATIENT
Start: 2025-05-21 | End: 2025-05-23 | Stop reason: HOSPADM

## 2025-05-21 RX ORDER — BUMETANIDE 1 MG/1
2 TABLET ORAL DAILY
Status: DISCONTINUED | OUTPATIENT
Start: 2025-05-22 | End: 2025-05-23 | Stop reason: HOSPADM

## 2025-05-21 RX ORDER — SPIRONOLACTONE 25 MG/1
50 TABLET ORAL DAILY
Status: DISCONTINUED | OUTPATIENT
Start: 2025-05-22 | End: 2025-05-22

## 2025-05-21 RX ORDER — ONDANSETRON 2 MG/ML
4 INJECTION INTRAMUSCULAR; INTRAVENOUS EVERY 6 HOURS PRN
Status: DISCONTINUED | OUTPATIENT
Start: 2025-05-21 | End: 2025-05-23 | Stop reason: HOSPADM

## 2025-05-21 RX ORDER — PROMETHAZINE HYDROCHLORIDE 25 MG/ML
12.5 INJECTION, SOLUTION INTRAMUSCULAR; INTRAVENOUS EVERY 6 HOURS PRN
Status: DISCONTINUED | OUTPATIENT
Start: 2025-05-21 | End: 2025-05-23 | Stop reason: HOSPADM

## 2025-05-21 RX ADMIN — FLUOXETINE HYDROCHLORIDE 10 MG: 10 CAPSULE ORAL at 08:27

## 2025-05-21 RX ADMIN — MIDODRINE HYDROCHLORIDE 10 MG: 10 TABLET ORAL at 08:26

## 2025-05-21 RX ADMIN — POLYVINYL ALCOHOL 1 DROP: 1.4 SOLUTION/ DROPS OPHTHALMIC at 13:09

## 2025-05-21 RX ADMIN — ALBUMIN (HUMAN) 25 G: 0.25 INJECTION, SOLUTION INTRAVENOUS at 02:59

## 2025-05-21 RX ADMIN — PANTOPRAZOLE SODIUM 40 MG: 40 TABLET, DELAYED RELEASE ORAL at 05:41

## 2025-05-21 RX ADMIN — THERA TABS 1 TABLET: TAB at 08:27

## 2025-05-21 RX ADMIN — QUETIAPINE FUMARATE 50 MG: 50 TABLET ORAL at 23:35

## 2025-05-21 RX ADMIN — BUMETANIDE 1 MG/HR: 0.25 INJECTION INTRAMUSCULAR; INTRAVENOUS at 03:05

## 2025-05-21 RX ADMIN — MEROPENEM 1000 MG: 1 INJECTION INTRAVENOUS at 16:34

## 2025-05-21 RX ADMIN — MEROPENEM 1000 MG: 1 INJECTION INTRAVENOUS at 08:33

## 2025-05-21 RX ADMIN — ENOXAPARIN SODIUM 40 MG: 100 INJECTION SUBCUTANEOUS at 16:30

## 2025-05-21 RX ADMIN — POTASSIUM CHLORIDE 40 MEQ: 1500 TABLET, EXTENDED RELEASE ORAL at 11:11

## 2025-05-21 RX ADMIN — GUAIFENESIN 600 MG: 600 TABLET ORAL at 08:26

## 2025-05-21 RX ADMIN — MEROPENEM 1000 MG: 1 INJECTION INTRAVENOUS at 23:50

## 2025-05-21 RX ADMIN — POTASSIUM CHLORIDE 40 MEQ: 1500 TABLET, EXTENDED RELEASE ORAL at 13:07

## 2025-05-21 RX ADMIN — Medication 20 MG: at 08:28

## 2025-05-21 RX ADMIN — PANTOPRAZOLE SODIUM 40 MG: 40 TABLET, DELAYED RELEASE ORAL at 16:30

## 2025-05-21 RX ADMIN — POTASSIUM CHLORIDE 40 MEQ: 1500 TABLET, EXTENDED RELEASE ORAL at 08:27

## 2025-05-21 RX ADMIN — POTASSIUM PHOSPHATE, MONOBASIC AND POTASSIUM PHOSPHATE, DIBASIC 30 MMOL: 224; 236 INJECTION, SOLUTION, CONCENTRATE INTRAVENOUS at 11:10

## 2025-05-21 RX ADMIN — ONDANSETRON 4 MG: 2 INJECTION, SOLUTION INTRAMUSCULAR; INTRAVENOUS at 05:40

## 2025-05-21 RX ADMIN — SODIUM CHLORIDE, PRESERVATIVE FREE 10 ML: 5 INJECTION INTRAVENOUS at 08:31

## 2025-05-21 RX ADMIN — MIDODRINE HYDROCHLORIDE 10 MG: 10 TABLET ORAL at 12:59

## 2025-05-21 RX ADMIN — MIDODRINE HYDROCHLORIDE 10 MG: 10 TABLET ORAL at 16:30

## 2025-05-21 RX ADMIN — GUAIFENESIN 600 MG: 600 TABLET ORAL at 23:43

## 2025-05-21 RX ADMIN — POLYVINYL ALCOHOL 1 DROP: 1.4 SOLUTION/ DROPS OPHTHALMIC at 08:30

## 2025-05-21 RX ADMIN — SODIUM CHLORIDE, PRESERVATIVE FREE 10 ML: 5 INJECTION INTRAVENOUS at 23:00

## 2025-05-21 RX ADMIN — POLYVINYL ALCOHOL 1 DROP: 1.4 SOLUTION/ DROPS OPHTHALMIC at 05:41

## 2025-05-21 RX ADMIN — FOLIC ACID 1 MG: 1 TABLET ORAL at 08:27

## 2025-05-21 NOTE — PLAN OF CARE
Problem: Discharge Planning  Goal: Discharge to home or other facility with appropriate resources  Outcome: Progressing  Flowsheets (Taken 5/20/2025 2100)  Discharge to home or other facility with appropriate resources:   Identify barriers to discharge with patient and caregiver   Arrange for needed discharge resources and transportation as appropriate   Identify discharge learning needs (meds, wound care, etc)   Refer to discharge planning if patient needs post-hospital services based on physician order or complex needs related to functional status, cognitive ability or social support system     Problem: Pain  Goal: Verbalizes/displays adequate comfort level or baseline comfort level  Outcome: Progressing     Problem: Safety - Adult  Goal: Free from fall injury  Outcome: Progressing  Flowsheets (Taken 5/20/2025 2249)  Free From Fall Injury: Instruct family/caregiver on patient safety     Problem: ABCDS Injury Assessment  Goal: Absence of physical injury  Outcome: Progressing  Flowsheets (Taken 5/20/2025 2249)  Absence of Physical Injury: Implement safety measures based on patient assessment     Problem: Skin/Tissue Integrity  Goal: Skin integrity remains intact  Description: 1.  Monitor for areas of redness and/or skin breakdown2.  Assess vascular access sites hourly3.  Every 4-6 hours minimum:  Change oxygen saturation probe site4.  Every 4-6 hours:  If on nasal continuous positive airway pressure, respiratory therapy assess nares and determine need for appliance change or resting period  Outcome: Progressing  Flowsheets (Taken 5/20/2025 2100)  Skin Integrity Remains Intact:   Assess vascular access sites hourly   Monitor for areas of redness and/or skin breakdown     Problem: Nutrition Deficit:  Goal: Optimize nutritional status  Outcome: Progressing     Problem: Chronic Conditions and Co-morbidities  Goal: Patient's chronic conditions and co-morbidity symptoms are monitored and maintained or improved  Outcome:

## 2025-05-22 LAB
ALBUMIN SERPL-MCNC: 2.9 G/DL (ref 3.5–5.2)
ALP SERPL-CCNC: 364 U/L (ref 40–129)
ALT SERPL-CCNC: 85 U/L (ref 10–50)
ANION GAP SERPL CALCULATED.3IONS-SCNC: 12 MMOL/L (ref 8–16)
AST SERPL-CCNC: 93 U/L (ref 10–50)
BASOPHILS # BLD: 0.1 K/UL (ref 0–0.2)
BASOPHILS NFR BLD: 0.2 % (ref 0–1)
BILIRUB DIRECT SERPL-MCNC: 3.9 MG/DL (ref 0–0.3)
BILIRUB INDIRECT SERPL-MCNC: 1.5 MG/DL (ref 0–1)
BILIRUB SERPL-MCNC: 5.4 MG/DL (ref 0.2–1.2)
BUN SERPL-MCNC: 14 MG/DL (ref 6–20)
CALCIUM SERPL-MCNC: 7.8 MG/DL (ref 8.6–10)
CHLORIDE SERPL-SCNC: 91 MMOL/L (ref 98–107)
CO2 SERPL-SCNC: 30 MMOL/L (ref 22–29)
CREAT SERPL-MCNC: 0.6 MG/DL (ref 0.7–1.2)
EOSINOPHIL # BLD: 0.1 K/UL (ref 0–0.6)
EOSINOPHIL NFR BLD: 0.4 % (ref 0–5)
ERYTHROCYTE [DISTWIDTH] IN BLOOD BY AUTOMATED COUNT: 14.4 % (ref 11.5–14.5)
GLUCOSE SERPL-MCNC: 114 MG/DL (ref 70–99)
HCT VFR BLD AUTO: 35.3 % (ref 42–52)
HGB BLD-MCNC: 12 G/DL (ref 14–18)
IMM GRANULOCYTES # BLD: 0.3 K/UL
LYMPHOCYTES # BLD: 2.9 K/UL (ref 1.1–4.5)
LYMPHOCYTES NFR BLD: 8.7 % (ref 20–40)
MAGNESIUM SERPL-MCNC: 1.2 MG/DL (ref 1.6–2.6)
MCH RBC QN AUTO: 38.6 PG (ref 27–31)
MCHC RBC AUTO-ENTMCNC: 34 G/DL (ref 33–37)
MCV RBC AUTO: 113.5 FL (ref 80–94)
MONOCYTES # BLD: 1.6 K/UL (ref 0–0.9)
MONOCYTES NFR BLD: 4.8 % (ref 0–10)
NEUTROPHILS # BLD: 27.9 K/UL (ref 1.5–7.5)
NEUTS SEG NFR BLD: 85 % (ref 50–65)
PHOSPHATE SERPL-MCNC: 1.8 MG/DL (ref 2.5–4.5)
PLATELET # BLD AUTO: 136 K/UL (ref 130–400)
PMV BLD AUTO: 13 FL (ref 9.4–12.4)
POTASSIUM SERPL-SCNC: 2.7 MMOL/L (ref 3.5–5.1)
PROT SERPL-MCNC: 4.7 G/DL (ref 6.4–8.3)
RBC # BLD AUTO: 3.11 M/UL (ref 4.7–6.1)
SODIUM SERPL-SCNC: 133 MMOL/L (ref 136–145)
WBC # BLD AUTO: 32.8 K/UL (ref 4.8–10.8)

## 2025-05-22 PROCEDURE — 94760 N-INVAS EAR/PLS OXIMETRY 1: CPT

## 2025-05-22 PROCEDURE — 6370000000 HC RX 637 (ALT 250 FOR IP): Performed by: INTERNAL MEDICINE

## 2025-05-22 PROCEDURE — 6370000000 HC RX 637 (ALT 250 FOR IP): Performed by: HOSPITALIST

## 2025-05-22 PROCEDURE — 2580000003 HC RX 258: Performed by: HOSPITALIST

## 2025-05-22 PROCEDURE — 1200000000 HC SEMI PRIVATE

## 2025-05-22 PROCEDURE — 99232 SBSQ HOSP IP/OBS MODERATE 35: CPT | Performed by: INTERNAL MEDICINE

## 2025-05-22 PROCEDURE — 2500000003 HC RX 250 WO HCPCS: Performed by: HOSPITALIST

## 2025-05-22 PROCEDURE — 6370000000 HC RX 637 (ALT 250 FOR IP)

## 2025-05-22 PROCEDURE — 6360000002 HC RX W HCPCS: Performed by: HOSPITALIST

## 2025-05-22 PROCEDURE — 94150 VITAL CAPACITY TEST: CPT

## 2025-05-22 PROCEDURE — 6360000002 HC RX W HCPCS: Performed by: INTERNAL MEDICINE

## 2025-05-22 PROCEDURE — 2500000003 HC RX 250 WO HCPCS

## 2025-05-22 PROCEDURE — 2580000003 HC RX 258: Performed by: INTERNAL MEDICINE

## 2025-05-22 RX ORDER — SPIRONOLACTONE 25 MG/1
50 TABLET ORAL ONCE
Status: COMPLETED | OUTPATIENT
Start: 2025-05-22 | End: 2025-05-22

## 2025-05-22 RX ORDER — POTASSIUM CHLORIDE 1500 MG/1
40 TABLET, EXTENDED RELEASE ORAL PRN
Status: DISCONTINUED | OUTPATIENT
Start: 2025-05-22 | End: 2025-05-23 | Stop reason: HOSPADM

## 2025-05-22 RX ORDER — POTASSIUM CHLORIDE 7.45 MG/ML
10 INJECTION INTRAVENOUS PRN
Status: DISCONTINUED | OUTPATIENT
Start: 2025-05-22 | End: 2025-05-23 | Stop reason: HOSPADM

## 2025-05-22 RX ORDER — SPIRONOLACTONE 25 MG/1
100 TABLET ORAL DAILY
Status: DISCONTINUED | OUTPATIENT
Start: 2025-05-23 | End: 2025-05-23 | Stop reason: HOSPADM

## 2025-05-22 RX ORDER — MAGNESIUM SULFATE IN WATER 40 MG/ML
2000 INJECTION, SOLUTION INTRAVENOUS PRN
Status: DISCONTINUED | OUTPATIENT
Start: 2025-05-22 | End: 2025-05-23 | Stop reason: HOSPADM

## 2025-05-22 RX ORDER — POTASSIUM CHLORIDE 1500 MG/1
40 TABLET, EXTENDED RELEASE ORAL 3 TIMES DAILY
Status: DISCONTINUED | OUTPATIENT
Start: 2025-05-22 | End: 2025-05-23 | Stop reason: HOSPADM

## 2025-05-22 RX ORDER — POTASSIUM CHLORIDE 1500 MG/1
40 TABLET, EXTENDED RELEASE ORAL ONCE
Status: COMPLETED | OUTPATIENT
Start: 2025-05-22 | End: 2025-05-22

## 2025-05-22 RX ADMIN — MEROPENEM 1000 MG: 1 INJECTION INTRAVENOUS at 15:02

## 2025-05-22 RX ADMIN — POLYVINYL ALCOHOL 1 DROP: 1.4 SOLUTION/ DROPS OPHTHALMIC at 14:58

## 2025-05-22 RX ADMIN — LACTULOSE 20 G: 10 SOLUTION ORAL at 08:37

## 2025-05-22 RX ADMIN — POTASSIUM CHLORIDE 40 MEQ: 1500 TABLET, EXTENDED RELEASE ORAL at 21:15

## 2025-05-22 RX ADMIN — POTASSIUM CHLORIDE 40 MEQ: 1500 TABLET, EXTENDED RELEASE ORAL at 14:56

## 2025-05-22 RX ADMIN — MAGNESIUM SULFATE HEPTAHYDRATE 2000 MG: 40 INJECTION, SOLUTION INTRAVENOUS at 10:40

## 2025-05-22 RX ADMIN — PANTOPRAZOLE SODIUM 40 MG: 40 TABLET, DELAYED RELEASE ORAL at 14:56

## 2025-05-22 RX ADMIN — FLUOXETINE HYDROCHLORIDE 10 MG: 10 CAPSULE ORAL at 08:38

## 2025-05-22 RX ADMIN — QUETIAPINE FUMARATE 50 MG: 50 TABLET ORAL at 21:15

## 2025-05-22 RX ADMIN — THERA TABS 1 TABLET: TAB at 08:37

## 2025-05-22 RX ADMIN — MAGNESIUM SULFATE HEPTAHYDRATE 2000 MG: 40 INJECTION, SOLUTION INTRAVENOUS at 08:44

## 2025-05-22 RX ADMIN — Medication: at 21:16

## 2025-05-22 RX ADMIN — POTASSIUM CHLORIDE 10 MEQ: 7.46 INJECTION, SOLUTION INTRAVENOUS at 10:50

## 2025-05-22 RX ADMIN — Medication 20 MG: at 08:39

## 2025-05-22 RX ADMIN — POTASSIUM CHLORIDE 10 MEQ: 7.46 INJECTION, SOLUTION INTRAVENOUS at 08:51

## 2025-05-22 RX ADMIN — MIDODRINE HYDROCHLORIDE 10 MG: 10 TABLET ORAL at 08:38

## 2025-05-22 RX ADMIN — POTASSIUM CHLORIDE 40 MEQ: 1500 TABLET, EXTENDED RELEASE ORAL at 04:17

## 2025-05-22 RX ADMIN — BUMETANIDE 2 MG: 1 TABLET ORAL at 08:38

## 2025-05-22 RX ADMIN — ENOXAPARIN SODIUM 40 MG: 100 INJECTION SUBCUTANEOUS at 14:56

## 2025-05-22 RX ADMIN — MEROPENEM 1000 MG: 1 INJECTION INTRAVENOUS at 08:35

## 2025-05-22 RX ADMIN — GUAIFENESIN 600 MG: 600 TABLET ORAL at 08:37

## 2025-05-22 RX ADMIN — GUAIFENESIN 600 MG: 600 TABLET ORAL at 21:14

## 2025-05-22 RX ADMIN — FOLIC ACID 1 MG: 1 TABLET ORAL at 08:37

## 2025-05-22 RX ADMIN — SODIUM CHLORIDE, PRESERVATIVE FREE 10 ML: 5 INJECTION INTRAVENOUS at 08:40

## 2025-05-22 RX ADMIN — POLYVINYL ALCOHOL 1 DROP: 1.4 SOLUTION/ DROPS OPHTHALMIC at 17:36

## 2025-05-22 RX ADMIN — SODIUM CHLORIDE, PRESERVATIVE FREE 10 ML: 5 INJECTION INTRAVENOUS at 21:20

## 2025-05-22 RX ADMIN — POTASSIUM CHLORIDE 40 MEQ: 1500 TABLET, EXTENDED RELEASE ORAL at 08:38

## 2025-05-22 RX ADMIN — SPIRONOLACTONE 50 MG: 25 TABLET ORAL at 14:56

## 2025-05-22 RX ADMIN — PANTOPRAZOLE SODIUM 40 MG: 40 TABLET, DELAYED RELEASE ORAL at 06:21

## 2025-05-22 RX ADMIN — POLYVINYL ALCOHOL 1 DROP: 1.4 SOLUTION/ DROPS OPHTHALMIC at 21:17

## 2025-05-22 RX ADMIN — SODIUM PHOSPHATE, MONOBASIC, MONOHYDRATE AND SODIUM PHOSPHATE, DIBASIC, ANHYDROUS 15 MMOL: 142; 276 INJECTION, SOLUTION INTRAVENOUS at 15:01

## 2025-05-22 RX ADMIN — MIDODRINE HYDROCHLORIDE 10 MG: 10 TABLET ORAL at 14:56

## 2025-05-22 RX ADMIN — MIDODRINE HYDROCHLORIDE 10 MG: 10 TABLET ORAL at 17:36

## 2025-05-22 RX ADMIN — SPIRONOLACTONE 50 MG: 25 TABLET ORAL at 08:38

## 2025-05-22 NOTE — PROGRESS NOTES
Pharmacy Intravenous to Oral Protocol    Medication changed per [facility] IV to PO protocol: Pantoprazole    Patient meets the following inclusion criteria and none of the exclusion criteria:    Inclusion criteria:  - IV therapy > 24 hours (antibiotics only)  - Tolerating diet more advanced than clear liquids  - Tolerating PO medications  - No vasopressor blood pressure support (ie no signs of shock)  - Patient hasn't had a seizure for 72 hrs (antiepileptic medications only)    Exclusion criteria:  - Infections requiring IV therapy (ie meningitis, endocarditis, osteomyelitis, pancreatitis)   - Nausea and/or vomiting or severe diarrhea within past 24 hours   - Has gastrectomy, ileus, gastric outlet or bowel obstruction, or malabsorption syndromes   - Has significant painful oral ulceration   - TPN with an NPO order   - Active GI bleed   - Unable to swallow   - NPO   - Febrile in the last 24 hours (antibiotics only)   - Clinical deteriorating or unstable (antibiotics only)   - Pediatric patients and patients who are not euthyroid (not on oral levothyroxine/not stabilized on oral levothyroxine)- Levothyroxine only     Electronically signed by Shameka Walker RPh, WILDER, 5/19/2025,2:02 PM    
    Hospitalist Progress Note        PCP: Rosangela Mtz APRN - CNP    Date of Admission: 5/13/2025    Length of Stay: 2    Chief Complaint: worsening dyspnea, progressing LE edema and increasing abdominal distention.       Pt was recently admitted, had 5L drained with paracentesis and treated for SBP at that time.     This admission he had repeat paracentesis which does not suggest SBP (3.7L removed).   Chest CT revealed multifocal PNA.     Started on HCAP coverage.     Also with ongoing alcoholic hepatitis diagnosed from prior admission. EtOH level undetectable this admission and pt reported he was compliant with recommendations from last admission and did not drink any alcohol upon discharge.       Pt also on vraylar, prozac, haldol, zyprexa, paxil, seroquel prior to admission.   This regimen was confirmed with pharmacy.       First night in the hospital his resp status decompensated and he was moved to ICU, attempted on NIMV which he could not tolerate, so he was intubated 5/13/25      Subjective:     Pt intubated and sedated this am.   O2 requirement overall improved.   Trial of tube feed, but high residuals - so this is on hold now.                Medications:  Reviewed    Infusion Medications    dexmedeTOMIDine HCl in NaCl 1 mcg/kg/hr (05/15/25 0629)    sodium chloride 100 mL (05/14/25 1027)     Scheduled Medications    midodrine  10 mg Orogastric TID WC    [Held by provider] bumetanide  1 mg IntraVENous Daily    potassium phosphate IVPB (PERIPHERAL LINE)  15 mmol IntraVENous Once    lactulose  30 g Oral Daily    chlorhexidine  15 mL Mouth/Throat BID    polyvinyl alcohol  1 drop Both Eyes Q4H    Or    artificial tears   Both Eyes Q4H    methylPREDNISolone  40 mg IntraVENous Daily    pantoprazole  40 mg IntraVENous BID    potassium bicarb-citric acid  20 mEq Orogastric BID    vancomycin  1,250 mg IntraVENous Q12H    enoxaparin  40 mg SubCUTAneous Daily    azithromycin  500 mg IntraVENous Q24H    sodium chloride 
    Hospitalist Progress Note        PCP: Rosangela Mtz APRN - CNP    Date of Admission: 5/13/2025    Length of Stay: 3    Chief Complaint: worsening dyspnea, progressing LE edema and increasing abdominal distention.       Pt was recently admitted, had 5L drained with paracentesis and treated for SBP at that time.     This admission he had repeat paracentesis which does not suggest SBP (3.7L removed).   Chest CT revealed multifocal PNA.     Started on HCAP coverage.     Also with ongoing alcoholic hepatitis diagnosed from prior admission. EtOH level undetectable this admission and pt reported he was compliant with recommendations from last admission and did not drink any alcohol upon discharge.       Pt also on vraylar, prozac, haldol, zyprexa, paxil, seroquel prior to admission.   This regimen was confirmed with pharmacy.       First night in the hospital his resp status decompensated and he was moved to ICU, attempted on NIMV which he could not tolerate, so he was intubated 5/13/25      Subjective:     Extubated yesterday.   Hallucinating this am.   Hemodynamics overall improved.     Ongoing diarrhea - holding lactulose.     Failed SLP.                  Medications:  Reviewed    Infusion Medications    dextrose 5 % and 0.9 % NaCl 75 mL/hr at 05/16/25 0856    dexmedeTOMIDine HCl in NaCl Stopped (05/16/25 0100)    sodium chloride 100 mL (05/14/25 1027)     Scheduled Medications    sodium chloride 0.9 % 1,000 mL with folic acid 5 MG/ML 1 mg, adult multi-vitamin with vitamin k 10 mL, thiamine 100 mg   IntraVENous Daily    midodrine  10 mg Orogastric TID WC    [Held by provider] bumetanide  1 mg IntraVENous Daily    lactulose  30 g Oral Daily    nicotine  1 patch TransDERmal Daily    chlorhexidine  15 mL Mouth/Throat BID    polyvinyl alcohol  1 drop Both Eyes Q4H    Or    artificial tears   Both Eyes Q4H    methylPREDNISolone  40 mg IntraVENous Daily    pantoprazole  40 mg IntraVENous BID    vancomycin  1,250 mg 
    Hospitalist Progress Note        PCP: Rosangela Mtz APRN - CNP    Date of Admission: 5/13/2025    Length of Stay: 4    Chief Complaint: worsening dyspnea, progressing LE edema and increasing abdominal distention.       Pt was recently admitted, had 5L drained with paracentesis and treated for SBP at that time.     This admission he had repeat paracentesis which does not suggest SBP (3.7L removed).   Chest CT revealed multifocal PNA.     Started on HCAP coverage.     Also with ongoing alcoholic hepatitis diagnosed from prior admission. EtOH level undetectable this admission and pt reported he was compliant with recommendations from last admission and did not drink any alcohol upon discharge.       Pt also on vraylar, prozac, haldol, zyprexa, paxil, seroquel prior to admission.   This regimen was confirmed with pharmacy.       First night in the hospital his resp status decompensated and he was moved to ICU, attempted on NIMV which he could not tolerate, so he was intubated 5/13/25      Slowly improved and extubated 5/15      MRCP concerning for severe interstitial pancreatitis.          Subjective:     Mental status better today.   No appetite and persisting abd pain.     Hemodynamics better.                    Medications:  Reviewed    Infusion Medications    dextrose 5 % and 0.9 % NaCl 75 mL/hr at 05/16/25 0856    sodium chloride 100 mL (05/14/25 1027)     Scheduled Medications    haloperidol lactate  1 mg IntraMUSCular q8h    midodrine  10 mg Orogastric TID WC    [Held by provider] bumetanide  1 mg IntraVENous Daily    lactulose  30 g Oral Daily    nicotine  1 patch TransDERmal Daily    chlorhexidine  15 mL Mouth/Throat BID    polyvinyl alcohol  1 drop Both Eyes Q4H    Or    artificial tears   Both Eyes Q4H    methylPREDNISolone  40 mg IntraVENous Daily    pantoprazole  40 mg IntraVENous BID    vancomycin  1,250 mg IntraVENous Q12H    enoxaparin  40 mg SubCUTAneous Daily    azithromycin  500 mg IntraVENous 
    Hospitalist Progress Note        PCP: Rosangela Mtz APRN - CNP    Date of Admission: 5/13/2025    Length of Stay: 5    Chief Complaint: worsening dyspnea, progressing LE edema and increasing abdominal distention.       Pt was recently admitted, had 5L drained with paracentesis and treated for SBP at that time.     This admission he had repeat paracentesis which does not suggest SBP (3.7L removed).   Chest CT revealed multifocal PNA.     Started on HCAP coverage.     Also with ongoing alcoholic hepatitis diagnosed from prior admission. EtOH level undetectable this admission and pt reported he was compliant with recommendations from last admission and did not drink any alcohol upon discharge.       Pt also on vraylar, prozac, haldol, zyprexa, paxil, seroquel prior to admission.   This regimen was confirmed with pharmacy.       First night in the hospital his resp status decompensated and he was moved to ICU, attempted on NIMV which he could not tolerate, so he was intubated 5/13/25      Slowly improved and extubated 5/15      MRCP concerning for severe interstitial pancreatitis.          Subjective:     Mental status continues better today.   + abd distention.   Ongoing diarrhea.                        Medications:  Reviewed    Infusion Medications    sodium bicarbonate 75 mEq in dextrose 5 % and 0.2 % NaCl 1,000 mL infusion 100 mL/hr at 05/18/25 1051    sodium chloride 100 mL (05/14/25 1027)     Scheduled Medications    magnesium sulfate  4,000 mg IntraVENous Once    meropenem  1,000 mg IntraVENous Q8H    midodrine  10 mg Orogastric TID WC    QUEtiapine  25 mg Oral Nightly    nicotine  1 patch TransDERmal Daily    chlorhexidine  15 mL Mouth/Throat BID    polyvinyl alcohol  1 drop Both Eyes Q4H    Or    artificial tears   Both Eyes Q4H    methylPREDNISolone  40 mg IntraVENous Daily    pantoprazole  40 mg IntraVENous BID    enoxaparin  40 mg SubCUTAneous Daily    sodium chloride flush  5-40 mL IntraVENous 2 times 
    Hospitalist Progress Note        PCP: Rosangela Mtz APRN - CNP    Date of Admission: 5/13/2025    Length of Stay: 7    Chief Complaint: worsening dyspnea, progressing LE edema and increasing abdominal distention.       Pt was recently admitted, had 5L drained with paracentesis and treated for SBP at that time.     This admission he had repeat paracentesis which does not suggest SBP (3.7L removed).   Chest CT revealed multifocal PNA.     Started on HCAP coverage.     Also with ongoing alcoholic hepatitis diagnosed from prior admission. EtOH level undetectable this admission and pt reported he was compliant with recommendations from last admission and did not drink any alcohol upon discharge.       Pt also on vraylar, prozac, haldol, zyprexa, paxil, seroquel prior to admission.   This regimen was confirmed with pharmacy.       First night in the hospital his resp status decompensated and he was moved to ICU, attempted on NIMV which he could not tolerate, so he was intubated 5/13/25      Slowly improved and extubated 5/15      MRCP concerning for severe interstitial pancreatitis.          Subjective:     Mental status has returned to baseline.      Progressing abd distention and recurrent LE edema - in part of course secondary to IVF that we had to give for pancreatitis.     Due to persisting leukocytosis despite Abx obtained repeat imaging today - CT chest shows resolving PNA, CT abd pelvis shows persisting pancreatitis albeit without signs of other complications, no perforation, no necrosis or hemorrhage reported.     Both CTs indicate fluid building up.   Stopped Bumex IV BID and started Bumex IV Gtt today.                Medications:  Reviewed    Infusion Medications    bumetanide (BUMEX) 12.5 mg in sodium chloride 0.9 % 125 mL infusion 1 mg/hr (05/20/25 1101)    sodium chloride 100 mL (05/14/25 1027)     Scheduled Medications    potassium phosphate IVPB (PERIPHERAL LINE)  20 mmol IntraVENous Once    potassium 
  Palliative Care Progress Note  5/15/2025 9:43 AM    Patient:  Moreno De Leon  YOB: 1980  Primary Care Physician: Rosangela Mtz APRN - CNP  Advance Directive: Full Code  Admit Date: 5/13/2025       Hospital Day: 2  Portions of this note have been copied forward, however, changed to reflect the most current clinical status of this patient.    CHIEF COMPLAINT/REASON FOR CONSULTATION Goals of care, family support, Code status, symptom management     SUBJECTIVE:  Indra remains intubated, sedated but responsive w/ lightened sedation    HPI:  The patient is a 44 y.o. male with PMH suspected cirrhosis of liver 2/2 alcohol abuse, hepatic encephalopathy, pancreatitis,SCC of head/neck s/p radical tonsillectomy and R neck dissection/ligation of right facial artery and lingual artery 03/27/2023 at [facility] w/ plans for radiation therapy after dental extraction-however neither are known to have occurred, suicidal ideation, dysphagia, anxiety/depression who presented to [facility] ED on 05/13/2025 w/ worsening dyspnea, jaundice, and abdominal distention.  Workup included CT abdomen and pelvis which reported acute pancreatitis, worse than seen previously, moderate ascites likely related to pancreatitis worse than seen previously, bibasilar pneumonia, hepatomegaly and fatty metamorphosis of the liver, small benign right renal cyst, degenerative changes of the spine.  CT chest reported bilateral pulmonary infiltrates, right upper lobe nodule measuring 1.3 x 0.8 cm, fluid collection surrounding the pancreas, process such as necrotizing pancreatitis cannot be excluded, mild perihepatic and perisplenic ascites, heterogenous enhancement of the liver.  , WBCs 32.6, potassium 3.2, bilirubin 12.8, alk phos 643, , .  pH 7.4, pCO2 31, pO2 72.  Lactic acid 2.5, procalcitonin 0.68, CA 19-9 512, CEA 8.4, .     He was last discharged from this facility on 05/06/2025 w/ concern for alcohol hepatitis, 
  Palliative Care Progress Note  5/16/2025 9:08 AM    Patient:  Moreno De Leon  YOB: 1980  Primary Care Physician: Rosangela Mtz APRN - CNP  Advance Directive: Full Code  Admit Date: 5/13/2025       Hospital Day: 3  Portions of this note have been copied forward, however, changed to reflect the most current clinical status of this patient.    CHIEF COMPLAINT/REASON FOR CONSULTATION Goals of care, family support, Code status, symptom management     SUBJECTIVE: Patient is extubated.  Alert and oriented, restless.  Wants to leave as soon as possible.  Mother at bedside.    HPI:  The patient is a 44 y.o. male with PMH suspected cirrhosis of liver 2/2 alcohol abuse, hepatic encephalopathy, pancreatitis,SCC of head/neck s/p radical tonsillectomy and R neck dissection/ligation of right facial artery and lingual artery 03/27/2023 at [facility] w/ plans for radiation therapy after dental extraction-however neither are known to have occurred, suicidal ideation, dysphagia, anxiety/depression who presented to [facility] ED on 05/13/2025 w/ worsening dyspnea, jaundice, and abdominal distention.  Workup included CT abdomen and pelvis which reported acute pancreatitis, worse than seen previously, moderate ascites likely related to pancreatitis worse than seen previously, bibasilar pneumonia, hepatomegaly and fatty metamorphosis of the liver, small benign right renal cyst, degenerative changes of the spine.  CT chest reported bilateral pulmonary infiltrates, right upper lobe nodule measuring 1.3 x 0.8 cm, fluid collection surrounding the pancreas, process such as necrotizing pancreatitis cannot be excluded, mild perihepatic and perisplenic ascites, heterogenous enhancement of the liver.  , WBCs 32.6, potassium 3.2, bilirubin 12.8, alk phos 643, , .  pH 7.4, pCO2 31, pO2 72.  Lactic acid 2.5, procalcitonin 0.68, CA 19-9 512, CEA 8.4, .     He was last discharged from this facility on 05/06/2025 w/ 
  Physician Progress Note      PATIENT:               BEVERLY GARCIA  CSN #:                  [id number]  :                       1980  ADMIT DATE:       2025 8:50 AM  DISCH DATE:  RESPONDING  PROVIDER #:        Jordan Tuttle MD          QUERY TEXT:    \"Malnutrition severe\" is documented in the 5/15/25 attending PN. Please   provide additional clinical indicators supportive of your documentation.    The clinical indicators include:  -male age 44   BMI 26.1  -5/15/25 Dr Tuttle: \"Malnutrition severe. Trial tube feed - high   residuals - tube feeds on hold.\"  -5/ 15/25 RD Rutland: \"Malnutrition Status: At risk for   malnutrition...Nutrition Related Findings: +3 BLE edema., nonpitting   generalized edema., ascites...Nutrition Diagnosis: Inadequate oral intake   related to acute injury/trauma, impaired respiratory function as evidenced by   NPO or clear liquid status due to medical condition, intubation\"  Options provided:  -- Severe malnutrition is present as evidenced by: Please document evidence.,   Please document evidence.  -- Other - I will add my own diagnosis  -- Disagree - Not applicable / Not valid  -- Disagree - Clinically unable to determine / Unknown  -- Refer to Clinical Documentation Reviewer    PROVIDER RESPONSE TEXT:    Severe malnutrition is present as evidenced by: Please document evidence. no   PO intake for over 4 days    Query created by: Alexandre Rivero on 2025 1:43 PM      Electronically signed by:  Jordan Tuttle MD 2025 9:15 AM          
24 hr orders verified Electronically signed by Poonam Harry RN on 5/19/2025 at 12:17 AM    
4 Eyes Skin Assessment     NAME:  Moreno De Leon  YOB: 1980  MEDICAL RECORD NUMBER:  [id number]    The patient is being assessed for  Admission    I agree that at least one RN has performed a thorough Head to Toe Skin Assessment on the patient. ALL assessment sites listed below have been assessed.      Areas assessed by both nurses:    Head, Face, Ears, Shoulders, Back, Chest, Arms, Elbows, Hands, Sacrum. Buttock, Coccyx, Ischium, Legs. Feet and Heels, and Under Medical Devices         Does the Patient have a Wound? No noted wound(s)       Peter Prevention initiated by RN: Yes  Wound Care Orders initiated by RN: No    Pressure Injury (Stage 3,4, Unstageable, DTI, NWPT, and Complex wounds) if present, place Wound referral order by RN under : No    New Ostomies, if present place, Ostomy referral order under : No     Nurse 1 eSignature: Electronically signed by BROWN GILMAN RN on 5/14/25 at 5:41 AM CDT    **SHARE this note so that the co-signing nurse can place an eSignature**    Nurse 2 eSignature: Electronically signed by Alphonse Elias RN on 5/14/25 at 6:12 AM CDT  
4 Eyes Skin Assessment     NAME:  Moreno De Leon  YOB: 1980  MEDICAL RECORD NUMBER:  [id number]    The patient is being assessed for  Admission    I agree that at least one RN has performed a thorough Head to Toe Skin Assessment on the patient. ALL assessment sites listed below have been assessed.      Areas assessed by both nurses:    Head, Face, Ears, Shoulders, Back, Chest, Arms, Elbows, Hands, Sacrum. Buttock, Coccyx, Ischium, and Legs. Feet and Heels        Does the Patient have a Wound? No noted wound(s)       Peter Prevention initiated by RN: No  Wound Care Orders initiated by RN: No    Pressure Injury (Stage 3,4, Unstageable, DTI, NWPT, and Complex wounds) if present, place Wound referral order by RN under : No    New Ostomies, if present place, Ostomy referral order under : No     Nurse 1 eSignature: Electronically signed by Yas Paz RN on 5/13/25 at 5:21 PM CDT    **SHARE this note so that the co-signing nurse can place an eSignature**    Nurse 2 eSignature: {Esignature:[id number]}  
ABG done got VBG Dr Ronquillo aware  
Ativan 70ml IV drip wasted with Yvonne Melendez RN. Pt was changed from Ativan to Precedex for sedation earlier today. Electronically signed by Hilary Ambriz RN on 5/14/2025 at 8:22 PM    
Called report to Darryl HANSEN  
Comprehensive Nutrition Assessment    Type and Reason for Visit:  Reassess    Nutrition Recommendations/Plan:   Continue POC     Malnutrition Assessment:  Malnutrition Status:  At risk for malnutrition (05/19/25 1219)    Context:  Acute Illness     Findings of the 6 clinical characteristics of malnutrition:  Energy Intake:  No decrease in energy intake  Weight Loss:  No weight loss     Body Fat Loss:  No body fat loss     Muscle Mass Loss:  No muscle mass loss    Fluid Accumulation:  Mild Extremities   Strength:  Not Performed    Nutrition Assessment:    Pt has advanced to a 2g Na+ diet. PO intake is good. Last BM noted 5/19. Continue POC.    Nutrition Related Findings:    s/p paracentesis. MSOF. +1 BLE edema.     Current Nutrition Intake & Therapies:    Average Meal Intake: %  ADULT DIET; Regular; Low Sodium (2 gm)    Anthropometric Measures:  Height: 177.8 cm (5' 10\")  Ideal Body Weight (IBW): 166 lbs (75 kg)    Admission Body Weight: 81.6 kg (180 lb) (stated)  Current Body Weight: 82.6 kg (182 lb 1.6 oz), 107.8 % IBW. Weight Source: Bed scale  Current BMI (kg/m2): 26.1  Usual Body Weight: 78 kg (171 lb 15.3 oz) (4/19/2025)  % Weight Change (Calculated): 4.1  Weight Adjustment For: No Adjustment  BMI Categories: Overweight (BMI 25.0-29.9)    Estimated Daily Nutrient Needs:  Energy Requirements Based On: Kcal/kg  Weight Used for Energy Requirements: Current  Energy (kcal/day): 9998-7837 kcals/day  Weight Used for Protein Requirements: Ideal  Protein (g/day):  g/protein/day  Method Used for Fluid Requirements: 1 ml/kcal  Fluid (ml/day): 3996-9539 mL/day    Nutrition Diagnosis:   No nutrition diagnosis at this time     Nutrition Interventions:   Food and/or Nutrient Delivery: Continue Current Diet  Coordination of Nutrition Care: Continue to monitor while inpatient    Goals:  Goals: Meet at least 75% of estimated needs, Maintain adequate nutrition status  Type of Goal: Continue current goal  Previous 
Facility/Department: [facility] ICU   CLINICAL BEDSIDE SWALLOW EVALUATION      NAME: Moreno De Leon  : 1980  MRN: [id number]    ADMISSION DATE: 2025  ADMITTING DIAGNOSIS: has Psychosis (HCC); Suicidal ideation; Macrocytic anemia; Alcohol use disorder; Alcohol intoxication, episodic, uncomplicated; Alcohol intoxication delirium; Alcohol dependence with withdrawal delirium (HCC); Alcohol abuse with intoxication; Alcohol withdrawal syndrome, uncomplicated (HCC); Oropharyngeal dysphagia; Cancer of tonsil (HCC); MRSA nasal colonization; Nasal obstruction; Alcohol-induced acute pancreatitis, unspecified complication status; Hypoalbuminemia; Alcoholic hepatitis (HCC); Hypokalemia; Hyponatremia; Depression; GERD (gastroesophageal reflux disease); Hypertension; Hyperammonemia; Sepsis (HCC); Palliative care patient; Dyspnea and respiratory abnormalities; Acute respiratory failure with hypoxia (HCC); and Squamous cell carcinoma of head and neck on their problem list.    Date of Eval: 2025  Evaluating Therapist: ANDREAS De Santiago    Current Diet level:  Clear liquid diet with thin liquids    Pain:  Pain Assessment  Pain Assessment: None - Denies Pain  Pain Level: 0    Reason for Referral  Moreno De Leon was referred for a bedside swallow evaluation to assess the efficiency of his swallow function, identify signs and symptoms of aspiration and make recommendations regarding safe dietary consistencies, effective compensatory strategies, and safe eating environment.    Impression  Assessed patient's swallowing function. Patient exhibited decreased oral prep of more solid consistencies, inconsistently fast oral transit and suspected swallow delay with thin liquids, and sluggish, inconsistently mildly decreased laryngeal elevation for swallow airway protection. Even so, no outward S/S penetration/aspiration was noted with any ice chip trial, puree consistency trial, or thin H2O trial presented during evaluation this 
I placed a text to Dr henderson concerning this pt being in room 401 with acute Bipap requiring 13 liters of oxygen Sat's running 91%. Also let clinical house know.   
[facility]   Pharmacy Pharmacokinetic Monitoring Service - Vancomycin    Consulting Provider:   Mati REYNOLDS / DOTTIE Moseley   Indication:  intra-abdominal infection / PNA   Target Concentration: Goal AUC/ROSIE 400-600 mg*hr/L  Day of Therapy: 3  Additional Antimicrobials:  Azithromycin / Piperacillin-tazobactam (ZOSYN)      Pertinent Laboratory Values:   Wt Readings from Last 1 Encounters:   05/15/25 82.6 kg (182 lb)     Temp Readings from Last 1 Encounters:   05/15/25 99.9 °F (37.7 °C) (Temporal)     Estimated Creatinine Clearance: 136 mL/min (based on SCr of 0.7 mg/dL).  Recent Labs     05/14/25  0212 05/15/25  0128   CREATININE 0.7 0.7   BUN 19 22*   WBC 27.7* 24.5*     Procalcitonin: 05/13/25 = 0.68     Pertinent Cultures:  Culture Date Source Results   05/13/25 Blood x 2 No growth to date   05/13/25 Body Fluid from Ascitic Fluid  No growth to date    05/13/25 Respiratory Panel Nothing detected     Respiratory Culture  Sputum Induced  Sent     Respiratory Culture  Sputum Expectorated  Sent     MRSA Nasal Swab:  was negative on 05/13/25, ordered for respiratory indication and intra-abdominal infection    Recent vancomycin administrations                     vancomycin (VANCOCIN) 1,250 mg in sodium chloride 0.9 % 250 mL IVPB (mg) 1,250 mg New Bag 05/15/25 0431     1,250 mg New Bag 05/14/25 1642    vancomycin (VANCOCIN) 1,000 mg in sodium chloride 0.9 % 250 mL IVPB ([id number]) (mg) 1,000 mg New Bag 05/14/25 0508     1,000 mg New Bag 05/13/25 2144    vancomycin (VANCOCIN) 2,000 mg in sodium chloride 0.9 % 500 mL IVPB (mg) 2,000 mg New Bag 05/13/25 1031                    Assessment:  Date/Time Current Dose Concentration Timing of Concentration (h) AUC   05/15/25 1250 Q 12 14.5 10 H 27 M 527   Note: Serum concentrations collected for AUC dosing may appear elevated if collected in close proximity to the dose administered, this is not necessarily an indication of toxicity    Plan:  Current dosing 
[facility]   Pharmacy Pharmacokinetic Monitoring Service - Vancomycin     Moreno De Leon is a 44 y.o. male starting on vancomycin therapy for intra-abdominal infection. Pharmacy consulted by Mati REYNOLDS for monitoring and adjustment.    Target Concentration: Goal AUC/ROSIE 400-600 mg*hr/L    Additional Antimicrobials: Zosyn    Pertinent Laboratory Values:   Wt Readings from Last 1 Encounters:   05/13/25 81.6 kg (180 lb)     Temp Readings from Last 1 Encounters:   05/13/25 97.7 °F (36.5 °C) (Oral)     Estimated Creatinine Clearance: 159 mL/min (A) (based on SCr of 0.6 mg/dL (L)).  Recent Labs     05/13/25  0815   CREATININE 0.6*   BUN 19   WBC 32.6*     Procalcitonin: N/A    Pertinent Cultures:  Culture Date Source Results   05/13/25 Blood x 2 collected   MRSA Nasal Swab: N/A. Non-respiratory infection.    Plan:  Dosing recommendations based on Bayesian software  Start vancomycin 2000 mg IV x 1 dose followed by 1000 mg IV Q 8 hours.  Anticipated AUC of 456 and trough concentration of 13.1 at steady state  Renal labs as indicated   Vancomycin concentration ordered for 05/14/25 @ 1000   Pharmacy will continue to monitor patient and adjust therapy as indicated    Thank you for the consult,  Shahram Lacey RPH  5/13/2025 10:10 AM   
[facility]   Pharmacy Pharmacokinetic Monitoring Service - Vancomycin    Consulting Provider: Mati REYNOLDS / DOTTIE Moseley  Indication: intra-abdominal infection / PNA  Therapeutic Target: AUC/ROSIE 400-600 mg*hr/L  Day of Therapy: 2  Additional Antimicrobials: Azithromycin / Piperacillin-tazobactam (ZOSYN)      Pertinent Laboratory Values:   Wt Readings from Last 1 Encounters:   05/14/25 81.2 kg (179 lb)     Temp Readings from Last 1 Encounters:   05/14/25 97 °F (36.1 °C) (Temporal)     Estimated Creatinine Clearance: 136 mL/min (based on SCr of 0.7 mg/dL).  Recent Labs     05/13/25  0815 05/14/25  0212   CREATININE 0.6* 0.7   BUN 19 19   WBC 32.6* 27.7*     Procalcitonin: 05/13/25 = 0.68    Pertinent Cultures:  Culture Date Source Results   05/13/25 Blood x 2 No growth to date   05/13/25 Body Fluid from Ascitic Fluid  No growth to date    05/13/25 Respiratory Panel Nothing detected    Respiratory Culture  Sputum Induced  Sent    Respiratory Culture  Sputum Expectorated  Sent   MRSA Nasal Swab:  was negative on 05/13/25, ordered for respiratory indication and intra-abdominal infection    Recent vancomycin administrations                     vancomycin (VANCOCIN) 1,000 mg in sodium chloride 0.9 % 250 mL IVPB ([id number]) (mg) 1,000 mg New Bag 05/14/25 0508     1,000 mg New Bag 05/13/25 2144    vancomycin (VANCOCIN) 2,000 mg in sodium chloride 0.9 % 500 mL IVPB (mg) 2,000 mg New Bag 05/13/25 1031                    Assessment:  Date/Time Current Dose Concentration Timing of Concentration (h) AUC   05/14/25 1000 mg IV q 8 hours 16.1 6 h 49 m 577 / 601   Note: Serum concentrations collected for AUC dosing may appear elevated if collected in close proximity to the dose administered, this is not necessarily an indication of toxicity    Plan:  Current dosing regimen is supra-therapeutic    Regimen: 1000 mg IV every 8 hours.  Start time: 14:46 on 05/14/2025  Exposure target: AUC24 (range)400-600 mg/L.hr 
Moreno De Leon arrived to room # 147.   Presented with: sepsis; respiratory distress  Mental Status: Patient is disoriented.   Vitals:    05/14/25 0500   BP: (!) 83/56   Pulse: 80   Resp: 25   Temp:    SpO2: 100%     Patient safety contract and falls prevention contract reviewed with patient Yes.  Oriented Patient and Family to room.  Call light within reach. Yes.  Needs, issues or concerns expressed at this time: no.      Electronically signed by BROWN GILMAN RN on 5/14/2025 at 5:39 AM  
Nutrition Assessment     Type and Reason for Visit: Reassess    Nutrition Recommendations/Plan:   Follow for advancing diet     Malnutrition Assessment:  Malnutrition Status: At risk for malnutrition    Nutrition Assessment:  Patient has been extubated.  Remains NPO at this time.  Glucose 107-138  New weight not available.  Glucose 107-138    Estimated Daily Nutrient Needs:  Energy (kcal):  5396-9845 kcals (20-25 kcals/kg) Weight Used for Energy Requirements: Current     Protein (g):   g Weight Used for Protein Requirements: Ideal        Fluid (ml/day):  8012-1907 ml Method Used for Fluid Requirements: 1 ml/kcal    Nutrition Related Findings:   trace BLE and generalized edema Wound Type: None    Current Nutrition Therapies:    Diet NPO    Anthropometric Measures:  Height: 177.8 cm (5' 10\")  Current Body Wt: 82.6 kg (182 lb 1.6 oz)   BMI: 26.1        Nutrition Diagnosis:   Inadequate oral intake related to acute injury/trauma as evidenced by NPO or clear liquid status due to medical condition    Nutrition Interventions:   Food and/or Nutrient Delivery: Continue NPO  Nutrition Education/Counseling: No recommendation at this time  Coordination of Nutrition Care: Continue to monitor while inpatient  Plan of Care discussed with: nursing    Goals:  Goals: Meet at least 75% of estimated needs, Maintain adequate nutrition status  Type of Goal: Continue current goal  Previous Goal Met: No Progress toward Goal(s)    Nutrition Monitoring and Evaluation:   Behavioral-Environmental Outcomes: None Identified  Food/Nutrient Intake Outcomes: Progression of Nutrition  Physical Signs/Symptoms Outcomes: Biochemical Data, Chewing or Swallowing, Fluid Status or Edema, Weight, Skin    Discharge Planning:    Too soon to determine     Rosa Sheridan MS, RD, LD  Contact: [phone]    
Nutrition Assessment     Type and Reason for Visit: Reassess    Nutrition Recommendations/Plan:   Follow for extubation or restarting of EN     Malnutrition Assessment:  Malnutrition Status: At risk for malnutrition    Nutrition Assessment:  TF is currently off.  Aware to try to extubate this afternoon after scan.  If unable, nursing to restart tf.  Current weight 82.6kg.    Estimated Daily Nutrient Needs:  Energy (kcal):  4426-0775 kcals (20-25 kcals/kg) Weight Used for Energy Requirements: Current     Protein (g):   g Weight Used for Protein Requirements: Ideal        Fluid (ml/day):  2356-4735 ml Method Used for Fluid Requirements: 1 ml/kcal    Nutrition Related Findings:   +3 BLE edema., nonpitting generalized edema., ascites Wound Type: None    Current Nutrition Therapies:    Diet NPO  ADULT TUBE FEEDING; Orogastric; Standard with Fiber; Continuous; 25; Yes; 10; Q 6 hours; 51; 35; Q 1 hour; Protein; 1 Dose; Daily    Anthropometric Measures:  Height: 177.8 cm (5' 10\")  Current Body Wt: 82.6 kg (182 lb 1.6 oz)   BMI: 26.1        Nutrition Diagnosis:   Inadequate oral intake related to acute injury/trauma, impaired respiratory function as evidenced by NPO or clear liquid status due to medical condition, intubation    Nutrition Interventions:   Food and/or Nutrient Delivery: Continue NPO  Nutrition Education/Counseling: No recommendation at this time  Coordination of Nutrition Care: Continue to monitor while inpatient  Plan of Care discussed with: nursing    Goals:  Goals: Meet at least 75% of estimated needs, Maintain adequate nutrition status  Type of Goal: New goal  Previous Goal Met: No Progress toward Goal(s)    Nutrition Monitoring and Evaluation:   Behavioral-Environmental Outcomes: None Identified  Food/Nutrient Intake Outcomes: Progression of Nutrition  Physical Signs/Symptoms Outcomes: Biochemical Data, Chewing or Swallowing, Fluid Status or Edema, Weight, Skin    Discharge Planning:    Too soon to 
Placed pt on Bipap 14/6 with 13 LPM   
Progress Note            Date:5/14/2025        Patient Name:Moreno De Leon     YOB: 1980     Age:44 y.o.    CC: Follow-up alcohol hepatitis    He was intubated yesterday and sent to ICU given respiratory failure.  Being treated for pneumonia.    Physical Exam   BP (!) 101/59   Pulse 83   Temp 97 °F (36.1 °C) (Temporal)   Resp 22   Ht 1.778 m (5' 10\")   Wt 81.2 kg (179 lb)   SpO2 95%   BMI 25.68 kg/m²      - GENERAL: Intubated and sedated.  ET tube in place.    - EYES: EOMI. scleral icterus.    - HENT: ET tube in place. No cervical lymphadenopathy.    - LUNGS: Clear to auscultation bilaterally. No accessory muscle use.    - CARDIOVASCULAR: Regular rate and rhythm. No murmur. No JVD.    - ABDOMEN: Soft, non-tender and non-distended. No palpable masses.    - EXTREMITIES: Lower extremity edema. Non-tender.?      Labs    CBC:  Recent Labs     05/13/25 0815 05/14/25 0212   WBC 32.6* 27.7*   RBC 3.74* 3.42*   HGB 14.6 13.2*   HCT 41.1* 38.6*   .9* 112.9*   RDW 20.6* 19.9*    149     CHEMISTRIES:  Recent Labs     05/13/25 0815 05/13/25 0830 05/14/25 0212 05/14/25  0316 05/14/25  0353     --  139  --   --    K 3.2*   < > 3.2* 3.2 2.9     --  102  --   --    CO2 23  --  24  --   --    BUN 19  --  19  --   --    CREATININE 0.6*  --  0.7  --   --    GLUCOSE 100*  --  75  --   --    MG 2.0  --  1.7  --   --     < > = values in this interval not displayed.     PT/INR:  Recent Labs     05/13/25 0815 05/14/25 0928   PROTIME 14.2 16.4*   INR 1.10 1.32*     APTT:  Recent Labs     05/14/25 0928   APTT 30.0     LIVER PROFILE:  Recent Labs     05/13/25 0815 05/14/25  0212   * 252*   * 179*   BILIDIR 9.3* 7.8*   BILITOT 12.8* 10.8*   ALKPHOS 643* 490*         Assessment & Plan:   This is a 44-year-old male who presents with his mother and brother at bedside for dyspnea and jaundice since his last discharge May 6, 2025.  He was treated for alcohol hepatitis at that 
Progress Note            Date:5/17/2025        Patient Name:Moreno De Leon     YOB: 1980     Age:44 y.o.    Follow-up alcohol hepatitis    He remains on Solu-Medrol for lung coverage.  Off the ventilator.  Mom is at bedside.  MRCP yesterday concerning for pancreatitis.    Physical Exam   BP (!) 132/95   Pulse (!) 102   Temp 97.8 °F (36.6 °C) (Temporal)   Resp 24   Ht 1.778 m (5' 10\")   Wt 82.6 kg (182 lb 1.6 oz)   SpO2 96%   BMI 26.13 kg/m²      - GENERAL: Awake but lethargic.  His mother is at bedside.    - EYES: EOMI. scleral icterus.    - HENT: Moist mucous membranes. No cervical lymphadenopathy.    - LUNGS: Clear to auscultation bilaterally. No accessory muscle use.    - CARDIOVASCULAR: Regular rate and rhythm. No murmur. No JVD.    - ABDOMEN: Soft, non-tender and non-distended. No palpable masses.    - EXTREMITIES: Bilateral lower extremity edema. Non-tender.      Labs    CBC:  Recent Labs     05/15/25  0128 05/16/25  0558 05/17/25  0116   WBC 24.5* 26.3* 30.8*   RBC 2.99* 3.10* 3.27*   HGB 11.5* 12.1* 12.8*   HCT 34.1* 37.8* 39.2*   .0* 121.9* 119.9*   RDW 19.8* 18.7* 17.7*    130 154     CHEMISTRIES:  Recent Labs     05/15/25  0128 05/15/25  0352 05/16/25  0558 05/17/25  0116     --  145 147*   K 3.4* 3.3 3.8 3.9     --  113* 117*   CO2 22  --  22 21*   BUN 22*  --  30* 26*   CREATININE 0.7  --  0.7 0.6*   GLUCOSE 138*  --  107* 120*   PHOS 2.4*  --  2.5 2.4*   MG  --   --   --  1.9     PT/INR:No results for input(s): \"PROTIME\", \"INR\" in the last 72 hours.  APTT:No results for input(s): \"APTT\" in the last 72 hours.  LIVER PROFILE:  Recent Labs     05/15/25  0128 05/16/25  0558 05/17/25  0116   * 164* 188*   * 109* 138*   BILIDIR 6.4* 6.0* 5.7*   BILITOT 9.0* 8.5* 7.9*   ALKPHOS 303* 302* 356*         Assessment & Plan:   This is a 44-year-old male who presents with his mother and brother at bedside for dyspnea and jaundice since his last 
Progress Note            Date:5/19/2025        Patient Name:Moreno De Leon     YOB: 1980     Age:44 y.o.    CC: Follow-up alcohol hepatitis    He denies any abdominal pain at this time.  He feels his mental status is the most clear that it has been on this hospitalization.  Reports bowel movements.  No further complaints.    Physical Exam   BP (!) 141/99   Pulse 92   Temp 97.5 °F (36.4 °C) (Temporal)   Resp 18   Ht 1.778 m (5' 10\")   Wt 82.6 kg (182 lb 1.6 oz)   SpO2 99%   BMI 26.13 kg/m²      - GENERAL: Alert and oriented x 3. No acute distress. Well-nourished.    - EYES: EOMI. scleral icterus.    - HENT: Moist mucous membranes. No cervical lymphadenopathy.    - LUNGS: Clear to auscultation bilaterally. No accessory muscle use.    - CARDIOVASCULAR: Regular rate and rhythm. No murmur. No JVD.    - ABDOMEN: Soft, non-tender and non-distended. No palpable masses.    - EXTREMITIES: Lower extremity edema. Non-tender.?      Labs    CBC:  Recent Labs     05/17/25  0116 05/18/25  0817 05/19/25  0149   WBC 30.8* 22.6* 30.5*   RBC 3.27* 2.18* 2.96*   HGB 12.8* 8.7* 11.5*   HCT 39.2* 28.5* 35.2*   .9* 130.7* 118.9*   RDW 17.7* 16.7* 15.9*    102* 144     CHEMISTRIES:  Recent Labs     05/17/25  0116 05/18/25  0356 05/19/25  0149   * 141 137   K 3.9 3.5 3.4*   * 109* 103   CO2 21* 18* 22   BUN 26* 19 17   CREATININE 0.6* 0.6* 0.6*   GLUCOSE 120* 105* 123*   PHOS 2.4* 2.3* 2.3*   MG 1.9 1.7 1.9     PT/INR:No results for input(s): \"PROTIME\", \"INR\" in the last 72 hours.  APTT:No results for input(s): \"APTT\" in the last 72 hours.  LIVER PROFILE:  Recent Labs     05/17/25  0116 05/18/25  0356 05/19/25  0149   * 155* 128*   * 136* 128*   BILIDIR 5.7* 4.8* 4.0*   BILITOT 7.9* 6.7* 5.6*   ALKPHOS 356* 354* 319*         Assessment & Plan:   This is a 44-year-old male who presents with his mother and brother at bedside for dyspnea and jaundice since his last discharge May 
Progress Note            Date:5/22/2025        Patient Name:Moreno De Leon     YOB: 1980     Age:44 y.o.    CC: Follow-up alcohol hepatitis    Nausea resolved today.  Still with hypokalemia.  No new symptoms.    Physical Exam   /84   Pulse (!) 112   Temp 98.1 °F (36.7 °C) (Temporal)   Resp 18   Ht 1.778 m (5' 10\")   Wt 82.6 kg (182 lb 1.6 oz)   SpO2 98%   BMI 26.13 kg/m²      - GENERAL: Alert and oriented x 3. No acute distress. Well-nourished.     - EYES: EOMI. scleral icterus.     - HENT: Moist mucous membranes. No cervical lymphadenopathy.     - LUNGS: Clear to auscultation bilaterally. No accessory muscle use.     - CARDIOVASCULAR: Regular rate and rhythm. No murmur. No JVD.     - ABDOMEN: Soft, non-tender and non-distended. No palpable masses.     - EXTREMITIES: Bilateral lower extremity edema. Non-tender.?      Labs    CBC:  Recent Labs     05/20/25  0451 05/21/25  0617 05/22/25  0507   WBC 32.1* 32.4* 32.8*   RBC 3.19* 3.27* 3.11*   HGB 12.3* 12.8* 12.0*   HCT 36.8* 36.5* 35.3*   .4* 111.6* 113.5*   RDW 15.2* 14.5 14.4    172 136     CHEMISTRIES:  Recent Labs     05/20/25  0451 05/21/25  0617 05/21/25  2244 05/22/25  0507   * 135*  --  133*   K 3.5 1.9* 2.9* 2.7*    88*  --  91*   CO2 23 31*  --  30*   BUN 18 13  --  14   CREATININE 0.5* 0.6*  --  0.6*   GLUCOSE 93 95  --  114*   PHOS 2.0* 1.8*  --  1.8*   MG  --   --   --  1.2*     PT/INR:No results for input(s): \"PROTIME\", \"INR\" in the last 72 hours.  APTT:No results for input(s): \"APTT\" in the last 72 hours.  LIVER PROFILE:  Recent Labs     05/20/25  0451 05/21/25  0617 05/22/25  0507   * 111* 93*   * 102* 85*   BILIDIR 4.1* 4.3* 3.9*   BILITOT 5.6* 6.4* 5.4*   ALKPHOS 358* 407* 364*         Assessment & Plan:   This is a 44-year-old male who presents with his mother and brother at bedside for dyspnea and jaundice since his last discharge May 6, 2025.  He was treated for alcohol 
Progress Note    Date:2025       Room:0516/516-01  Patient Name:Moreno De Leon     YOB: 1980     Age:44 y.o.      Subjective    Subjective: 44 year old male with hx ETOH abuse, cirrhosis of the liver, Jaundice, who presented with concerns of worsening dyspnea, abdominal distention and LE edema. Pt was recently admitted, had 5L drained with paracentesis and treated for SBP at that time.  This admission patient had repeat paracentesis, fluid negative for SBP.  CT chest concerning for multifocal pneumonia and was placed on broad-spectrum antibiotics with.  Alcohol levels was undetected this admission, patient stated he has been compliant to recommendations given on last discharge to refrain from alcohol.  After admission, patient respiratory status decompensated and was moved to the ICU.  Attempted noninvasive vent management however patient could not tolerate and was intubated 2025.  Patient subsequently improved, extubated 5/15, MRCP concerning for severe interstitial pancreatitis and received high-volume rate fluid in house.  Patient's mentation returned to baseline, transition back to medical floor, was initiated on Bumex drip due to volume overload.    Patient has significantly diuresed with Bumex drip, volume status improved.  Was transition of Bumex drip 2025.  Multiple electrolyte derangements in house which were repleted.      Seen in house today with mother present.  Patient stated he is having some nausea but no emesis.  Did not tolerate his breakfast today.  Seen by GI, recommended trying some broth, was given Zofran for nausea.  Encouraged continued ambulation with continued monitoring for electrolyte replacement in house.       Review of Systems: 8 point system review negative septa stated above.    Objective         Vitals Last 24 Hours:  TEMPERATURE:  Temp  Av °F (36.7 °C)  Min: 97.3 °F (36.3 °C)  Max: 98.6 °F (37 °C)  RESPIRATIONS RANGE: Resp  Av  Min: 16  
Pt intubated due to deteriorating resp. Status. Placed on prvc,18,450, 100% + 5 peep. Advanced et tube to 24 cm misty at lip. Rn at bedside.  
RT advanced tube 1cm. Now 24cm at the lip.  
SBT initiated at 11:37  Pt awake and follows commands, pulling volumes 500s, SBI 40s, VSS  Orders given to extubate.   Extubated by RT at 12:17 to 6L NC    Electronically signed by Priti Clark RN on 5/15/2025 at 12:18 PM      
TF now started at 25ml/hr as ordered with flush set at 10ml/hr. OGT at 60cm misty llip line. TF is Jevity 1.5. Electronically signed by Hilary Ambriz RN on 5/14/2025 at 4:45 PM    
This  visited with pt's mother to follow up and provide spiritual care. Pt's mother says pt is a L[employer]-day but his shena wasn't always \"gung ho.\" This  provided spiritual care with sustaining presence, support, and prayer. Pt's mom, Cynthia, expressed gratitude for spiritual care.         Electronically signed by Mary Behrens on 5/14/2025 at 2:27 PM    
Wasted 75mL fentanyl with another RN.    Electronically signed by BROWN GILMAN RN on 5/14/2025 at 6:06 AM    Electronically signed by Alphonse Elias RN on 5/14/2025 at 6:13 AM        
6, 2025.  He was treated for alcohol hepatitis at that time with alcohol pancreatitis and was sent home on a prednisolone taper.  Since being sent home, he has developed worsening dyspnea and persistent jaundice.     Alcohol hepatitis on steroid therapy given high discriminant function.  LFTs are slowly improving.  Workup has been unremarkable for other causes.  Now on prednisolone 20 mg daily.  If this is for continuation of alcohol hepatitis treatment, I will change to prednisolone.  Will discuss with hospital medicine.  Continue to trend daily LFTs.  We discussed alcohol cessation again today.  He states that he is going to quit and he understands its adverse effect on his health.     Pancreatitis, recurrent per MRCP 5/16/2025.  Clinically no signs of abdominal pain.  Pancreatitis diagnosis unclear.    Okay for a regular, low-salt, low-fat diet.  He is asymptomatic.     Respiratory failure secondary to suspected pneumonia on broad-spectrum antibiotics.  Now extubated, 5/15/2025.  WBC is high.  Some of this can be contributed to prednisolone therapy for alcohol hepatitis  On meropenem.    Hypokalemia, potassium today is 1.9.  He has been on a Bumex drip.  [facility] medicine to replace.  Spoke to nursing he is getting replacement.  I will increase spironolactone to 50 mg p.o. daily.  Recheck once repleted.  Replace other electrolytes including phosphorus.  Magnesium is normal.      
  Electronically signed by: SITA PACHECO M.D.   Date:     05/13/2025   Time:    12:11       CT CHEST W CONTRAST   Final Result   Bilateral pulmonary infiltrates. Primary diagnostic considerations are for infection, versus pneumonitis   Right upper lobe nodule measuring 1.3 x 0.8 cm, similar in size compared with prior study. Follow up chest CT in one year is recommended   Fluid collection surrounding the pancreas. Process such as necrotizing pancreatitis cannot be excluded   Mild perihepatic and perisplenic ascites   Heterogeneous enhancement of the liver, likely reflecting geographic fatty infiltration of the liver        All CT scans are performed using dose optimization techniques as appropriate to the performed exam and include    at least one of the following: Automated exposure control, adjustment of the mA and/or kV according to size, and the use of iterative reconstruction technique.        ______________________________________    Electronically signed by: DEMIAN NAVARRO M.D.   Date:     05/13/2025   Time:    12:10       XR CHEST PORTABLE   Final Result   1.  Patchy bilateral interstitial infiltrates without consolidation. Edema versus interstitial pneumonitis.               ______________________________________    Electronically signed by: DEMIAN SMYTH M.D.   Date:     05/13/2025   Time:    09:03       MRI ABDOMEN W WO CONTRAST MRCP    (Results Pending)   XR CHEST PORTABLE    (Results Pending)       PHARMACY TO DOSE VANCOMYCIN  IP CONSULT TO GI  PHARMACY TO DOSE VANCOMYCIN  IP CONSULT TO DIETITIAN  IP CONSULT TO PALLIATIVE CARE    Assessment/Plan:    Active Hospital Problems    Diagnosis     Palliative care patient [Z51.5]     Dyspnea and respiratory abnormalities [R06.00, R06.89]     Acute respiratory failure with hypoxia (HCC) [J96.01]     Squamous cell carcinoma of head and neck [C44.42]     Sepsis (HCC) [A41.9]     Alcoholic hepatitis (HCC) [K70.10]     GERD (gastroesophageal reflux disease) [K21.9]  
his room on the fifth floor.  His mother with whom he lives is not available at bedside for today's discussion. He is resting comfortably today.  Generally improved since last visit.  Denies significant pain or nausea.  Some generalized tenderness to the abdominal area.  Patient tells me he is tolerating his food well despite pancreatitis and is no longer having any diarrhea.  He wants to return home as soon as possible.  We did discuss looking into options for inpatient rehab for alcohol cessation, patient declines any EtOH rehab stay.  Patient states he feels he has the tools he needs at home to remain sober.  Would prefer not to go to skilled rehab for physical therapy either but is considering his options.  Case management following.     Palliative team will follow as needed.    Recommendations:   Palliative Care-FULL CODE, GOC include aggressive treatments to prolong life, full diagnostic workup and intervention, voiced interest in Hepatology referral at discharge pending clinical course, plans to remain sober and be considered for transplant  Sepsis - persistent leukocytosis s/p abx therapy for pneumonia, pancreatitis, mgmt on merrem as per Hospitalist team  Acute hypoxic respiratory failure-  room air s/p pneumonia pathway treatment, remains extubated, fluid overload on bumex gtt  Ascites - alcohol induced hepatitis, GI following, steroids, trending LFTs, ETOH discontinuation counseling, bumex gtt initiated, considering additional paracentesis, IVF completed  Pancreatitis - reocurrent on MRCP, received IVF, asymptomatic and tolerating PO intake, GI following  SCC head/neck-never completed radiation therapy, mother concerned for recurrence/metastatic disease, would like to follow up w/ Oncology    Thank you for consulting Palliative Care and allowing us to participate in the care of this patient.     Total Time 61 minutes spent with patient assessment, interview of independent historian/HCS, workup/treatment 
hospitalization.  Interval history reviewed.    Patient experienced some early morning nausea that was resolved with antiemetics.  He has been diuresed with Bumex drip and required potassium replacement per GI and hospitalist team management, drip now off.  Patient denies any current nausea, diarrhea, pain.  He wishes to return to resting this afternoon as he tells me he did not sleep well last night due to interruptions.     Palliative team will follow as needed.    Recommendations:   Palliative Care-FULL CODE, GOC include aggressive treatments to prolong life, full diagnostic workup and intervention, voiced interest in Hepatology referral at discharge pending clinical course, plans to remain sober and be considered for transplant  Sepsis - persistent leukocytosis s/p abx therapy for pneumonia, pancreatitis, mgmt on merrem, steroids as per Hospitalist team  Acute hypoxic respiratory failure-  room air s/p pneumonia pathway treatment, remains extubated, diuresed with bumex gtt now off  Ascites - alcohol induced hepatitis, GI following, steroids, trending LFTs, ETOH discontinuation counseling, bumex gtt now d/c'd, considering additional paracentesis, IVF completed  Pancreatitis - reocurrent on MRCP, received IVF, asymptomatic and tolerating PO intake, GI following, nausea noted this am, could be r/t electrolyte imbalances from diuresis  SCC head/neck-never completed radiation therapy, mother concerned for recurrence/metastatic disease, would like to follow up w/ Oncology    Thank you for consulting Palliative Care and allowing us to participate in the care of this patient.     Total Time 40 minutes spent with patient assessment, interview of independent historian/HCS, workup/treatment review, discussion with medical team, review of current and home medications, and placement of orders/preparation of this note: 66 minutes    Electronically signed by DOTTIE Kilgore CNP on 5/21/2025 at 10:14 PM    (Please note that 
perihepatic ascites Fluid is again demonstrated surrounding the pancreas There is a fracture defect involving the left 12th rib, which shows callus formation consistent with subacute to chronic process Chronic degenerative changes are demonstrated throughout the visualized orthopedic structures         Interval improvement in bilateral pulmonary infiltrates Interval progression in bilateral pleural effusions Upper abdominal ascites Peripancreatic fluid  All CT scans are performed using dose optimization techniques as appropriate to the performed exam and include at least one of the following: Automated exposure control, adjustment of the mA and/or kV according to size, and the use of iterative reconstruction technique.  ______________________________________ Electronically signed by: DEMIAN NAVARRO M.D. Date:     05/20/2025 Time:    09:14     Assessment//Plan           Hospital Problems           Last Modified POA    * (Principal) Sepsis (HCC) 5/13/2025 Yes    Alcohol use disorder 5/13/2025 Yes    Alcoholic hepatitis (HCC) 5/13/2025 Yes    Depression 5/13/2025 Yes    GERD (gastroesophageal reflux disease) 5/13/2025 Yes    Hypertension 5/13/2025 Yes    Palliative care patient 5/14/2025 Yes    Dyspnea and respiratory abnormalities 5/14/2025 Yes    Acute respiratory failure with hypoxia (HCC) 5/14/2025 Yes    Squamous cell carcinoma of head and neck 5/14/2025 Yes    Acute congestive heart failure (HCC) 5/20/2025 Yes     Assessment & Plan      Acute Hypoxic Resp Failure secondary to HCAP  CT chest with bilateral PNA consistent with HCAP.   Vanc discontinued, and continue Merrem IV on 5/18  solumedrol IV to PO  MRSA swab negative  CXR repeat reviewed.   CT chest repeat 5/20 - shows resolving PNA.   extubated 5/15     Alcoholic Hepatitis.   Recurrent ascites.   Paracentesis repeat this admission on 5/13; 3.7L removed and did NOT reveal persisting SBP  SAAG consistent with portal HTN as etiology of ascites consistent with 
swab negative   - resp culture ordered - unable to obtain   - CXR repeat reviewed.    - extubated 5/15        Ongoing Alcoholic Hepatitis.   Recurrent ascites.   Recent SBP last admission (5L drained) showed SBP - treated last admit.   Paracentesis repeat this admission on 5/13 3.7L removed and did NOT reveal persisting SBP  SAAG consistent with portal HTN as etiology of ascites consistent with liver cirrhosis.   Discussed with GI   - LFTs slowly improving past several days.    - ascites recurrent - will likely need another paracentesis.         Severe interstitial pancreatitis.   Improving very slowly.   Stop IVF.   Diuretic as above.   Slowly advancing diet.         Cholestatic Jaundice.   In setting of alcoholic hepatitis and underlying liver cirrhosis.   Prior Hx of Squamous cell carcinoma s/p resection in [place] with clear margins reported at that time.   - MRCP reviewed - limited study, concerning for pancreatitis. See above.         Recurrent Ascites.   Paracentesis this admit - 3.7L removed. No SBP.   Retaining fluid fast with IVF, but needs this with severe pancreatitis.    - IVF now off.    - bumex IV scheduled.         Hypotension - resolved.   Added midodrine.   reviewed ECHO.   Diuretics as above  IVF for pancreatitis - now off.         Psych  Pt on regimen of vraylar, prozac, haldol, zyprexa, paxil and seroquel.   This is very concerning for polypharmacy.   I do not think this is provider error, but rather suspect he may be getting prescriptions from different providers without them realizing that.    - stop vraylar, haldol, zyprexa, paxil    - cont prozac   - seroquel also discontinued due to oversedation initially.     - now improved, so resumed low dose seroquel and continue to hold the rest.         Malnutrition severe.   Doing better with swallowing - started clear liquids 5/17        DVT Prophylaxis: lovenox  Diet: ADULT DIET; Regular; Low Sodium (2 gm)  Code Status: Full Code      Dispo -

## 2025-05-23 ENCOUNTER — TELEPHONE (OUTPATIENT)
Dept: GASTROENTEROLOGY | Age: 45
End: 2025-05-23

## 2025-05-23 VITALS
HEIGHT: 70 IN | SYSTOLIC BLOOD PRESSURE: 126 MMHG | HEART RATE: 110 BPM | RESPIRATION RATE: 14 BRPM | DIASTOLIC BLOOD PRESSURE: 98 MMHG | WEIGHT: 182.1 LBS | TEMPERATURE: 98.8 F | BODY MASS INDEX: 26.07 KG/M2 | OXYGEN SATURATION: 94 %

## 2025-05-23 LAB
ALBUMIN SERPL-MCNC: 3 G/DL (ref 3.5–5.2)
ALP SERPL-CCNC: 389 U/L (ref 40–129)
ALT SERPL-CCNC: 89 U/L (ref 10–50)
ANION GAP SERPL CALCULATED.3IONS-SCNC: 13 MMOL/L (ref 8–16)
AST SERPL-CCNC: 89 U/L (ref 10–50)
BASOPHILS # BLD: 0.1 K/UL (ref 0–0.2)
BASOPHILS NFR BLD: 0.2 % (ref 0–1)
BILIRUB DIRECT SERPL-MCNC: 3.7 MG/DL (ref 0–0.3)
BILIRUB INDIRECT SERPL-MCNC: 1.5 MG/DL (ref 0–1)
BILIRUB SERPL-MCNC: 5.2 MG/DL (ref 0.2–1.2)
BUN SERPL-MCNC: 14 MG/DL (ref 6–20)
CALCIUM SERPL-MCNC: 8.3 MG/DL (ref 8.6–10)
CHLORIDE SERPL-SCNC: 91 MMOL/L (ref 98–107)
CO2 SERPL-SCNC: 28 MMOL/L (ref 22–29)
CREAT SERPL-MCNC: 0.5 MG/DL (ref 0.7–1.2)
EOSINOPHIL # BLD: 0.2 K/UL (ref 0–0.6)
EOSINOPHIL NFR BLD: 0.4 % (ref 0–5)
ERYTHROCYTE [DISTWIDTH] IN BLOOD BY AUTOMATED COUNT: 13.9 % (ref 11.5–14.5)
GLUCOSE SERPL-MCNC: 105 MG/DL (ref 70–99)
HCT VFR BLD AUTO: 34.1 % (ref 42–52)
HGB BLD-MCNC: 12.2 G/DL (ref 14–18)
IMM GRANULOCYTES # BLD: 0.4 K/UL
LYMPHOCYTES # BLD: 2.8 K/UL (ref 1.1–4.5)
LYMPHOCYTES NFR BLD: 7.9 % (ref 20–40)
MAGNESIUM SERPL-MCNC: 1.7 MG/DL (ref 1.6–2.6)
MCH RBC QN AUTO: 39.6 PG (ref 27–31)
MCHC RBC AUTO-ENTMCNC: 35.8 G/DL (ref 33–37)
MCV RBC AUTO: 110.7 FL (ref 80–94)
MONOCYTES # BLD: 1.7 K/UL (ref 0–0.9)
MONOCYTES NFR BLD: 4.7 % (ref 0–10)
NEUTROPHILS # BLD: 30.5 K/UL (ref 1.5–7.5)
NEUTS SEG NFR BLD: 85.8 % (ref 50–65)
PHOSPHATE SERPL-MCNC: 1.6 MG/DL (ref 2.5–4.5)
PLATELET # BLD AUTO: 227 K/UL (ref 130–400)
PMV BLD AUTO: 12.1 FL (ref 9.4–12.4)
POTASSIUM SERPL-SCNC: 3.4 MMOL/L (ref 3.5–5.1)
PROT SERPL-MCNC: 4.9 G/DL (ref 6.4–8.3)
RBC # BLD AUTO: 3.08 M/UL (ref 4.7–6.1)
SODIUM SERPL-SCNC: 132 MMOL/L (ref 136–145)
WBC # BLD AUTO: 35.5 K/UL (ref 4.8–10.8)

## 2025-05-23 PROCEDURE — 6360000002 HC RX W HCPCS: Performed by: INTERNAL MEDICINE

## 2025-05-23 PROCEDURE — 6370000000 HC RX 637 (ALT 250 FOR IP): Performed by: HOSPITALIST

## 2025-05-23 PROCEDURE — 2500000003 HC RX 250 WO HCPCS

## 2025-05-23 PROCEDURE — 6370000000 HC RX 637 (ALT 250 FOR IP)

## 2025-05-23 PROCEDURE — 6360000002 HC RX W HCPCS: Performed by: HOSPITALIST

## 2025-05-23 PROCEDURE — 6370000000 HC RX 637 (ALT 250 FOR IP): Performed by: INTERNAL MEDICINE

## 2025-05-23 PROCEDURE — 2580000003 HC RX 258: Performed by: INTERNAL MEDICINE

## 2025-05-23 RX ORDER — MAGNESIUM SULFATE IN WATER 40 MG/ML
4000 INJECTION, SOLUTION INTRAVENOUS ONCE
Status: DISCONTINUED | OUTPATIENT
Start: 2025-05-23 | End: 2025-05-23 | Stop reason: HOSPADM

## 2025-05-23 RX ORDER — BUMETANIDE 2 MG/1
2 TABLET ORAL DAILY
Qty: 30 TABLET | Refills: 3 | Status: SHIPPED | OUTPATIENT
Start: 2025-05-24

## 2025-05-23 RX ORDER — NICOTINE 21 MG/24HR
1 PATCH, TRANSDERMAL 24 HOURS TRANSDERMAL DAILY
Qty: 30 PATCH | Refills: 3 | Status: SHIPPED | OUTPATIENT
Start: 2025-05-24

## 2025-05-23 RX ORDER — SPIRONOLACTONE 100 MG/1
100 TABLET, FILM COATED ORAL DAILY
Qty: 30 TABLET | Refills: 3 | Status: SHIPPED | OUTPATIENT
Start: 2025-05-24

## 2025-05-23 RX ORDER — POTASSIUM CHLORIDE 1500 MG/1
40 TABLET, EXTENDED RELEASE ORAL 2 TIMES DAILY
Qty: 120 TABLET | Refills: 3 | Status: SHIPPED | OUTPATIENT
Start: 2025-05-23

## 2025-05-23 RX ORDER — OMEPRAZOLE 40 MG/1
40 CAPSULE, DELAYED RELEASE ORAL
Qty: 60 CAPSULE | Refills: 3 | Status: SHIPPED | OUTPATIENT
Start: 2025-05-23

## 2025-05-23 RX ORDER — MIDODRINE HYDROCHLORIDE 10 MG/1
10 TABLET ORAL
Qty: 90 TABLET | Refills: 3 | Status: SHIPPED | OUTPATIENT
Start: 2025-05-23

## 2025-05-23 RX ADMIN — POTASSIUM CHLORIDE 40 MEQ: 1500 TABLET, EXTENDED RELEASE ORAL at 07:34

## 2025-05-23 RX ADMIN — SPIRONOLACTONE 100 MG: 25 TABLET ORAL at 07:34

## 2025-05-23 RX ADMIN — Medication 20 MG: at 07:34

## 2025-05-23 RX ADMIN — THERA TABS 1 TABLET: TAB at 07:34

## 2025-05-23 RX ADMIN — MAGNESIUM SULFATE HEPTAHYDRATE 2000 MG: 40 INJECTION, SOLUTION INTRAVENOUS at 07:33

## 2025-05-23 RX ADMIN — MEROPENEM 1000 MG: 1 INJECTION INTRAVENOUS at 07:26

## 2025-05-23 RX ADMIN — POLYVINYL ALCOHOL 1 DROP: 1.4 SOLUTION/ DROPS OPHTHALMIC at 10:22

## 2025-05-23 RX ADMIN — MIDODRINE HYDROCHLORIDE 10 MG: 10 TABLET ORAL at 07:35

## 2025-05-23 RX ADMIN — BUMETANIDE 2 MG: 1 TABLET ORAL at 07:35

## 2025-05-23 RX ADMIN — MAGNESIUM SULFATE HEPTAHYDRATE 2000 MG: 40 INJECTION, SOLUTION INTRAVENOUS at 11:05

## 2025-05-23 RX ADMIN — FOLIC ACID 1 MG: 1 TABLET ORAL at 07:35

## 2025-05-23 RX ADMIN — MIDODRINE HYDROCHLORIDE 10 MG: 10 TABLET ORAL at 11:54

## 2025-05-23 RX ADMIN — SODIUM CHLORIDE, PRESERVATIVE FREE 10 ML: 5 INJECTION INTRAVENOUS at 07:35

## 2025-05-23 RX ADMIN — POLYVINYL ALCOHOL 1 DROP: 1.4 SOLUTION/ DROPS OPHTHALMIC at 04:45

## 2025-05-23 RX ADMIN — LACTULOSE 20 G: 10 SOLUTION ORAL at 07:34

## 2025-05-23 RX ADMIN — MEROPENEM 1000 MG: 1 INJECTION INTRAVENOUS at 01:37

## 2025-05-23 RX ADMIN — POTASSIUM CHLORIDE 10 MEQ: 7.46 INJECTION, SOLUTION INTRAVENOUS at 07:31

## 2025-05-23 RX ADMIN — PANTOPRAZOLE SODIUM 40 MG: 40 TABLET, DELAYED RELEASE ORAL at 07:34

## 2025-05-23 RX ADMIN — FLUOXETINE HYDROCHLORIDE 10 MG: 10 CAPSULE ORAL at 07:34

## 2025-05-23 RX ADMIN — GUAIFENESIN 600 MG: 600 TABLET ORAL at 07:34

## 2025-05-23 NOTE — DISCHARGE INSTR - DIET
Good nutrition is important when healing from an illness, injury, or surgery.  Follow any nutrition recommendations given to you during your hospital stay.   If you were given an oral nutrition supplement while in the hospital, continue to take this supplement at home.  You can take it with meals, in-between meals, and/or before bedtime. These supplements can be purchased at most local grocery stores, pharmacies, and chain [employer]-stores.   If you have any questions about your diet or nutrition, call the hospital and ask for the dietitian.    Regular low sodium diet

## 2025-05-23 NOTE — DISCHARGE SUMMARY
Discharge Summary    Date:5/23/2025        Patient Name:Moreno De Leon     YOB: 1980     Age:44 y.o.    Admit Date:5/13/2025   Admission Condition:fair   Discharged Condition:stable  Discharge Date: 05/23/25       Discharge Diagnoses   Principal Problem:    Sepsis (HCC)  Active Problems:    Alcohol use disorder    Alcoholic hepatitis (HCC)    Depression    GERD (gastroesophageal reflux disease)    Hypertension    Palliative care patient    Dyspnea and respiratory abnormalities    Acute respiratory failure with hypoxia (HCC)    Squamous cell carcinoma of head and neck    Acute congestive heart failure (HCC)  Resolved Problems:    * No resolved hospital problems. *      Hospital Stay   Narrative of Hospital Course:     44 year old male with hx ETOH abuse, cirrhosis of the liver, Jaundice, who presented with concerns of worsening dyspnea, abdominal distention and LE edema. Pt was recently admitted, had 5L drained with paracentesis and treated for SBP at that time. This admission patient had repeat paracentesis, fluid negative for SBP. CT chest concerning for multifocal pneumonia and was placed on broad-spectrum antibiotics with.  Alcohol levels was undetected this admission, patient stated he has been compliant to recommendations given on last discharge to refrain from alcohol.  After admission, patient respiratory status decompensated and was moved to the ICU.  Attempted noninvasive vent management however patient could not tolerate and was intubated 5/13/2025.  Patient subsequently improved, extubated 5/15, MRCP concerning for severe interstitial pancreatitis and received high-volume rate fluid in house. Patient's mentation returned to baseline, transition back to medical floor, was initiated on Bumex drip due to volume overload. Patient has significantly diuresed with Bumex drip, volume status improved.  Was transitioned off Bumex drip 5/21/2025.  Multiple electrolyte derangements in house which

## 2025-05-23 NOTE — TELEPHONE ENCOUNTER
Moreno is needing to schedule an appointment for [facility] Discharge. [facility] was unable to accommodate in the time frame needed. Please be advised that the best time to call Anytime.    Thank you.

## 2025-05-27 ENCOUNTER — TELEPHONE (OUTPATIENT)
Dept: OTHER | Age: 45
End: 2025-05-27

## 2025-05-27 NOTE — TELEPHONE ENCOUNTER
Pt seen in ED on 5/13/25.  CT chest w contrast revealed pulmonary nodule.  Radiologist recommended repeat CT scan in 12 months. Attempted to reach pt via telephone, no answer, VM full. Report faxed to [facility]. Sweeten message sent. Letter mailed.     Electronically signed by Asya Blanton RN on 5/27/2025 at 12:19 PM

## 2025-05-29 ENCOUNTER — OFFICE VISIT (OUTPATIENT)
Dept: GASTROENTEROLOGY | Age: 45
End: 2025-05-29
Payer: MEDICAID

## 2025-05-29 VITALS
WEIGHT: 160 LBS | BODY MASS INDEX: 22.9 KG/M2 | HEART RATE: 100 BPM | SYSTOLIC BLOOD PRESSURE: 103 MMHG | HEIGHT: 70 IN | OXYGEN SATURATION: 97 % | DIASTOLIC BLOOD PRESSURE: 80 MMHG

## 2025-05-29 DIAGNOSIS — K70.31 ALCOHOLIC CIRRHOSIS OF LIVER WITH ASCITES (HCC): ICD-10-CM

## 2025-05-29 DIAGNOSIS — Z09 HOSPITAL DISCHARGE FOLLOW-UP: ICD-10-CM

## 2025-05-29 DIAGNOSIS — K76.82 HEPATIC ENCEPHALOPATHY (HCC): Primary | ICD-10-CM

## 2025-05-29 DIAGNOSIS — R10.13 EPIGASTRIC PAIN: ICD-10-CM

## 2025-05-29 DIAGNOSIS — K21.9 GASTROESOPHAGEAL REFLUX DISEASE, UNSPECIFIED WHETHER ESOPHAGITIS PRESENT: ICD-10-CM

## 2025-05-29 PROCEDURE — 3074F SYST BP LT 130 MM HG: CPT | Performed by: NURSE PRACTITIONER

## 2025-05-29 PROCEDURE — 99215 OFFICE O/P EST HI 40 MIN: CPT | Performed by: NURSE PRACTITIONER

## 2025-05-29 PROCEDURE — 4004F PT TOBACCO SCREEN RCVD TLK: CPT | Performed by: NURSE PRACTITIONER

## 2025-05-29 PROCEDURE — G8420 CALC BMI NORM PARAMETERS: HCPCS | Performed by: NURSE PRACTITIONER

## 2025-05-29 PROCEDURE — 3079F DIAST BP 80-89 MM HG: CPT | Performed by: NURSE PRACTITIONER

## 2025-05-29 PROCEDURE — 1111F DSCHRG MED/CURRENT MED MERGE: CPT | Performed by: NURSE PRACTITIONER

## 2025-05-29 PROCEDURE — G8427 DOCREV CUR MEDS BY ELIG CLIN: HCPCS | Performed by: NURSE PRACTITIONER

## 2025-05-29 ASSESSMENT — ENCOUNTER SYMPTOMS
VOMITING: 0
TROUBLE SWALLOWING: 0
ABDOMINAL PAIN: 1
DIARRHEA: 0
ANAL BLEEDING: 0
NAUSEA: 0
COUGH: 0
SHORTNESS OF BREATH: 0
BLOOD IN STOOL: 0
CHOKING: 0
ABDOMINAL DISTENTION: 0
RECTAL PAIN: 0
CONSTIPATION: 0

## 2025-05-29 NOTE — PROGRESS NOTES
Subjective:     Patient ID: Moreno De Leon is a 44 y.o. male  PCP: Rosangela Mtz APRN - CNP  Referring Provider: No ref. provider found    HPI  History of Present Illness  The patient presents for hospital follow up and evaluation of liver cirrhosis. He is accompanied by his mother.    He reports a general sense of well-being following his recent hospital discharge, although he continues to experience fluid retention in his abdomen, which he describes as fluctuating. He was unaware of his liver condition prior to his hospitalization, during which he experienced shortness of breath and pneumonia. He has abstained from alcohol since 02/2025. He was hospitalized in the beginning of 05/2025 and was discharged last week. He was intubated for 2 days during his hospital stay. He also reports persistent fatigue since his hospital discharge.   He is currently on lactulose twice daily, which has resulted in regular bowel movements. He is also taking Bumex and spironolactone. He underwent paracentesis on 05/01/2025 and 05/20/2025, during which 5 liters of fluid were removed each time. He experienced hallucinations a few days post-hospitalization. He has not undergone an endoscopy. He was treated with prednisone syrup for alcoholic hepatitis during his hospital stay, a medication he continues to take.    He reports constant indigestion and occasional upper abdominal cramping, which occurs every 4 to 5 days. He is on omeprazole twice daily. He reports no difficulty swallowing or any bowel issues such as blood in stool, mucus, or pain. He underwent a colonoscopy at [facility], which he believes was longer ago than 2020, and it revealed no abnormalities or polyps.    He had tonsil cancer in 2023 and a \"spot\" on the back of his tongue.      We discussed hepatology referral and he would like to go to [place] hepat[facility]     MELD 16      I have reviewed labs and imaging as well as notes from his hospitalization   Greater than 50

## 2025-06-09 DIAGNOSIS — K76.82 HEPATIC ENCEPHALOPATHY (HCC): ICD-10-CM

## 2025-06-09 DIAGNOSIS — K70.31 ALCOHOLIC CIRRHOSIS OF LIVER WITH ASCITES (HCC): ICD-10-CM

## 2025-06-09 LAB
ALBUMIN SERPL-MCNC: 3.3 G/DL (ref 3.5–5.2)
ALP SERPL-CCNC: 399 U/L (ref 40–129)
ALT SERPL-CCNC: 62 U/L (ref 10–50)
AST SERPL-CCNC: 101 U/L (ref 10–50)
BILIRUB DIRECT SERPL-MCNC: 1.4 MG/DL (ref 0–0.3)
BILIRUB INDIRECT SERPL-MCNC: 0.5 MG/DL (ref 0–1)
BILIRUB SERPL-MCNC: 1.9 MG/DL (ref 0.2–1.2)
PROT SERPL-MCNC: 6.4 G/DL (ref 6.4–8.3)

## 2025-06-10 ENCOUNTER — RESULTS FOLLOW-UP (OUTPATIENT)
Dept: GASTROENTEROLOGY | Age: 45
End: 2025-06-10

## 2025-06-10 ENCOUNTER — HOSPITAL ENCOUNTER (OUTPATIENT)
Dept: ULTRASOUND IMAGING | Age: 45
Discharge: HOME | End: 2025-06-10
Payer: MEDICAID

## 2025-06-10 VITALS
TEMPERATURE: 97.3 F | HEART RATE: 99 BPM | SYSTOLIC BLOOD PRESSURE: 108 MMHG | DIASTOLIC BLOOD PRESSURE: 78 MMHG | OXYGEN SATURATION: 96 % | RESPIRATION RATE: 16 BRPM

## 2025-06-10 DIAGNOSIS — K70.31 ALCOHOLIC CIRRHOSIS OF LIVER WITH ASCITES (HCC): ICD-10-CM

## 2025-06-10 LAB
APPEARANCE FLD: CLEAR
BODY FLD TYPE: NORMAL
CLOT EVALUATION: NORMAL
COLOR FLD: YELLOW
NUCLEATED CELLS FLUID: 128 /CUMM
RBC # FLD: <2000 /CUMM
TOTAL CELLS COUNTED FLD: 100

## 2025-06-10 PROCEDURE — 87205 SMEAR GRAM STAIN: CPT

## 2025-06-10 PROCEDURE — 89051 BODY FLUID CELL COUNT: CPT

## 2025-06-10 PROCEDURE — 87075 CULTR BACTERIA EXCEPT BLOOD: CPT

## 2025-06-10 PROCEDURE — 87070 CULTURE OTHR SPECIMN AEROBIC: CPT

## 2025-06-10 PROCEDURE — C1729 CATH, DRAINAGE: HCPCS

## 2025-06-10 RX ORDER — SODIUM CHLORIDE 0.9 % (FLUSH) 0.9 %
5-40 SYRINGE (ML) INJECTION PRN
Status: DISCONTINUED | OUTPATIENT
Start: 2025-06-10 | End: 2025-06-12 | Stop reason: HOSPADM

## 2025-06-10 NOTE — PROGRESS NOTES
Pt returns from US dept s/p paracentesis with 3L ascitic fluid removed. Puncture site on left side abdomen covered with band aid, dry and intact. No albumin infusion needed this visit. Provided with discharge instructions. Pt verbalizes understanding.

## 2025-06-10 NOTE — PROGRESS NOTES
Pt here today with ultrasound guided paracentesis with possible albumin infusion. Identified using name and . Placed in [facility] room 1. Vitals taken,stable. Procedure discussed, questions answered, and consent signed. US dept notified of pt arrival.   Transport

## 2025-06-15 LAB
BACTERIA SPEC ANAEROBE CULT: NORMAL
BACTERIA SPEC ANAEROBE+AEROBE CULT: NORMAL
GRAM STN SPEC: NORMAL

## 2025-06-17 ENCOUNTER — TELEPHONE (OUTPATIENT)
Dept: GASTROENTEROLOGY | Age: 45
End: 2025-06-17

## 2025-06-17 DIAGNOSIS — K70.31 ALCOHOLIC CIRRHOSIS OF LIVER WITH ASCITES (HCC): ICD-10-CM

## 2025-06-17 DIAGNOSIS — K76.82 HEPATIC ENCEPHALOPATHY (HCC): Primary | ICD-10-CM

## 2025-06-17 NOTE — TELEPHONE ENCOUNTER
called patient to confirm  procedure scheduled for  6/20/25@1200 with Dr. Trent at [facility] outpatient    NO ANSWER / LM

## 2025-06-17 NOTE — TELEPHONE ENCOUNTER
06-17-25    Clinic records have been faxed to [facility] due to insurance.  [phone]    Fax confirmation scanned in media and attached to this encounter.        Pt has been aware of the location change.

## 2025-06-18 ENCOUNTER — TELEPHONE (OUTPATIENT)
Dept: GASTROENTEROLOGY | Age: 45
End: 2025-06-18

## 2025-06-18 NOTE — TELEPHONE ENCOUNTER
Indra called back and told ask me if we got he paracentesis sched?  I told him that I would sent him to the schedule dept and he can sched.  He told me that they told him that he would have to wait till some time in July before they will schedule another one for him.    He asked if he could go somewhere else?   I told him yes, that he could go anywhere he wanted to.     Then he hung up.

## 2025-06-18 NOTE — TELEPHONE ENCOUNTER
PT calling today asking for a paracentesis. PT has a procedure with Dr Trent on Friday. He feels bad, says he has 5L that needs to be drained. Looks like order was placed yesterday. Pt said he was told to call back today due to the  being gone already. They said he had one 6/10 and got 3L only removed. The one before that 5/20 and 5L was removed. They said he's not high volume. They are booked tomorrow and could only get him in as an emergency. Please review and advise or call pt back as I am not sure of the whole situation. They recommend him having it done next week. Questions call [phone] directly. LOWELLG sending to you w/CT APRN out, not sure how quick Ansley will see this message.

## 2025-06-19 ENCOUNTER — TELEPHONE (OUTPATIENT)
Dept: GASTROENTEROLOGY | Age: 45
End: 2025-06-19

## 2025-06-19 NOTE — TELEPHONE ENCOUNTER
06- Cynthia cat called stated that they called yesterday and is needing a Paracentesis.   She stated that they are having a problem getting the patient scheduled for a Paracentesis.  She stated that the patient was told that they could not get him scheduled til July 17th.     Questioned if he is having any symptoms?  She stated that last night he did have some breathing issues but today he sees ok.  She stated that his joints, bones hurt, nauseated and just not feeling good.       She stated that he is scheduled EGD on 06-.    Advised that if the patient  isn't feeling good and is having breathing issues by all means then it would be recommended to proceed to the ER. However will call [facility] scheduling to see if any apts sooner is available.       Called [facility] Scheduling nazario Lund  She stated that they will have to get a hold of Lisbeth within the department and will call the patient back about getting him scheduled.       Called Cynthia Cat notified that [facility] Scheduling will be calling them back to get him scheduled as that they are trying to get a hold of the department

## 2025-06-20 ENCOUNTER — ANCILLARY PROCEDURE (OUTPATIENT)
Dept: ENDOSCOPY | Age: 45
End: 2025-06-20
Attending: INTERNAL MEDICINE
Payer: MEDICAID

## 2025-06-20 ENCOUNTER — ANESTHESIA EVENT (OUTPATIENT)
Dept: ENDOSCOPY | Age: 45
End: 2025-06-20
Payer: MEDICAID

## 2025-06-20 ENCOUNTER — HOSPITAL ENCOUNTER (OUTPATIENT)
Age: 45
Setting detail: OUTPATIENT SURGERY
Discharge: HOME | End: 2025-06-20
Attending: INTERNAL MEDICINE | Admitting: INTERNAL MEDICINE
Payer: MEDICAID

## 2025-06-20 ENCOUNTER — ANESTHESIA (OUTPATIENT)
Dept: ENDOSCOPY | Age: 45
End: 2025-06-20
Payer: MEDICAID

## 2025-06-20 VITALS
RESPIRATION RATE: 18 BRPM | SYSTOLIC BLOOD PRESSURE: 118 MMHG | HEART RATE: 107 BPM | WEIGHT: 155.5 LBS | BODY MASS INDEX: 22.26 KG/M2 | HEIGHT: 70 IN | TEMPERATURE: 97.1 F | DIASTOLIC BLOOD PRESSURE: 85 MMHG | OXYGEN SATURATION: 100 %

## 2025-06-20 PROCEDURE — 3609017100 HC EGD: Performed by: INTERNAL MEDICINE

## 2025-06-20 PROCEDURE — 3700000000 HC ANESTHESIA ATTENDED CARE: Performed by: INTERNAL MEDICINE

## 2025-06-20 PROCEDURE — 6360000002 HC RX W HCPCS: Performed by: NURSE ANESTHETIST, CERTIFIED REGISTERED

## 2025-06-20 PROCEDURE — 43235 EGD DIAGNOSTIC BRUSH WASH: CPT | Performed by: INTERNAL MEDICINE

## 2025-06-20 PROCEDURE — 2580000003 HC RX 258: Performed by: NURSE ANESTHETIST, CERTIFIED REGISTERED

## 2025-06-20 PROCEDURE — 7100000010 HC PHASE II RECOVERY - FIRST 15 MIN: Performed by: INTERNAL MEDICINE

## 2025-06-20 PROCEDURE — 2709999900 HC NON-CHARGEABLE SUPPLY: Performed by: INTERNAL MEDICINE

## 2025-06-20 PROCEDURE — 7100000011 HC PHASE II RECOVERY - ADDTL 15 MIN: Performed by: INTERNAL MEDICINE

## 2025-06-20 PROCEDURE — 2580000003 HC RX 258: Performed by: ANESTHESIOLOGY

## 2025-06-20 RX ORDER — SODIUM CHLORIDE, SODIUM LACTATE, POTASSIUM CHLORIDE, CALCIUM CHLORIDE 600; 310; 30; 20 MG/100ML; MG/100ML; MG/100ML; MG/100ML
INJECTION, SOLUTION INTRAVENOUS CONTINUOUS
Status: DISCONTINUED | OUTPATIENT
Start: 2025-06-20 | End: 2025-06-20 | Stop reason: HOSPADM

## 2025-06-20 RX ORDER — PROPOFOL 10 MG/ML
INJECTION, EMULSION INTRAVENOUS
Status: DISCONTINUED | OUTPATIENT
Start: 2025-06-20 | End: 2025-06-20 | Stop reason: SDUPTHER

## 2025-06-20 RX ORDER — LIDOCAINE HYDROCHLORIDE 10 MG/ML
INJECTION, SOLUTION INFILTRATION; PERINEURAL
Status: DISCONTINUED | OUTPATIENT
Start: 2025-06-20 | End: 2025-06-20 | Stop reason: SDUPTHER

## 2025-06-20 RX ORDER — SODIUM CHLORIDE, SODIUM LACTATE, POTASSIUM CHLORIDE, CALCIUM CHLORIDE 600; 310; 30; 20 MG/100ML; MG/100ML; MG/100ML; MG/100ML
INJECTION, SOLUTION INTRAVENOUS
Status: DISCONTINUED | OUTPATIENT
Start: 2025-06-20 | End: 2025-06-20 | Stop reason: SDUPTHER

## 2025-06-20 RX ORDER — OLANZAPINE 20 MG/1
20 TABLET, FILM COATED ORAL NIGHTLY
COMMUNITY

## 2025-06-20 RX ADMIN — LIDOCAINE HYDROCHLORIDE 100 MG: 10 INJECTION, SOLUTION INFILTRATION; PERINEURAL at 08:31

## 2025-06-20 RX ADMIN — SODIUM CHLORIDE, POTASSIUM CHLORIDE, SODIUM LACTATE AND CALCIUM CHLORIDE: 600; 310; 30; 20 INJECTION, SOLUTION INTRAVENOUS at 08:32

## 2025-06-20 RX ADMIN — PROPOFOL 100 MG: 10 INJECTION, EMULSION INTRAVENOUS at 08:35

## 2025-06-20 RX ADMIN — SODIUM CHLORIDE, SODIUM LACTATE, POTASSIUM CHLORIDE, AND CALCIUM CHLORIDE: .6; .31; .03; .02 INJECTION, SOLUTION INTRAVENOUS at 07:43

## 2025-06-20 ASSESSMENT — ENCOUNTER SYMPTOMS: SHORTNESS OF BREATH: 1

## 2025-06-20 ASSESSMENT — PAIN - FUNCTIONAL ASSESSMENT
PAIN_FUNCTIONAL_ASSESSMENT: NONE - DENIES PAIN
PAIN_FUNCTIONAL_ASSESSMENT: NONE - DENIES PAIN
PAIN_FUNCTIONAL_ASSESSMENT: 0-10

## 2025-06-20 NOTE — ANESTHESIA POSTPROCEDURE EVALUATION
Department of Anesthesiology  Postprocedure Note    Patient: Moreno De Leon  MRN: [id number]  YOB: 1980  Date of evaluation: 6/20/2025    Procedure Summary       Date: 06/20/25 Room / Location: [place] [id number] / [facility]    Anesthesia Start: 0831 Anesthesia Stop: 0839    Procedure: ESOPHAGOGASTRODUODENOSCOPY Diagnosis:       Hepatic cirrhosis, unspecified hepatic cirrhosis type, unspecified whether ascites present (HCC)      Gastroesophageal reflux disease, unspecified whether esophagitis present      Epigastric pain      (Hepatic cirrhosis, unspecified hepatic cirrhosis type, unspecified whether ascites present (HCC) [K74.60])      (Gastroesophageal reflux disease, unspecified whether esophagitis present [K21.9])      (Epigastric pain [R10.13])    Surgeons: Francisco Trent MD Responsible Provider: Arie Cannon APRN - CRNA    Anesthesia Type: general ASA Status: 3            Anesthesia Type: No value filed.    Kelvin Phase I: Kelvin Score: 10    Kelvin Phase II: Kelvin Score: 9    Anesthesia Post Evaluation    Patient location during evaluation: PACU  Patient participation: complete - patient participated  Level of consciousness: awake  Pain score: 0  Airway patency: patent  Nausea & Vomiting: no nausea and no vomiting  Cardiovascular status: hemodynamically stable  Respiratory status: spontaneous ventilation and nonlabored ventilation  Hydration status: stable  Multimodal analgesia pain management approach    No notable events documented.

## 2025-06-20 NOTE — OP NOTE
Endoscopic Procedure Note    Patient: Moreno De Leon : 1980  [facility] Rec#: [id number] Acc#: [id number]     Primary Care Provider Rosangela Mtz APRN - CNP  Referring Provider:     Endoscopist: Francisco Trent MD    Date of Procedure:  2025    Procedure:   1. EGD     Indications:   1. Cirrhosis, variceal screening      Anesthesia:  Sedation was administered by anesthesia who monitored the patient during the procedure.    Estimated Blood Loss: minimal    Procedure:   After reviewing the patient's chart and obtaining informed consent, the patient was placed in the left lateral decubitus position.  A forward-viewing Olympus endoscope was lubricated and inserted through the mouth into the oropharynx. Under direct visualization, the upper esophagus was intubated. The scope was advanced to the level of the third portion of duodenum. Scope was slowly withdrawn with careful inspection of the mucosal surfaces. The scope was retroflexed for inspection of the gastric fundus and incisura. Findings and maneuvers are listed in impression below. The patient tolerated the procedure well. The scope was removed. There were no immediate complications.    Findings:   Esophagus: possible small grade I varices. No high risk stigmata.   There is no hiatal hernia present.      Stomach:  abnormal: no ulceration. There was  moderate amount of retained food in the gastric lumen.     Duodenum: normal      IMPRESSION:  1. Grade I varices  2. Retained food in stomach     RECOMMENDATIONS:    1.  Repeat EGD in 2 yrs for variceal screening  2.  Complete Alcohol avoidance.   3.  Follow up with KANNAN SINCLAIR as planned.     The results were discussed with the patient and family.  A copy of the images obtained were given to the patient.     Francisco Trent MD  2025  8:38 AM

## 2025-06-20 NOTE — DISCHARGE INSTRUCTIONS
RECOMMENDATIONS:    1.  Repeat EGD in 2 yrs for variceal screening  2.  Complete Alcohol avoidance.   3.  Follow up with KANNAN SINCLAIR as planned.     Upper GI Endoscopy: What to Expect at Home  Your Recovery  You had an upper GI endoscopy. Your doctor used a thin, lighted tube that bends to look at the inside of your esophagus, your stomach, and the first part of the small intestine, called the duodenum.    How can you care for yourself at home?  Activity   Rest as much as you need to after you go home.  You should be able to go back to your usual activities the day after the test.  Due to anesthesia, no driving or operating equipment for 24 hours.  Diet   Follow your doctor's directions for eating after the test.  Drink plenty of fluids (unless your doctor has told you not to).  Medications   If you have a sore throat the day after the test, use an over-the-counter spray to numb your throat.  When should you call for help?   Call 911 anytime you think you may need emergency care. For example, call if:    You passed out (lost consciousness).     You have trouble breathing.     You pass maroon or bloody stools.   Call your doctor now or seek immediate medical care if:    You have pain that does not get better after your take pain medicine.     You have new or worse belly pain.     You have blood in your stools.     You are sick to your stomach and cannot keep fluids down.     You have a fever.     You cannot pass stools or gas.   Watch closely for changes in your health, and be sure to contact your doctor if:    Your throat still hurts after a day or two.     You do not get better as expected.

## 2025-06-20 NOTE — H&P
Patient Name: Moreno De Leon  : 1980  MRN: [id number]  DATE: 25    Allergies: No Known Allergies     ENDOSCOPY  History and Physical    Procedure:    [] Diagnostic Colonoscopy       [] Screening Colonoscopy  [x] EGD      [] ERCP      [] EUS       [] Other    [x] Previous office notes/History and Physical reviewed from the patients chart. Please see EMR for further details of HPI. I have examined the patient's status immediately prior to the procedure and:      Indications/HPI:    []Abdominal Pain   []Barretts  []Screening/Surveillance   []History of Polyps  []Dysphagia            [] +Cologard/DNA testing  []Abnormal Imaging              []EOE Hx              [] Family Hx of CRC/Polyps  []Anemia                            []Food Impaction       []Recent Poor Prep  []GI Bleed             []Lymphadenopathy  []History of Polyps  []Change in bowel habits []Heartburn/Reflux  []Cancer- GI/Lung  []Chest Pain - Non Cardiac []Heme (+) Stool []Ulcers  []Constipation  []Hemoptysis  []Incontinence    []Diarrhea  []Hypoxemia  []Rectal Bleed (BRBPR)  []Nausea/Vomiting   [x] Varices  []Crohns/Colitis  []Pancreatic Cyst   [] Cirrhosis   []Pancreatitis    []Abnormal MRCP  []Elevated LFT [] Stent Removal, Previous ERCP  []Other:     Anesthesia:   [x] MAC [] Moderate Sedation   [] General   [] None     ROS: 12 pt Review of Symptoms was negative unless mentioned above    Medications:   Prior to Admission medications    Medication Sig Start Date End Date Taking? Authorizing Provider   OLANZapine (ZYPREXA) 20 MG tablet Take 1 tablet by mouth nightly   Yes ProviderOmkar MD   rifAXIMin (XIFAXAN) 550 MG tablet Take 1 tablet by mouth 2 times daily 25  Yes Elise Cisse APRN   bumetanide (BUMEX) 2 MG tablet Take 1 tablet by mouth daily 25  Yes Poonam Gabriel MD   spironolactone (ALDACTONE) 100 MG tablet Take 1 tablet by mouth daily 25  Yes Poonam Gabriel MD   potassium chloride  (KLOR-CON M) 20 MEQ extended release tablet Take 2 tablets by mouth 2 times daily 5/23/25  Yes Poonam Gabriel MD   midodrine (PROAMATINE) 10 MG tablet Take 1 tablet by mouth 3 times daily (with meals) 5/23/25  Yes Poonam Gabriel MD   omeprazole (PRILOSEC) 40 MG delayed release capsule Take 1 capsule by mouth 2 times daily (before meals) 5/23/25  Yes Poonam Gabriel MD   FLUoxetine (PROZAC) 10 MG capsule Take 1 capsule by mouth daily 4/10/25  Yes Omkar Quick MD   propranolol (INDERAL) 10 MG tablet Take 1 tablet by mouth 2 times daily   Yes Omkar Quick MD   lactulose (CHRONULAC) 10 GM/15ML solution Take 45 mLs by mouth 2 times daily 5/6/25  Yes Hayden Irby, APRN - CNP   carvedilol (COREG) 12.5 MG tablet Take 1 tablet by mouth 2 times daily (with meals) 5/3/24  Yes Narinder Munoz DO   folic acid (FOLVITE) 1 MG tablet Take 1 tablet by mouth daily 5/4/24  Yes Narinder Munoz DO   vitamin D (ERGOCALCIFEROL) 1.25 MG (28278 UT) CAPS capsule TAKE 1 CAPSULE BY MOUTH ONCE A WEEK 8/25/22   ProviderOmkar MD       Past Medical History:  Past Medical History:   Diagnosis Date    Alcohol abuse     Cancer of tonsil (HCC) 05/09/2023    Dog bite     Oropharyngeal dysphagia 05/09/2023    Palliative care patient     Psychiatric problem     Suicidal ideation 04/21/2019       Past Surgical History:  Past Surgical History:   Procedure Laterality Date    ADENOIDECTOMY      COLONOSCOPY  2020    normal per patient    TONSILLECTOMY Right     TYMPANOSTOMY TUBE PLACEMENT      US BIOPSY LYMPH NODE  10/21/2022    US LYMPH NODE BIOPSY 10/21/2022 [facility] ULTRASOUND       Social History:  Social History     Tobacco Use    Smoking status: Every Day     Current packs/day: 1.00     Average packs/day: 1 pack/day for 20.0 years (20.0 ttl pk-yrs)     Types: Cigarettes    Smokeless tobacco: Never   Vaping Use    Vaping status: Never Used   Substance Use Topics    Alcohol use: Not Currently     Comment: not

## 2025-06-20 NOTE — ANESTHESIA PRE PROCEDURE
Department of Anesthesiology  Preprocedure Note       Name:  Moreno De Leon   Age:  44 y.o.  :  1980                                          MRN:  [id number]         Date:  2025      Surgeon: Surgeon(s):  Francisco Trent MD    Procedure: Procedure(s):  ESOPHAGOGASTRODUODENOSCOPY BIOPSY    Medications prior to admission:   Prior to Admission medications    Medication Sig Start Date End Date Taking? Authorizing Provider   OLANZapine (ZYPREXA) 20 MG tablet Take 1 tablet by mouth nightly   Yes Omkar Quick MD   rifAXIMin (XIFAXAN) 550 MG tablet Take 1 tablet by mouth 2 times daily 25  Yes Elise Cisse APRN   bumetanide (BUMEX) 2 MG tablet Take 1 tablet by mouth daily 25  Yes Poonam Gabriel MD   spironolactone (ALDACTONE) 100 MG tablet Take 1 tablet by mouth daily 25  Yes Poonam Gabriel MD   potassium chloride (KLOR-CON M) 20 MEQ extended release tablet Take 2 tablets by mouth 2 times daily 25  Yes Poonam Gabriel MD   midodrine (PROAMATINE) 10 MG tablet Take 1 tablet by mouth 3 times daily (with meals) 25  Yes Poonam Gabriel MD   omeprazole (PRILOSEC) 40 MG delayed release capsule Take 1 capsule by mouth 2 times daily (before meals) 25  Yes Poonma Gabriel MD   FLUoxetine (PROZAC) 10 MG capsule Take 1 capsule by mouth daily 4/10/25  Yes Omkar Quick MD   propranolol (INDERAL) 10 MG tablet Take 1 tablet by mouth 2 times daily   Yes Omkar Quick MD   lactulose (CHRONULAC) 10 GM/15ML solution Take 45 mLs by mouth 2 times daily 25  Yes Hayden Irby, APRN - CNP   carvedilol (COREG) 12.5 MG tablet Take 1 tablet by mouth 2 times daily (with meals) 5/3/24  Yes Narinder Munoz,    folic acid (FOLVITE) 1 MG tablet Take 1 tablet by mouth daily 24  Yes Narinder Munoz, DO   vitamin D (ERGOCALCIFEROL) 1.25 MG (45466 UT) CAPS capsule TAKE 1 CAPSULE BY MOUTH ONCE A WEEK 22   Provider, MD Omkar       Current

## 2025-06-24 ENCOUNTER — TELEPHONE (OUTPATIENT)
Dept: GASTROENTEROLOGY | Facility: CLINIC | Age: 45
End: 2025-06-24
Payer: COMMERCIAL

## 2025-06-24 NOTE — TELEPHONE ENCOUNTER
Called Hammad Moctezuma  to reschedule missed appointment. No answer, left message to call to reschedule at [phone]

## 2025-06-27 ENCOUNTER — HOSPITAL ENCOUNTER (OUTPATIENT)
Dept: ULTRASOUND IMAGING | Age: 45
Discharge: HOME | End: 2025-06-27
Payer: MEDICAID

## 2025-06-27 VITALS
OXYGEN SATURATION: 98 % | HEIGHT: 70 IN | HEART RATE: 105 BPM | RESPIRATION RATE: 16 BRPM | DIASTOLIC BLOOD PRESSURE: 80 MMHG | SYSTOLIC BLOOD PRESSURE: 110 MMHG | TEMPERATURE: 97.4 F | WEIGHT: 155 LBS | BODY MASS INDEX: 22.19 KG/M2

## 2025-06-27 DIAGNOSIS — K70.31 ALCOHOLIC CIRRHOSIS OF LIVER WITH ASCITES (HCC): ICD-10-CM

## 2025-06-27 DIAGNOSIS — K76.82 HEPATIC ENCEPHALOPATHY (HCC): ICD-10-CM

## 2025-06-27 LAB
INR PPP: 1.23 (ref 0.88–1.18)
PLATELET # BLD AUTO: 291 K/UL (ref 130–400)
PROTHROMBIN TIME: 15.5 SEC (ref 12–14.6)

## 2025-06-27 PROCEDURE — P9047 ALBUMIN (HUMAN), 25%, 50ML: HCPCS | Performed by: NURSE PRACTITIONER

## 2025-06-27 PROCEDURE — 6360000002 HC RX W HCPCS: Performed by: NURSE PRACTITIONER

## 2025-06-27 PROCEDURE — 85610 PROTHROMBIN TIME: CPT

## 2025-06-27 PROCEDURE — 85049 AUTOMATED PLATELET COUNT: CPT

## 2025-06-27 PROCEDURE — C1729 CATH, DRAINAGE: HCPCS

## 2025-06-27 RX ORDER — ALBUMIN (HUMAN) 12.5 G/50ML
25 SOLUTION INTRAVENOUS ONCE
Status: COMPLETED | OUTPATIENT
Start: 2025-06-27 | End: 2025-06-27

## 2025-06-27 RX ADMIN — ALBUMIN (HUMAN) 25 G: 0.25 INJECTION, SOLUTION INTRAVENOUS at 14:20

## 2025-06-27 ASSESSMENT — PAIN - FUNCTIONAL ASSESSMENT: PAIN_FUNCTIONAL_ASSESSMENT: NONE - DENIES PAIN

## 2025-06-27 NOTE — PROGRESS NOTES
Pt returned to [facility] RM 2.  VSS. Patient in semi fowlers position. CLWR.

## 2025-06-27 NOTE — DISCHARGE INSTRUCTIONS
[place], [place]  PATIENT EDUCATION  Paracentesis Discharge Instructions  Care Needed at Home:  · Salt causes fluid to build up. Limit your salt intake to 2 grams each day. Do not add salt to your  food. Try to cook your own food and avoid fast food or processed food. Read the labels on your  food to see how much salt they contain.  · You may remove bandage in 24 hours if no bleeding, keep site clean and dry.  · You may shower the next day.  · Be sure to wash your hands before touching the wound or dressing.  · May resume Coumadin (warfarin) or Plavix in 4 days.  · Take it easy for 24 hours after the procedure, avoid physical activity.  · No driving for 24 hours.  What Problems Could Happen?  · Upset stomach or throwing up  · Damage to the bladder, bowel, or a blood vessel in the belly.  · Infection  · Drop in blood due to large amount of fluid removed  When Should I Call the Doctor?  · Signs of infection: fever of 100.4 or higher, chills or non-healing wound.  · Signs of a wound infection: swelling, redness, warmth around the wound; too much pain when  touched; yellowish, greenish discharge; foul smell coming from the puncture site.  · You have sudden, sharp pain in your abdomen, not pain from puncture site.  · You urinate very little or not at all or have blood in your urine  · Your symptoms from ascites return, such as decreased appetite for food and shortness of  breath. You may gain weight for no known reason.  · You feel confused, suddenly light headed and more tired than usual.  · You have nausea or vomiting.  · You have more than a small amount of fluid leakage from puncture site or still draining 24-48  hours after paracentesis.  _____________________________________________________________________________________  Signature of Patient or Responsible Person Date Time  _____________________________________________________________________________________  Signature of Instructing Nurse Date Time

## 2025-06-27 NOTE — PROGRESS NOTES
Pt to [facility] RM 2. Labs obtained. VS obtained. Patient stable. Questions answered. CLWR.

## 2025-06-30 ENCOUNTER — TELEPHONE (OUTPATIENT)
Dept: GASTROENTEROLOGY | Age: 45
End: 2025-06-30

## 2025-06-30 DIAGNOSIS — K70.31 ALCOHOLIC CIRRHOSIS OF LIVER WITH ASCITES (HCC): Primary | ICD-10-CM

## 2025-06-30 NOTE — TELEPHONE ENCOUNTER
Moreno called stating that the hepatologist cannot get him in until the very end of December or January. Patient is asking if there is anywhere else he can go sooner. Please contact patient to advise.    Thank you.

## 2025-06-30 NOTE — TELEPHONE ENCOUNTER
Indra called me back and was told about records being faxed to [facility]. And he did need to call [facility] back and sched. We can always cx if [facility] gets him in sooner.    Pt voiced understanding and will call them back.      Cess, he is needing another paracentesis scheduled.  Can you put order in please.  thanks

## 2025-07-03 ENCOUNTER — HOSPITAL ENCOUNTER (OUTPATIENT)
Dept: ULTRASOUND IMAGING | Age: 45
Discharge: HOME | End: 2025-07-03
Payer: MEDICAID

## 2025-07-03 VITALS
DIASTOLIC BLOOD PRESSURE: 67 MMHG | BODY MASS INDEX: 22.19 KG/M2 | TEMPERATURE: 97 F | HEIGHT: 70 IN | HEART RATE: 97 BPM | SYSTOLIC BLOOD PRESSURE: 104 MMHG | OXYGEN SATURATION: 100 % | WEIGHT: 155 LBS | RESPIRATION RATE: 16 BRPM

## 2025-07-03 DIAGNOSIS — K70.31 ALCOHOLIC CIRRHOSIS OF LIVER WITH ASCITES (HCC): ICD-10-CM

## 2025-07-03 LAB
APPEARANCE FLD: CLEAR
BODY FLD TYPE: NORMAL
CLOT EVALUATION: NORMAL
COLOR FLD: YELLOW
LYMPHOCYTES NFR FLD: 53 %
MACROPHAGES NFR FLD MANUAL: 8 %
MONOCYTES NFR FLD: 24 %
NEUTROPHILS NFR FLD: 15 %
NUCLEATED CELLS FLUID: 126 /CUMM
RBC # FLD: <2000 /CUMM
TOTAL CELLS COUNTED FLD: 100

## 2025-07-03 PROCEDURE — 87070 CULTURE OTHR SPECIMN AEROBIC: CPT

## 2025-07-03 PROCEDURE — C1729 CATH, DRAINAGE: HCPCS

## 2025-07-03 PROCEDURE — 87205 SMEAR GRAM STAIN: CPT

## 2025-07-03 PROCEDURE — 87075 CULTR BACTERIA EXCEPT BLOOD: CPT

## 2025-07-03 PROCEDURE — 89051 BODY FLUID CELL COUNT: CPT

## 2025-07-03 RX ORDER — SODIUM CHLORIDE 0.9 % (FLUSH) 0.9 %
10 SYRINGE (ML) INJECTION PRN
Status: DISCONTINUED | OUTPATIENT
Start: 2025-07-03 | End: 2025-07-05 | Stop reason: HOSPADM

## 2025-07-03 ASSESSMENT — PAIN - FUNCTIONAL ASSESSMENT: PAIN_FUNCTIONAL_ASSESSMENT: NONE - DENIES PAIN

## 2025-07-03 NOTE — PROGRESS NOTES
Pt returned to [facility] via stretcher.  CLWR. VSS. D/C instructions provided. LDA removed by LOWELL Kothari. Pt left in stable condition.

## 2025-07-03 NOTE — PROGRESS NOTES
Pt to [facility] ED rm 1. Vss. Consent signed. Questions answered. Patient ready for procedure.

## 2025-07-07 ENCOUNTER — TELEPHONE (OUTPATIENT)
Dept: GASTROENTEROLOGY | Age: 45
End: 2025-07-07

## 2025-07-07 DIAGNOSIS — K70.31 ALCOHOLIC CIRRHOSIS OF LIVER WITH ASCITES (HCC): Primary | ICD-10-CM

## 2025-07-07 NOTE — TELEPHONE ENCOUNTER
07- Patient called [facility] that he had a Paracentesis done on 07- Thursday.  Complains that he is already swollen back up and that he is leaking fluid out of what spot on his side where the paracentesis was done       Routed to JG APRN   (CT APRN patient)          Discussed with JG APRN   Check to see how much the patient is leaking from previous Paracentesis site.  If leaking to the point of soaking clothes then should proceed to ER but if just a little then recommend using a 4 x 4 gauze.  Will place a order for another Paracentesis       Called the patient  Mobile number-no answer and voice mail is full   Called the patients home number voice mail full and can't accept messages         Patient returned call   Questioned if the spot is leaking to the point that it is soaking his clothes or if leaking a little bit?  He stated that it was leaking really bad but has slowed down and isn't even leaking through his bandage.  Notified of recommendation as per JG APRN       He would like an apt with CT APRN-scheduled for 07-     Transferred to Central Scheduling to get next Paracentesis scheduled.

## 2025-07-08 ENCOUNTER — RESULTS FOLLOW-UP (OUTPATIENT)
Dept: GASTROENTEROLOGY | Age: 45
End: 2025-07-08

## 2025-07-09 ENCOUNTER — OFFICE VISIT (OUTPATIENT)
Dept: GASTROENTEROLOGY | Age: 45
End: 2025-07-09
Payer: MEDICAID

## 2025-07-09 VITALS
SYSTOLIC BLOOD PRESSURE: 120 MMHG | HEIGHT: 69 IN | WEIGHT: 164 LBS | BODY MASS INDEX: 24.29 KG/M2 | DIASTOLIC BLOOD PRESSURE: 80 MMHG

## 2025-07-09 DIAGNOSIS — C09.9 CANCER OF TONSIL (HCC): ICD-10-CM

## 2025-07-09 DIAGNOSIS — K76.82 HEPATIC ENCEPHALOPATHY (HCC): ICD-10-CM

## 2025-07-09 DIAGNOSIS — K70.31 ALCOHOLIC CIRRHOSIS OF LIVER WITH ASCITES (HCC): Primary | ICD-10-CM

## 2025-07-09 DIAGNOSIS — K21.9 GASTROESOPHAGEAL REFLUX DISEASE, UNSPECIFIED WHETHER ESOPHAGITIS PRESENT: ICD-10-CM

## 2025-07-09 PROCEDURE — 99214 OFFICE O/P EST MOD 30 MIN: CPT | Performed by: NURSE PRACTITIONER

## 2025-07-09 PROCEDURE — 3079F DIAST BP 80-89 MM HG: CPT | Performed by: NURSE PRACTITIONER

## 2025-07-09 PROCEDURE — G8427 DOCREV CUR MEDS BY ELIG CLIN: HCPCS | Performed by: NURSE PRACTITIONER

## 2025-07-09 PROCEDURE — G8420 CALC BMI NORM PARAMETERS: HCPCS | Performed by: NURSE PRACTITIONER

## 2025-07-09 PROCEDURE — 4004F PT TOBACCO SCREEN RCVD TLK: CPT | Performed by: NURSE PRACTITIONER

## 2025-07-09 PROCEDURE — 3074F SYST BP LT 130 MM HG: CPT | Performed by: NURSE PRACTITIONER

## 2025-07-10 ENCOUNTER — HOSPITAL ENCOUNTER (OUTPATIENT)
Dept: ULTRASOUND IMAGING | Age: 45
Discharge: HOME | End: 2025-07-10
Payer: MEDICAID

## 2025-07-10 VITALS
TEMPERATURE: 98 F | DIASTOLIC BLOOD PRESSURE: 83 MMHG | OXYGEN SATURATION: 99 % | RESPIRATION RATE: 18 BRPM | SYSTOLIC BLOOD PRESSURE: 118 MMHG | HEART RATE: 125 BPM

## 2025-07-10 DIAGNOSIS — K70.31 ALCOHOLIC CIRRHOSIS OF LIVER WITH ASCITES (HCC): ICD-10-CM

## 2025-07-10 LAB
APPEARANCE FLD: NORMAL
BACTERIA SPEC ANAEROBE CULT: ABNORMAL
BACTERIA SPEC ANAEROBE+AEROBE CULT: ABNORMAL
BODY FLD TYPE: NORMAL
CLOT EVALUATION: NORMAL
COLOR FLD: YELLOW
GRAM STN SPEC: ABNORMAL
LYMPHOCYTES NFR FLD: 39 %
MACROPHAGES NFR FLD MANUAL: 8 %
MONOCYTES NFR FLD: 36 %
NEUTROPHILS NFR FLD: 17 %
NUCLEATED CELLS FLUID: 132 /CUMM
ORGANISM: ABNORMAL
RBC # FLD: <2000 /CUMM
TOTAL CELLS COUNTED FLD: 100

## 2025-07-10 PROCEDURE — 6360000002 HC RX W HCPCS: Performed by: NURSE PRACTITIONER

## 2025-07-10 PROCEDURE — 87075 CULTR BACTERIA EXCEPT BLOOD: CPT

## 2025-07-10 PROCEDURE — 87205 SMEAR GRAM STAIN: CPT

## 2025-07-10 PROCEDURE — 87070 CULTURE OTHR SPECIMN AEROBIC: CPT

## 2025-07-10 PROCEDURE — C1729 CATH, DRAINAGE: HCPCS

## 2025-07-10 PROCEDURE — 89051 BODY FLUID CELL COUNT: CPT

## 2025-07-10 PROCEDURE — 87015 SPECIMEN INFECT AGNT CONCNTJ: CPT

## 2025-07-10 PROCEDURE — P9047 ALBUMIN (HUMAN), 25%, 50ML: HCPCS | Performed by: NURSE PRACTITIONER

## 2025-07-10 RX ORDER — ALBUMIN (HUMAN) 12.5 G/50ML
25 SOLUTION INTRAVENOUS ONCE
Status: COMPLETED | OUTPATIENT
Start: 2025-07-10 | End: 2025-07-10

## 2025-07-10 RX ADMIN — ALBUMIN (HUMAN) 25 G: 0.25 INJECTION, SOLUTION INTRAVENOUS at 16:13

## 2025-07-10 ASSESSMENT — PAIN - FUNCTIONAL ASSESSMENT: PAIN_FUNCTIONAL_ASSESSMENT: NONE - DENIES PAIN

## 2025-07-10 NOTE — PROGRESS NOTES
Pt arrived in [facility] Rm 1 by foot. VSS, consent signed, no questions at this time.  Patient here for paracentesis.

## 2025-07-13 LAB
BACTERIA FLD AEROBE CULT: NORMAL
BACTERIA SPEC ANAEROBE CULT: NORMAL
GRAM STN SPEC: NORMAL

## 2025-07-15 LAB
BACTERIA FLD AEROBE CULT: ABNORMAL
BACTERIA SPEC ANAEROBE CULT: ABNORMAL
GRAM STN SPEC: ABNORMAL
ORGANISM: ABNORMAL

## 2025-07-16 ASSESSMENT — ENCOUNTER SYMPTOMS
VOMITING: 0
BLOOD IN STOOL: 0
DIARRHEA: 0
ABDOMINAL DISTENTION: 0
CHOKING: 0
COUGH: 0
TROUBLE SWALLOWING: 0
CONSTIPATION: 0
SHORTNESS OF BREATH: 0
NAUSEA: 0
ANAL BLEEDING: 0
RECTAL PAIN: 0
ABDOMINAL PAIN: 0

## 2025-07-17 DIAGNOSIS — K70.31 ALCOHOLIC CIRRHOSIS OF LIVER WITH ASCITES (HCC): Primary | ICD-10-CM

## 2025-07-18 ENCOUNTER — TELEPHONE (OUTPATIENT)
Dept: GASTROENTEROLOGY | Age: 45
End: 2025-07-18

## 2025-07-18 ENCOUNTER — HOSPITAL ENCOUNTER (EMERGENCY)
Age: 45
Discharge: HOME | End: 2025-07-18
Attending: EMERGENCY MEDICINE
Payer: MEDICAID

## 2025-07-18 ENCOUNTER — APPOINTMENT (OUTPATIENT)
Dept: ULTRASOUND IMAGING | Age: 45
End: 2025-07-18
Payer: MEDICAID

## 2025-07-18 ENCOUNTER — TELEPHONE (OUTPATIENT)
Dept: SURGERY | Facility: CLINIC | Age: 45
End: 2025-07-18
Payer: COMMERCIAL

## 2025-07-18 VITALS
TEMPERATURE: 97.5 F | BODY MASS INDEX: 25.08 KG/M2 | RESPIRATION RATE: 18 BRPM | SYSTOLIC BLOOD PRESSURE: 109 MMHG | OXYGEN SATURATION: 98 % | DIASTOLIC BLOOD PRESSURE: 77 MMHG | HEART RATE: 104 BPM | WEIGHT: 169.8 LBS

## 2025-07-18 DIAGNOSIS — K70.31 ALCOHOLIC CIRRHOSIS OF LIVER WITH ASCITES (HCC): Primary | ICD-10-CM

## 2025-07-18 DIAGNOSIS — Z98.890 STATUS POST ABDOMINAL PARACENTESIS: ICD-10-CM

## 2025-07-18 LAB
ALBUMIN SERPL-MCNC: 2.9 G/DL (ref 3.5–5.2)
ALP SERPL-CCNC: 230 U/L (ref 40–129)
ALT SERPL-CCNC: 17 U/L (ref 10–50)
ANION GAP SERPL CALCULATED.3IONS-SCNC: 9 MMOL/L (ref 8–16)
APTT PPP: 27.3 SEC (ref 26–36.2)
AST SERPL-CCNC: 61 U/L (ref 10–50)
BASOPHILS # BLD: 0.1 K/UL (ref 0–0.2)
BASOPHILS NFR BLD: 1.1 % (ref 0–1)
BILIRUB SERPL-MCNC: 0.6 MG/DL (ref 0.2–1.2)
BUN SERPL-MCNC: 7 MG/DL (ref 6–20)
CALCIUM SERPL-MCNC: 9.3 MG/DL (ref 8.6–10)
CHLORIDE SERPL-SCNC: 105 MMOL/L (ref 98–107)
CO2 SERPL-SCNC: 20 MMOL/L (ref 22–29)
CREAT SERPL-MCNC: 0.6 MG/DL (ref 0.7–1.2)
EOSINOPHIL # BLD: 0.4 K/UL (ref 0–0.6)
EOSINOPHIL NFR BLD: 3 % (ref 0–5)
ERYTHROCYTE [DISTWIDTH] IN BLOOD BY AUTOMATED COUNT: 13.7 % (ref 11.5–14.5)
GLUCOSE SERPL-MCNC: 94 MG/DL (ref 70–99)
HCT VFR BLD AUTO: 36.7 % (ref 42–52)
HGB BLD-MCNC: 12.8 G/DL (ref 14–18)
IMM GRANULOCYTES # BLD: 0.1 K/UL
INR PPP: 1.09 (ref 0.88–1.18)
LIPASE SERPL-CCNC: 14 U/L (ref 13–60)
LYMPHOCYTES # BLD: 2.3 K/UL (ref 1.1–4.5)
LYMPHOCYTES NFR BLD: 19.1 % (ref 20–40)
MCH RBC QN AUTO: 34.5 PG (ref 27–31)
MCHC RBC AUTO-ENTMCNC: 34.9 G/DL (ref 33–37)
MCV RBC AUTO: 98.9 FL (ref 80–94)
MONOCYTES # BLD: 1.5 K/UL (ref 0–0.9)
MONOCYTES NFR BLD: 12.4 % (ref 0–10)
NEUTROPHILS # BLD: 7.7 K/UL (ref 1.5–7.5)
NEUTS SEG NFR BLD: 63.7 % (ref 50–65)
PLATELET # BLD AUTO: 251 K/UL (ref 130–400)
PMV BLD AUTO: 10.3 FL (ref 9.4–12.4)
POTASSIUM SERPL-SCNC: 5.3 MMOL/L (ref 3.5–5.1)
PROT SERPL-MCNC: 6.5 G/DL (ref 6.4–8.3)
PROTHROMBIN TIME: 14 SEC (ref 12–14.6)
RBC # BLD AUTO: 3.71 M/UL (ref 4.7–6.1)
SODIUM SERPL-SCNC: 134 MMOL/L (ref 136–145)
WBC # BLD AUTO: 12.1 K/UL (ref 4.8–10.8)

## 2025-07-18 PROCEDURE — 80053 COMPREHEN METABOLIC PANEL: CPT

## 2025-07-18 PROCEDURE — 83690 ASSAY OF LIPASE: CPT

## 2025-07-18 PROCEDURE — 85025 COMPLETE CBC W/AUTO DIFF WBC: CPT

## 2025-07-18 PROCEDURE — 10160 PNXR ASPIR ABSC HMTMA BULLA: CPT

## 2025-07-18 PROCEDURE — 85730 THROMBOPLASTIN TIME PARTIAL: CPT

## 2025-07-18 PROCEDURE — 99284 EMERGENCY DEPT VISIT MOD MDM: CPT

## 2025-07-18 PROCEDURE — 36415 COLL VENOUS BLD VENIPUNCTURE: CPT

## 2025-07-18 PROCEDURE — P9047 ALBUMIN (HUMAN), 25%, 50ML: HCPCS | Performed by: EMERGENCY MEDICINE

## 2025-07-18 PROCEDURE — 96365 THER/PROPH/DIAG IV INF INIT: CPT

## 2025-07-18 PROCEDURE — 6360000002 HC RX W HCPCS: Performed by: EMERGENCY MEDICINE

## 2025-07-18 PROCEDURE — 85610 PROTHROMBIN TIME: CPT

## 2025-07-18 RX ORDER — ALBUMIN (HUMAN) 12.5 G/50ML
50 SOLUTION INTRAVENOUS ONCE
Status: COMPLETED | OUTPATIENT
Start: 2025-07-18 | End: 2025-07-18

## 2025-07-18 RX ADMIN — ALBUMIN (HUMAN) 50 G: 0.25 INJECTION, SOLUTION INTRAVENOUS at 16:51

## 2025-07-18 ASSESSMENT — ENCOUNTER SYMPTOMS
DIARRHEA: 0
SHORTNESS OF BREATH: 0
COUGH: 0
VOMITING: 0
ABDOMINAL PAIN: 0
NAUSEA: 0
BACK PAIN: 0
ABDOMINAL DISTENTION: 1

## 2025-07-18 NOTE — CARE COORDINATION
SW spoke to PT bedside regarding paracentesis appointments. PT said it is difficult to get a resonse from [facility] [phone] to schedule appointments. Since it is after hours on a Friday, JOSE LUIS will email Arturo Sanchez, nurse at [facility] <[email or web address]> to set up next weeks appoint and set up recurring appointments. JOSE LUIS will call PT back once nurse responds.

## 2025-07-18 NOTE — ED PROVIDER NOTES
[facility] EMERGENCY DEPARTMENT  eMERGENCY dEPARTMENT eNCOUnter      Pt Name: Moreno De Leon  MRN: [id number]  Birthdate 1980  Date of evaluation: 7/18/2025  Provider: ALYSSA JOHNSON MD    CHIEF COMPLAINT       Chief Complaint   Patient presents with    Abdominal Pain     And swelling          HISTORY OF PRESENT ILLNESS   (Location/Symptom, Timing/Onset,Context/Setting, Quality, Duration, Modifying Factors, Severity)  Note limiting factors.   Moreno De Leon is a 44 y.o. male who presents to the emergency department for abdominal distention.  He denies any pain.  Has known alcoholic cirrhosis and gets frequent paracentesis often weekly but has been having difficulty reaching the office to arrange his appointments and states he also messaged the GI office and has not heard back.  He denies any GI symptoms currently.  No fevers or chills.  Does have a small area of dry erythematous skin from his last paracentesis in right lower quadrant.  He was given a shot of antibiotic and started on Bactrim and given mupirocin yesterday by PCP.  Has been sober from alcohol since February.    HPI    NursingNotes were reviewed.    REVIEW OF SYSTEMS    (2-9 systems for level 4, 10 or more for level 5)     Review of Systems   Constitutional:  Negative for fever.   Respiratory:  Negative for cough and shortness of breath.    Cardiovascular:  Negative for chest pain.   Gastrointestinal:  Positive for abdominal distention. Negative for abdominal pain, diarrhea, nausea and vomiting.   Genitourinary:  Negative for dysuria and frequency.   Musculoskeletal:  Negative for back pain.   Neurological:  Negative for dizziness and headaches.          PAST MEDICALHISTORY     Past Medical History:   Diagnosis Date    Alcohol abuse     Cancer of tonsil (HCC) 05/09/2023    Dog bite     Oropharyngeal dysphagia 05/09/2023    Palliative care patient     Psychiatric problem     Suicidal ideation 04/21/2019         SURGICAL HISTORY       Past

## 2025-07-18 NOTE — TELEPHONE ENCOUNTER
Attempted to contact PT regarding scheduling for their referral. Mailbox was full and I was unable to leave a message.      ECC  7/18/2025   no

## 2025-07-18 NOTE — TELEPHONE ENCOUNTER
7-  Per CT APRN OV Note             Have sent referral to Dr Guzmán     Phone number: [phone]  Fax number: [phone]      Fax confirmation scanned in media and attached to this encounter.

## 2025-07-21 NOTE — CARE COORDINATION
JOSE LUIS received an email back from Rosette Guardado with [facility] regarding recurring paracentesis appointments for PT. Lisbeth stated she will call the PT and set up recurring appointments.

## 2025-07-24 ENCOUNTER — OFFICE VISIT (OUTPATIENT)
Dept: SURGERY | Facility: CLINIC | Age: 45
End: 2025-07-24
Payer: COMMERCIAL

## 2025-07-24 VITALS
SYSTOLIC BLOOD PRESSURE: 118 MMHG | BODY MASS INDEX: 22.07 KG/M2 | HEIGHT: 70 IN | WEIGHT: 154.2 LBS | DIASTOLIC BLOOD PRESSURE: 88 MMHG

## 2025-07-24 DIAGNOSIS — K70.31 ALCOHOLIC CIRRHOSIS OF LIVER WITH ASCITES: Primary | ICD-10-CM

## 2025-07-24 RX ORDER — PROPRANOLOL HYDROCHLORIDE 10 MG/1
10 TABLET ORAL 2 TIMES DAILY
COMMUNITY

## 2025-07-24 RX ORDER — CARVEDILOL 12.5 MG/1
12.5 TABLET ORAL 2 TIMES DAILY WITH MEALS
COMMUNITY

## 2025-07-24 RX ORDER — POTASSIUM CHLORIDE 20MEQ/15ML
LIQUID (ML) ORAL DAILY
COMMUNITY

## 2025-07-24 RX ORDER — OMEPRAZOLE 40 MG/1
40 CAPSULE, DELAYED RELEASE ORAL DAILY
COMMUNITY

## 2025-07-24 RX ORDER — BUMETANIDE 2 MG/1
2 TABLET ORAL DAILY
COMMUNITY

## 2025-07-24 RX ORDER — MIDODRINE HYDROCHLORIDE 5 MG/1
10 TABLET ORAL
COMMUNITY

## 2025-07-24 RX ORDER — FOLIC ACID 1 MG/1
1 TABLET ORAL DAILY
COMMUNITY

## 2025-07-24 RX ORDER — ERGOCALCIFEROL 1.25 MG/1
50000 CAPSULE, LIQUID FILLED ORAL WEEKLY
COMMUNITY

## 2025-07-24 RX ORDER — SPIRONOLACTONE 100 MG/1
100 TABLET, FILM COATED ORAL DAILY
COMMUNITY

## 2025-07-24 RX ORDER — FLUOXETINE 10 MG/1
10 CAPSULE ORAL DAILY
COMMUNITY

## 2025-07-24 RX ORDER — LACTULOSE 10 G/15ML
20 SOLUTION ORAL 2 TIMES DAILY PRN
COMMUNITY

## 2025-07-24 NOTE — PROGRESS NOTES
GENERAL SURGERY OFFICE NEW PATIENT HISTORY AND PHYSICAL    Referring Provider: Ingris Leigh A*  Primary Care Provider: Tamie Bennett APRN    Chief Complaint   Patient presents with    PORT PLACEMENT       Subjective .     History of present illness:  Hammad Moctezuma is a 44 y.o. male with a history of alcoholic cirrhosis with recurrent ascites.  He is currently undergoing paracentesis weekly for symptomatic control.  He is on Bumex, spironolactone, rifaximin, propranolol and lactulose.  Despite being on diuretics, he is still reaccumulating the ascites rapidly and without paracentesis developing severe symptoms of abdominal pain, distention and shortness of breath.  He was referred to hepatology for liver transplantation evaluation. The patient reports that his last alcohol intake was in February of 2025.  Current MELD score is 6, based on his most recent labs.    History:  Past Medical History:   Diagnosis Date    Alcoholic     GERD (gastroesophageal reflux disease)     History of abdominal paracentesis     Liver failure     Tonsil cancer 03/27/2023    right   ,   Past Surgical History:   Procedure Laterality Date    NECK DISSECTION Right 03/27/2023   , History reviewed. No pertinent family history.,   Social History     Tobacco Use    Smoking status: Every Day     Types: Cigarettes   Vaping Use    Vaping status: Never Used   Substance Use Topics    Alcohol use: Not Currently    Drug use: Never       Current Outpatient Medications:     bumetanide (BUMEX) 2 MG tablet, Take 1 tablet by mouth Daily., Disp: , Rfl:     carvedilol (COREG) 12.5 MG tablet, Take 1 tablet by mouth 2 (Two) Times a Day With Meals., Disp: , Rfl:     FLUoxetine (PROzac) 10 MG capsule, Take 1 capsule by mouth Daily., Disp: , Rfl:     folic acid (FOLVITE) 1 MG tablet, Take 1 tablet by mouth Daily., Disp: , Rfl:     lactulose (CHRONULAC) 10 GM/15ML solution, Take 30 mL by mouth 2 (Two) Times a Day As Needed., Disp: , Rfl:      midodrine (PROAMATINE) 5 MG tablet, Take 2 tablets by mouth 3 (Three) Times a Day Before Meals., Disp: , Rfl:     omeprazole (priLOSEC) 40 MG capsule, Take 1 capsule by mouth Daily., Disp: , Rfl:     potassium chloride (KAYCIEL) 20 mEq/15 mL solution, Take  by mouth Daily., Disp: , Rfl:     propranolol (INDERAL) 10 MG tablet, Take 1 tablet by mouth 2 (Two) Times a Day., Disp: , Rfl:     riFAXIMin (XIFAXAN) 550 MG tablet, Take 1 tablet by mouth., Disp: , Rfl:     spironolactone (ALDACTONE) 100 MG tablet, Take 1 tablet by mouth Daily., Disp: , Rfl:     vitamin D (ERGOCALCIFEROL) 1.25 MG (63917 UT) capsule capsule, Take 1 capsule by mouth 1 (One) Time Per Week., Disp: , Rfl:     celecoxib (CeleBREX) 200 MG capsule, Take 1 capsule by mouth Daily. (Patient not taking: Reported on 7/24/2025), Disp: , Rfl:     HYDROcodone-acetaminophen (NORCO) 5-325 MG per tablet, Take 1 tablet by mouth Every 6 (Six) Hours As Needed for Pain. (Patient not taking: Reported on 7/24/2025), Disp: , Rfl:     nystatin (MYCOSTATIN) 100,000 unit/mL suspension, Swish and swallow 5 mL 4 (Four) Times a Day. (Patient not taking: Reported on 7/24/2025), Disp: 280 mL, Rfl: 0    OLANZapine (zyPREXA) 20 MG tablet, Take 1 tablet by mouth every night at bedtime. (Patient not taking: Reported on 7/24/2025), Disp: , Rfl:     omeprazole (priLOSEC) 40 MG capsule, Take 1 capsule by mouth Daily. (Patient not taking: Reported on 7/24/2025), Disp: , Rfl:     PARoxetine (PAXIL) 40 MG tablet, Take 1 tablet by mouth Daily. (Patient not taking: Reported on 7/24/2025), Disp: , Rfl:     Allergies:  Patient has no known allergies.      The following portions of the patient's history were reviewed and updated as appropriate: allergies, current medications, past family history, past medical history, past social history, past surgical history, and problem list.      Review of Systems  A comprehensive 14 point review of systems was performed and is negative unless  "otherwise noted      Objective   /88   Ht 177.8 cm (70\")   Wt 69.9 kg (154 lb 3.2 oz)   BMI 22.13 kg/m²     BMI followup discussion/instruction with patient: none (medical contraindication)      Physical Exam  Constitutional:       Appearance: Normal appearance. He is normal weight.      Comments: Pleasant middle-aged male, in no acute distress. Normal development, normal body habitus. Well nourished   HENT:      Head: Normocephalic and atraumatic.      Right Ear: External ear normal.      Left Ear: External ear normal.      Ears:      Comments: Hearing intact     Nose: Nose normal.      Comments: Nares patent, no septal deviation, no drainage     Mouth/Throat:      Comments: Airway patent, dentition intact, mucus membranes moist  Eyes:      Extraocular Movements: Extraocular movements intact.      Conjunctiva/sclera: Conjunctivae normal.      Pupils: Pupils are equal, round, and reactive to light.      Comments: External eyelids grossly normal, vision intact, no scleral icterus   Neck:      Comments: Trachea midline  Cardiovascular:      Rate and Rhythm: Normal rate.      Comments: Normotensive, no JVD bilaterally  Pulmonary:      Effort: Pulmonary effort is normal.      Breath sounds: Normal breath sounds.      Comments: Normal respiratory rate  Abdominal:      Comments: Protuberant, firm, fluid wave present, redness and excoriation along his right hemiabdomen.  No scars, hernias or masses   Musculoskeletal:         General: Normal range of motion.      Cervical back: Normal range of motion.   Skin:     General: Skin is warm and dry.      Comments: Skin color is consistent with ethnicity   Neurological:      General: No focal deficit present.      Mental Status: He is alert and oriented to person, place, and time.   Psychiatric:         Mood and Affect: Mood normal.         Behavior: Behavior normal.         Thought Content: Thought content normal.         Judgment: Judgment normal.      Comments: Patient " understands disease process and has decision making capacity.              Results:  Labs:  I personally reviewed all pertinent labs.   Imaging: I personally reviewed all pertinent imaging studies.   Pathology:        Assessment & Plan   Diagnoses and all orders for this visit:    1. Alcoholic cirrhosis of liver with ascites (Primary)  -     Ambulatory Referral to General Surgery    44-year-old male with recurrent ascites due to alcoholic cirrhosis.  He has been referred to me for evaluation for a paracentesis catheter placement.  He has paracentesis scheduled for this Friday, and for the next 2 weeks.  While paracentesis catheter placement would allow him to drain the ascites at home, it does pose risks for bacterial peritonitis and catheter related complications.  I will refer him to the [facility] to see hepatology to undergo liver transplantation evaluation.  He would likely benefit from a TIPS procedure to help manage the ascites.  I will see him back in 3 weeks to further evaluate and discuss the benefits of paracentesis dialysis catheter placement.         Thank you for the referral and trusting me with the care of your patient.    Jorden Perez MD, FACS  General Surgery  7/24/2025  12:12 CDT    Please note that portions of this note were completed with a voice recognition program.

## 2025-07-25 ENCOUNTER — HOSPITAL ENCOUNTER (OUTPATIENT)
Dept: ULTRASOUND IMAGING | Age: 45
Discharge: HOME | End: 2025-07-25
Payer: MEDICAID

## 2025-07-25 VITALS
TEMPERATURE: 97 F | HEART RATE: 116 BPM | DIASTOLIC BLOOD PRESSURE: 86 MMHG | RESPIRATION RATE: 18 BRPM | SYSTOLIC BLOOD PRESSURE: 121 MMHG | OXYGEN SATURATION: 99 %

## 2025-07-25 DIAGNOSIS — K70.31 ALCOHOLIC CIRRHOSIS OF LIVER WITH ASCITES (HCC): ICD-10-CM

## 2025-07-25 PROCEDURE — 76705 ECHO EXAM OF ABDOMEN: CPT

## 2025-07-25 ASSESSMENT — PAIN - FUNCTIONAL ASSESSMENT: PAIN_FUNCTIONAL_ASSESSMENT: 0-10

## 2025-07-25 NOTE — PROGRESS NOTES
Pt arrived in [facility] Rm 1. VSS. Consent signed. Medication and HX reviewed.

## 2025-07-25 NOTE — PROGRESS NOTES
Pt back from ultrasound. Per ultrasound, no para was needed today as there was not enough fluid noted on initial scan. Pt was brought back to [facility] and allowed to leave. Pt would like to leave his appointment on the schedule for next week in case he needs a para by then. Pt planning to move his jeovany to a more PRN / \"when I need it\" basis.

## 2025-07-27 DIAGNOSIS — K70.31 ALCOHOLIC CIRRHOSIS OF LIVER WITH ASCITES (HCC): Primary | ICD-10-CM

## 2025-08-01 ENCOUNTER — TELEPHONE (OUTPATIENT)
Dept: GASTROENTEROLOGY | Age: 45
End: 2025-08-01

## 2025-08-01 NOTE — TELEPHONE ENCOUNTER
08-1-2025 Cynthia mother called complains that the patient isn't holding fluid and didn't get the Paracentesis last week or this week.  She complains that he has lost about 10 lbs and is losing hair on his arm, legs and hair on head is thinning.       Discussed with CT APRN   Recommend that they contact his PCP       Called Cynthia mother notified of recommendation as per CT APRN

## 2025-08-08 ENCOUNTER — HOSPITAL ENCOUNTER (OUTPATIENT)
Dept: ULTRASOUND IMAGING | Age: 45
Discharge: HOME | End: 2025-08-08
Payer: MEDICAID

## 2025-08-08 VITALS
HEART RATE: 93 BPM | TEMPERATURE: 98 F | OXYGEN SATURATION: 100 % | RESPIRATION RATE: 16 BRPM | DIASTOLIC BLOOD PRESSURE: 80 MMHG | SYSTOLIC BLOOD PRESSURE: 113 MMHG

## 2025-08-08 DIAGNOSIS — K70.31 ALCOHOLIC CIRRHOSIS OF LIVER WITH ASCITES (HCC): ICD-10-CM

## 2025-08-08 PROCEDURE — 76705 ECHO EXAM OF ABDOMEN: CPT

## 2025-08-08 ASSESSMENT — PAIN - FUNCTIONAL ASSESSMENT: PAIN_FUNCTIONAL_ASSESSMENT: NONE - DENIES PAIN

## (undated) DEVICE — ENDO KIT,LOURDES HOSPITAL: Brand: MEDLINE INDUSTRIES, INC.